# Patient Record
Sex: MALE | Race: WHITE | NOT HISPANIC OR LATINO
[De-identification: names, ages, dates, MRNs, and addresses within clinical notes are randomized per-mention and may not be internally consistent; named-entity substitution may affect disease eponyms.]

---

## 2018-08-17 ENCOUNTER — NON-APPOINTMENT (OUTPATIENT)
Age: 73
End: 2018-08-17

## 2018-08-17 ENCOUNTER — APPOINTMENT (OUTPATIENT)
Dept: CARDIOLOGY | Facility: CLINIC | Age: 73
End: 2018-08-17
Payer: MEDICARE

## 2018-08-17 VITALS
WEIGHT: 163 LBS | BODY MASS INDEX: 26.2 KG/M2 | DIASTOLIC BLOOD PRESSURE: 75 MMHG | OXYGEN SATURATION: 96 % | HEIGHT: 66 IN | HEART RATE: 65 BPM | SYSTOLIC BLOOD PRESSURE: 132 MMHG

## 2018-08-17 DIAGNOSIS — I25.2 OLD MYOCARDIAL INFARCTION: ICD-10-CM

## 2018-08-17 PROCEDURE — 93000 ELECTROCARDIOGRAM COMPLETE: CPT

## 2018-08-17 PROCEDURE — 99204 OFFICE O/P NEW MOD 45 MIN: CPT | Mod: 25

## 2018-08-29 ENCOUNTER — APPOINTMENT (OUTPATIENT)
Dept: CARDIOLOGY | Facility: CLINIC | Age: 73
End: 2018-08-29
Payer: MEDICARE

## 2018-09-24 ENCOUNTER — EMERGENCY (EMERGENCY)
Facility: HOSPITAL | Age: 73
LOS: 1 days | Discharge: HOME | End: 2018-09-24
Attending: EMERGENCY MEDICINE
Payer: MEDICARE

## 2018-09-24 VITALS — WEIGHT: 164.91 LBS | HEIGHT: 66 IN

## 2018-09-24 VITALS
HEART RATE: 60 BPM | OXYGEN SATURATION: 97 % | TEMPERATURE: 98 F | RESPIRATION RATE: 17 BRPM | SYSTOLIC BLOOD PRESSURE: 150 MMHG | DIASTOLIC BLOOD PRESSURE: 74 MMHG

## 2018-09-24 LAB
ALBUMIN SERPL ELPH-MCNC: 4.4 G/DL — SIGNIFICANT CHANGE UP (ref 3.3–5.2)
ALP SERPL-CCNC: 52 U/L — SIGNIFICANT CHANGE UP (ref 40–120)
ALT FLD-CCNC: 33 U/L — SIGNIFICANT CHANGE UP
ANION GAP SERPL CALC-SCNC: 9 MMOL/L — SIGNIFICANT CHANGE UP (ref 5–17)
APPEARANCE UR: CLEAR — SIGNIFICANT CHANGE UP
APTT BLD: 30.8 SEC — SIGNIFICANT CHANGE UP (ref 27.5–37.4)
AST SERPL-CCNC: 28 U/L — SIGNIFICANT CHANGE UP
BASOPHILS # BLD AUTO: 0 K/UL — SIGNIFICANT CHANGE UP (ref 0–0.2)
BASOPHILS NFR BLD AUTO: 0.5 % — SIGNIFICANT CHANGE UP (ref 0–2)
BILIRUB SERPL-MCNC: 0.2 MG/DL — LOW (ref 0.4–2)
BILIRUB UR-MCNC: NEGATIVE — SIGNIFICANT CHANGE UP
BUN SERPL-MCNC: 22 MG/DL — HIGH (ref 8–20)
CALCIUM SERPL-MCNC: 9.4 MG/DL — SIGNIFICANT CHANGE UP (ref 8.6–10.2)
CHLORIDE SERPL-SCNC: 99 MMOL/L — SIGNIFICANT CHANGE UP (ref 98–107)
CO2 SERPL-SCNC: 31 MMOL/L — HIGH (ref 22–29)
COLOR SPEC: YELLOW — SIGNIFICANT CHANGE UP
CREAT SERPL-MCNC: 0.89 MG/DL — SIGNIFICANT CHANGE UP (ref 0.5–1.3)
DIFF PNL FLD: ABNORMAL
EOSINOPHIL # BLD AUTO: 0.2 K/UL — SIGNIFICANT CHANGE UP (ref 0–0.5)
EOSINOPHIL NFR BLD AUTO: 3.9 % — SIGNIFICANT CHANGE UP (ref 0–5)
GLUCOSE SERPL-MCNC: 92 MG/DL — SIGNIFICANT CHANGE UP (ref 70–115)
GLUCOSE UR QL: NEGATIVE MG/DL — SIGNIFICANT CHANGE UP
HCT VFR BLD CALC: 43.6 % — SIGNIFICANT CHANGE UP (ref 42–52)
HGB BLD-MCNC: 14.1 G/DL — SIGNIFICANT CHANGE UP (ref 14–18)
INR BLD: 0.95 RATIO — SIGNIFICANT CHANGE UP (ref 0.88–1.16)
KETONES UR-MCNC: NEGATIVE — SIGNIFICANT CHANGE UP
LEUKOCYTE ESTERASE UR-ACNC: ABNORMAL
LYMPHOCYTES # BLD AUTO: 1.8 K/UL — SIGNIFICANT CHANGE UP (ref 1–4.8)
LYMPHOCYTES # BLD AUTO: 32.6 % — SIGNIFICANT CHANGE UP (ref 20–55)
MCHC RBC-ENTMCNC: 29.7 PG — SIGNIFICANT CHANGE UP (ref 27–31)
MCHC RBC-ENTMCNC: 32.3 G/DL — SIGNIFICANT CHANGE UP (ref 32–36)
MCV RBC AUTO: 91.8 FL — SIGNIFICANT CHANGE UP (ref 80–94)
MONOCYTES # BLD AUTO: 0.5 K/UL — SIGNIFICANT CHANGE UP (ref 0–0.8)
MONOCYTES NFR BLD AUTO: 9.1 % — SIGNIFICANT CHANGE UP (ref 3–10)
NEUTROPHILS # BLD AUTO: 3 K/UL — SIGNIFICANT CHANGE UP (ref 1.8–8)
NEUTROPHILS NFR BLD AUTO: 53.7 % — SIGNIFICANT CHANGE UP (ref 37–73)
NITRITE UR-MCNC: NEGATIVE — SIGNIFICANT CHANGE UP
PH UR: 8 — SIGNIFICANT CHANGE UP (ref 5–8)
PLATELET # BLD AUTO: 246 K/UL — SIGNIFICANT CHANGE UP (ref 150–400)
POTASSIUM SERPL-MCNC: 4.2 MMOL/L — SIGNIFICANT CHANGE UP (ref 3.5–5.3)
POTASSIUM SERPL-SCNC: 4.2 MMOL/L — SIGNIFICANT CHANGE UP (ref 3.5–5.3)
PROT SERPL-MCNC: 7.6 G/DL — SIGNIFICANT CHANGE UP (ref 6.6–8.7)
PROT UR-MCNC: 30 MG/DL
PROTHROM AB SERPL-ACNC: 10.4 SEC — SIGNIFICANT CHANGE UP (ref 9.8–12.7)
RBC # BLD: 4.75 M/UL — SIGNIFICANT CHANGE UP (ref 4.6–6.2)
RBC # FLD: 13.4 % — SIGNIFICANT CHANGE UP (ref 11–15.6)
SODIUM SERPL-SCNC: 139 MMOL/L — SIGNIFICANT CHANGE UP (ref 135–145)
SP GR SPEC: 1.01 — SIGNIFICANT CHANGE UP (ref 1.01–1.02)
UROBILINOGEN FLD QL: NEGATIVE MG/DL — SIGNIFICANT CHANGE UP
WBC # BLD: 5.6 K/UL — SIGNIFICANT CHANGE UP (ref 4.8–10.8)
WBC # FLD AUTO: 5.6 K/UL — SIGNIFICANT CHANGE UP (ref 4.8–10.8)

## 2018-09-24 PROCEDURE — 80053 COMPREHEN METABOLIC PANEL: CPT

## 2018-09-24 PROCEDURE — 85610 PROTHROMBIN TIME: CPT

## 2018-09-24 PROCEDURE — 85730 THROMBOPLASTIN TIME PARTIAL: CPT

## 2018-09-24 PROCEDURE — 36415 COLL VENOUS BLD VENIPUNCTURE: CPT

## 2018-09-24 PROCEDURE — 99284 EMERGENCY DEPT VISIT MOD MDM: CPT | Mod: 25

## 2018-09-24 PROCEDURE — 99284 EMERGENCY DEPT VISIT MOD MDM: CPT

## 2018-09-24 PROCEDURE — 85027 COMPLETE CBC AUTOMATED: CPT

## 2018-09-24 PROCEDURE — 74176 CT ABD & PELVIS W/O CONTRAST: CPT | Mod: 26

## 2018-09-24 PROCEDURE — 74176 CT ABD & PELVIS W/O CONTRAST: CPT

## 2018-09-24 PROCEDURE — 81001 URINALYSIS AUTO W/SCOPE: CPT

## 2018-09-24 RX ORDER — ACETAMINOPHEN 500 MG
2 TABLET ORAL
Qty: 18 | Refills: 0
Start: 2018-09-24 | End: 2018-09-26

## 2018-09-24 RX ORDER — CEPHALEXIN 500 MG
1 CAPSULE ORAL
Qty: 28 | Refills: 0
Start: 2018-09-24 | End: 2018-09-30

## 2018-09-24 RX ORDER — TAMSULOSIN HYDROCHLORIDE 0.4 MG/1
1 CAPSULE ORAL
Qty: 14 | Refills: 0
Start: 2018-09-24 | End: 2018-10-07

## 2018-09-24 NOTE — ED PROVIDER NOTE - PROGRESS NOTE DETAILS
labs and UA reviewed +hematuria, pending CT CT + recently passed stone, UA +UTI, will RX Keflex, flomax, NSAIDS, f/u with urology Pt well appearing. Likely recently passed stone and pain is controlled. Mild pyuria, and qwill give Abx.    exam performed, uneven sized testicles, L atrophic, no swelling, no erythema/induration, +cremasteric reflex Pt well appearing. Likely recently passed stone and pain is controlled. Mild pyuria, and will give Abx.    exam performed, uneven sized testicles, L atrophic, no swelling, no erythema/induration, +cremasteric reflex. Pt stable for dc

## 2018-09-24 NOTE — ED STATDOCS - OBJECTIVE STATEMENT
Telemedicine assessment was conducted (using real time 2 way audio-video technology) by Dr. Olman Dhaliwal located at [address] [place] [address]  ++++++++++++++++++++++++  Pertinent patient history and initial plan:  74 yo male with hx htn, cad with stents, on plavix, c/o few days of left lower back and left testicular pain. +hematuria this am. denies fever or chills. denies nausea or vomiting  no acute distress.   will check labs, UA, ct to r/o renal colic  unable to perform  exam due to teleconference  Patient seen by me in intake for initial assessment and ordering. Physician on site to follow results and further evaluate and treat patient.

## 2018-09-24 NOTE — ED PROVIDER NOTE - CARE PLAN
Principal Discharge DX:	Hematuria Principal Discharge DX:	Hematuria  Secondary Diagnosis:	Kidney stone

## 2018-09-24 NOTE — ED PROVIDER NOTE - PSH
CAD S/P percutaneous coronary angioplasty  Stent to Proximal LAD and Proximal CX  H/O arthroscopy of left knee    H/O foot surgery  Right Foot  H/O inguinal hernia repair    Hx of cholecystectomy

## 2018-09-24 NOTE — ED PROVIDER NOTE - ATTENDING CONTRIBUTION TO CARE
I, Olman Sutton, performed the initial face to face bedside interview with this patient regarding history of present illness, review of symptoms and relevant past medical, social and family history.  I completed an independent physical examination.  I was the initial provider who evaluated this patient. I have signed out the follow up of any pending tests (i.e. labs, radiological studies) to the ACP.  I have communicated the patient’s plan of care and disposition with the ACP.

## 2018-09-24 NOTE — ED PROVIDER NOTE - PMH
Asthma    CAD (coronary artery disease)    Dyslipidemia    GERD (gastroesophageal reflux disease)    Myocardial infarction  x 3  Rheumatoid arthritis    Tinnitus

## 2018-09-24 NOTE — ED PROVIDER NOTE - OBJECTIVE STATEMENT
74 y/o M presents to ED c/o hematuria onset today. Has also had intermittent lower back pain (rated 6/10) for a few weeks, which he initially attributed to his arthritis. Has also had L testicular pain for the past month, which he was evaluated for by his PMD. Denies fevers, chills, nausea, vomiting, numbness or tingling in the legs. No further acute complaints at this time.

## 2018-09-24 NOTE — ED ADULT NURSE NOTE - OBJECTIVE STATEMENT
pt arrived with left side flank pain 5/10 that started today pt states blood in urine denies fever, denies vomtiing

## 2018-09-24 NOTE — ED ADULT NURSE NOTE - NSIMPLEMENTINTERV_GEN_ALL_ED
Implemented All Universal Safety Interventions:  [place] to call system. Call bell, personal items and telephone within reach. Instruct patient to call for assistance. Room bathroom lighting operational. Non-slip footwear when patient is off stretcher. Physically safe environment: no spills, clutter or unnecessary equipment. Stretcher in lowest position, wheels locked, appropriate side rails in place.

## 2018-10-09 ENCOUNTER — APPOINTMENT (OUTPATIENT)
Dept: RHEUMATOLOGY | Facility: CLINIC | Age: 73
End: 2018-10-09
Payer: MEDICARE

## 2018-10-09 VITALS
SYSTOLIC BLOOD PRESSURE: 151 MMHG | BODY MASS INDEX: 25.71 KG/M2 | HEART RATE: 66 BPM | WEIGHT: 160 LBS | RESPIRATION RATE: 16 BRPM | HEIGHT: 66 IN | DIASTOLIC BLOOD PRESSURE: 79 MMHG | TEMPERATURE: 97.8 F | OXYGEN SATURATION: 100 %

## 2018-10-09 DIAGNOSIS — Z87.39 PERSONAL HISTORY OF OTHER DISEASES OF THE MUSCULOSKELETAL SYSTEM AND CONNECTIVE TISSUE: ICD-10-CM

## 2018-10-09 PROCEDURE — 99204 OFFICE O/P NEW MOD 45 MIN: CPT | Mod: 25

## 2018-10-09 PROCEDURE — 36415 COLL VENOUS BLD VENIPUNCTURE: CPT

## 2018-10-26 ENCOUNTER — APPOINTMENT (OUTPATIENT)
Dept: CARDIOLOGY | Facility: CLINIC | Age: 73
End: 2018-10-26
Payer: MEDICARE

## 2018-10-26 PROCEDURE — 93978 VASCULAR STUDY: CPT

## 2018-10-26 PROCEDURE — 93306 TTE W/DOPPLER COMPLETE: CPT

## 2018-11-16 ENCOUNTER — NON-APPOINTMENT (OUTPATIENT)
Age: 73
End: 2018-11-16

## 2018-11-16 ENCOUNTER — APPOINTMENT (OUTPATIENT)
Dept: CARDIOLOGY | Facility: CLINIC | Age: 73
End: 2018-11-16
Payer: MEDICARE

## 2018-11-16 VITALS
BODY MASS INDEX: 26.2 KG/M2 | WEIGHT: 163 LBS | HEIGHT: 66 IN | SYSTOLIC BLOOD PRESSURE: 148 MMHG | HEART RATE: 62 BPM | OXYGEN SATURATION: 96 % | DIASTOLIC BLOOD PRESSURE: 78 MMHG

## 2018-11-16 PROCEDURE — 99215 OFFICE O/P EST HI 40 MIN: CPT | Mod: 25

## 2018-11-16 PROCEDURE — 93000 ELECTROCARDIOGRAM COMPLETE: CPT

## 2018-11-16 NOTE — REVIEW OF SYSTEMS
[see HPI] : see HPI [Shortness Of Breath] : shortness of breath [Dyspnea on exertion] : not dyspnea during exertion [Chest  Pressure] : no chest pressure [Chest Pain] : no chest pain [Lower Ext Edema] : no extremity edema [Palpitations] : no palpitations [Cough] : cough [Wheezing] : wheezing [Joint Pain] : joint pain [Muscle Cramps] : muscle cramps [Easy Bleeding] : a tendency for easy bleeding [Easy Bruising] : no tendency for easy bruising [Negative] : Endocrine

## 2018-11-16 NOTE — HISTORY OF PRESENT ILLNESS
[FreeTextEntry1] : Historical HPI\par This is a 72 year old male with history of MI, coronary artery disease, multiple coronary artery stents, hypertension, asthma, and hypercholesterolemia presents to establish care with a new cardiologist. Patient states that he has not seen a cardiologist in a few years. His PCP is Dr. Rankin in [place]. [place] and is trying to establish care with physicians closer to his home. Patient denies any chest pain, SOB, or palpitations. He states he walks almost daily with no exertional chest pain or shortness of breath. He is compliant with his medications. He takes Plavix. He used to be on Aspirin and Plavix but patient gets frequent nose bleeds so the Aspirin was stopped in the past. Patient was on Crestor and Atorvastatin in the past but they were stopped due to complaints of muscle pains. Patient has been on pravastatin 80mg with no complaints. \par \par Today's HPI:\par Patient now on Zetia for cholesterol because he cannot tolerate statin therapy. Patient denies chest pain or palpitations. He states he walks the dogs for about 1 mile daily without symptoms. He does admit to mild SOB with walking upstairs.

## 2018-11-16 NOTE — PHYSICAL EXAM
[General Appearance - Well Developed] : well developed [Normal Appearance] : normal appearance [Well Groomed] : well groomed [General Appearance - Well Nourished] : well nourished [General Appearance - In No Acute Distress] : no acute distress [Normal Conjunctiva] : the conjunctiva exhibited no abnormalities [Eyelids - No Xanthelasma] : the eyelids demonstrated no xanthelasmas [Normal Oral Mucosa] : normal oral mucosa [No Oral Pallor] : no oral pallor [No Oral Cyanosis] : no oral cyanosis [Respiration, Rhythm And Depth] : normal respiratory rhythm and effort [Exaggerated Use Of Accessory Muscles For Inspiration] : no accessory muscle use [Auscultation Breath Sounds / Voice Sounds] : lungs were clear to auscultation bilaterally [Heart Rate And Rhythm] : heart rate and rhythm were normal [Heart Sounds] : normal S1 and S2 [Murmurs] : no murmurs present [Arterial Pulses Normal] : the arterial pulses were normal [Edema] : no peripheral edema present [Bowel Sounds] : normal bowel sounds [Abdomen Soft] : soft [Abdomen Tenderness] : non-tender [FreeTextEntry1] : ambulates with a cane. [Nail Clubbing] : no clubbing of the fingernails [Cyanosis, Localized] : no localized cyanosis [Skin Turgor] : normal skin turgor [] : no rash [Impaired Insight] : insight and judgment were intact [Affect] : the affect was normal [Memory Recent] : recent memory was not impaired

## 2018-11-16 NOTE — DISCUSSION/SUMMARY
[FreeTextEntry1] : 1. Coronary Artery Disease: patient has previous stents. Repeat cardiac catheterization in 2014 demonstrates patency of stents. Continue Plavix, Zetia, and Metoprolol (high risk medication with no signs of toxicity on ECG). Encouraged continuation of daily walking as long as chest pain free.\par \par 2. Hypercholesterolemia: uncontrolled. Goal LDL for this patient would be less than 70. Patient was on Zetia in the past, however he states it was stopped for some unknown reason. Recommend lipid panel in at next visit.\par \par 3. Former Smoker: patient underwent screening US of AAA on 10/26/2018. It was normal.

## 2018-11-21 ENCOUNTER — MEDICATION RENEWAL (OUTPATIENT)
Age: 73
End: 2018-11-21

## 2018-12-06 ENCOUNTER — RX CHANGE (OUTPATIENT)
Age: 73
End: 2018-12-06

## 2018-12-11 ENCOUNTER — APPOINTMENT (OUTPATIENT)
Dept: RHEUMATOLOGY | Facility: CLINIC | Age: 73
End: 2018-12-11
Payer: MEDICARE

## 2018-12-11 PROCEDURE — 99214 OFFICE O/P EST MOD 30 MIN: CPT

## 2019-01-02 ENCOUNTER — RX CHANGE (OUTPATIENT)
Age: 74
End: 2019-01-02

## 2019-01-15 ENCOUNTER — APPOINTMENT (OUTPATIENT)
Dept: RHEUMATOLOGY | Facility: CLINIC | Age: 74
End: 2019-01-15
Payer: MEDICARE

## 2019-01-15 VITALS
SYSTOLIC BLOOD PRESSURE: 117 MMHG | OXYGEN SATURATION: 95 % | DIASTOLIC BLOOD PRESSURE: 70 MMHG | HEART RATE: 82 BPM | RESPIRATION RATE: 17 BRPM

## 2019-01-15 DIAGNOSIS — M19.042 PRIMARY OSTEOARTHRITIS, RIGHT HAND: ICD-10-CM

## 2019-01-15 DIAGNOSIS — M19.041 PRIMARY OSTEOARTHRITIS, RIGHT HAND: ICD-10-CM

## 2019-01-15 PROCEDURE — 99214 OFFICE O/P EST MOD 30 MIN: CPT

## 2019-01-17 ENCOUNTER — CHART COPY (OUTPATIENT)
Age: 74
End: 2019-01-17

## 2019-01-24 LAB
A PHAGOCYTOPH IGG TITR SER IF: NORMAL TITER
ALBUMIN SERPL ELPH-MCNC: 4.7 G/DL
ALP BLD-CCNC: 57 U/L
ALT SERPL-CCNC: 36 U/L
ANA SER IF-ACNC: NEGATIVE
ANION GAP SERPL CALC-SCNC: 14 MMOL/L
AST SERPL-CCNC: 29 U/L
B BURGDOR AB SER QL IA: NEGATIVE
B BURGDOR AB SER-IMP: NEGATIVE
B BURGDOR IGM PATRN SER IB-IMP: NEGATIVE
B BURGDOR18/20KD IGM SER QL IB: NORMAL
B BURGDOR18KD IGG SER QL IB: NORMAL
B BURGDOR23KD IGG SER QL IB: NORMAL
B BURGDOR23KD IGM SER QL IB: NORMAL
B BURGDOR28KD AB SER QL IB: NORMAL
B BURGDOR28KD IGG SER QL IB: NORMAL
B BURGDOR30KD AB SER QL IB: NORMAL
B BURGDOR30KD IGG SER QL IB: NORMAL
B BURGDOR31KD IGG SER QL IB: NORMAL
B BURGDOR31KD IGM SER QL IB: NORMAL
B BURGDOR39KD IGG SER QL IB: NORMAL
B BURGDOR39KD IGM SER QL IB: NORMAL
B BURGDOR41KD IGG SER QL IB: PRESENT
B BURGDOR41KD IGM SER QL IB: NORMAL
B BURGDOR45KD AB SER QL IB: NORMAL
B BURGDOR45KD IGG SER QL IB: NORMAL
B BURGDOR58KD AB SER QL IB: NORMAL
B BURGDOR58KD IGG SER QL IB: NORMAL
B BURGDOR66KD IGG SER QL IB: NORMAL
B BURGDOR66KD IGM SER QL IB: NORMAL
B BURGDOR93KD IGG SER QL IB: NORMAL
B BURGDOR93KD IGM SER QL IB: NORMAL
B MICROTI IGG TITR SER: NORMAL TITER
BASOPHILS # BLD AUTO: 0.02 K/UL
BASOPHILS NFR BLD AUTO: 0.4 %
BILIRUB SERPL-MCNC: 0.3 MG/DL
BUN SERPL-MCNC: 20 MG/DL
CALCIUM SERPL-MCNC: 10.4 MG/DL
CCP AB SER IA-ACNC: <8 UNITS
CHLORIDE SERPL-SCNC: 101 MMOL/L
CO2 SERPL-SCNC: 27 MMOL/L
CREAT SERPL-MCNC: 1.12 MG/DL
CRP SERPL-MCNC: 0.47 MG/DL
E CHAFFEENSIS IGG TITR SER IF: NORMAL TITER
EOSINOPHIL # BLD AUTO: 0.23 K/UL
EOSINOPHIL NFR BLD AUTO: 4.6 %
ERYTHROCYTE [SEDIMENTATION RATE] IN BLOOD BY WESTERGREN METHOD: 8 MM/HR
GLUCOSE SERPL-MCNC: 106 MG/DL
HCT VFR BLD CALC: 43.4 %
HGB BLD-MCNC: 14.4 G/DL
IMM GRANULOCYTES NFR BLD AUTO: 0.2 %
LYMPHOCYTES # BLD AUTO: 1.62 K/UL
LYMPHOCYTES NFR BLD AUTO: 32.1 %
MAN DIFF?: NORMAL
MCHC RBC-ENTMCNC: 30.7 PG
MCHC RBC-ENTMCNC: 33.2 GM/DL
MCV RBC AUTO: 92.5 FL
MONOCYTES # BLD AUTO: 0.42 K/UL
MONOCYTES NFR BLD AUTO: 8.3 %
NEUTROPHILS # BLD AUTO: 2.74 K/UL
NEUTROPHILS NFR BLD AUTO: 54.4 %
PLATELET # BLD AUTO: 287 K/UL
POTASSIUM SERPL-SCNC: 5.6 MMOL/L
PROT SERPL-MCNC: 7.4 G/DL
RBC # BLD: 4.69 M/UL
RBC # FLD: 13.8 %
RF+CCP IGG SER-IMP: NEGATIVE
RHEUMATOID FACT SER QL: <10 IU/ML
SODIUM SERPL-SCNC: 142 MMOL/L
URATE SERPL-MCNC: 4.9 MG/DL
WBC # FLD AUTO: 5.04 K/UL

## 2019-02-12 ENCOUNTER — RX RENEWAL (OUTPATIENT)
Age: 74
End: 2019-02-12

## 2019-02-15 ENCOUNTER — APPOINTMENT (OUTPATIENT)
Dept: CARDIOLOGY | Facility: CLINIC | Age: 74
End: 2019-02-15
Payer: MEDICARE

## 2019-02-15 ENCOUNTER — NON-APPOINTMENT (OUTPATIENT)
Age: 74
End: 2019-02-15

## 2019-02-15 VITALS
HEIGHT: 66 IN | SYSTOLIC BLOOD PRESSURE: 138 MMHG | WEIGHT: 156 LBS | BODY MASS INDEX: 25.07 KG/M2 | DIASTOLIC BLOOD PRESSURE: 78 MMHG | HEART RATE: 68 BPM | OXYGEN SATURATION: 99 %

## 2019-02-15 PROCEDURE — 99215 OFFICE O/P EST HI 40 MIN: CPT | Mod: 25

## 2019-02-15 PROCEDURE — 93000 ELECTROCARDIOGRAM COMPLETE: CPT

## 2019-02-15 NOTE — REVIEW OF SYSTEMS
[see HPI] : see HPI [Shortness Of Breath] : shortness of breath [Cough] : cough [Wheezing] : wheezing [Joint Pain] : joint pain [Muscle Cramps] : muscle cramps [Easy Bleeding] : a tendency for easy bleeding [Negative] : Endocrine [Dyspnea on exertion] : not dyspnea during exertion [Chest  Pressure] : no chest pressure [Chest Pain] : no chest pain [Lower Ext Edema] : no extremity edema [Palpitations] : no palpitations [Easy Bruising] : no tendency for easy bruising

## 2019-02-15 NOTE — DISCUSSION/SUMMARY
[FreeTextEntry1] : 1. Coronary Artery Disease: Most recent cardiac catheterization was performed 5/1/2015 which demonstrated mild diffuse disease of the LAD with a patent stent that was previously placed. The Cx had mild diffused disease, the 1st marginal had a 99% lesion which underwent PCI with ROQUE, and there was 100% stenosis of the RCA and the distal vessel fills via collaterals from the left system. Continue Plavix, Zetia, and Metoprolol (high risk medication with no signs of toxicity on ECG). Encouraged continuation of daily walking as long as chest pain free.\par \par 2. Hypercholesterolemia: uncontrolled. Goal LDL for this patient would be less than 70. Patient was on multiple statins in the past and he developed significant myalgias requiring stoppage of the medicaitons. Currently on Zetia. Recommend lipid panel in at next visit.\par \par 3. Former Smoker: patient underwent screening US of AAA on 10/26/2018. It was normal. \par \par Patient is cleared from a cardiology standpoint to undergo surgical intervention on his left testicle. Please note I don't have access to pre-surgical lab testing. It is OK to stop his Plavix 5 days prior to the procedure and this medication should be resumed as soon as adequate hemostasis is obtained, preferably within 24 hours of the procedure.

## 2019-02-15 NOTE — PHYSICAL EXAM
[General Appearance - Well Developed] : well developed [Normal Appearance] : normal appearance [Well Groomed] : well groomed [General Appearance - Well Nourished] : well nourished [General Appearance - In No Acute Distress] : no acute distress [Normal Conjunctiva] : the conjunctiva exhibited no abnormalities [Eyelids - No Xanthelasma] : the eyelids demonstrated no xanthelasmas [Normal Oral Mucosa] : normal oral mucosa [No Oral Pallor] : no oral pallor [No Oral Cyanosis] : no oral cyanosis [Respiration, Rhythm And Depth] : normal respiratory rhythm and effort [Exaggerated Use Of Accessory Muscles For Inspiration] : no accessory muscle use [Auscultation Breath Sounds / Voice Sounds] : lungs were clear to auscultation bilaterally [Heart Rate And Rhythm] : heart rate and rhythm were normal [Heart Sounds] : normal S1 and S2 [Murmurs] : no murmurs present [Arterial Pulses Normal] : the arterial pulses were normal [Edema] : no peripheral edema present [Bowel Sounds] : normal bowel sounds [Abdomen Soft] : soft [Abdomen Tenderness] : non-tender [Nail Clubbing] : no clubbing of the fingernails [Cyanosis, Localized] : no localized cyanosis [Skin Turgor] : normal skin turgor [] : no rash [Impaired Insight] : insight and judgment were intact [Affect] : the affect was normal [Memory Recent] : recent memory was not impaired [FreeTextEntry1] : ambulates with a cane.

## 2019-02-15 NOTE — HISTORY OF PRESENT ILLNESS
[FreeTextEntry1] : Historical HPI\par This is a 72 year old male with history of MI, coronary artery disease, multiple coronary artery stents, hypertension, asthma, and hypercholesterolemia presents to establish care with a new cardiologist. Patient states that he has not seen a cardiologist in a few years. His PCP is Dr. Rankin in [place]. The patient moved out east and is trying to establish care with physicians closer to his home. Patient denies any chest pain, SOB, or palpitations. He states he walks almost daily with no exertional chest pain or shortness of breath. He is compliant with his medications. He takes Plavix. He used to be on Aspirin and Plavix but patient gets frequent nose bleeds so the Aspirin was stopped in the past. Patient was on Crestor, Pravastatin, and Atorvastatin in the past but they were stopped due to complaints of muscle pains. \par \par Today's HPI:\par Patient now on Zetia for cholesterol because he cannot tolerate statin therapy. Patient denies chest pain or palpitations. He states he walks the dogs for about 1 mile daily without symptoms. He does admit to mild SOB with walking upstairs. Patient is going to have his left testicle surgically removed on February 26, 2019 and need cardiac clearance.

## 2019-04-09 ENCOUNTER — APPOINTMENT (OUTPATIENT)
Dept: RHEUMATOLOGY | Facility: CLINIC | Age: 74
End: 2019-04-09
Payer: MEDICARE

## 2019-04-09 VITALS
HEART RATE: 63 BPM | TEMPERATURE: 97.5 F | OXYGEN SATURATION: 98 % | DIASTOLIC BLOOD PRESSURE: 70 MMHG | SYSTOLIC BLOOD PRESSURE: 151 MMHG

## 2019-04-09 PROCEDURE — 99213 OFFICE O/P EST LOW 20 MIN: CPT

## 2019-05-22 ENCOUNTER — NON-APPOINTMENT (OUTPATIENT)
Age: 74
End: 2019-05-22

## 2019-05-22 ENCOUNTER — APPOINTMENT (OUTPATIENT)
Age: 74
End: 2019-05-22
Payer: MEDICARE

## 2019-05-22 VITALS
HEIGHT: 66 IN | HEART RATE: 72 BPM | SYSTOLIC BLOOD PRESSURE: 127 MMHG | BODY MASS INDEX: 25.71 KG/M2 | DIASTOLIC BLOOD PRESSURE: 77 MMHG | OXYGEN SATURATION: 98 % | WEIGHT: 160 LBS

## 2019-05-22 PROCEDURE — 99215 OFFICE O/P EST HI 40 MIN: CPT | Mod: 25

## 2019-05-22 PROCEDURE — 93000 ELECTROCARDIOGRAM COMPLETE: CPT

## 2019-05-22 NOTE — HISTORY OF PRESENT ILLNESS
[FreeTextEntry1] : Historical HPI\par This is a 72 year old male with history of MI, coronary artery disease, multiple coronary artery stents, hypertension, asthma, and hypercholesterolemia presents to establish care with a new cardiologist. Patient states that he has not seen a cardiologist in a few years. His PCP is Dr. Rankin in [place]. The patient moved out east and is trying to establish care with physicians closer to his home. Patient denies any chest pain, SOB, or palpitations. He states he walks almost daily with no exertional chest pain or shortness of breath. He is compliant with his medications. He takes Plavix. He used to be on Aspirin and Plavix but patient gets frequent nose bleeds so the Aspirin was stopped in the past. Patient was on Crestor, Pravastatin, and Atorvastatin in the past but they were stopped due to complaints of muscle pains. \par \par Today's HPI:\par Patient now on Zetia for cholesterol because he cannot tolerate statin therapy. Patient denies chest pain or palpitations. He states he walks the dogs for about 1 mile daily without symptoms. He does admit to mild SOB with walking upstairs.

## 2019-05-22 NOTE — DISCUSSION/SUMMARY
[FreeTextEntry1] : 1. Coronary Artery Disease: Most recent cardiac catheterization was performed 5/1/2015 which demonstrated mild diffuse disease of the LAD with a patent stent that was previously placed. The Cx had mild diffused disease, the 1st marginal had a 99% lesion which underwent PCI with ROQUE, and there was 100% stenosis of the RCA and the distal vessel fills via collaterals from the left system. Continue Plavix, Zetia, and Metoprolol (high risk medication with no signs of toxicity on ECG). Encouraged continuation of daily walking as long as chest pain free.\par \par 2. Hypercholesterolemia: uncontrolled. Goal LDL for this patient would be less than 70. Patient was on multiple statins in the past and he developed significant myalgias requiring stoppage of the medications. Currently on Zetia. Recommend lipid panel.\par \par 3. Former Smoker: patient underwent screening US of AAA on 10/26/2018. It was normal. \par \par Follow up in 3 months or sooner if needed.

## 2019-08-12 ENCOUNTER — APPOINTMENT (OUTPATIENT)
Dept: RHEUMATOLOGY | Facility: CLINIC | Age: 74
End: 2019-08-12

## 2019-09-04 ENCOUNTER — APPOINTMENT (OUTPATIENT)
Dept: CARDIOLOGY | Facility: CLINIC | Age: 74
End: 2019-09-04

## 2019-09-12 ENCOUNTER — APPOINTMENT (OUTPATIENT)
Dept: RHEUMATOLOGY | Facility: CLINIC | Age: 74
End: 2019-09-12
Payer: MEDICARE

## 2019-09-12 VITALS
RESPIRATION RATE: 16 BRPM | BODY MASS INDEX: 25.71 KG/M2 | OXYGEN SATURATION: 96 % | DIASTOLIC BLOOD PRESSURE: 69 MMHG | WEIGHT: 160 LBS | HEIGHT: 66 IN | HEART RATE: 89 BPM | SYSTOLIC BLOOD PRESSURE: 121 MMHG

## 2019-09-12 DIAGNOSIS — G56.02 CARPAL TUNNEL SYNDROME, LEFT UPPER LIMB: ICD-10-CM

## 2019-09-12 DIAGNOSIS — M17.10 UNILATERAL PRIMARY OSTEOARTHRITIS, UNSPECIFIED KNEE: ICD-10-CM

## 2019-09-12 DIAGNOSIS — M47.812 SPONDYLOSIS W/OUT MYELOPATHY OR RADICULOPATHY, CERVICAL REGION: ICD-10-CM

## 2019-09-12 PROCEDURE — 99214 OFFICE O/P EST MOD 30 MIN: CPT

## 2019-09-17 ENCOUNTER — NON-APPOINTMENT (OUTPATIENT)
Age: 74
End: 2019-09-17

## 2019-09-17 ENCOUNTER — APPOINTMENT (OUTPATIENT)
Dept: CARDIOLOGY | Facility: CLINIC | Age: 74
End: 2019-09-17
Payer: MEDICARE

## 2019-09-17 VITALS
SYSTOLIC BLOOD PRESSURE: 140 MMHG | DIASTOLIC BLOOD PRESSURE: 80 MMHG | BODY MASS INDEX: 25.71 KG/M2 | HEIGHT: 66 IN | WEIGHT: 160 LBS | OXYGEN SATURATION: 96 % | HEART RATE: 88 BPM

## 2019-09-17 PROCEDURE — 93000 ELECTROCARDIOGRAM COMPLETE: CPT

## 2019-09-17 PROCEDURE — 99214 OFFICE O/P EST MOD 30 MIN: CPT | Mod: 25

## 2019-09-17 NOTE — ASSESSMENT
[FreeTextEntry1] : ECG performed today at the office revealed a NSR, with normal AQRS, MN, QRS and QTc. Q wave in V1-2. \par

## 2019-09-17 NOTE — REVIEW OF SYSTEMS
[Joint Pain] : joint pain [Muscle Cramps] : muscle cramps [Easy Bleeding] : a tendency for easy bleeding [Negative] : Respiratory [Dyspnea on exertion] : not dyspnea during exertion [Shortness Of Breath] : no shortness of breath [Chest  Pressure] : no chest pressure [Chest Pain] : no chest pain [Lower Ext Edema] : no extremity edema [Palpitations] : no palpitations [Wheezing] : no wheezing [Cough] : no cough [Easy Bruising] : no tendency for easy bruising

## 2019-09-17 NOTE — PHYSICAL EXAM
[General Appearance - Well Developed] : well developed [Normal Appearance] : normal appearance [Well Groomed] : well groomed [General Appearance - Well Nourished] : well nourished [General Appearance - In No Acute Distress] : no acute distress [Normal Conjunctiva] : the conjunctiva exhibited no abnormalities [Eyelids - No Xanthelasma] : the eyelids demonstrated no xanthelasmas [Normal Oral Mucosa] : normal oral mucosa [No Oral Pallor] : no oral pallor [No Oral Cyanosis] : no oral cyanosis [Respiration, Rhythm And Depth] : normal respiratory rhythm and effort [Exaggerated Use Of Accessory Muscles For Inspiration] : no accessory muscle use [Auscultation Breath Sounds / Voice Sounds] : lungs were clear to auscultation bilaterally [Heart Rate And Rhythm] : heart rate and rhythm were normal [Heart Sounds] : normal S1 and S2 [Murmurs] : no murmurs present [Arterial Pulses Normal] : the arterial pulses were normal [Edema] : no peripheral edema present [Abdomen Soft] : soft [Bowel Sounds] : normal bowel sounds [Abdomen Tenderness] : non-tender [Nail Clubbing] : no clubbing of the fingernails [Cyanosis, Localized] : no localized cyanosis [Skin Turgor] : normal skin turgor [] : no rash [Impaired Insight] : insight and judgment were intact [Affect] : the affect was normal [Memory Recent] : recent memory was not impaired [Normal Jugular Venous V Waves Present] : normal jugular venous V waves present [Chest Palpation] : palpation of the chest revealed no abnormalities [Veins - Varicosity Changes] : no varicosital changes were noted in the lower extremities [Abnormal Walk] : normal gait [Skin Color & Pigmentation] : normal skin color and pigmentation [Oriented To Time, Place, And Person] : oriented to person, place, and time [No Anxiety] : not feeling anxious [FreeTextEntry1] : ambulates with a cane.

## 2019-09-17 NOTE — DISCUSSION/SUMMARY
[FreeTextEntry1] : 1. Coronary Artery Disease: Most recent cardiac catheterization was performed 5/1/2015 which demonstrated mild diffuse disease of the LAD with a patent stent that was previously placed. The Cx had mild diffused disease, the 1st marginal had a 99% lesion which underwent PCI with ROQUE, and there was 100% stenosis of the RCA and the distal vessel fills via collaterals from the left system. Continue Plavix, Zetia, and Metoprolol (high risk medication with no signs of toxicity on ECG). Encouraged continuation of daily walking as long as chest pain free.\par \par 2. Hypercholesterolemia: uncontrolled. Goal LDL for this patient would be less than 70. Patient was on multiple statins in the past and he developed significant myalgias requiring stoppage of the medications. Currently on Zetia. Recommended PCSK9 inhibitors. Will start Praluent. \par Follow up in 3 months or sooner if needed.

## 2019-09-17 NOTE — HISTORY OF PRESENT ILLNESS
[FreeTextEntry1] : Historical HPI\par This is a 74 year old male with history of MI in 1988, coronary artery disease, multiple coronary artery stents, hypertension, asthma, and hypercholesterolemia. \par Last intervention was performed in 2015 that revealed patent stents in the LAD and LCx with a complete occlusion of the RCA. PCI was done to the OM1 successfully.  LVEF is 50% as per echo in 2018. \par Patient denies any chest pain, SOB, or palpitations. He states he walks almost daily with no exertional chest pain or shortness of breath. He is compliant with his medications. He takes Plavix. He used to be on Aspirin and Plavix but patient gets frequent nose bleeds so the Aspirin was stopped in the past. Patient was on Crestor, Pravastatin, and Atorvastatin in the past but they were stopped due to complaints of muscle pains. \par Patient now on Zetia for cholesterol because he cannot tolerate statin therapy. Patient denies chest pain or palpitations. He states he walks the dogs for about 1 mile daily without symptoms. He does admit to mild SOB with walking upstairs. \par Denies HTN or diabetes.\par Doesnt smoke, quit in 2001.\par Denies drinking alcohol\par Denies the use of illicit drugs

## 2019-09-23 ENCOUNTER — APPOINTMENT (OUTPATIENT)
Dept: NEUROLOGY | Facility: CLINIC | Age: 74
End: 2019-09-23
Payer: MEDICARE

## 2019-09-23 VITALS
HEIGHT: 66 IN | SYSTOLIC BLOOD PRESSURE: 130 MMHG | DIASTOLIC BLOOD PRESSURE: 80 MMHG | WEIGHT: 160 LBS | BODY MASS INDEX: 25.71 KG/M2

## 2019-09-23 PROCEDURE — 99204 OFFICE O/P NEW MOD 45 MIN: CPT

## 2019-09-23 NOTE — ASSESSMENT
[FreeTextEntry1] : This is a 74-year-old man who experiences tremor when his hand is partially flexed his arms stretched out while laying down. He has no parkinsonian signs. I will check an MRI of his brain to make sure there is no intracranial pathology that may be causing this. I did advise him to get a second carpal Tunnel wrist splint to keep his hand from flexing to see if this will help control the tremor. I will call her with the results of the MRI and see him back in the office as needed.

## 2019-09-23 NOTE — PHYSICAL EXAM
[Person] : oriented to person [Place] : oriented to place [Time] : oriented to time [Short Term Intact] : short term memory intact [Remote Intact] : remote memory intact [Registration Intact] : recent registration memory intact [Span Intact] : the attention span was normal [Concentration Intact] : normal concentrating ability [Visual Intact] : visual attention was ~T not ~L decreased [Naming Objects] : no difficulty naming common objects [Repeating Phrases] : no difficulty repeating a phrase [Fluency] : fluency intact [Comprehension] : comprehension intact [Current Events] : adequate knowledge of current events [Past History] : adequate knowledge of personal past history [Cranial Nerves Optic (II)] : visual acuity intact bilaterally,  visual fields full to confrontation, pupils equal round and reactive to light [Cranial Nerves Oculomotor (III)] : extraocular motion intact [Cranial Nerves Trigeminal (V)] : facial sensation intact symmetrically [Cranial Nerves Facial (VII)] : face symmetrical [Cranial Nerves Vestibulocochlear (VIII)] : hearing was intact bilaterally [Cranial Nerves Glossopharyngeal (IX)] : tongue and palate midline [Cranial Nerves Accessory (XI - Cranial And Spinal)] : head turning and shoulder shrug symmetric [Cranial Nerves Hypoglossal (XII)] : there was no tongue deviation with protrusion [Motor Strength] : muscle strength was normal in all four extremities [No Muscle Atrophy] : normal bulk in all four extremities [Paresis Pronator Drift Right-Sided] : no pronator drift on the right [Paresis Pronator Drift Left-Sided] : no pronator drift on the left [Motor Strength Upper Extremities Bilaterally] : strength was normal in both upper extremities [Motor Strength Lower Extremities Bilaterally] : strength was normal in both lower extremities [Sensation Tactile Decrease] : light touch was intact [Balance] : balance was intact [Tremor] : no tremor present [Coordination - Dysmetria Impaired Finger-to-Nose Bilateral] : not present [2+] : Patella left 2+ [Plantar Reflex Right Only] : abnormal on the right [PERRL With Normal Accommodation] : pupils were equal in size, round, reactive to light, with normal accommodation [Sclera] : the sclera and conjunctiva were normal [Extraocular Movements] : extraocular movements were intact [Optic Disc Abnormality] : the optic disc were normal in size and color [No APD] : no afferent pupillary defect [No LORETO] : no internuclear ophthalmoplegia [Full Visual Field] : full visual field [Edema] : there was no peripheral edema [Abnormal Walk] : normal gait [Involuntary Movements] : no involuntary movements were seen [Motor Tone] : muscle strength and tone were normal

## 2019-09-23 NOTE — CONSULT LETTER
[Dear  ___] : Dear  [unfilled], [Consult Letter:] : I had the pleasure of evaluating your patient, [unfilled]. [Please see my note below.] : Please see my note below. [Consult Closing:] : Thank you very much for allowing me to participate in the care of this patient.  If you have any questions, please do not hesitate to contact me. [Sincerely,] : Sincerely, [FreeTextEntry3] : Cali Reyna M.D., Ph.D. DPN-N\par [facility]\par Neurology at [place]\par Medical Director of Stroke Services\par [facility]\par

## 2019-09-23 NOTE — HISTORY OF PRESENT ILLNESS
[FreeTextEntry1] : Initial office visit September 23, 2019:\par This is a 74-year-old man who presents today for evaluation of tremor. He states that if he is laying down with his arms outstretched and his hand is gently flexed he'll have uncontrollable tremor. It stops and he underwent his his hand. He recently got a carpal tunnel wrist brace for his left hand which is not allow him to flex the fingers and this is stopped the tremor on the left hand but it still happens on the right hand. He doesn't have the tremor during the day when his car and up and about. He has no clear resting tremor currently. He is here today for neurologic evaluation of this.

## 2019-09-24 PROBLEM — M47.812 CERVICAL SPONDYLOSIS: Status: ACTIVE | Noted: 2018-10-09

## 2019-10-01 ENCOUNTER — FORM ENCOUNTER (OUTPATIENT)
Age: 74
End: 2019-10-01

## 2019-10-02 ENCOUNTER — OUTPATIENT (OUTPATIENT)
Dept: OUTPATIENT SERVICES | Facility: HOSPITAL | Age: 74
LOS: 1 days | End: 2019-10-02
Payer: MEDICARE

## 2019-10-02 ENCOUNTER — APPOINTMENT (OUTPATIENT)
Dept: MRI IMAGING | Facility: CLINIC | Age: 74
End: 2019-10-02
Payer: MEDICARE

## 2019-10-02 DIAGNOSIS — R25.1 TREMOR, UNSPECIFIED: ICD-10-CM

## 2019-10-02 PROCEDURE — 70551 MRI BRAIN STEM W/O DYE: CPT

## 2019-10-02 PROCEDURE — 70551 MRI BRAIN STEM W/O DYE: CPT | Mod: 26

## 2019-10-10 ENCOUNTER — MEDICATION RENEWAL (OUTPATIENT)
Age: 74
End: 2019-10-10

## 2019-10-22 ENCOUNTER — RX RENEWAL (OUTPATIENT)
Age: 74
End: 2019-10-22

## 2019-12-27 ENCOUNTER — APPOINTMENT (OUTPATIENT)
Dept: NEUROLOGY | Facility: CLINIC | Age: 74
End: 2019-12-27
Payer: MEDICARE

## 2019-12-27 VITALS
SYSTOLIC BLOOD PRESSURE: 120 MMHG | DIASTOLIC BLOOD PRESSURE: 80 MMHG | BODY MASS INDEX: 25.71 KG/M2 | HEIGHT: 66 IN | WEIGHT: 160 LBS

## 2019-12-27 PROCEDURE — 99213 OFFICE O/P EST LOW 20 MIN: CPT

## 2019-12-27 NOTE — HISTORY OF PRESENT ILLNESS
[FreeTextEntry1] : Initial office visit September 23, 2019:\par This is a 74-year-old man who presents today for evaluation of tremor. He states that if he is laying down with his arms outstretched and his hand is gently flexed he'll have uncontrollable tremor. It stops and he underwent his his hand. He recently got a carpal tunnel wrist brace for his left hand which is not allow him to flex the fingers and this is stopped the tremor on the left hand but it still happens on the right hand. He doesn't have the tremor during the day when his car and up and about. He has no clear resting tremor currently. He is here today for neurologic evaluation of this.\par \par Followup December 27, 2019:\par This is a 74-year-old man he presents today for evaluation of tremor. He continues to have tremor when he closes his fist he can be induced. He opens his hand that she stops. What is concerning now is that his wife is noticing it while he is sleeping. He she will see him with a flexed head and shaking arm. It can happen on either arm but appears to have been more the right than the left. The patient states that it can happen during the day and he states he can bring on, but when he tried to do maneuvers that bring on today the office did not occur. He is here today for neurologic followup.

## 2019-12-27 NOTE — DATA REVIEWED
[de-identified] : MRI of his brain was done on October 2, 2019 interviewed. It did not show stroke mass bleed. It did show significant amount as well associated changes along with mild atrophy. There was also enlarged perivascular spaces in the basal ganglia bilaterally.

## 2019-12-27 NOTE — ASSESSMENT
[FreeTextEntry1] : This is a 74-year-old man with tremor that seems to be induced when he closes his fist. His wife has seen him with a closed fist and shaking or melena he went to sleep. This would be concerned more for seizure to me. I will check an EEG. He has no Parkinsonian signs currently. I will call him with the results of the EEG and if negative refer him for movement disorder specialist consultation.

## 2019-12-27 NOTE — CONSULT LETTER
[Dear  ___] : Dear  [unfilled], [Consult Closing:] : Thank you very much for allowing me to participate in the care of this patient.  If you have any questions, please do not hesitate to contact me. [Please see my note below.] : Please see my note below. [Courtesy Letter:] : I had the pleasure of seeing your patient, [unfilled], in my office today. [Sincerely,] : Sincerely, [FreeTextEntry3] : Cali Reyna M.D., Ph.D. DPN-N\par [facility]\par Neurology at [place]\par Medical Director of Stroke Services\par [facility]\par

## 2019-12-27 NOTE — PHYSICAL EXAM
[Person] : oriented to person [Place] : oriented to place [Time] : oriented to time [Remote Intact] : remote memory intact [Span Intact] : the attention span was normal [Registration Intact] : recent registration memory intact [Concentration Intact] : normal concentrating ability [Visual Intact] : visual attention was ~T not ~L decreased [Naming Objects] : no difficulty naming common objects [Repeating Phrases] : no difficulty repeating a phrase [Fluency] : fluency intact [Comprehension] : comprehension intact [Current Events] : adequate knowledge of current events [Past History] : adequate knowledge of personal past history [Cranial Nerves Optic (II)] : visual acuity intact bilaterally,  visual fields full to confrontation, pupils equal round and reactive to light [Cranial Nerves Trigeminal (V)] : facial sensation intact symmetrically [Cranial Nerves Oculomotor (III)] : extraocular motion intact [Cranial Nerves Facial (VII)] : face symmetrical [Cranial Nerves Vestibulocochlear (VIII)] : hearing was intact bilaterally [Cranial Nerves Accessory (XI - Cranial And Spinal)] : head turning and shoulder shrug symmetric [Cranial Nerves Glossopharyngeal (IX)] : tongue and palate midline [Cranial Nerves Hypoglossal (XII)] : there was no tongue deviation with protrusion [Involuntary Movements] : no involuntary movements were seen [Motor Strength] : muscle strength was normal in all four extremities [Motor Tone] : muscle tone was normal in all four extremities [Paresis Pronator Drift Right-Sided] : no pronator drift on the right [No Muscle Atrophy] : normal bulk in all four extremities [Motor Strength Upper Extremities Bilaterally] : strength was normal in both upper extremities [Paresis Pronator Drift Left-Sided] : no pronator drift on the left [Motor Strength Lower Extremities Bilaterally] : strength was normal in both lower extremities [Sensation Tactile Decrease] : light touch was intact [Sensation Vibration Decrease] : vibration was intact [Sensation Pain / Temperature Decrease] : pain and temperature was intact [Proprioception] : proprioception was intact [Abnormal Walk] : normal gait [Balance] : balance was intact [Coordination - Dysmetria Impaired Finger-to-Nose Bilateral] : not present [Tremor] : no tremor present [2+] : Patella left 2+ [Sclera] : the sclera and conjunctiva were normal [PERRL With Normal Accommodation] : pupils were equal in size, round, reactive to light, with normal accommodation [Extraocular Movements] : extraocular movements were intact [No APD] : no afferent pupillary defect [No LORETO] : no internuclear ophthalmoplegia [Full Visual Field] : full visual field

## 2019-12-27 NOTE — REVIEW OF SYSTEMS
[As Noted in HPI] : as noted in HPI [Negative] : Heme/Lymph [de-identified] : Tremor as noted above in history of present illness

## 2020-01-06 ENCOUNTER — APPOINTMENT (OUTPATIENT)
Dept: NEUROLOGY | Facility: CLINIC | Age: 75
End: 2020-01-06

## 2020-01-13 ENCOUNTER — APPOINTMENT (OUTPATIENT)
Dept: RHEUMATOLOGY | Facility: CLINIC | Age: 75
End: 2020-01-13

## 2020-01-30 RX ORDER — ALIROCUMAB 75 MG/ML
75 INJECTION, SOLUTION SUBCUTANEOUS
Qty: 6 | Refills: 6 | Status: DISCONTINUED | COMMUNITY
Start: 2019-09-17 | End: 2020-01-30

## 2020-03-17 ENCOUNTER — EMERGENCY (EMERGENCY)
Facility: HOSPITAL | Age: 75
LOS: 1 days | Discharge: HOME | End: 2020-03-17
Attending: EMERGENCY MEDICINE
Payer: MEDICARE

## 2020-03-17 VITALS
WEIGHT: 160.06 LBS | OXYGEN SATURATION: 98 % | HEIGHT: 66 IN | TEMPERATURE: 97 F | HEART RATE: 84 BPM | RESPIRATION RATE: 18 BRPM | DIASTOLIC BLOOD PRESSURE: 87 MMHG | SYSTOLIC BLOOD PRESSURE: 182 MMHG

## 2020-03-17 LAB
ALBUMIN SERPL ELPH-MCNC: 4.4 G/DL — SIGNIFICANT CHANGE UP (ref 3.3–5.2)
ALP SERPL-CCNC: 90 U/L — SIGNIFICANT CHANGE UP (ref 40–120)
ALT FLD-CCNC: 40 U/L — SIGNIFICANT CHANGE UP
ANION GAP SERPL CALC-SCNC: 17 MMOL/L — SIGNIFICANT CHANGE UP (ref 5–17)
APPEARANCE UR: ABNORMAL
AST SERPL-CCNC: 32 U/L — SIGNIFICANT CHANGE UP
BASOPHILS # BLD AUTO: 0.03 K/UL — SIGNIFICANT CHANGE UP (ref 0–0.2)
BASOPHILS NFR BLD AUTO: 0.4 % — SIGNIFICANT CHANGE UP (ref 0–2)
BILIRUB SERPL-MCNC: 0.6 MG/DL — SIGNIFICANT CHANGE UP (ref 0.4–2)
BILIRUB UR-MCNC: NEGATIVE — SIGNIFICANT CHANGE UP
BUN SERPL-MCNC: 15 MG/DL — SIGNIFICANT CHANGE UP (ref 8–20)
CALCIUM SERPL-MCNC: 9.9 MG/DL — SIGNIFICANT CHANGE UP (ref 8.6–10.2)
CHLORIDE SERPL-SCNC: 96 MMOL/L — LOW (ref 98–107)
CO2 SERPL-SCNC: 24 MMOL/L — SIGNIFICANT CHANGE UP (ref 22–29)
COLOR SPEC: YELLOW — SIGNIFICANT CHANGE UP
CREAT SERPL-MCNC: 1 MG/DL — SIGNIFICANT CHANGE UP (ref 0.5–1.3)
DIFF PNL FLD: ABNORMAL
EOSINOPHIL # BLD AUTO: 0.08 K/UL — SIGNIFICANT CHANGE UP (ref 0–0.5)
EOSINOPHIL NFR BLD AUTO: 1 % — SIGNIFICANT CHANGE UP (ref 0–6)
EPI CELLS # UR: SIGNIFICANT CHANGE UP
GLUCOSE SERPL-MCNC: 103 MG/DL — HIGH (ref 70–99)
GLUCOSE UR QL: NEGATIVE MG/DL — SIGNIFICANT CHANGE UP
HCT VFR BLD CALC: 44.4 % — SIGNIFICANT CHANGE UP (ref 39–50)
HGB BLD-MCNC: 15.3 G/DL — SIGNIFICANT CHANGE UP (ref 13–17)
IMM GRANULOCYTES NFR BLD AUTO: 0.3 % — SIGNIFICANT CHANGE UP (ref 0–1.5)
KETONES UR-MCNC: ABNORMAL
LEUKOCYTE ESTERASE UR-ACNC: ABNORMAL
LIDOCAIN IGE QN: 9 U/L — LOW (ref 22–51)
LYMPHOCYTES # BLD AUTO: 1.8 K/UL — SIGNIFICANT CHANGE UP (ref 1–3.3)
LYMPHOCYTES # BLD AUTO: 23.4 % — SIGNIFICANT CHANGE UP (ref 13–44)
MCHC RBC-ENTMCNC: 30.1 PG — SIGNIFICANT CHANGE UP (ref 27–34)
MCHC RBC-ENTMCNC: 34.5 GM/DL — SIGNIFICANT CHANGE UP (ref 32–36)
MCV RBC AUTO: 87.2 FL — SIGNIFICANT CHANGE UP (ref 80–100)
MONOCYTES # BLD AUTO: 0.76 K/UL — SIGNIFICANT CHANGE UP (ref 0–0.9)
MONOCYTES NFR BLD AUTO: 9.9 % — SIGNIFICANT CHANGE UP (ref 2–14)
NEUTROPHILS # BLD AUTO: 5 K/UL — SIGNIFICANT CHANGE UP (ref 1.8–7.4)
NEUTROPHILS NFR BLD AUTO: 65 % — SIGNIFICANT CHANGE UP (ref 43–77)
NITRITE UR-MCNC: NEGATIVE — SIGNIFICANT CHANGE UP
PH UR: 8 — SIGNIFICANT CHANGE UP (ref 5–8)
PLATELET # BLD AUTO: 269 K/UL — SIGNIFICANT CHANGE UP (ref 150–400)
POTASSIUM SERPL-MCNC: 4.6 MMOL/L — SIGNIFICANT CHANGE UP (ref 3.5–5.3)
POTASSIUM SERPL-SCNC: 4.6 MMOL/L — SIGNIFICANT CHANGE UP (ref 3.5–5.3)
PROT SERPL-MCNC: 7.4 G/DL — SIGNIFICANT CHANGE UP (ref 6.6–8.7)
PROT UR-MCNC: 100 MG/DL
RBC # BLD: 5.09 M/UL — SIGNIFICANT CHANGE UP (ref 4.2–5.8)
RBC # FLD: 13.2 % — SIGNIFICANT CHANGE UP (ref 10.3–14.5)
RBC CASTS # UR COMP ASSIST: ABNORMAL /HPF (ref 0–4)
SODIUM SERPL-SCNC: 137 MMOL/L — SIGNIFICANT CHANGE UP (ref 135–145)
SP GR SPEC: 1.01 — SIGNIFICANT CHANGE UP (ref 1.01–1.02)
UROBILINOGEN FLD QL: NEGATIVE MG/DL — SIGNIFICANT CHANGE UP
WBC # BLD: 7.69 K/UL — SIGNIFICANT CHANGE UP (ref 3.8–10.5)
WBC # FLD AUTO: 7.69 K/UL — SIGNIFICANT CHANGE UP (ref 3.8–10.5)
WBC UR QL: ABNORMAL

## 2020-03-17 PROCEDURE — 99285 EMERGENCY DEPT VISIT HI MDM: CPT

## 2020-03-17 RX ORDER — ONDANSETRON 8 MG/1
4 TABLET, FILM COATED ORAL ONCE
Refills: 0 | Status: COMPLETED | OUTPATIENT
Start: 2020-03-17 | End: 2020-03-17

## 2020-03-17 RX ORDER — SODIUM CHLORIDE 9 MG/ML
1000 INJECTION INTRAMUSCULAR; INTRAVENOUS; SUBCUTANEOUS ONCE
Refills: 0 | Status: COMPLETED | OUTPATIENT
Start: 2020-03-17 | End: 2020-03-17

## 2020-03-17 RX ORDER — MORPHINE SULFATE 50 MG/1
4 CAPSULE, EXTENDED RELEASE ORAL ONCE
Refills: 0 | Status: DISCONTINUED | OUTPATIENT
Start: 2020-03-17 | End: 2020-03-17

## 2020-03-17 RX ADMIN — MORPHINE SULFATE 4 MILLIGRAM(S): 50 CAPSULE, EXTENDED RELEASE ORAL at 21:51

## 2020-03-17 RX ADMIN — ONDANSETRON 4 MILLIGRAM(S): 8 TABLET, FILM COATED ORAL at 21:51

## 2020-03-17 RX ADMIN — SODIUM CHLORIDE 1000 MILLILITER(S): 9 INJECTION INTRAMUSCULAR; INTRAVENOUS; SUBCUTANEOUS at 21:51

## 2020-03-17 NOTE — ED PROVIDER NOTE - NSFOLLOWUPINSTRUCTIONS_ED_ALL_ED_FT
Keflex 500 mg 3x daily for next 7 days  Percocet 1 tab every 6 hrs for moderate to severe pain  Follow up with Urology/[place]-call to schedule appt  Return sooner for any problems    Urinary Tract Infection    A urinary tract infection (UTI) is an infection of any part of the urinary tract, which includes the kidneys, ureters, bladder, and urethra. Risk factors include ignoring your need to urinate, wiping back to front if female, being an uncircumcised male, and having diabetes or a weak immune system. Symptoms include frequent urination, pain or burning with urination, foul smelling urine, cloudy urine, pain in the lower abdomen, blood in the urine, and fever. If you were prescribed an antibiotic medicine, take it as told by your health care provider. Do not stop taking the antibiotic even if you start to feel better.    SEEK IMMEDIATE MEDICAL CARE IF YOU HAVE ANY OF THE FOLLOWING SYMPTOMS: severe back or abdominal pain, fever, inability to keep fluids or medicine down, dizziness/lightheadedness, or a change in mental status.

## 2020-03-17 NOTE — ED PROVIDER NOTE - PROGRESS NOTE DETAILS
CT and lab results as noted.  Pt pain free at this time.  Will d/c on Keflex and Percocet with Urology follow-up.  Pt has been seen by Dr. Mathews in the past

## 2020-03-17 NOTE — ED ADULT TRIAGE NOTE - CHIEF COMPLAINT QUOTE
patient states that he has right lower abdominal pain radiating to his back was sent by UC for evaluation has HX of asthma and KIdney stones

## 2020-03-17 NOTE — ED ADULT TRIAGE NOTE - CCCP TRG CHIEF CMPLNT
Problem: Patient Care Overview  Goal: Plan of Care Review  Outcome: Ongoing (interventions implemented as appropriate)  Flowsheets (Taken 3/14/2020 6912)  Progress: improving  Plan of Care Reviewed With: patient  Patient Agreement with Plan of Care: agrees  Outcome Summary: PT PLEASANT AND COOPERATIVE.      abdominal pain

## 2020-03-17 NOTE — ED PROVIDER NOTE - PATIENT PORTAL LINK FT
You can access the FollowMyHealth Patient Portal offered by [facility] by registering at the following website: [email or web address]. By joining [employer]’s FollowMyHealth portal, you will also be able to view your health information using other applications (apps) compatible with our system.

## 2020-03-17 NOTE — ED PROVIDER NOTE - CARE PROVIDER_API CALL
Buck Mathews)  Urology  [address] [place] [address]  Phone: [phone]  Fax: [phone]  Follow Up Time:

## 2020-03-17 NOTE — ED PROVIDER NOTE - OBJECTIVE STATEMENT
74yoM with asthma, HTN, HLD, CAD 4 stents, kidney stones, p/w 3 days of progressive LLQ pain radiating to back;  gradual onset; nausea & vomiting x 1 tonight after dinner. Pt states he was constipated but had 2 BM today after taking miralax.  No fever/chills/ Pt states he saw " red flakes" in his urine today.  No dysuria.

## 2020-03-18 VITALS
TEMPERATURE: 98 F | HEART RATE: 94 BPM | DIASTOLIC BLOOD PRESSURE: 86 MMHG | RESPIRATION RATE: 17 BRPM | SYSTOLIC BLOOD PRESSURE: 133 MMHG | OXYGEN SATURATION: 97 %

## 2020-03-18 PROCEDURE — 99284 EMERGENCY DEPT VISIT MOD MDM: CPT | Mod: 25

## 2020-03-18 PROCEDURE — 85027 COMPLETE CBC AUTOMATED: CPT

## 2020-03-18 PROCEDURE — 74177 CT ABD & PELVIS W/CONTRAST: CPT | Mod: 26

## 2020-03-18 PROCEDURE — 96374 THER/PROPH/DIAG INJ IV PUSH: CPT | Mod: XU

## 2020-03-18 PROCEDURE — 96375 TX/PRO/DX INJ NEW DRUG ADDON: CPT

## 2020-03-18 PROCEDURE — 36415 COLL VENOUS BLD VENIPUNCTURE: CPT

## 2020-03-18 PROCEDURE — 74177 CT ABD & PELVIS W/CONTRAST: CPT

## 2020-03-18 PROCEDURE — 83690 ASSAY OF LIPASE: CPT

## 2020-03-18 PROCEDURE — 80053 COMPREHEN METABOLIC PANEL: CPT

## 2020-03-18 PROCEDURE — 87086 URINE CULTURE/COLONY COUNT: CPT

## 2020-03-18 PROCEDURE — 81001 URINALYSIS AUTO W/SCOPE: CPT

## 2020-03-18 RX ORDER — CEPHALEXIN 500 MG
1 CAPSULE ORAL
Qty: 21 | Refills: 0
Start: 2020-03-18 | End: 2020-03-24

## 2020-03-18 RX ORDER — CEFTRIAXONE 500 MG/1
1000 INJECTION, POWDER, FOR SOLUTION INTRAMUSCULAR; INTRAVENOUS ONCE
Refills: 0 | Status: COMPLETED | OUTPATIENT
Start: 2020-03-18 | End: 2020-03-18

## 2020-03-18 RX ADMIN — CEFTRIAXONE 100 MILLIGRAM(S): 500 INJECTION, POWDER, FOR SOLUTION INTRAMUSCULAR; INTRAVENOUS at 01:13

## 2020-03-18 NOTE — ED ADULT NURSE REASSESSMENT NOTE - NS ED NURSE REASSESS COMMENT FT1
Pt is resting in bed comfortably at this time, no apparent distress noted at this time. pt safety maintained. Pt denies any complaints at this time. Pt awaiting CT results.

## 2020-03-18 NOTE — ED ADULT NURSE NOTE - OBJECTIVE STATEMENT
Pt in no apparent distress at this time. Airway patent, breathing spontaneous and nonlabored. Pt A&Ox4 resting in stretcher, c/o lower abdominal pain x3 days. Pt describes pain in LLQ, worsens with palpation. Pt states he had one episode of vomiting with associated nausea. Pt states he was constipated yesterday. reports PMH of cardiac stents and 3 heart attacks. Pt denies any chest pain, SOB or difficulty breathing.

## 2020-03-18 NOTE — ED ADULT NURSE NOTE - NSIMPLEMENTINTERV_GEN_ALL_ED
Implemented All Universal Safety Interventions:  [place] to call system. Call bell, personal items and telephone within reach. Instruct patient to call for assistance. Room bathroom lighting operational. Non-slip footwear when patient is off stretcher. Physically safe environment: no spills, clutter or unnecessary equipment. Stretcher in lowest position, wheels locked, appropriate side rails in place.

## 2020-03-19 LAB
CULTURE RESULTS: NO GROWTH — SIGNIFICANT CHANGE UP
SPECIMEN SOURCE: SIGNIFICANT CHANGE UP

## 2020-04-21 ENCOUNTER — APPOINTMENT (OUTPATIENT)
Dept: CARDIOLOGY | Facility: CLINIC | Age: 75
End: 2020-04-21

## 2020-05-18 ENCOUNTER — RX RENEWAL (OUTPATIENT)
Age: 75
End: 2020-05-18

## 2020-09-29 ENCOUNTER — APPOINTMENT (OUTPATIENT)
Dept: CARDIOLOGY | Facility: CLINIC | Age: 75
End: 2020-09-29
Payer: MEDICARE

## 2020-09-29 ENCOUNTER — NON-APPOINTMENT (OUTPATIENT)
Age: 75
End: 2020-09-29

## 2020-09-29 VITALS
HEART RATE: 72 BPM | BODY MASS INDEX: 28.25 KG/M2 | SYSTOLIC BLOOD PRESSURE: 128 MMHG | WEIGHT: 175 LBS | DIASTOLIC BLOOD PRESSURE: 72 MMHG | OXYGEN SATURATION: 97 % | TEMPERATURE: 98.1 F

## 2020-09-29 PROCEDURE — 99214 OFFICE O/P EST MOD 30 MIN: CPT | Mod: 24

## 2020-09-29 PROCEDURE — 93000 ELECTROCARDIOGRAM COMPLETE: CPT

## 2020-09-29 NOTE — DISCUSSION/SUMMARY
Anoxic encephalopathy    Brain injury with coma  x 2 weeks in 2008  Gait abnormality    Hypertension    Leg pain, right    Prostate cancer  radiation therapy  Sudden cardiac death [FreeTextEntry1] : Mr. KRISTAN DENNIS is a 75 year male with known CAD. Underwent PCI to the LCx and since then he has been completely asymptomatic. Has stopped his ezetimibe and we have placed a new prescription. \par Wife recently  from a glioblastoma. \par At the present time He is completely asymptomatic.\par I have recommended to continue the same medications.\par We will perform routine laboratory tests\par Routine follow up in 6 months\par

## 2020-09-29 NOTE — ASSESSMENT
[FreeTextEntry1] : ECG performed today at the office revealed a NSR 68 bpm, with normal AQRS, AR, QRS and QTc. \par

## 2020-09-29 NOTE — PHYSICAL EXAM
[General Appearance - Well Developed] : well developed [Normal Appearance] : normal appearance [Well Groomed] : well groomed [General Appearance - Well Nourished] : well nourished [General Appearance - In No Acute Distress] : no acute distress [Normal Conjunctiva] : the conjunctiva exhibited no abnormalities [Eyelids - No Xanthelasma] : the eyelids demonstrated no xanthelasmas [Normal Jugular Venous V Waves Present] : normal jugular venous V waves present [Respiration, Rhythm And Depth] : normal respiratory rhythm and effort [Exaggerated Use Of Accessory Muscles For Inspiration] : no accessory muscle use [Auscultation Breath Sounds / Voice Sounds] : lungs were clear to auscultation bilaterally [Chest Palpation] : palpation of the chest revealed no abnormalities [Heart Rate And Rhythm] : heart rate and rhythm were normal [Heart Sounds] : normal S1 and S2 [Murmurs] : no murmurs present [Arterial Pulses Normal] : the arterial pulses were normal [Edema] : no peripheral edema present [Veins - Varicosity Changes] : no varicosital changes were noted in the lower extremities [Bowel Sounds] : normal bowel sounds [Abdomen Soft] : soft [Abdomen Tenderness] : non-tender [Abnormal Walk] : normal gait [Nail Clubbing] : no clubbing of the fingernails [Cyanosis, Localized] : no localized cyanosis [Skin Color & Pigmentation] : normal skin color and pigmentation [Skin Turgor] : normal skin turgor [] : no rash [Oriented To Time, Place, And Person] : oriented to person, place, and time [Impaired Insight] : insight and judgment were intact [Affect] : the affect was normal [Memory Recent] : recent memory was not impaired [No Anxiety] : not feeling anxious [FreeTextEntry1] : ambulates with a cane.

## 2020-09-29 NOTE — HISTORY OF PRESENT ILLNESS
[FreeTextEntry1] : This is a 74 year old male, , wife  in 2020 from glioblastoma), father of 6. Has a known history of MI in , coronary artery disease, multiple coronary artery stents, hypertension, asthma, and hypercholesterolemia. Was under the care of Dr. Cartagena. \par Last intervention was performed in  that revealed patent stents in the LAD and LCx with a complete occlusion of the RCA. PCI was done to the OM1 successfully.  LVEF is 50% as per echo in 2018. \par Patient denies any chest pain, SOB, or palpitations. He states he walks almost daily with no exertional chest pain or shortness of breath. He is compliant with his medications. He used to be on Aspirin and Plavix but patient gets frequent nose bleeds so the Aspirin was stopped in the past. Patient was on Crestor, Pravastatin, and Atorvastatin in the past but they were stopped due to complaints of muscle pains. \par Patient now on Zetia for cholesterol because he cannot tolerate statin therapy. However he stopped when he run out of it. \par Patient denies chest pain or palpitations. He states he walks the dogs for about 1 mile daily without symptoms. He does admit to mild SOB with walking upstairs. \par Denies HTN or diabetes.\par Doesnt smoke, quit in .\par Denies drinking alcohol\par Denies the use of illicit drugs

## 2020-09-29 NOTE — REVIEW OF SYSTEMS
[Joint Pain] : joint pain [Muscle Cramps] : muscle cramps [Easy Bleeding] : a tendency for easy bleeding [Negative] : Endocrine [Shortness Of Breath] : no shortness of breath [Dyspnea on exertion] : not dyspnea during exertion [Chest  Pressure] : no chest pressure [Chest Pain] : no chest pain [Lower Ext Edema] : no extremity edema [Palpitations] : no palpitations [Cough] : no cough [Wheezing] : no wheezing [Easy Bruising] : no tendency for easy bruising

## 2021-01-19 ENCOUNTER — APPOINTMENT (OUTPATIENT)
Dept: CARDIOLOGY | Facility: CLINIC | Age: 76
End: 2021-01-19
Payer: MEDICARE

## 2021-01-19 ENCOUNTER — NON-APPOINTMENT (OUTPATIENT)
Age: 76
End: 2021-01-19

## 2021-01-19 VITALS
OXYGEN SATURATION: 98 % | TEMPERATURE: 97.9 F | BODY MASS INDEX: 27.48 KG/M2 | HEART RATE: 75 BPM | RESPIRATION RATE: 17 BRPM | SYSTOLIC BLOOD PRESSURE: 132 MMHG | HEIGHT: 66 IN | WEIGHT: 171 LBS | DIASTOLIC BLOOD PRESSURE: 85 MMHG

## 2021-01-19 PROCEDURE — 99072 ADDL SUPL MATRL&STAF TM PHE: CPT

## 2021-01-19 PROCEDURE — 93000 ELECTROCARDIOGRAM COMPLETE: CPT

## 2021-01-19 PROCEDURE — 99214 OFFICE O/P EST MOD 30 MIN: CPT | Mod: 24

## 2021-01-27 LAB
ALBUMIN SERPL ELPH-MCNC: 4.2 G/DL
ALP BLD-CCNC: 91 U/L
ALT SERPL-CCNC: 44 U/L
ANION GAP SERPL CALC-SCNC: 13 MMOL/L
AST SERPL-CCNC: 32 U/L
BASOPHILS # BLD AUTO: 0.05 K/UL
BASOPHILS NFR BLD AUTO: 0.7 %
BILIRUB DIRECT SERPL-MCNC: 0.1 MG/DL
BILIRUB INDIRECT SERPL-MCNC: 0.2 MG/DL
BILIRUB SERPL-MCNC: 0.3 MG/DL
BUN SERPL-MCNC: 18 MG/DL
CALCIUM SERPL-MCNC: 9.4 MG/DL
CHLORIDE SERPL-SCNC: 102 MMOL/L
CHOLEST SERPL-MCNC: 137 MG/DL
CK SERPL-CCNC: 76 U/L
CO2 SERPL-SCNC: 23 MMOL/L
CREAT SERPL-MCNC: 1.46 MG/DL
EOSINOPHIL # BLD AUTO: 0.22 K/UL
EOSINOPHIL NFR BLD AUTO: 3.3 %
ESTIMATED AVERAGE GLUCOSE: 128 MG/DL
GLUCOSE SERPL-MCNC: 129 MG/DL
HBA1C MFR BLD HPLC: 6.1 %
HCT VFR BLD CALC: 44.3 %
HDLC SERPL-MCNC: 39 MG/DL
HGB BLD-MCNC: 14.3 G/DL
IMM GRANULOCYTES NFR BLD AUTO: 0.1 %
LDLC SERPL CALC-MCNC: 47 MG/DL
LYMPHOCYTES # BLD AUTO: 2.06 K/UL
LYMPHOCYTES NFR BLD AUTO: 30.5 %
MAN DIFF?: NORMAL
MCHC RBC-ENTMCNC: 28.5 PG
MCHC RBC-ENTMCNC: 32.3 GM/DL
MCV RBC AUTO: 88.2 FL
MONOCYTES # BLD AUTO: 0.59 K/UL
MONOCYTES NFR BLD AUTO: 8.7 %
NEUTROPHILS # BLD AUTO: 3.83 K/UL
NEUTROPHILS NFR BLD AUTO: 56.7 %
NONHDLC SERPL-MCNC: 98 MG/DL
PLATELET # BLD AUTO: 234 K/UL
POTASSIUM SERPL-SCNC: 4.8 MMOL/L
PROT SERPL-MCNC: 7.3 G/DL
RBC # BLD: 5.02 M/UL
RBC # FLD: 13.7 %
SODIUM SERPL-SCNC: 139 MMOL/L
TRIGL SERPL-MCNC: 256 MG/DL
TSH SERPL-ACNC: 5.19 UIU/ML
WBC # FLD AUTO: 6.76 K/UL

## 2021-01-28 ENCOUNTER — APPOINTMENT (OUTPATIENT)
Dept: CARDIOLOGY | Facility: CLINIC | Age: 76
End: 2021-01-28
Payer: MEDICARE

## 2021-01-28 PROCEDURE — A9500: CPT

## 2021-01-28 PROCEDURE — 99072 ADDL SUPL MATRL&STAF TM PHE: CPT

## 2021-01-28 PROCEDURE — 93306 TTE W/DOPPLER COMPLETE: CPT

## 2021-01-28 PROCEDURE — 93015 CV STRESS TEST SUPVJ I&R: CPT

## 2021-01-28 PROCEDURE — 78452 HT MUSCLE IMAGE SPECT MULT: CPT

## 2021-02-04 NOTE — PHYSICAL EXAM
[General Appearance - Well Developed] : well developed [Normal Appearance] : normal appearance [Well Groomed] : well groomed [General Appearance - In No Acute Distress] : no acute distress [General Appearance - Well Nourished] : well nourished [Normal Conjunctiva] : the conjunctiva exhibited no abnormalities [Eyelids - No Xanthelasma] : the eyelids demonstrated no xanthelasmas [Normal Jugular Venous V Waves Present] : normal jugular venous V waves present [Respiration, Rhythm And Depth] : normal respiratory rhythm and effort [Exaggerated Use Of Accessory Muscles For Inspiration] : no accessory muscle use [Auscultation Breath Sounds / Voice Sounds] : lungs were clear to auscultation bilaterally [Chest Palpation] : palpation of the chest revealed no abnormalities [Heart Rate And Rhythm] : heart rate and rhythm were normal [Heart Sounds] : normal S1 and S2 [Murmurs] : no murmurs present [Arterial Pulses Normal] : the arterial pulses were normal [Edema] : no peripheral edema present [Veins - Varicosity Changes] : no varicosital changes were noted in the lower extremities [Bowel Sounds] : normal bowel sounds [Abdomen Soft] : soft [Abdomen Tenderness] : non-tender [Abnormal Walk] : normal gait [Nail Clubbing] : no clubbing of the fingernails [Cyanosis, Localized] : no localized cyanosis [Skin Color & Pigmentation] : normal skin color and pigmentation [Skin Turgor] : normal skin turgor [] : no rash [Oriented To Time, Place, And Person] : oriented to person, place, and time [Impaired Insight] : insight and judgment were intact [Affect] : the affect was normal [Memory Recent] : recent memory was not impaired [No Anxiety] : not feeling anxious [FreeTextEntry1] : ambulates with a cane.

## 2021-02-04 NOTE — REVIEW OF SYSTEMS
[Joint Pain] : joint pain [Muscle Cramps] : muscle cramps [Easy Bleeding] : a tendency for easy bleeding [Negative] : Endocrine [Shortness Of Breath] : shortness of breath [Dyspnea on exertion] : dyspnea during exertion [Chest  Pressure] : no chest pressure [Chest Pain] : no chest pain [Lower Ext Edema] : no extremity edema [Cough] : no cough [Palpitations] : no palpitations [Wheezing] : no wheezing [Easy Bruising] : no tendency for easy bruising

## 2021-02-04 NOTE — DISCUSSION/SUMMARY
[FreeTextEntry1] : Mr. KRISTAN DENNIS is a 75 year male with known CAD and multiple coronary interventions in the past. Has been C/O cough and effort related SOB for the last month. he was assesed by his pulmonologist and underwent PFTs that were OK.\par Will request an echo and nuclear to assess for possible IHD that could explain his symptoms.\par Will have a colonoscopy in feb 10.\par I have recommended to continue the same medications.\par Routine follow up in 1 month\par

## 2021-02-04 NOTE — ADDENDUM
[FreeTextEntry1] : 2/4/2021\par \par Mr. Edil Cool is stable from cardiology point of view for upcoming Colonoscopy and Upper Endoscopy. He may hold the Plavix for 5 days prior to the procedure and will resume asap after the procedure. \par \par Greg Memoracion, NP

## 2021-02-04 NOTE — ASSESSMENT
[FreeTextEntry1] : ECG performed today at the office revealed a NSR 76 bpm, with normal AQRS, TN, QRS and QTc. Frequent VPCs - Trigeminy. \par

## 2021-02-04 NOTE — HISTORY OF PRESENT ILLNESS
[FreeTextEntry1] : This is a 74 year old male, , wife  in 2020 from glioblastoma), father of 6. Has a known history of MI in , coronary artery disease, multiple coronary artery stents, hypertension, asthma, and hypercholesterolemia. Was under the care of Dr. Cartagena. \par He has known CAD. Had PCI in  and in  at [employer]. Last cardiac cath was performed in  that revealed patent stents in the LAD and LCx with a complete occlusion of the RCA. PCI was done to the OM1 successfully.  LVEF is 50% as per echo in 2018. \par Patient has been C/O coughing and possibly SOB, limited in his ambulation because of Rt knee problems. No chest pressure or pain. Denies orthopnea or PND. Was assessed by pulmonology that referred him to us. PFTs were reasonable preserved. He used to be on Aspirin and Plavix but patient gets frequent nose bleeds so the Aspirin was stopped in the past but he is on clopidogrel. Patient was on Crestor, Pravastatin, and Atorvastatin in the past but they were stopped due to complaints of muscle pains. \par Patient now on Zetia for cholesterol because he cannot tolerate statin therapy. However he stopped when he run out of it. \par He states he walks the dogs for about 1 mile daily without symptoms. He does admit to mild SOB with walking upstairs. \par Asthma under the care of a pulmonologist since \par Left knee surgery in \par Hyperlipidemia treated with PCSK9 and zetia. \par Denies HTN or diabetes.\par Doesnt smoke, quit in .\par Denies drinking alcohol\par Denies the use of illicit drugs

## 2021-02-10 ENCOUNTER — RESULT REVIEW (OUTPATIENT)
Age: 76
End: 2021-02-10

## 2021-02-17 NOTE — ED PROVIDER NOTE - PROGRESS NOTE
Subjective: Alert and awake in bed, no complaints. Overnight course reviewed with RN.      Objective:     Vital Signs (last 24 hrs)_____ Last Charted___________Minimum____________ Maximum____________  Temp    L 97.3 (JAN 02 14:40) L 97.3 (JAN 02 14:40) L 97.3 (JAN 02 14:40)  Heart Rate   70  (JAN 02 14:40) 70  (JAN 02 14:40) 70  (JAN 02 14:40)  Resp Rate       20  (JAN 02 14:40) L 12 (JAN 01 16:30) H 26 (JAN 01 16:00)  SBP    103  (JAN 02 14:40) 91  (JAN 01 17:30) 119  (JAN 02 10:00)  DBP    63  (JAN 02 14:40) L 35 (JAN 01 17:30) H 95 (JAN 02 10:00)    Labs (Last four charted values)  WBC                  7.8 (JAN 02) 10.6 (JAN 01) H 28.8 (DEC 31) H 17.0 (DEC 30)   Hgb                  L 8.4 (JAN 02) L 8.6 (JAN 01) L 7.7 (DEC 31) L 9.7 (DEC 30)   Hct                  L 26 (JAN 02) L 27 (JAN 01) L 25 (DEC 31) L 31 (DEC 30)   Plt                  164 (JAN 02) 203 (JAN 01) 245 (DEC 31) 317 (DEC 30)   Na                   140 (JAN 02) 139 (JAN 01) 136 (DEC 31) L 133 (DEC 30)   K                    3.6 (JAN 02) 4.4 (JAN 01) 3.5 (JAN 01) 4.0 (DEC 31)   CO2                  20 (JAN 02) L 18 (JAN 01) L 18 (DEC 31) 23 (DEC 30)   Cl                   H 112 (JAN 02) H 111 (JAN 01) H 108 (DEC 31) 101 (DEC 30)   Cr                   0.98 (JAN 02) 1.17 (JAN 01) 1.25 (DEC 31) 1.04 (DEC 30)   BUN                  H 28 (JAN 02) H 26 (JAN 01) 19 (DEC 31) 12 (DEC 30)   Glucose              96 (JAN 02) 96 (JAN 01) H 104 (DEC 31) H 111 (DEC 30)   Mg                   2.6 (JAN 02) 1.7 (JAN 01)   Phos                 L 1.6 (JAN 02) 2.8 (JAN 01)   Ca                   L 7.2 (JAN 02) L 7.2 (JAN 01) L 7.2 (DEC 31) 9.1 (DEC 30)   PT                   H 12.5 (JAN 01) 11.2 (DEC 17)   INR                  H 1.3 (JAN 01) 1.1 (DEC 17)     Constitutional: 89-year-old male in no apparent distress normal  HEENT: Normal  Heart: S1, S2 heard  Lungs: Clear and diminished  Abdomen: Soft, nontender, bowel sounds present  Extremities: No  edema  Neurologic: Alert and oriented x3  Skin: Scattered bruising  Psychology: Appropriate mood.  Patient is in a happy and talkative mood.  Goals of Care:   1.  Plan of care.  Patient will continue current treatments for infections and post surgery.  Currently doing very well.  Plan is for patient to get discharge from ICU to a regular hospital room today.  2.  Palliative care versus hospice.  Patient will continue with palliative care.      Thank you for this consult. Palliative care will continue to follow this pt while here at the hospital.  Time: 25 minutes, > 50% time spent counseling and supporting the patient as noted above and communicating with care team.              Electronically Signed On 01.02.2019 14:56  ___________________________________________________   Olga Mancilla NP     Worsening.

## 2021-06-15 ENCOUNTER — APPOINTMENT (OUTPATIENT)
Dept: CARDIOLOGY | Facility: CLINIC | Age: 76
End: 2021-06-15
Payer: MEDICARE

## 2021-06-15 ENCOUNTER — NON-APPOINTMENT (OUTPATIENT)
Age: 76
End: 2021-06-15

## 2021-06-15 VITALS
RESPIRATION RATE: 18 BRPM | TEMPERATURE: 98.3 F | BODY MASS INDEX: 28.77 KG/M2 | SYSTOLIC BLOOD PRESSURE: 118 MMHG | OXYGEN SATURATION: 97 % | HEART RATE: 69 BPM | HEIGHT: 66 IN | DIASTOLIC BLOOD PRESSURE: 70 MMHG | WEIGHT: 179 LBS

## 2021-06-15 PROCEDURE — 99072 ADDL SUPL MATRL&STAF TM PHE: CPT

## 2021-06-15 PROCEDURE — 99213 OFFICE O/P EST LOW 20 MIN: CPT

## 2021-06-15 PROCEDURE — 93000 ELECTROCARDIOGRAM COMPLETE: CPT

## 2021-06-15 RX ORDER — MULTIVIT-MINS/IRON/FOLIC/LYCOP 8-200-600
TABLET ORAL DAILY
Refills: 0 | Status: ACTIVE | COMMUNITY

## 2021-06-15 NOTE — DISCUSSION/SUMMARY
[FreeTextEntry1] : Mr. KRISTAN DENNIS is a 75 year male with known CAD. Underwent PCI to the LCx and since then he has been completely asymptomatic.\par Will undergo resection of a Lt renal mass, he is an active patient able to perform >4 METS. Had a nuclear stress test that was negative for ischemia and has a normal LVEF. This is an intermediate risk surgery in a low risk patient (cardiac risk <1%). Therefore there is no need for further evaluation prior to the surgery.\par At the present time He is completely asymptomatic.\par I have recommended to continue the same medications.\par Routine follow up in 6 months\par

## 2021-06-15 NOTE — REVIEW OF SYSTEMS
[Cough] : cough [Joint Pain] : joint pain [Joint Swelling] : joint swelling [Negative] : Heme/Lymph [Myalgia] : no myalgia

## 2021-06-15 NOTE — ASSESSMENT
[FreeTextEntry1] : ECG performed today at the office revealed a NSR 73 bpm, with normal AQRS, IN, QRS and QTc. \par

## 2021-06-15 NOTE — HISTORY OF PRESENT ILLNESS
[FreeTextEntry1] : This is a 75 year old male, , wife  in 2020 from glioblastoma), father of 6. Has a known history of MI in , coronary artery disease, multiple coronary artery stents, hypertension, asthma, and hypercholesterolemia. Was under the care of Dr. Cartagena. \par Last intervention was performed in  that revealed patent stents in the LAD and LCx with a complete occlusion of the RCA. PCI was done to the OM1 successfully.  LVEF is 50% as per echo in 2018. \par Patient denies any chest pain, SOB, or palpitations. He states he walks almost daily with no exertional chest pain or shortness of breath. He is compliant with his medications. He used to be on Aspirin and Plavix but patient gets frequent nose bleeds so the Aspirin was stopped in the past. Patient was on Crestor, Pravastatin, and Atorvastatin in the past but they were stopped due to complaints of muscle pains. \par Patient now on Zetia for cholesterol because he cannot tolerate statin therapy. However he stopped when he run out of it. \par Patient has a Left renal mass and will undergo surgery in July at [place]. \par Patient denies chest pain or palpitations. He states he walks the dogs for about 1 mile daily without symptoms. He does admit to mild SOB with walking upstairs. Nuclear stress test done in 2021 was normal with no ischemia. \par Denies HTN or diabetes.\blanquita Doesnt smoke, quit in .\par Denies drinking alcohol\par Denies the use of illicit drugs

## 2021-07-16 ENCOUNTER — RESULT REVIEW (OUTPATIENT)
Age: 76
End: 2021-07-16

## 2021-07-24 ENCOUNTER — EMERGENCY (EMERGENCY)
Facility: HOSPITAL | Age: 76
LOS: 1 days | Discharge: HOME | End: 2021-07-24
Attending: EMERGENCY MEDICINE
Payer: MEDICARE

## 2021-07-24 VITALS
WEIGHT: 156.09 LBS | SYSTOLIC BLOOD PRESSURE: 126 MMHG | OXYGEN SATURATION: 96 % | DIASTOLIC BLOOD PRESSURE: 80 MMHG | HEART RATE: 65 BPM | TEMPERATURE: 98 F | HEIGHT: 66 IN | RESPIRATION RATE: 18 BRPM

## 2021-07-24 LAB
APPEARANCE UR: CLEAR — SIGNIFICANT CHANGE UP
BILIRUB UR-MCNC: NEGATIVE — SIGNIFICANT CHANGE UP
COLOR SPEC: YELLOW — SIGNIFICANT CHANGE UP
DIFF PNL FLD: NEGATIVE — SIGNIFICANT CHANGE UP
GLUCOSE UR QL: NEGATIVE MG/DL — SIGNIFICANT CHANGE UP
KETONES UR-MCNC: NEGATIVE — SIGNIFICANT CHANGE UP
LEUKOCYTE ESTERASE UR-ACNC: NEGATIVE — SIGNIFICANT CHANGE UP
NITRITE UR-MCNC: NEGATIVE — SIGNIFICANT CHANGE UP
PH UR: 7 — SIGNIFICANT CHANGE UP (ref 5–8)
PROT UR-MCNC: NEGATIVE MG/DL — SIGNIFICANT CHANGE UP
SP GR SPEC: 1.01 — SIGNIFICANT CHANGE UP (ref 1.01–1.02)
UROBILINOGEN FLD QL: NEGATIVE MG/DL — SIGNIFICANT CHANGE UP

## 2021-07-24 PROCEDURE — 99283 EMERGENCY DEPT VISIT LOW MDM: CPT | Mod: 25

## 2021-07-24 PROCEDURE — 81003 URINALYSIS AUTO W/O SCOPE: CPT

## 2021-07-24 PROCEDURE — 99284 EMERGENCY DEPT VISIT MOD MDM: CPT

## 2021-07-24 PROCEDURE — 51702 INSERT TEMP BLADDER CATH: CPT

## 2021-07-24 PROCEDURE — 87086 URINE CULTURE/COLONY COUNT: CPT

## 2021-07-24 RX ORDER — CEPHALEXIN 500 MG
1 CAPSULE ORAL
Qty: 21 | Refills: 0
Start: 2021-07-24 | End: 2021-07-30

## 2021-07-24 RX ORDER — TAMSULOSIN HYDROCHLORIDE 0.4 MG/1
1 CAPSULE ORAL
Qty: 7 | Refills: 0
Start: 2021-07-24 | End: 2021-07-30

## 2021-07-24 NOTE — ED STATDOCS - PATIENT PORTAL LINK FT
You can access the FollowMyHealth Patient Portal offered by [facility] by registering at the following website: [email or web address]. By joining [employer]’s FollowMyHealth portal, you will also be able to view your health information using other applications (apps) compatible with our system.

## 2021-07-24 NOTE — ED STATDOCS - ATTENDING CONTRIBUTION TO CARE
Kyung: I performed a face to face bedside interview with patient regarding history of present illness, review of symptoms and past medical history. I completed an independent physical exam and ordered tests/medications as needed.  I have discussed patient's plan of care with advanced care provider. The advanced care provider assisted in  executing the discussed plan. I was available for any questions or issues that may have arose during the execution of the plan of care.

## 2021-07-24 NOTE — ED STATDOCS - PROGRESS NOTE DETAILS
Hardy placed  Urine collected  Has f/u with urology this monday  Rx flomax and keflex  Return precautions discussed

## 2021-07-24 NOTE — ED ADULT TRIAGE NOTE - CHIEF COMPLAINT QUOTE
Patient arrived to the ED from home, pt states that he had a archer catheter removed yesterday and is having difficulty urinating since that time. Pt states that he is having slight discomfort in his lower abdomen.

## 2021-07-24 NOTE — ED STATDOCS - PHYSICAL EXAMINATION
Gen: No acute distress, non toxic  HEENT: Mucous membranes moist, pink conjunctivae, EOMI  CV: RRR, nl s1/s2.  Resp: CTAB, normal rate and effort  GI: Abdomen soft, NT, ND. No rebound, no guarding. Healing surgical scar with light bruising  : No CVAT. 1 testicle, no tenderness or lesion.  Neuro: A&O x 3, moving all 4 extremities  MSK: No spine or joint tenderness to palpation  Skin: No rashes. intact and perfused.

## 2021-07-24 NOTE — ED STATDOCS - NS ED ROS FT
ROS: No fever/chills. No eye pain/changes in vision, No ear pain/sore throat/dysphagia, No chest pain/palpitations. No SOB/cough/. No abdominal pain, N/V/D, no black/bloody bm. No dysuria/frequency/discharge, No headache. No Dizziness.    No rashes or breaks in skin. No numbness/tingling/weakness. +urinary retention

## 2021-07-24 NOTE — ED STATDOCS - OBJECTIVE STATEMENT
74y/o M with PMHx of CAD; on Plavix and Renal CA s/p recent left nephrectomy (no chemo/radiation) presents to the ED c/o urinary retention since yesterday. Pt had archer in since surgery, removed yesterday and has been unable to urinate since, reporting only small dribbling. Denies fever, chills, back pain or other symptoms.

## 2021-07-24 NOTE — ED STATDOCS - CLINICAL SUMMARY MEDICAL DECISION MAKING FREE TEXT BOX
Pt with urinary retention after failing trial to void, will replace archer. start Flomax and Keflex, follow up with urology and instructed on side effects of Flomax.

## 2021-07-25 LAB
CULTURE RESULTS: NO GROWTH — SIGNIFICANT CHANGE UP
SPECIMEN SOURCE: SIGNIFICANT CHANGE UP

## 2021-09-01 ENCOUNTER — APPOINTMENT (OUTPATIENT)
Dept: CARDIOLOGY | Facility: CLINIC | Age: 76
End: 2021-09-01
Payer: MEDICARE

## 2021-09-01 VITALS
TEMPERATURE: 97.7 F | DIASTOLIC BLOOD PRESSURE: 70 MMHG | OXYGEN SATURATION: 96 % | WEIGHT: 167 LBS | HEART RATE: 61 BPM | BODY MASS INDEX: 26.84 KG/M2 | SYSTOLIC BLOOD PRESSURE: 110 MMHG | HEIGHT: 66 IN

## 2021-09-01 DIAGNOSIS — Z01.810 ENCOUNTER FOR PREPROCEDURAL CARDIOVASCULAR EXAMINATION: ICD-10-CM

## 2021-09-01 PROCEDURE — 99214 OFFICE O/P EST MOD 30 MIN: CPT

## 2021-09-10 ENCOUNTER — RESULT REVIEW (OUTPATIENT)
Age: 76
End: 2021-09-10

## 2022-01-05 ENCOUNTER — APPOINTMENT (OUTPATIENT)
Dept: CARDIOLOGY | Facility: CLINIC | Age: 77
End: 2022-01-05

## 2022-01-17 ENCOUNTER — EMERGENCY (EMERGENCY)
Facility: HOSPITAL | Age: 77
LOS: 1 days | Discharge: HOME | End: 2022-01-17
Attending: STUDENT IN AN ORGANIZED HEALTH CARE EDUCATION/TRAINING PROGRAM
Payer: COMMERCIAL

## 2022-01-17 VITALS
TEMPERATURE: 98 F | SYSTOLIC BLOOD PRESSURE: 133 MMHG | DIASTOLIC BLOOD PRESSURE: 77 MMHG | OXYGEN SATURATION: 97 % | HEART RATE: 73 BPM | RESPIRATION RATE: 18 BRPM

## 2022-01-17 VITALS
DIASTOLIC BLOOD PRESSURE: 77 MMHG | HEIGHT: 66 IN | TEMPERATURE: 98 F | HEART RATE: 77 BPM | WEIGHT: 160.06 LBS | SYSTOLIC BLOOD PRESSURE: 133 MMHG | RESPIRATION RATE: 18 BRPM | OXYGEN SATURATION: 98 %

## 2022-01-17 LAB
ALBUMIN SERPL ELPH-MCNC: 3.9 G/DL — SIGNIFICANT CHANGE UP (ref 3.3–5.2)
ALP SERPL-CCNC: 106 U/L — SIGNIFICANT CHANGE UP (ref 40–120)
ALT FLD-CCNC: 36 U/L — SIGNIFICANT CHANGE UP
ANION GAP SERPL CALC-SCNC: 13 MMOL/L — SIGNIFICANT CHANGE UP (ref 5–17)
APPEARANCE UR: CLEAR — SIGNIFICANT CHANGE UP
AST SERPL-CCNC: 35 U/L — SIGNIFICANT CHANGE UP
BACTERIA # UR AUTO: ABNORMAL
BASOPHILS # BLD AUTO: 0 K/UL — SIGNIFICANT CHANGE UP (ref 0–0.2)
BASOPHILS NFR BLD AUTO: 0 % — SIGNIFICANT CHANGE UP (ref 0–2)
BILIRUB SERPL-MCNC: 0.4 MG/DL — SIGNIFICANT CHANGE UP (ref 0.4–2)
BILIRUB UR-MCNC: NEGATIVE — SIGNIFICANT CHANGE UP
BUN SERPL-MCNC: 13.2 MG/DL — SIGNIFICANT CHANGE UP (ref 8–20)
CALCIUM SERPL-MCNC: 8.8 MG/DL — SIGNIFICANT CHANGE UP (ref 8.6–10.2)
CHLORIDE SERPL-SCNC: 95 MMOL/L — LOW (ref 98–107)
CO2 SERPL-SCNC: 24 MMOL/L — SIGNIFICANT CHANGE UP (ref 22–29)
COD CRY URNS QL: ABNORMAL
COLOR SPEC: YELLOW — SIGNIFICANT CHANGE UP
CREAT SERPL-MCNC: 1.09 MG/DL — SIGNIFICANT CHANGE UP (ref 0.5–1.3)
DIFF PNL FLD: NEGATIVE — SIGNIFICANT CHANGE UP
EOSINOPHIL # BLD AUTO: 0.26 K/UL — SIGNIFICANT CHANGE UP (ref 0–0.5)
EOSINOPHIL NFR BLD AUTO: 7.2 % — HIGH (ref 0–6)
EPI CELLS # UR: SIGNIFICANT CHANGE UP
GIANT PLATELETS BLD QL SMEAR: PRESENT — SIGNIFICANT CHANGE UP
GLUCOSE SERPL-MCNC: 85 MG/DL — SIGNIFICANT CHANGE UP (ref 70–99)
GLUCOSE UR QL: NEGATIVE MG/DL — SIGNIFICANT CHANGE UP
HCT VFR BLD CALC: 35.3 % — LOW (ref 39–50)
HGB BLD-MCNC: 12.1 G/DL — LOW (ref 13–17)
KETONES UR-MCNC: ABNORMAL
LEUKOCYTE ESTERASE UR-ACNC: ABNORMAL
LYMPHOCYTES # BLD AUTO: 0.45 K/UL — LOW (ref 1–3.3)
LYMPHOCYTES # BLD AUTO: 12.5 % — LOW (ref 13–44)
MAGNESIUM SERPL-MCNC: 2 MG/DL — SIGNIFICANT CHANGE UP (ref 1.8–2.6)
MANUAL SMEAR VERIFICATION: SIGNIFICANT CHANGE UP
MCHC RBC-ENTMCNC: 30.6 PG — SIGNIFICANT CHANGE UP (ref 27–34)
MCHC RBC-ENTMCNC: 34.3 GM/DL — SIGNIFICANT CHANGE UP (ref 32–36)
MCV RBC AUTO: 89.1 FL — SIGNIFICANT CHANGE UP (ref 80–100)
MONOCYTES # BLD AUTO: 0.54 K/UL — SIGNIFICANT CHANGE UP (ref 0–0.9)
MONOCYTES NFR BLD AUTO: 15.2 % — HIGH (ref 2–14)
NEUTROPHILS # BLD AUTO: 2.07 K/UL — SIGNIFICANT CHANGE UP (ref 1.8–7.4)
NEUTROPHILS NFR BLD AUTO: 58 % — SIGNIFICANT CHANGE UP (ref 43–77)
NITRITE UR-MCNC: NEGATIVE — SIGNIFICANT CHANGE UP
PH UR: 6 — SIGNIFICANT CHANGE UP (ref 5–8)
PHOSPHATE SERPL-MCNC: 3 MG/DL — SIGNIFICANT CHANGE UP (ref 2.4–4.7)
PLAT MORPH BLD: NORMAL — SIGNIFICANT CHANGE UP
PLATELET # BLD AUTO: 137 K/UL — LOW (ref 150–400)
POTASSIUM SERPL-MCNC: 4.6 MMOL/L — SIGNIFICANT CHANGE UP (ref 3.5–5.3)
POTASSIUM SERPL-SCNC: 4.6 MMOL/L — SIGNIFICANT CHANGE UP (ref 3.5–5.3)
PROT SERPL-MCNC: 7.2 G/DL — SIGNIFICANT CHANGE UP (ref 6.6–8.7)
PROT UR-MCNC: NEGATIVE — SIGNIFICANT CHANGE UP
RAPID RVP RESULT: SIGNIFICANT CHANGE UP
RBC # BLD: 3.96 M/UL — LOW (ref 4.2–5.8)
RBC # FLD: 13.2 % — SIGNIFICANT CHANGE UP (ref 10.3–14.5)
RBC BLD AUTO: NORMAL — SIGNIFICANT CHANGE UP
RBC CASTS # UR COMP ASSIST: SIGNIFICANT CHANGE UP /HPF (ref 0–4)
SARS-COV-2 RNA SPEC QL NAA+PROBE: SIGNIFICANT CHANGE UP
SODIUM SERPL-SCNC: 132 MMOL/L — LOW (ref 135–145)
SP GR SPEC: 1.02 — SIGNIFICANT CHANGE UP (ref 1.01–1.02)
TROPONIN T SERPL-MCNC: <0.01 NG/ML — SIGNIFICANT CHANGE UP (ref 0–0.06)
UROBILINOGEN FLD QL: NEGATIVE MG/DL — SIGNIFICANT CHANGE UP
VARIANT LYMPHS # BLD: 7.1 % — HIGH (ref 0–6)
WBC # BLD: 3.57 K/UL — LOW (ref 3.8–10.5)
WBC # FLD AUTO: 3.57 K/UL — LOW (ref 3.8–10.5)
WBC UR QL: SIGNIFICANT CHANGE UP

## 2022-01-17 PROCEDURE — 93005 ELECTROCARDIOGRAM TRACING: CPT

## 2022-01-17 PROCEDURE — 99283 EMERGENCY DEPT VISIT LOW MDM: CPT | Mod: 25

## 2022-01-17 PROCEDURE — 84484 ASSAY OF TROPONIN QUANT: CPT

## 2022-01-17 PROCEDURE — 71045 X-RAY EXAM CHEST 1 VIEW: CPT

## 2022-01-17 PROCEDURE — 80053 COMPREHEN METABOLIC PANEL: CPT

## 2022-01-17 PROCEDURE — 83735 ASSAY OF MAGNESIUM: CPT

## 2022-01-17 PROCEDURE — 93010 ELECTROCARDIOGRAM REPORT: CPT

## 2022-01-17 PROCEDURE — 0225U NFCT DS DNA&RNA 21 SARSCOV2: CPT

## 2022-01-17 PROCEDURE — 36415 COLL VENOUS BLD VENIPUNCTURE: CPT

## 2022-01-17 PROCEDURE — 71045 X-RAY EXAM CHEST 1 VIEW: CPT | Mod: 26

## 2022-01-17 PROCEDURE — 85025 COMPLETE CBC W/AUTO DIFF WBC: CPT

## 2022-01-17 PROCEDURE — 87086 URINE CULTURE/COLONY COUNT: CPT

## 2022-01-17 PROCEDURE — 81001 URINALYSIS AUTO W/SCOPE: CPT

## 2022-01-17 PROCEDURE — 99285 EMERGENCY DEPT VISIT HI MDM: CPT

## 2022-01-17 PROCEDURE — 84100 ASSAY OF PHOSPHORUS: CPT

## 2022-01-17 RX ORDER — SODIUM CHLORIDE 9 MG/ML
500 INJECTION, SOLUTION INTRAVENOUS ONCE
Refills: 0 | Status: COMPLETED | OUTPATIENT
Start: 2022-01-17 | End: 2022-01-17

## 2022-01-17 RX ADMIN — SODIUM CHLORIDE 500 MILLILITER(S): 9 INJECTION, SOLUTION INTRAVENOUS at 15:22

## 2022-01-17 RX ADMIN — Medication 100 MILLIGRAM(S): at 18:53

## 2022-01-17 NOTE — ED PROVIDER NOTE - ATTENDING CONTRIBUTION TO CARE
73yo male with history of HTN, HLD, CAD s/p PCI x 4, asthma, s/p left nephrectomy, bladder CA presenting with worsening weakness x 1 week with decreased appetite. Denies f/c, cough, cp, sob, abd pain. Patient goes to infusion center every 2 weeks for cancer treatment, last went 1 week ago. Denies sick contacts. Is vaccinated against COVID.   AP - well appearing, ambulatory here. vitals wnl. will do infectious w/up. check electrolytes. reassess

## 2022-01-17 NOTE — ED PROVIDER NOTE - NSICDXPASTSURGICALHX_GEN_ALL_CORE_FT
PAST SURGICAL HISTORY:  CAD S/P percutaneous coronary angioplasty Stent to Proximal LAD and Proximal CX    H/O arthroscopy of left knee     H/O foot surgery Right Foot    H/O inguinal hernia repair     Hx of cholecystectomy

## 2022-01-17 NOTE — ED PROVIDER NOTE - CLINICAL SUMMARY MEDICAL DECISION MAKING FREE TEXT BOX
75yo male with history of HTN, HLD, CAD s/p PCI x 4, asthma, s/p left nephrectomy, bladder CA presenting with worsening weakness x 1 week with decreased appetite. Unremarkable examination with normal heart sounds, clear lungs bilaterally, nontender abdomen, without focal weakness, 5/5 strength, neuro intact. Labs, IVF, ECG, CXR, urine.

## 2022-01-17 NOTE — ED PROVIDER NOTE - NSICDXPASTMEDICALHX_GEN_ALL_CORE_FT
PAST MEDICAL HISTORY:  Asthma     CAD (coronary artery disease)     Dyslipidemia     GERD (gastroesophageal reflux disease)     Myocardial infarction x 3    Rheumatoid arthritis     Tinnitus

## 2022-01-17 NOTE — ED PROVIDER NOTE - NSFOLLOWUPINSTRUCTIONS_ED_ALL_ED_FT
Pneumonia    Pneumonia is an infection of the lungs. Pneumonia may be caused by bacteria, viruses, or funguses. Symptoms include coughing, fever, chest pain when breathing deeply or coughing, shortness of breath, fatigue, or muscle aches. Pneumonia can be diagnosed with a medical history and physical exam, as well as other tests which may include a chest X-ray. If you were prescribed an antibiotic medicine, take it as told by your health care provider and do not stop taking the antibiotic even if you start to feel better. Do not use tobacco products, including cigarettes, chewing tobacco, and e-cigarettes.    SEEK IMMEDIATE MEDICAL CARE IF YOU HAVE ANY OF THE FOLLOWING SYMPTOMS: worsening shortness of breath, worsening chest pain, coughing up blood, change in mental status, lightheadedness/dizziness.    FOLLOW UP WITH YOUR PRIMARY CARE DOCTOR WITHIN 1 WEEK.

## 2022-01-17 NOTE — ED ADULT NURSE NOTE - NSIMPLEMENTINTERV_GEN_ALL_ED
Implemented All Fall Risk Interventions:  [place] to call system. Call bell, personal items and telephone within reach. Instruct patient to call for assistance. Room bathroom lighting operational. Non-slip footwear when patient is off stretcher. Physically safe environment: no spills, clutter or unnecessary equipment. Stretcher in lowest position, wheels locked, appropriate side rails in place. Provide visual cue, wrist band, yellow gown, etc. Monitor gait and stability. Monitor for mental status changes and reorient to person, place, and time. Review medications for side effects contributing to fall risk. Reinforce activity limits and safety measures with patient and family.

## 2022-01-17 NOTE — ED PROVIDER NOTE - PATIENT PORTAL LINK FT
You can access the FollowMyHealth Patient Portal offered by [facility] by registering at the following website: [email or web address]. By joining [employer]’s FollowMyHealth portal, you will also be able to view your health information using other applications (apps) compatible with our system.

## 2022-01-17 NOTE — ED PROVIDER NOTE - PROGRESS NOTE DETAILS
labs unremarkable. CXR with b/l infiltrate. Patient remains stable on reassessment. Pending urine. -DO Blanca patient remains stable on reassessment. will d/c home with abx for PNA. patient to follow with PCP. -DO Blanca

## 2022-01-17 NOTE — ED PROVIDER NOTE - OBJECTIVE STATEMENT
75yo male with history of HTN, HLD, CAD s/p PCI x 4, asthma, s/p left nephrectomy, bladder CA presenting with worsening weakness x 1 week with decreased appetite without fever, chills, chest pain, abdominal pain, urinary symptoms, dark stools or focal weakness. Patient goes to infusion center every 2 weeks but unsure which treatment he is receiving.

## 2022-01-17 NOTE — ED PROVIDER NOTE - PHYSICAL EXAMINATION
General: Well appearing in no acute distress. Alert and cooperative.   Head: Normocephalic, atraumatic.  Eyes: PERRLA. No conjunctival injection. No scleral icterus. EOMI  ENMT: Atraumatic external nose and ears.   Neck: Soft and supple. Full ROM without pain.   Cardiac: Regular rate and regular rhythm. No murmurs. Peripheral pulses 2+ and symmetric in all extremities. No LE edema.  Resp: Unlabored respiratory effort. Lungs CTAB. Speaking in full sentences. No wheezes.  Abd: Soft, non-tender, non-distended.   MSK: Spine midline and non-tender. No CVA tenderness.    Skin: Warm and dry.   Neuro: AO x 3. Moves all extremities symmetrically. Motor strength and sensation grossly intact.

## 2022-01-17 NOTE — ED ADULT TRIAGE NOTE - CHIEF COMPLAINT QUOTE
Patient arrived to ED today with c/o weakness, recent mechanical falls, hx bladder cancer on immuno-therapy.

## 2022-01-18 LAB
CULTURE RESULTS: SIGNIFICANT CHANGE UP
SPECIMEN SOURCE: SIGNIFICANT CHANGE UP

## 2022-01-19 ENCOUNTER — INPATIENT (INPATIENT)
Facility: HOSPITAL | Age: 77
LOS: 15 days | Discharge: NURSING, REHAB OR LONG-TERM CARE | DRG: 641 | End: 2022-02-04
Attending: HOSPITALIST | Admitting: STUDENT IN AN ORGANIZED HEALTH CARE EDUCATION/TRAINING PROGRAM
Payer: OTHER GOVERNMENT

## 2022-01-19 ENCOUNTER — APPOINTMENT (OUTPATIENT)
Dept: CARDIOLOGY | Facility: CLINIC | Age: 77
End: 2022-01-19

## 2022-01-19 VITALS
DIASTOLIC BLOOD PRESSURE: 80 MMHG | HEART RATE: 82 BPM | WEIGHT: 139.99 LBS | SYSTOLIC BLOOD PRESSURE: 153 MMHG | HEIGHT: 66 IN | OXYGEN SATURATION: 96 % | TEMPERATURE: 99 F | RESPIRATION RATE: 20 BRPM

## 2022-01-19 NOTE — ED ADULT TRIAGE NOTE - CHIEF COMPLAINT QUOTE
C/O of increased generalized weakness and decrease PO intake for the past 2 days.  Pt with multiple falls today, did not hit his head. C/O of lower back pain.  Takes Plavix. Was diagnosed on Monday with PNA.

## 2022-01-20 LAB
ALBUMIN SERPL ELPH-MCNC: 3.7 G/DL — SIGNIFICANT CHANGE UP (ref 3.3–5.2)
ALBUMIN SERPL ELPH-MCNC: 3.8 G/DL — SIGNIFICANT CHANGE UP (ref 3.3–5.2)
ALP SERPL-CCNC: 108 U/L — SIGNIFICANT CHANGE UP (ref 40–120)
ALP SERPL-CCNC: 96 U/L — SIGNIFICANT CHANGE UP (ref 40–120)
ALT FLD-CCNC: 23 U/L — SIGNIFICANT CHANGE UP
ALT FLD-CCNC: 27 U/L — SIGNIFICANT CHANGE UP
ANION GAP SERPL CALC-SCNC: 11 MMOL/L — SIGNIFICANT CHANGE UP (ref 5–17)
ANION GAP SERPL CALC-SCNC: 13 MMOL/L — SIGNIFICANT CHANGE UP (ref 5–17)
ANION GAP SERPL CALC-SCNC: 13 MMOL/L — SIGNIFICANT CHANGE UP (ref 5–17)
ANION GAP SERPL CALC-SCNC: 14 MMOL/L — SIGNIFICANT CHANGE UP (ref 5–17)
APPEARANCE UR: CLEAR — SIGNIFICANT CHANGE UP
AST SERPL-CCNC: 25 U/L — SIGNIFICANT CHANGE UP
AST SERPL-CCNC: 30 U/L — SIGNIFICANT CHANGE UP
BACTERIA # UR AUTO: ABNORMAL
BASOPHILS # BLD AUTO: 0.01 K/UL — SIGNIFICANT CHANGE UP (ref 0–0.2)
BASOPHILS NFR BLD AUTO: 0.2 % — SIGNIFICANT CHANGE UP (ref 0–2)
BILIRUB SERPL-MCNC: 0.6 MG/DL — SIGNIFICANT CHANGE UP (ref 0.4–2)
BILIRUB SERPL-MCNC: 0.6 MG/DL — SIGNIFICANT CHANGE UP (ref 0.4–2)
BILIRUB UR-MCNC: NEGATIVE — SIGNIFICANT CHANGE UP
BUN SERPL-MCNC: 12.2 MG/DL — SIGNIFICANT CHANGE UP (ref 8–20)
BUN SERPL-MCNC: 15.9 MG/DL — SIGNIFICANT CHANGE UP (ref 8–20)
BUN SERPL-MCNC: 17.2 MG/DL — SIGNIFICANT CHANGE UP (ref 8–20)
BUN SERPL-MCNC: 17.5 MG/DL — SIGNIFICANT CHANGE UP (ref 8–20)
CALCIUM SERPL-MCNC: 8.4 MG/DL — LOW (ref 8.6–10.2)
CALCIUM SERPL-MCNC: 8.7 MG/DL — SIGNIFICANT CHANGE UP (ref 8.6–10.2)
CALCIUM SERPL-MCNC: 9 MG/DL — SIGNIFICANT CHANGE UP (ref 8.6–10.2)
CALCIUM SERPL-MCNC: 9.3 MG/DL — SIGNIFICANT CHANGE UP (ref 8.6–10.2)
CHLORIDE SERPL-SCNC: 91 MMOL/L — LOW (ref 98–107)
CHLORIDE SERPL-SCNC: 92 MMOL/L — LOW (ref 98–107)
CHLORIDE SERPL-SCNC: 93 MMOL/L — LOW (ref 98–107)
CHLORIDE SERPL-SCNC: 96 MMOL/L — LOW (ref 98–107)
CO2 SERPL-SCNC: 21 MMOL/L — LOW (ref 22–29)
CO2 SERPL-SCNC: 22 MMOL/L — SIGNIFICANT CHANGE UP (ref 22–29)
CO2 SERPL-SCNC: 22 MMOL/L — SIGNIFICANT CHANGE UP (ref 22–29)
CO2 SERPL-SCNC: 23 MMOL/L — SIGNIFICANT CHANGE UP (ref 22–29)
COLOR SPEC: YELLOW — SIGNIFICANT CHANGE UP
CREAT SERPL-MCNC: 0.92 MG/DL — SIGNIFICANT CHANGE UP (ref 0.5–1.3)
CREAT SERPL-MCNC: 1.04 MG/DL — SIGNIFICANT CHANGE UP (ref 0.5–1.3)
CREAT SERPL-MCNC: 1.11 MG/DL — SIGNIFICANT CHANGE UP (ref 0.5–1.3)
CREAT SERPL-MCNC: 1.12 MG/DL — SIGNIFICANT CHANGE UP (ref 0.5–1.3)
DIFF PNL FLD: NEGATIVE — SIGNIFICANT CHANGE UP
EOSINOPHIL # BLD AUTO: 0.35 K/UL — SIGNIFICANT CHANGE UP (ref 0–0.5)
EOSINOPHIL NFR BLD AUTO: 6.8 % — HIGH (ref 0–6)
EPI CELLS # UR: SIGNIFICANT CHANGE UP
GLUCOSE SERPL-MCNC: 101 MG/DL — HIGH (ref 70–99)
GLUCOSE SERPL-MCNC: 106 MG/DL — HIGH (ref 70–99)
GLUCOSE SERPL-MCNC: 93 MG/DL — SIGNIFICANT CHANGE UP (ref 70–99)
GLUCOSE SERPL-MCNC: 98 MG/DL — SIGNIFICANT CHANGE UP (ref 70–99)
GLUCOSE UR QL: NEGATIVE MG/DL — SIGNIFICANT CHANGE UP
HCT VFR BLD CALC: 35.4 % — LOW (ref 39–50)
HGB BLD-MCNC: 12.5 G/DL — LOW (ref 13–17)
IMM GRANULOCYTES NFR BLD AUTO: 0.6 % — SIGNIFICANT CHANGE UP (ref 0–1.5)
KETONES UR-MCNC: ABNORMAL
LACTATE BLDV-MCNC: 1.1 MMOL/L — SIGNIFICANT CHANGE UP (ref 0.5–2)
LEUKOCYTE ESTERASE UR-ACNC: NEGATIVE — SIGNIFICANT CHANGE UP
LYMPHOCYTES # BLD AUTO: 0.62 K/UL — LOW (ref 1–3.3)
LYMPHOCYTES # BLD AUTO: 12 % — LOW (ref 13–44)
MAGNESIUM SERPL-MCNC: 1.8 MG/DL — SIGNIFICANT CHANGE UP (ref 1.6–2.6)
MCHC RBC-ENTMCNC: 31.2 PG — SIGNIFICANT CHANGE UP (ref 27–34)
MCHC RBC-ENTMCNC: 35.3 GM/DL — SIGNIFICANT CHANGE UP (ref 32–36)
MCV RBC AUTO: 88.3 FL — SIGNIFICANT CHANGE UP (ref 80–100)
MONOCYTES # BLD AUTO: 0.62 K/UL — SIGNIFICANT CHANGE UP (ref 0–0.9)
MONOCYTES NFR BLD AUTO: 12 % — SIGNIFICANT CHANGE UP (ref 2–14)
NEUTROPHILS # BLD AUTO: 3.55 K/UL — SIGNIFICANT CHANGE UP (ref 1.8–7.4)
NEUTROPHILS NFR BLD AUTO: 68.4 % — SIGNIFICANT CHANGE UP (ref 43–77)
NITRITE UR-MCNC: NEGATIVE — SIGNIFICANT CHANGE UP
PH UR: 6 — SIGNIFICANT CHANGE UP (ref 5–8)
PLATELET # BLD AUTO: 166 K/UL — SIGNIFICANT CHANGE UP (ref 150–400)
POTASSIUM SERPL-MCNC: 4.2 MMOL/L — SIGNIFICANT CHANGE UP (ref 3.5–5.3)
POTASSIUM SERPL-MCNC: 4.4 MMOL/L — SIGNIFICANT CHANGE UP (ref 3.5–5.3)
POTASSIUM SERPL-MCNC: 4.6 MMOL/L — SIGNIFICANT CHANGE UP (ref 3.5–5.3)
POTASSIUM SERPL-MCNC: 5.6 MMOL/L — HIGH (ref 3.5–5.3)
POTASSIUM SERPL-SCNC: 4.2 MMOL/L — SIGNIFICANT CHANGE UP (ref 3.5–5.3)
POTASSIUM SERPL-SCNC: 4.4 MMOL/L — SIGNIFICANT CHANGE UP (ref 3.5–5.3)
POTASSIUM SERPL-SCNC: 4.6 MMOL/L — SIGNIFICANT CHANGE UP (ref 3.5–5.3)
POTASSIUM SERPL-SCNC: 5.6 MMOL/L — HIGH (ref 3.5–5.3)
PROT SERPL-MCNC: 7 G/DL — SIGNIFICANT CHANGE UP (ref 6.6–8.7)
PROT SERPL-MCNC: 7.4 G/DL — SIGNIFICANT CHANGE UP (ref 6.6–8.7)
PROT UR-MCNC: 30 MG/DL
RAPID RVP RESULT: SIGNIFICANT CHANGE UP
RBC # BLD: 4.01 M/UL — LOW (ref 4.2–5.8)
RBC # FLD: 13 % — SIGNIFICANT CHANGE UP (ref 10.3–14.5)
RBC CASTS # UR COMP ASSIST: SIGNIFICANT CHANGE UP /HPF (ref 0–4)
SARS-COV-2 RNA SPEC QL NAA+PROBE: SIGNIFICANT CHANGE UP
SODIUM SERPL-SCNC: 127 MMOL/L — LOW (ref 135–145)
SODIUM SERPL-SCNC: 127 MMOL/L — LOW (ref 135–145)
SODIUM SERPL-SCNC: 128 MMOL/L — LOW (ref 135–145)
SODIUM SERPL-SCNC: 129 MMOL/L — LOW (ref 135–145)
SP GR SPEC: 1.02 — SIGNIFICANT CHANGE UP (ref 1.01–1.02)
UROBILINOGEN FLD QL: NEGATIVE MG/DL — SIGNIFICANT CHANGE UP
WBC # BLD: 5.18 K/UL — SIGNIFICANT CHANGE UP (ref 3.8–10.5)
WBC # FLD AUTO: 5.18 K/UL — SIGNIFICANT CHANGE UP (ref 3.8–10.5)
WBC UR QL: SIGNIFICANT CHANGE UP /HPF (ref 0–5)

## 2022-01-20 PROCEDURE — 71045 X-RAY EXAM CHEST 1 VIEW: CPT | Mod: 26

## 2022-01-20 PROCEDURE — 70450 CT HEAD/BRAIN W/O DYE: CPT | Mod: 26,MA

## 2022-01-20 PROCEDURE — 99218: CPT

## 2022-01-20 PROCEDURE — 93010 ELECTROCARDIOGRAM REPORT: CPT

## 2022-01-20 PROCEDURE — 71250 CT THORAX DX C-: CPT | Mod: 26,MA

## 2022-01-20 PROCEDURE — 73522 X-RAY EXAM HIPS BI 3-4 VIEWS: CPT | Mod: 26

## 2022-01-20 RX ORDER — CLOPIDOGREL BISULFATE 75 MG/1
75 TABLET, FILM COATED ORAL DAILY
Refills: 0 | Status: DISCONTINUED | OUTPATIENT
Start: 2022-01-20 | End: 2022-01-29

## 2022-01-20 RX ORDER — MONTELUKAST 4 MG/1
10 TABLET, CHEWABLE ORAL DAILY
Refills: 0 | Status: DISCONTINUED | OUTPATIENT
Start: 2022-01-20 | End: 2022-01-29

## 2022-01-20 RX ORDER — SODIUM CHLORIDE 9 MG/ML
1000 INJECTION INTRAMUSCULAR; INTRAVENOUS; SUBCUTANEOUS ONCE
Refills: 0 | Status: COMPLETED | OUTPATIENT
Start: 2022-01-20 | End: 2022-01-20

## 2022-01-20 RX ORDER — METOPROLOL TARTRATE 50 MG
25 TABLET ORAL
Refills: 0 | Status: DISCONTINUED | OUTPATIENT
Start: 2022-01-20 | End: 2022-01-23

## 2022-01-20 RX ORDER — LEVOTHYROXINE SODIUM 125 MCG
12.5 TABLET ORAL DAILY
Refills: 0 | Status: DISCONTINUED | OUTPATIENT
Start: 2022-01-20 | End: 2022-01-29

## 2022-01-20 RX ORDER — DILTIAZEM HCL 120 MG
180 CAPSULE, EXT RELEASE 24 HR ORAL DAILY
Refills: 0 | Status: DISCONTINUED | OUTPATIENT
Start: 2022-01-20 | End: 2022-01-21

## 2022-01-20 RX ORDER — PANTOPRAZOLE SODIUM 20 MG/1
40 TABLET, DELAYED RELEASE ORAL
Refills: 0 | Status: DISCONTINUED | OUTPATIENT
Start: 2022-01-20 | End: 2022-01-29

## 2022-01-20 RX ORDER — ALBUTEROL 90 UG/1
2 AEROSOL, METERED ORAL EVERY 6 HOURS
Refills: 0 | Status: DISCONTINUED | OUTPATIENT
Start: 2022-01-20 | End: 2022-02-04

## 2022-01-20 RX ORDER — FENOFIBRATE,MICRONIZED 130 MG
145 CAPSULE ORAL DAILY
Refills: 0 | Status: DISCONTINUED | OUTPATIENT
Start: 2022-01-20 | End: 2022-01-21

## 2022-01-20 RX ADMIN — SODIUM CHLORIDE 1000 MILLILITER(S): 9 INJECTION INTRAMUSCULAR; INTRAVENOUS; SUBCUTANEOUS at 06:32

## 2022-01-20 RX ADMIN — Medication 100 MILLIGRAM(S): at 18:17

## 2022-01-20 RX ADMIN — SODIUM CHLORIDE 1000 MILLILITER(S): 9 INJECTION INTRAMUSCULAR; INTRAVENOUS; SUBCUTANEOUS at 02:32

## 2022-01-20 RX ADMIN — SODIUM CHLORIDE 1000 MILLILITER(S): 9 INJECTION INTRAMUSCULAR; INTRAVENOUS; SUBCUTANEOUS at 23:04

## 2022-01-20 RX ADMIN — MONTELUKAST 10 MILLIGRAM(S): 4 TABLET, CHEWABLE ORAL at 11:54

## 2022-01-20 RX ADMIN — Medication 25 MILLIGRAM(S): at 18:16

## 2022-01-20 RX ADMIN — Medication 180 MILLIGRAM(S): at 18:17

## 2022-01-20 RX ADMIN — Medication 145 MILLIGRAM(S): at 22:34

## 2022-01-20 RX ADMIN — CLOPIDOGREL BISULFATE 75 MILLIGRAM(S): 75 TABLET, FILM COATED ORAL at 11:54

## 2022-01-20 NOTE — ED ADULT NURSE REASSESSMENT NOTE - NSIMPLEMENTINTERV_GEN_ALL_ED
Implemented All Fall Risk Interventions:  [place] to call system. Call bell, personal items and telephone within reach. Instruct patient to call for assistance. Room bathroom lighting operational. Non-slip footwear when patient is off stretcher. Physically safe environment: no spills, clutter or unnecessary equipment. Stretcher in lowest position, wheels locked, appropriate side rails in place. Provide visual cue, wrist band, yellow gown, etc. Monitor gait and stability. Monitor for mental status changes and reorient to person, place, and time. Review medications for side effects contributing to fall risk. Reinforce activity limits and safety measures with patient and family.

## 2022-01-20 NOTE — ED ADULT NURSE REASSESSMENT NOTE - NS ED NURSE REASSESS COMMENT FT1
Received patient from [facility] RN.  Pt AxO4, VSS.  Pt denies chest pain/SOB at this time. left  IV insertion site intact-, flushing without difficulty. Inc of urine @ time keep pt clean and dry .Instructed to patient to call for assistance as needed and to stay in bed verbalized understanding. Safety measures taken, bed in low position, call bell within reach, side rails up x2.  Plan of care explained.  Pt verbalized understanding.  Will continue to monitor. Anticipate D/C in AM

## 2022-01-20 NOTE — ED PROVIDER NOTE - PHYSICAL EXAMINATION
General-alert and oriented to person place and time, nontoxic appearing, pleasant cooperative, NAD  HEENT-normocephalic, atraumatic, NT to palp, EOMI, PERRLA, no conjunctival injections, nares patent, pinna nt to palp, tympanic membrane intact bilaterally, nonbulging TM, no erythema noted, +light reflex, moist oral mucosa, tongue nonenlarged, uvula midline, tonsils nonenlarged, no exudates or erythema noted  Neck- supple, trach midline, No JVD, no LAD  Chest- Nt to palp, no reproducible pain  Cardio-s1,s2 present, regular rate and rhythm  Resp- talks in full sentences, symmetrical chest rise, CTA bilat, no evidence of wheezes, rhonchi noted  Abdomen- bowel sounds presnt in all 4 quadrants, soft, NT/ND, no guarding, no rebound tenderness  MSK- moves all extremities, able to ambulate without issues  Back- nt to palp of cervical, thoracic, lumbar spine, nt to palp of paraspinal m.,   Neuro- no focal deficits, sensation intact

## 2022-01-20 NOTE — CHART NOTE - NSCHARTNOTEFT_GEN_A_CORE
Chart reviewed. MD order received for swallow eval. Pt received & seen seated upright in bed, awake/alert, oriented , reduced cognition, 0/10 pain. See documentation section for eval findings. Pt left NAD, 0/1 pain. RN Jamee aware of RX  RX:   1. Regular diet, thin fluids   2. Aspiration precautions   3. Supervision with PO, due to reduced cognition  4. Small bites/sips   5. Upright with PO   6. Will follow, to check PO tolerance

## 2022-01-20 NOTE — PHYSICAL THERAPY INITIAL EVALUATION ADULT - PLANNED THERAPY INTERVENTIONS, PT EVAL
-Midodrine is being prescribed for low BP.   If the top number of your BP is less than 100 you can take one 5 mg tablet of midodrine   If needed you can take up to 3 times a day.     - follow up with PCP     - do not drive if you are feeling dizzy     - sleep with the head of your bed elevated or with 3 pillows if available     MEDICATIONS:  · See Medication List (bring to your doctor appointments).  · Other:      VACCINES:  Most Recent Immunizations   Administered Date(s) Administered   • Hep A/Hep B 08/25/2008   • Influenza, injectable, quadrivalent 10/02/2018   • Influenza, injectable, quadrivalent, preservative-free 10/12/2017   • Influenza, seasonal, injectable, trivalent 03/06/2008   • Pneumococcal Conjugate 13 valent 10/12/2017   • Polio, INACTIVATED 03/06/2008   • Tdap 10/02/2018   • Typhoid, Vi capsular polysaccharide vaccine 04/07/2010   • Yellow fever (YF-VAX) 04/07/2010   • Zoster Shingles 08/13/2016       ACTIVITY:  · Weigh yourself daily (first thing in the morning, with same amount of clothes on) unless told otherwise by your doctor.  · Continue activity as you were in the hospital, slowly increase to what you were doing previously.  · Other: Change positions slowly if you feel dizzy or light headed      SMOKING:  · Avoid all tobacco products and secondhand smoke.  · Smoking Cessation Counseling offered.  · [place] Toll Free Quit Line: [phone]    DIET:  · Limit salt and salty foods unless told otherwise by your doctor.  · Special Diet: Diabetic Diet    · Trouble breathing or chest pain - CALL 911    CONTACT YOUR DOCTOR IF:  · You have symptoms that are not \"normal\" for you.  · You have new or worse symptoms or pain, not relieved by medicine or rest.  · Temperature greater than 101 degrees F, chills or flu like symptoms.  · You gain more than 3 pounds in 2 days.     balance training/bed mobility training/gait training/strengthening/transfer training

## 2022-01-20 NOTE — ED PROVIDER NOTE - PROGRESS NOTE DETAILS
POLO- ct head negative, ct chest negative except for "Ill-defined nonspecific 1 cm left lower lobe groundglass opacity for   which a 3-6 month follow-up CT is recommended to ensure stability." pt advised of results and follow up, frequent falls and weakness, hyponatremia improving, will admit to obs for pt eval and possible placement.

## 2022-01-20 NOTE — SWALLOW BEDSIDE ASSESSMENT ADULT - COMMENTS
Pt denied sensation of fluids "coming up" Pt denied sensation of PO "coming up" As per medical notes: " 77yo male with history of HTN, HLD, CAD s/p PCI x 4, asthma, s/p left nephrectomy, bladder CA, currently on chemo and radiation, Following [place] [facility] presents to the ED c/o worsening weakness. Frequent falls. will admit to obs for pt/sw for possible placement, hyponatremic, may be related to decrease in po intake, will hydrate and re-evaluate"

## 2022-01-20 NOTE — ED PROVIDER NOTE - ATTENDING CONTRIBUTION TO CARE
75 yo fatigued appearing male presenting for worsening general weakness and recurrent falls. I personally saw the patient with the PA, and completed the key components of the history and physical exam. I then discussed the management plan with the PA.

## 2022-01-20 NOTE — ED ADULT NURSE NOTE - CHIEF COMPLAINT QUOTE
HCP form explained and provided/No
C/O of increased generalized weakness and decrease PO intake for the past 2 days.  Pt with multiple falls today, did not hit his head. C/O of lower back pain.  Takes Plavix. Was diagnosed on Monday with PNA.

## 2022-01-20 NOTE — ED PROVIDER NOTE - ENMT NEGATIVE STATEMENT, MLM
Ears: no ear pain and no hearing problems. Nose: no nasal congestion and no nasal drainage. Mouth/Throat: no dysphagia, no hoarseness and no throat pain. Neck: no lumps, no pain, no stiffness and no swollen glands.
Dr. Grimaldo's Note: Workup done to rule out acute infectious, metabolic, cardiac condition. workup significant for covid, otherwise neg. pt's vitals are unremarkable. despite this diagnosis, pt appears well and is safe for d/c with supportive care, return precautions.

## 2022-01-20 NOTE — PROVIDER CONTACT NOTE (OTHER) - ASSESSMENT
PT ordered. chart reviewed and noted. pt received in ED, semifowler position in stretcher, agreeable to PT. pt requires assistance for functional mobility due to strength and balance deficits. 0/10 pain throughout session, pt left as received will continue to follow, NAD, all lines intact  goals: 2-3x a week for 2 weeks  bed mobility: S  transfers: S  gait: 50 feet RW [place]

## 2022-01-20 NOTE — ED CDU PROVIDER INITIAL DAY NOTE - ATTENDING CONTRIBUTION TO CARE
77 yo male with worsening weakness for several days. I personally saw the patient with the PA, and completed the key components of the history and physical exam. I then discussed the management plan with the PA.

## 2022-01-20 NOTE — ED CDU PROVIDER INITIAL DAY NOTE - MEDICAL DECISION MAKING DETAILS
75yo male with history of HTN, HLD, CAD s/p PCI x 4, asthma, s/p left nephrectomy, bladder CA, currently on chemo and radiation, Following [place] [facility] presents to the ED c/o worsening weakness. Frequent falls. will admit to obs for pt/sw for possible placement, hyponatremic, may be related to decrease in po intake, will hydrate and re-evaluate

## 2022-01-20 NOTE — PHYSICAL THERAPY INITIAL EVALUATION ADULT - PRECAUTIONS/LIMITATIONS, REHAB EVAL
Physical Therapy  Facility/Department: [address] STEPDOWN  Daily Treatment Note  NAME: Deni Estevez  : 1955  MRN: [id number]    Date of Service: 2019    Discharge Recommendations:    Further therapy recommended at discharge. PT Equipment Recommendations  Equipment Needed: No    Patient Diagnosis(es): There were no encounter diagnoses. has a past medical history of Acquired lymphedema, Acquired tracheal collapse, Arthritis, Blood clot in vein, CAD (coronary artery disease), CHF (congestive heart failure) ([address] COPD (chronic obstructive pulmonary disease) ([address] Factor V deficiency ([facility] [address] Full dentures, Gout, Hyperlipidemia, Hypertension, Primary osteoarthritis of left knee, Respiratory failure ([facility] [address] Sleep apnea, Type II or unspecified type diabetes mellitus without mention of complication, not stated as uncontrolled, and Wears glasses. has a past surgical history that includes cervical fusion (, ); Tricuspid valvuloplasty ( ?); Skin graft (Right, 2015); Knee Arthroplasty (Left, 12/22/15); pacemaker placement (10/2011); Knee Arthroplasty (Right, 2017); joint replacement (Bilateral); tracheostomy (2018); Cardiac catheterization (); other surgical history (Right, 2019); Leg Surgery (Right, 2019); and incision and drainage (Right, 2019). Restrictions  Restrictions/Precautions  Restrictions/Precautions: Fall Risk, Weight Bearing  Required Braces or Orthoses?: No  Lower Extremity Weight Bearing Restrictions  Right Lower Extremity Weight Bearing: Weight Bearing As Tolerated  Position Activity Restriction  Other position/activity restrictions: activity as tolerated, R knee I&D  s/p fall on knee ([facility]KA 2017). trach  Subjective   General  Response To Previous Treatment: Patient with no complaints from previous session.   Family / Caregiver Present: No(Wife initially but she left prior to mobility )  Subjective  Subjective: RN and pt agreed to PT, Goals  Short term goals  Time Frame for Short term goals: 14 visits  Short term goal 1: Pt will be Alejandra with bed mobility  Short term goal 2: Pt will be Alejandra transfers  Short term goal 3: Pt will be Alejandra amb 200' RW    Plan    Plan  Times per week: 6x/wk, 1-2x/day  Current Treatment Recommendations: Strengthening, Balance Training, Transfer Training, Gait Training, Endurance Training, Functional Mobility Training, Home Exercise Program, Safety Education & Training, Patient/Caregiver Education & Training, Equipment Evaluation, Education, & procurement  Safety Devices  Type of devices: Call light within reach, Nurse notified, Gait belt, Patient at risk for falls, Left in chair, Chair alarm in place, All fall risk precautions in place(skylift sling under pt in chair)  Restraints  Initially in place: No     Therapy Time   Individual Concurrent Group Co-treatment   Time In 1324         Time Out 1422         Minutes 62                 Lina Kurtz, PTA no known precautions/limitations

## 2022-01-20 NOTE — ED ADULT NURSE REASSESSMENT NOTE - NS ED NURSE REASSESS COMMENT FT1
Pt awake and alert x3,  Denies any c/o pain or discomfort. Pt resting comfortably at this time. Pts B/l hearing aids with patient. Safety maintained.

## 2022-01-20 NOTE — ED ADULT NURSE NOTE - OBJECTIVE STATEMENT
Pt AAOX3, Pt c/o worsening weakness. Patient notes the weakness has worsened over the past 4 days. Notes he was at [facility] 3 days ago for similar symptoms and was diagnosed with a RLL PNA, Patient notes since being discharged the he became progressively worsening weakness. Notes frequent falls today. Did not hit his head. Notes decrease in po intake. pt respirations even and unlabored, pt able to move all extremities well

## 2022-01-20 NOTE — ED ADULT NURSE REASSESSMENT NOTE - NS ED NURSE REASSESS COMMENT FT1
Report received from ED RN. Patient seen and assessed at bedside. Patient is A&Ox4. Pt in no apparent distress. Pt denies pain. PIV noted, intact & WNL. NS 0.9% bolus in progress. Call bell within reach. Safety maintained. Will continue to closely monitor.

## 2022-01-20 NOTE — ED ADULT NURSE REASSESSMENT NOTE - NS ED NURSE REASSESS COMMENT FT1
Assumed care of the patient @[id number]. Pt A&Ox4. Patient in understanding of plan of care. Patient with no further questions for the RN. Resting in comfort. Call bell within reach and encouraged to use when assistance needed. Will continue to monitor.

## 2022-01-20 NOTE — ED PROVIDER NOTE - NSICDXPASTSURGICALHX_GEN_ALL_CORE_FT
Phone call to patient and states is feeling better and is taking clear liquids and did eat a sandwich and only had slight cramping. Had one diarrhea stool today.  Denies fever   Needs a work excuse from 7/9-7/11   PAST SURGICAL HISTORY:  CAD S/P percutaneous coronary angioplasty Stent to Proximal LAD and Proximal CX    H/O arthroscopy of left knee     H/O foot surgery Right Foot    H/O inguinal hernia repair     Hx of cholecystectomy

## 2022-01-20 NOTE — ED PROVIDER NOTE - CLINICAL SUMMARY MEDICAL DECISION MAKING FREE TEXT BOX
75yo male with history of HTN, HLD, CAD s/p PCI x 4, asthma, s/p left nephrectomy, bladder CA, currently on chemo and radiation, Following [place] [facility] presents to the ED c/o worsening weakness. taking oral abx as outpatient but continues to remain weak, frequent falls today, did not hit his head, no signs of head trauma, will obtain labs, ekg, chest xray, reassess

## 2022-01-20 NOTE — ED PROVIDER NOTE - OBJECTIVE STATEMENT
77yo male with history of HTN, HLD, CAD s/p PCI x 4, asthma, s/p left nephrectomy, bladder CA, currently on chemo and radiation, Following [place] [facility] presents to the ED c/o worsening weakness. Patient notes the weakness has worsened over the past 4 days. Notes he was at [facility] 3 days ago for similar symptoms and was diagnosed with a RLL PNA, discharged and started on oral abx, Doxycycline. Patient notes since being discharged the he became progressively worsening weakness. Notes frequent falls today. Did not hit his head. Notes decrease in po intake. Denies chest pain, sob, lightheadedness, dizziness, nausea vomiting.

## 2022-01-21 DIAGNOSIS — R53.1 WEAKNESS: ICD-10-CM

## 2022-01-21 LAB
ALBUMIN SERPL ELPH-MCNC: 3.8 G/DL — SIGNIFICANT CHANGE UP (ref 3.3–5.2)
ALP SERPL-CCNC: 96 U/L — SIGNIFICANT CHANGE UP (ref 40–120)
ALT FLD-CCNC: 22 U/L — SIGNIFICANT CHANGE UP
ANION GAP SERPL CALC-SCNC: 12 MMOL/L — SIGNIFICANT CHANGE UP (ref 5–17)
ANION GAP SERPL CALC-SCNC: 12 MMOL/L — SIGNIFICANT CHANGE UP (ref 5–17)
AST SERPL-CCNC: 27 U/L — SIGNIFICANT CHANGE UP
BILIRUB SERPL-MCNC: 0.6 MG/DL — SIGNIFICANT CHANGE UP (ref 0.4–2)
BUN SERPL-MCNC: 10.7 MG/DL — SIGNIFICANT CHANGE UP (ref 8–20)
BUN SERPL-MCNC: 10.8 MG/DL — SIGNIFICANT CHANGE UP (ref 8–20)
CALCIUM SERPL-MCNC: 8.9 MG/DL — SIGNIFICANT CHANGE UP (ref 8.6–10.2)
CALCIUM SERPL-MCNC: 9 MG/DL — SIGNIFICANT CHANGE UP (ref 8.6–10.2)
CHLORIDE SERPL-SCNC: 92 MMOL/L — LOW (ref 98–107)
CHLORIDE SERPL-SCNC: 95 MMOL/L — LOW (ref 98–107)
CO2 SERPL-SCNC: 23 MMOL/L — SIGNIFICANT CHANGE UP (ref 22–29)
CO2 SERPL-SCNC: 23 MMOL/L — SIGNIFICANT CHANGE UP (ref 22–29)
CREAT SERPL-MCNC: 1 MG/DL — SIGNIFICANT CHANGE UP (ref 0.5–1.3)
CREAT SERPL-MCNC: 1.01 MG/DL — SIGNIFICANT CHANGE UP (ref 0.5–1.3)
GLUCOSE SERPL-MCNC: 129 MG/DL — HIGH (ref 70–99)
GLUCOSE SERPL-MCNC: 93 MG/DL — SIGNIFICANT CHANGE UP (ref 70–99)
POTASSIUM SERPL-MCNC: 4.2 MMOL/L — SIGNIFICANT CHANGE UP (ref 3.5–5.3)
POTASSIUM SERPL-MCNC: 4.4 MMOL/L — SIGNIFICANT CHANGE UP (ref 3.5–5.3)
POTASSIUM SERPL-SCNC: 4.2 MMOL/L — SIGNIFICANT CHANGE UP (ref 3.5–5.3)
POTASSIUM SERPL-SCNC: 4.4 MMOL/L — SIGNIFICANT CHANGE UP (ref 3.5–5.3)
PROCALCITONIN SERPL-MCNC: 0.28 NG/ML — HIGH (ref 0.02–0.1)
PROT SERPL-MCNC: 7 G/DL — SIGNIFICANT CHANGE UP (ref 6.6–8.7)
SODIUM SERPL-SCNC: 127 MMOL/L — LOW (ref 135–145)
SODIUM SERPL-SCNC: 130 MMOL/L — LOW (ref 135–145)
T3 SERPL-MCNC: 71 NG/DL — LOW (ref 80–200)
T4 AB SER-ACNC: 7.9 UG/DL — SIGNIFICANT CHANGE UP (ref 4.5–12)
TSH SERPL-MCNC: 3.56 UIU/ML — SIGNIFICANT CHANGE UP (ref 0.27–4.2)

## 2022-01-21 PROCEDURE — 99223 1ST HOSP IP/OBS HIGH 75: CPT

## 2022-01-21 PROCEDURE — 99217: CPT

## 2022-01-21 PROCEDURE — 99497 ADVNCD CARE PLAN 30 MIN: CPT | Mod: 25

## 2022-01-21 RX ORDER — TAMSULOSIN HYDROCHLORIDE 0.4 MG/1
0.4 CAPSULE ORAL AT BEDTIME
Refills: 0 | Status: DISCONTINUED | OUTPATIENT
Start: 2022-01-21 | End: 2022-01-29

## 2022-01-21 RX ORDER — AZITHROMYCIN 500 MG/1
500 TABLET, FILM COATED ORAL ONCE
Refills: 0 | Status: COMPLETED | OUTPATIENT
Start: 2022-01-21 | End: 2022-01-21

## 2022-01-21 RX ORDER — FENOFIBRATE,MICRONIZED 130 MG
145 CAPSULE ORAL DAILY
Refills: 0 | Status: DISCONTINUED | OUTPATIENT
Start: 2022-01-21 | End: 2022-01-29

## 2022-01-21 RX ORDER — CHOLECALCIFEROL (VITAMIN D3) 125 MCG
5000 CAPSULE ORAL DAILY
Refills: 0 | Status: DISCONTINUED | OUTPATIENT
Start: 2022-01-21 | End: 2022-01-29

## 2022-01-21 RX ORDER — OXYCODONE AND ACETAMINOPHEN 5; 325 MG/1; MG/1
1 TABLET ORAL EVERY 4 HOURS
Refills: 0 | Status: DISCONTINUED | OUTPATIENT
Start: 2022-01-21 | End: 2022-01-21

## 2022-01-21 RX ORDER — CEFTRIAXONE 500 MG/1
1000 INJECTION, POWDER, FOR SOLUTION INTRAMUSCULAR; INTRAVENOUS EVERY 24 HOURS
Refills: 0 | Status: DISCONTINUED | OUTPATIENT
Start: 2022-01-21 | End: 2022-01-22

## 2022-01-21 RX ORDER — ASCORBIC ACID 60 MG
1000 TABLET,CHEWABLE ORAL DAILY
Refills: 0 | Status: DISCONTINUED | OUTPATIENT
Start: 2022-01-21 | End: 2022-01-29

## 2022-01-21 RX ORDER — AZITHROMYCIN 500 MG/1
TABLET, FILM COATED ORAL
Refills: 0 | Status: DISCONTINUED | OUTPATIENT
Start: 2022-01-21 | End: 2022-01-22

## 2022-01-21 RX ORDER — FAMOTIDINE 10 MG/ML
40 INJECTION INTRAVENOUS AT BEDTIME
Refills: 0 | Status: DISCONTINUED | OUTPATIENT
Start: 2022-01-21 | End: 2022-01-29

## 2022-01-21 RX ORDER — AZITHROMYCIN 500 MG/1
TABLET, FILM COATED ORAL
Refills: 0 | Status: DISCONTINUED | OUTPATIENT
Start: 2022-01-21 | End: 2022-01-21

## 2022-01-21 RX ORDER — MOMETASONE FUROATE 220 UG/1
1 INHALANT RESPIRATORY (INHALATION) DAILY
Refills: 0 | Status: DISCONTINUED | OUTPATIENT
Start: 2022-01-21 | End: 2022-02-04

## 2022-01-21 RX ORDER — ACETAMINOPHEN 500 MG
650 TABLET ORAL EVERY 6 HOURS
Refills: 0 | Status: DISCONTINUED | OUTPATIENT
Start: 2022-01-21 | End: 2022-01-29

## 2022-01-21 RX ORDER — POLYETHYLENE GLYCOL 3350 17 G/17G
17 POWDER, FOR SOLUTION ORAL DAILY
Refills: 0 | Status: DISCONTINUED | OUTPATIENT
Start: 2022-01-21 | End: 2022-01-29

## 2022-01-21 RX ORDER — SODIUM CHLORIDE 9 MG/ML
1000 INJECTION INTRAMUSCULAR; INTRAVENOUS; SUBCUTANEOUS
Refills: 0 | Status: COMPLETED | OUTPATIENT
Start: 2022-01-21 | End: 2022-01-22

## 2022-01-21 RX ORDER — ENOXAPARIN SODIUM 100 MG/ML
40 INJECTION SUBCUTANEOUS DAILY
Refills: 0 | Status: DISCONTINUED | OUTPATIENT
Start: 2022-01-21 | End: 2022-02-04

## 2022-01-21 RX ORDER — AZITHROMYCIN 500 MG/1
250 TABLET, FILM COATED ORAL EVERY 24 HOURS
Refills: 0 | Status: DISCONTINUED | OUTPATIENT
Start: 2022-01-22 | End: 2022-01-22

## 2022-01-21 RX ORDER — ESCITALOPRAM OXALATE 10 MG/1
10 TABLET, FILM COATED ORAL DAILY
Refills: 0 | Status: DISCONTINUED | OUTPATIENT
Start: 2022-01-21 | End: 2022-01-29

## 2022-01-21 RX ORDER — SODIUM CHLORIDE 9 MG/ML
1 INJECTION INTRAMUSCULAR; INTRAVENOUS; SUBCUTANEOUS ONCE
Refills: 0 | Status: COMPLETED | OUTPATIENT
Start: 2022-01-21 | End: 2022-01-21

## 2022-01-21 RX ORDER — SENNA PLUS 8.6 MG/1
2 TABLET ORAL AT BEDTIME
Refills: 0 | Status: DISCONTINUED | OUTPATIENT
Start: 2022-01-21 | End: 2022-01-29

## 2022-01-21 RX ORDER — DULOXETINE HYDROCHLORIDE 30 MG/1
60 CAPSULE, DELAYED RELEASE ORAL DAILY
Refills: 0 | Status: DISCONTINUED | OUTPATIENT
Start: 2022-01-21 | End: 2022-01-29

## 2022-01-21 RX ADMIN — MONTELUKAST 10 MILLIGRAM(S): 4 TABLET, CHEWABLE ORAL at 11:33

## 2022-01-21 RX ADMIN — Medication 25 MILLIGRAM(S): at 06:02

## 2022-01-21 RX ADMIN — Medication 100 MILLIGRAM(S): at 06:05

## 2022-01-21 RX ADMIN — PANTOPRAZOLE SODIUM 40 MILLIGRAM(S): 20 TABLET, DELAYED RELEASE ORAL at 08:29

## 2022-01-21 RX ADMIN — Medication 145 MILLIGRAM(S): at 11:33

## 2022-01-21 RX ADMIN — Medication 650 MILLIGRAM(S): at 11:56

## 2022-01-21 RX ADMIN — CLOPIDOGREL BISULFATE 75 MILLIGRAM(S): 75 TABLET, FILM COATED ORAL at 11:33

## 2022-01-21 RX ADMIN — Medication 12.5 MICROGRAM(S): at 06:01

## 2022-01-21 RX ADMIN — Medication 25 MILLIGRAM(S): at 17:54

## 2022-01-21 RX ADMIN — Medication 650 MILLIGRAM(S): at 18:12

## 2022-01-21 RX ADMIN — FAMOTIDINE 40 MILLIGRAM(S): 10 INJECTION INTRAVENOUS at 23:08

## 2022-01-21 RX ADMIN — Medication 650 MILLIGRAM(S): at 18:26

## 2022-01-21 RX ADMIN — SENNA PLUS 2 TABLET(S): 8.6 TABLET ORAL at 23:08

## 2022-01-21 RX ADMIN — CEFTRIAXONE 100 MILLIGRAM(S): 500 INJECTION, POWDER, FOR SOLUTION INTRAMUSCULAR; INTRAVENOUS at 17:36

## 2022-01-21 RX ADMIN — AZITHROMYCIN 255 MILLIGRAM(S): 500 TABLET, FILM COATED ORAL at 17:35

## 2022-01-21 RX ADMIN — Medication 180 MILLIGRAM(S): at 06:00

## 2022-01-21 RX ADMIN — TAMSULOSIN HYDROCHLORIDE 0.4 MILLIGRAM(S): 0.4 CAPSULE ORAL at 23:09

## 2022-01-21 RX ADMIN — SODIUM CHLORIDE 100 MILLILITER(S): 9 INJECTION INTRAMUSCULAR; INTRAVENOUS; SUBCUTANEOUS at 18:31

## 2022-01-21 RX ADMIN — SODIUM CHLORIDE 1 GRAM(S): 9 INJECTION INTRAMUSCULAR; INTRAVENOUS; SUBCUTANEOUS at 11:32

## 2022-01-21 NOTE — H&P ADULT - NSHPLABSRESULTS_GEN_ALL_CORE
1/17/21 CXR : Mild hazy opacicty right lung  1/21/21 CXR : Clear b/l  1/20/21 CT Head :   IMPRESSION:  No acute intracranial hemorrhage, mass effect, or acute osseous fracture.

## 2022-01-21 NOTE — ED ADULT NURSE REASSESSMENT NOTE - STATUS
Failed Dysphagia screen
MORRIS placement/awaiting consult
PT/OT consult/awaiting consult
M[facility] placement
[]PT  []speech & swallow/awaiting consult
M[facility]RIS placement/awaiting consult

## 2022-01-21 NOTE — ED CDU PROVIDER SUBSEQUENT DAY NOTE - PROGRESS NOTE DETAILS
alerted by RN pt has fever 101.3F. tylenol ordered. blood cultures already drawn. pt currently being tx for pna with doxycycline.

## 2022-01-21 NOTE — ED ADULT NURSE REASSESSMENT NOTE - NS ED NURSE REASSESS COMMENT FT1
Patient noted with temp 101.3 rectal, Anand Anderson notified, awaiting orders for tylenol po, no further orders received at this time.

## 2022-01-21 NOTE — H&P ADULT - ASSESSMENT
76 year old male with HTN, HLD, CAD s/p PCI x 4, asthma, s/p left nephrectomy and left RCC on immunotherapy (biweekly infusions per son with [facility]) presenting from home with recurrent falls and profound weakness x 10 days. Placed in observation waiting for dsc to [facility]. Noted to be febrile and hyponatremic while in ER    Admit to Medicine   Profound weakness with fever   Recent CXR (1/17) in comparison to repeat 1/20 with Right sided infiltrate. Potentially obscured on repeat imaging by cardiac border  Obtain CT Chest w/o contrast  Send TFTs now  Send am cortisol levels (s/p nephrectomy and questionable radiation)  Blood cultures x 2    Hyponatremia   UA notable for ketonuria, suspect recent starvation ketosis given pts history   Start 0.9NS @ 100 ml/hr   Repeat BMP in am     Hypothyroidism  Continue home dose of synthroid 12.5 mcg daily  Reports significant recent constiptaion  Send TFTs as above   Fleet enema x 1, bowel regimen     CAD / HTN / HLD  Continue Metoprolol 25 mg BID   Continue Plavix 75 mg daily  In sins rhythm now, previously had been on Cardizem (no longer per home meds)  Metoprolol 5 mg IVP Q 6 for sustained HR > 120 (>15 mins)  On Alirocumab bi weekly (Praluent). PCSK9 inh non formulary. Will start Tricor for now   Telemetry monitoring x 24 hours     DVT ppx : Lovenox  Dispo : Acute  DNR/DNI (MOLST filled and placed in chart)  HCP : Ricky (son) [phone]    76 year old male with HTN, HLD, CAD s/p PCI x 4, asthma, s/p left nephrectomy and left RCC on immunotherapy (biweekly infusions per son with [place] [facility]) presenting from home with recurrent falls and profound weakness x 10 days. Placed in observation waiting for dsc to [facility]. Noted to be febrile and hyponatremic while in ER    Admit to Medicine   Profound weakness with fever   Recent CXR (1/17) in comparison to repeat 1/20 with Right sided infiltrate. Potentially obscured on repeat imaging by cardiac border  Obtain CT Chest w/o contrast  Send TFTs now  Send am cortisol levels (s/p nephrectomy and questionable radiation)  Blood cultures x 2    RCC s/p nephrectomy  Will d/w [facility] in am regarding pts current regimen and check for potential medication side effects    Hyponatremia   UA notable for ketonuria, suspect recent starvation ketosis given pts history   Start 0.9NS @ 100 ml/hr   Repeat BMP in am     Hypothyroidism  Continue home dose of synthroid 12.5 mcg daily  Reports significant recent constiptaion  Send TFTs as above   Fleet enema x 1, bowel regimen     CAD / HTN / HLD  Continue Metoprolol 25 mg BID   Continue Plavix 75 mg daily  In sins rhythm now, previously had been on Cardizem (no longer per home meds)  Metoprolol 5 mg IVP Q 6 for sustained HR > 120 (>15 mins)  On Alirocumab bi weekly (Praluent). PCSK9 inh non formulary. Will start Tricor for now   Telemetry monitoring x 24 hours     DVT ppx : Lovenox  Dispo : Acute  DNR/DNI (MOLST filled and placed in chart)  HCP : Ricky (son) [phone]

## 2022-01-21 NOTE — H&P ADULT - HISTORY OF PRESENT ILLNESS
Briefly pt is a 76 year old male with HTN, HLD, CAD s/p PCI x 4, asthma, s/p left nephrectomy and left RCC on immunotherapy (biweekly infusions per son with [place] [facility]) presenting from home with recurrent falls and profound weakness x 10 days. Per pt and his son (Ricky via Telephone) pt woke up with sudden onset of fatigue slept ~ 4 days continuously, has been unable to go for scheduled infusion therapy due to weakness. Prior to [facility] [place] pt reports he was independent able to ambulate w/o assistance. Recently was told he had PNA (1/17) and dsc on Doxycyline BID.

## 2022-01-21 NOTE — ED CDU PROVIDER SUBSEQUENT DAY NOTE - ATTENDING CONTRIBUTION TO CARE
I, Olman Sutton, performed a face to face bedside interview with this patient regarding history of present illness, review of symptoms and relevant past medical, social and family history.  I completed an independent physical examination. I have communicated the patient’s plan of care and disposition with the ACP.  Pt on obs pending MORRIS placement. Here for generalize weakness and recurrent falls, likely due to decondition and poor PO intake from chemo treatment. No focal deficits or acute pathology identified.  Gen: NAD, well appearing  CV: RRR  Pul: CTA b/l  Abd: Soft, non-distended, non-tender  Neuro: no focal deficits

## 2022-01-21 NOTE — H&P ADULT - CONVERSATION DETAILS
Pts wishes regarding end of life care and GOC discussed. The pt has a prior DNR which he wishes to remain in effect. Also wishes to be DNI   Wants son (Ricky) to be HCP ([phone]) Pts wishes regarding end of life care and GOC discussed. The pt has a prior DNR which he wishes to remain in effect. Also wishes to be DNI   Wants son (Ricky) to be HCP ([phone])    ACP time : 45 mins

## 2022-01-21 NOTE — ED ADULT NURSE REASSESSMENT NOTE - NURSING MUSC ROM
full range of motion in all extremities

## 2022-01-21 NOTE — H&P ADULT - NSHPREVIEWOFSYSTEMS_GEN_ALL_CORE
Notable for weakness, constipation (prior to today last BM ~ 2 weeks prior), fever (today). Denies any SOB, fevers, chills, CP, changes in genitourinary habits.

## 2022-01-21 NOTE — ED ADULT NURSE REASSESSMENT NOTE - NURSING MUSC JOINTS
no pain, swelling or deformity of joints

## 2022-01-21 NOTE — ED ADULT NURSE REASSESSMENT NOTE - NURSING MUSC STRENGTH
hand grasp, leg strength strong and equal bilaterally

## 2022-01-21 NOTE — ED ADULT NURSE REASSESSMENT NOTE - NS ED NURSE REASSESS COMMENT FT1
Assumed care of the patient at 0730. Verbal report received from Darby RILEY ED. Patient A&Ox4. No s/s of acute distress or pain. VSS. PIV patent. Patient pending rpt bmp at 10am and MORRIS placement. Perineal care provided. Voiding in urinal. Frequent T&P. Fall precautions maintained. Patient in understanding of plan of care. Patient with no further questions for the RN. Resting in comfort. Call bell within reach and encouraged to use when assistance needed. Will continue to monitor. Assumed care of the patient at 0730. Verbal report received from Darby RILEY ED. Patient A&Ox4. No s/s of acute distress or pain. VSS. PIV patent. Patient pending rpt bmp at 10am and MORRIS placement. Perineal care provided. Voiding in urinal. Frequent T&P. Fall precautions maintained. NSR on CM. Patient in understanding of plan of care. Patient with no further questions for the RN. Resting in comfort. Call bell within reach and encouraged to use when assistance needed. Will continue to monitor.

## 2022-01-21 NOTE — ED ADULT NURSE REASSESSMENT NOTE - COMFORT CARE
meal provided/plan of care explained/po fluids offered/repositioned/side rails up/warm blanket provided
meal provided/plan of care explained/repositioned/wait time explained
meal provided/plan of care explained/po fluids offered/repositioned/wait time explained
plan of care explained/po fluids offered/wait time explained
assisted with bedpan/darkened lights/side rails up/warm blanket provided

## 2022-01-21 NOTE — ED ADULT NURSE REASSESSMENT NOTE - GENERAL PATIENT STATE
comfortable appearance/cooperative
comfortable appearance/cooperative
comfortable appearance
comfortable appearance/cooperative

## 2022-01-21 NOTE — H&P ADULT - NSHPSOCIALHISTORY_GEN_ALL_CORE
Lives at home, ambulates w/o assistance (prior to 10 days ago), former smoker ( pack years) quit 25 years ago

## 2022-01-21 NOTE — ED CDU PROVIDER SUBSEQUENT DAY NOTE - HISTORY
pt resting comfortably overnight. No acute complaints. Given 1L NS Bolus, in for repeat cmp to monitor Na. On doxy for prior dx of PNA. PT recommended MORRIS.

## 2022-01-21 NOTE — ED CDU PROVIDER DISPOSITION NOTE - ATTENDING CONTRIBUTION TO CARE
I, Olman Sutton, performed a face to face bedside interview with this patient regarding history of present illness, review of symptoms and relevant past medical, social and family history.  I completed an independent physical examination. I have communicated the patient’s plan of care and disposition with the ACP.  Pt presented for gait instability likely secondary to chemo, then became febrile here, high risk as immunocompromised  Gen: NAD, well appearing  CV: RRR  Pul: CTA b/l  Abd: Soft, non-distended, non-tender  Neuro: no focal deficits  Pt admitted for fever w/u and MORRIS placement

## 2022-01-21 NOTE — ED ADULT NURSE REASSESSMENT NOTE - NS ED NURSE REASSESS COMMENT FT1
Care endorsed to Olivia RILEY ESSU. Patient in understanding of plan of care of being admitted to the hospital. Patient assisted to bedside commode, large BMx1. VSS. Afebrile now. Patient moved via stretcher to CDU 13L. RN with no further questions.

## 2022-01-21 NOTE — ED CDU PROVIDER DISPOSITION NOTE - CLINICAL COURSE
75yo M pmhx of HTN, HLD, CAD s/p PCI x 4, asthma, s/p left nephrectomy, bladder CA, currently on chemo and radiation presented to ED c/o persistent weakness. Pt has poor PO intake 2/2 chemo, last session 2 weeks ago. currently being tx for pna with doxycycline. Was kept overnight in observation unit pending MORRIS placemetn however as per CM due to pt's insurance unable to placed over weekend likely until Monday. pt also febrile 101.3 this AM. cultures drawn and pending, abx continued. pt admitted for further management, pending MORRIS placement.

## 2022-01-21 NOTE — H&P ADULT - NSHPPHYSICALEXAM_GEN_ALL_CORE
CONSTITUTIONAL: Non toxic appearing, lying in bed  ENMT: Moist oral mucosa, no pharyngeal injection or exudates; normal dentition  RESPIRATORY: Normal respiratory effort; lungs are clear to auscultation bilaterally  CARDIOVASCULAR: Regular rate and rhythm, normal S1 and S2, no murmur/rub/gallop; No lower extremity edema; Peripheral pulses are 2+ bilaterally  ABDOMEN: Nontender to palpation, normoactive bowel sounds, no rebound/guarding; No hepatosplenomegaly  MUSCLOSKELETAL:  Normal gait; no clubbing or cyanosis of digits; no joint swelling or tenderness to palpation  PSYCH: A+O to person, place, and time; affect appropriate  NEUROLOGY: CN 2-12 are intact and symmetric; no gross sensory deficits;   SKIN: No rashes; no palpable lesions

## 2022-01-22 LAB
ALBUMIN SERPL ELPH-MCNC: 3.2 G/DL — LOW (ref 3.3–5.2)
ALBUMIN SERPL ELPH-MCNC: 3.4 G/DL — SIGNIFICANT CHANGE UP (ref 3.3–5.2)
ALP SERPL-CCNC: 87 U/L — SIGNIFICANT CHANGE UP (ref 40–120)
ALP SERPL-CCNC: 88 U/L — SIGNIFICANT CHANGE UP (ref 40–120)
ALT FLD-CCNC: 21 U/L — SIGNIFICANT CHANGE UP
ALT FLD-CCNC: 24 U/L — SIGNIFICANT CHANGE UP
AMMONIA BLD-MCNC: 12 UMOL/L — SIGNIFICANT CHANGE UP (ref 11–55)
ANION GAP SERPL CALC-SCNC: 17 MMOL/L — SIGNIFICANT CHANGE UP (ref 5–17)
AST SERPL-CCNC: 25 U/L — SIGNIFICANT CHANGE UP
AST SERPL-CCNC: 36 U/L — SIGNIFICANT CHANGE UP
BILIRUB DIRECT SERPL-MCNC: 0.2 MG/DL — SIGNIFICANT CHANGE UP (ref 0–0.3)
BILIRUB INDIRECT FLD-MCNC: 0.4 MG/DL — SIGNIFICANT CHANGE UP (ref 0.2–1)
BILIRUB SERPL-MCNC: 0.5 MG/DL — SIGNIFICANT CHANGE UP (ref 0.4–2)
BILIRUB SERPL-MCNC: 0.6 MG/DL — SIGNIFICANT CHANGE UP (ref 0.4–2)
BUN SERPL-MCNC: 11.1 MG/DL — SIGNIFICANT CHANGE UP (ref 8–20)
CALCIUM SERPL-MCNC: 8.7 MG/DL — SIGNIFICANT CHANGE UP (ref 8.6–10.2)
CHLORIDE SERPL-SCNC: 96 MMOL/L — LOW (ref 98–107)
CK MB CFR SERPL CALC: 1.7 NG/ML — SIGNIFICANT CHANGE UP (ref 0–6.7)
CK SERPL-CCNC: 195 U/L — SIGNIFICANT CHANGE UP (ref 30–200)
CO2 SERPL-SCNC: 19 MMOL/L — LOW (ref 22–29)
CREAT SERPL-MCNC: 1.09 MG/DL — SIGNIFICANT CHANGE UP (ref 0.5–1.3)
CULTURE RESULTS: SIGNIFICANT CHANGE UP
GLUCOSE SERPL-MCNC: 106 MG/DL — HIGH (ref 70–99)
HCT VFR BLD CALC: 31.8 % — LOW (ref 39–50)
HGB BLD-MCNC: 11 G/DL — LOW (ref 13–17)
LACTATE SERPL-SCNC: 1.1 MMOL/L — SIGNIFICANT CHANGE UP (ref 0.5–2)
LDH SERPL L TO P-CCNC: 291 U/L — HIGH (ref 98–192)
MAGNESIUM SERPL-MCNC: 1.7 MG/DL — SIGNIFICANT CHANGE UP (ref 1.6–2.6)
MCHC RBC-ENTMCNC: 30.5 PG — SIGNIFICANT CHANGE UP (ref 27–34)
MCHC RBC-ENTMCNC: 34.6 GM/DL — SIGNIFICANT CHANGE UP (ref 32–36)
MCV RBC AUTO: 88.1 FL — SIGNIFICANT CHANGE UP (ref 80–100)
PHOSPHATE SERPL-MCNC: 3.2 MG/DL — SIGNIFICANT CHANGE UP (ref 2.4–4.7)
PLATELET # BLD AUTO: 204 K/UL — SIGNIFICANT CHANGE UP (ref 150–400)
POTASSIUM SERPL-MCNC: 3.8 MMOL/L — SIGNIFICANT CHANGE UP (ref 3.5–5.3)
POTASSIUM SERPL-SCNC: 3.8 MMOL/L — SIGNIFICANT CHANGE UP (ref 3.5–5.3)
PROCALCITONIN SERPL-MCNC: 0.43 NG/ML — HIGH (ref 0.02–0.1)
PROT SERPL-MCNC: 6.8 G/DL — SIGNIFICANT CHANGE UP (ref 6.6–8.7)
PROT SERPL-MCNC: 7.1 G/DL — SIGNIFICANT CHANGE UP (ref 6.6–8.7)
RBC # BLD: 3.61 M/UL — LOW (ref 4.2–5.8)
RBC # FLD: 13 % — SIGNIFICANT CHANGE UP (ref 10.3–14.5)
SODIUM SERPL-SCNC: 132 MMOL/L — LOW (ref 135–145)
SPECIMEN SOURCE: SIGNIFICANT CHANGE UP
TROPONIN T SERPL-MCNC: <0.01 NG/ML — SIGNIFICANT CHANGE UP (ref 0–0.06)
WBC # BLD: 5.02 K/UL — SIGNIFICANT CHANGE UP (ref 3.8–10.5)
WBC # FLD AUTO: 5.02 K/UL — SIGNIFICANT CHANGE UP (ref 3.8–10.5)

## 2022-01-22 PROCEDURE — 99223 1ST HOSP IP/OBS HIGH 75: CPT

## 2022-01-22 PROCEDURE — 93010 ELECTROCARDIOGRAM REPORT: CPT

## 2022-01-22 PROCEDURE — 99233 SBSQ HOSP IP/OBS HIGH 50: CPT

## 2022-01-22 RX ORDER — METOPROLOL TARTRATE 50 MG
5 TABLET ORAL EVERY 6 HOURS
Refills: 0 | Status: DISCONTINUED | OUTPATIENT
Start: 2022-01-22 | End: 2022-01-31

## 2022-01-22 RX ADMIN — Medication 1000 MILLIGRAM(S): at 11:27

## 2022-01-22 RX ADMIN — DULOXETINE HYDROCHLORIDE 60 MILLIGRAM(S): 30 CAPSULE, DELAYED RELEASE ORAL at 11:27

## 2022-01-22 RX ADMIN — ESCITALOPRAM OXALATE 10 MILLIGRAM(S): 10 TABLET, FILM COATED ORAL at 11:27

## 2022-01-22 RX ADMIN — PANTOPRAZOLE SODIUM 40 MILLIGRAM(S): 20 TABLET, DELAYED RELEASE ORAL at 07:41

## 2022-01-22 RX ADMIN — MONTELUKAST 10 MILLIGRAM(S): 4 TABLET, CHEWABLE ORAL at 11:27

## 2022-01-22 RX ADMIN — TAMSULOSIN HYDROCHLORIDE 0.4 MILLIGRAM(S): 0.4 CAPSULE ORAL at 21:42

## 2022-01-22 RX ADMIN — SODIUM CHLORIDE 100 MILLILITER(S): 9 INJECTION INTRAMUSCULAR; INTRAVENOUS; SUBCUTANEOUS at 07:36

## 2022-01-22 RX ADMIN — SENNA PLUS 2 TABLET(S): 8.6 TABLET ORAL at 21:42

## 2022-01-22 RX ADMIN — Medication 25 MILLIGRAM(S): at 17:00

## 2022-01-22 RX ADMIN — FAMOTIDINE 40 MILLIGRAM(S): 10 INJECTION INTRAVENOUS at 21:42

## 2022-01-22 RX ADMIN — MOMETASONE FUROATE 1 PUFF(S): 220 INHALANT RESPIRATORY (INHALATION) at 08:57

## 2022-01-22 RX ADMIN — Medication 25 MILLIGRAM(S): at 05:43

## 2022-01-22 RX ADMIN — Medication 650 MILLIGRAM(S): at 04:58

## 2022-01-22 RX ADMIN — Medication 5000 UNIT(S): at 11:28

## 2022-01-22 RX ADMIN — ENOXAPARIN SODIUM 40 MILLIGRAM(S): 100 INJECTION SUBCUTANEOUS at 11:26

## 2022-01-22 RX ADMIN — Medication 12.5 MICROGRAM(S): at 05:43

## 2022-01-22 RX ADMIN — CLOPIDOGREL BISULFATE 75 MILLIGRAM(S): 75 TABLET, FILM COATED ORAL at 11:27

## 2022-01-22 RX ADMIN — Medication 650 MILLIGRAM(S): at 11:28

## 2022-01-22 RX ADMIN — Medication 145 MILLIGRAM(S): at 11:27

## 2022-01-22 NOTE — CONSULT NOTE ADULT - SUBJECTIVE AND OBJECTIVE BOX
[facility]  INFECTIOUS DISEASES AND INTERNAL MEDICINE at [place] and [place]  =======================================================                               Alec Epperson MD#  Victor Manuel Minor MD*                                     Brianna Ndiaye MD*    Melida Gil MD*            Diplomates American Board of Internal Medicine & Infectious Diseases                # [place] Office - Appt - Tel  [phone] Fax [phone]                * [place] Office - Appt - Tel [phone] Fax [phone]                                  Hospital Consult line:  [phone]  =======================================================      [id number]  KRISTANPATRICA DENNIS    CC: Patient is a 76y old  Male who presents with a chief complaint of Weakness (22 Jan 2022 11:53)      76y  Male with h/o HTN, HLD, CAD s/p PCI x 4, asthma, s/p left nephrectomy and left RCC on immunotherapy (biweekly infusions per son with [place] [facility]). Patient was brought to the ER by his son for recurrent falls and profound weakness x 10 days.  He reports we was well up until one day after waking up and having weakness. No other associated symptoms. Denies sick contacts. No noted fever at home. In the ER patient was febrile to 102.5F no leukocytosis. Started on Ceftriaxone and Azithromycin. ID input requested.       Past Medical & Surgical Hx:  CAD (coronary artery disease)  Myocardial infarction x 3  Dyslipidemia  Asthma  GERD (gastroesophageal reflux disease)  Rheumatoid arthritis  Tinnitus  CAD S/P percutaneous coronary angioplasty  Stent to Proximal LAD and Proximal CX  Hx of cholecystectomy  H/O arthroscopy of left knee  H/O inguinal hernia repair  H/O foot surgery, Right Foot      Social Hx:  Former smoker       FAMILY HISTORY:  Doesn't recall medical problems of his mother and father       Allergies  No Known Allergies       REVIEW OF SYSTEMS:  CONSTITUTIONAL:  No Fever or chills  HEENT:  No diplopia or blurred vision.  No earache, sore throat or runny nose.  CARDIOVASCULAR:  No pressure, squeezing, strangling, tightness, heaviness or aching about the chest, neck, axilla or epigastrium.  RESPIRATORY:  No cough, shortness of breath  GASTROINTESTINAL:  No nausea, vomiting or diarrhea.  GENITOURINARY:  No dysuria, frequency or urgency.   MUSCULOSKELETAL:  no joint aches, no muscle pain  SKIN:  No change in skin, hair or nails.  NEUROLOGIC:  No Headaches, seizures   PSYCHIATRIC:  No disorder of thought or mood.  ENDOCRINE:  No heat or cold intolerance  HEMATOLOGICAL:  No easy bruising or bleeding.       Physical Exam:  GEN: NAD, pleasant  HEENT: normocephalic and atraumatic. EOMI. PERRL.  Anicteric  NECK: Supple.   LUNGS: Decreased Basal BS B/L   HEART: Regular rate and rhythm   ABDOMEN: Soft, nontender, and nondistended.  Positive bowel sounds.    : No CVA tenderness  EXTREMITIES: Without any edema.  MSK: No joint swelling  NEUROLOGIC: No Focal Deficits  PSYCHIATRIC: Appropriate affect .  SKIN: No Rash      Vitals:  T(F): 101 (22 Jan 2022 07:57), Max: 102.5 (22 Jan 2022 04:48)  HR: 81 (22 Jan 2022 07:57)  BP: 154/75 (22 Jan 2022 07:57)  RR: 18 (22 Jan 2022 07:57)  SpO2: 95% (22 Jan 2022 07:57) (93% - 97%)  temp max in last 48H T(F): , Max: 102.5 (01-22-22 @ 04:48)      Current Antibiotics:  azithromycin  IVPB      azithromycin  IVPB 250 milliGRAM(s) IV Intermittent every 24 hours  cefTRIAXone   IVPB 1000 milliGRAM(s) IV Intermittent every 24 hours    Other medications:  ascorbic acid  Oral Tab/Cap - Peds 1000 milliGRAM(s) Oral daily  cholecalciferol Oral Tab/Cap - Peds 5000 Unit(s) Oral daily  clopidogrel Tablet 75 milliGRAM(s) Oral daily  DULoxetine 60 milliGRAM(s) Oral daily  enoxaparin Injectable 40 milliGRAM(s) SubCutaneous daily  escitalopram 10 milliGRAM(s) Oral daily  famotidine  Oral Tab/Cap - Peds 40 milliGRAM(s) Oral at bedtime  fenofibrate Tablet 145 milliGRAM(s) Oral daily  levothyroxine 12.5 MICROGram(s) Oral daily  metoprolol tartrate 25 milliGRAM(s) Oral two times a day  mometasone 220 MICROgram(s) Inhaler 1 Puff(s) Inhalation daily  montelukast 10 milliGRAM(s) Oral daily  pantoprazole    Tablet 40 milliGRAM(s) Oral before breakfast  senna 2 Tablet(s) Oral at bedtime  tamsulosin 0.4 milliGRAM(s) Oral at bedtime                 11.0   5.02  )-----------( 204      ( 22 Jan 2022 07:51 )             31.8     01-22    132<L>  |  96<L>  |  11.1  ----------------------------<  106<H>  3.8   |  19.0<L>  |  1.09    Ca    8.7      22 Jan 2022 07:51  Phos  3.2     01-22  Mg     1.7     01-22    TPro  7.1  /  Alb  3.2<L>  /  TBili  0.6  /  DBili  0.2  /  AST  36  /  ALT  24  /  AlkPhos  88  01-22      RECENT CULTURES:  01-21 @ 02:09 Clean Catch Clean Catch (Midstream)     <10,000 CFU/mL Normal Urogenital [place]    01-20 @ 01:04 .Blood Blood     No growth at 48 hours    01-17 @ 23:03 Clean Catch Clean Catch (Midstream)     <10,000 CFU/mL Normal Urogenital Clarita      WBC Count: 5.02 K/uL (01-22-22 @ 07:51)  WBC Count: 5.18 K/uL (01-20-22 @ 01:04)  WBC Count: 3.57 K/uL (01-17-22 @ 15:03)    Creatinine, Serum: 1.09 mg/dL (01-22-22 @ 07:51)  Creatinine, Serum: 1.00 mg/dL (01-21-22 @ 09:31)  Creatinine, Serum: 1.01 mg/dL (01-21-22 @ 06:57)  Creatinine, Serum: 0.92 mg/dL (01-20-22 @ 21:30)  Creatinine, Serum: 1.04 mg/dL (01-20-22 @ 05:08)  Creatinine, Serum: 1.12 mg/dL (01-20-22 @ 02:15)  Creatinine, Serum: 1.11 mg/dL (01-20-22 @ 01:04)  Creatinine, Serum: 1.09 mg/dL (01-17-22 @ 15:03)    Procalcitonin, Serum: 0.43 ng/mL (01-22-22 @ 11:49)  Procalcitonin, Serum: 0.28 ng/mL (01-21-22 @ 16:22)     Rapid RVP Result: NotDetec (01-20-22 @ 01:04)  SARS-CoV-2: NotDetec (01-20-22 @ 01:04)  SARS-CoV-2: NotDetec (01-17-22 @ 15:02)  Rapid RVP Result: NotDetec (01-17-22 @ 15:02)    SARS-CoV-2: NotDetec (01-20-22 @ 01:04)  SARS-CoV-2: NotDetec (01-17-22 @ 15:02)      Urinalysis (01.20.22 @ 06:51)    pH Urine: 6.0    Glucose Qualitative, Urine: Negative mg/dL    Blood, Urine: Negative    Color: Yellow    Urine Appearance: Clear    Bilirubin: Negative    Ketone - Urine: Moderate    Specific Gravity: 1.020    Protein, Urine: 30 mg/dL    Urobilinogen: Negative mg/dL    Nitrite: Negative    Leukocyte Esterase Concentration: Negative  Urine Microscopic-Add On (NC) (01.20.22 @ 06:51)    Bacteria: Occasional    Epithelial Cells: Occasional    Red Blood Cell - Urine: 0-2 /HPF    White Blood Cell - Urine: 3-5 /HPF      < from: CT Chest No Cont (01.20.22 @ 04:45) >  ACC: [id number] EXAM:  CT CHEST                          PROCEDURE DATE:  01/20/2022      INTERPRETATION:  CLINICAL INFORMATION: Pneumonia    COMPARISON: CT chest 7/17/2021    CONTRAST/COMPLICATIONS:  IV Contrast: NONE  Oral Contrast: NONE  Complications: None reported at time of study completion    PROCEDURE:  CT of the Chest was performed.  Sagittal and coronal reformats were performed.    FINDINGS:    LUNGS AND AIRWAYS: Patent central airways.  A 1 cm ill-defined   groundglass opacity in the left lower lobe is nonspecific. Subpleural air   cysts. No pulmonary consolidation. Mild biapical scarring.  PLEURA: No pleural effusion.  MEDIASTINUM AND PHILLIP: No lymphadenopathy.  VESSELS: Atherosclerotic calcifications of the aorta and coronary   arteries.  HEART: Heart size is normal. No pericardial effusion.  CHEST WALL AND LOWER NECK: Within normal limits.  VISUALIZED UPPER ABDOMEN: Status post left nephrectomy. Cholecystectomy.  BONES: Tendon anchors in the right humeral head. Degenerative changes of   the spine..    IMPRESSION:  Ill-defined nonspecific 1 cm left lower lobe groundglass opacity for   which a 3-6 month follow-up CT is recommended to ensure stability.    No CT evidence for pneumonia.    < end of copied text >

## 2022-01-22 NOTE — PATIENT PROFILE ADULT - FALL HARM RISK - HARM RISK INTERVENTIONS

## 2022-01-22 NOTE — CHART NOTE - NSCHARTNOTEFT_GEN_A_CORE
Asked to evaluate pt. who had some alarms on the monitor with ST wave changes. Asked to evaluate pt. who had some alarms on the monitor with ST wave changes.  Pt. denies any pain or discomfort, denies chills/fever, Sob, Cp, Palpitations, diaphoresis, abd.pain/N/V/D/C, but states poor appetite, and generalized weakness.  Pt. denies any sick contacts, any recent trauma, any operative procedures, any URI, or any travel  He states about 1 week ago he was trying to eat some cereal in the morning and all of a sudden had an aversion to the food    Vital Signs Last 24 Hrs  T(C): 38.3 (01-22-22 @ 07:57), Max: 39.2 (01-22-22 @ 04:48)  T(F): 101 (01-22-22 @ 07:57), Max: 102.5 (01-22-22 @ 04:48)  HR: 81 (01-22-22 @ 07:57) (69 - 134)  BP: 154/75 (01-22-22 @ 07:57) (132/71 - 171/77)  RR: 18 (01-22-22 @ 07:57) (18 - 18)  SpO2: 95% (01-22-22 @ 07:57) (93% - 97%) Asked to evaluate pt. who had some alarms on the monitor with ST wave changes.  Pt. denies any pain or discomfort, denies chills/fever, Sob, Cp, Palpitations, diaphoresis, abd.pain/N/V/D/C, but states poor appetite, and generalized weakness.  Pt. denies any sick contacts, any recent trauma, any operative procedures, any URI, or any travel  He states about 1 week ago he was trying to eat some cereal in the morning and all of a sudden had an aversion to the food    Vital Signs Last 24 Hrs  T(C): 38.3 (01-22-22 @ 07:57), Max: 39.2 (01-22-22 @ 04:48)  T(F): 101 (01-22-22 @ 07:57), Max: 102.5 (01-22-22 @ 04:48)  HR: 81 (01-22-22 @ 07:57) (69 - 134)  BP: 154/75 (01-22-22 @ 07:57) (132/71 - 171/77)  RR: 18 (01-22-22 @ 07:57) (18 - 18)  SpO2: 95% (01-22-22 @ 07:57) (93% - 97%)    EKG : NSR, at 78BPM.  Additional blood work CPK, troponin, LFTs, Mg, Phos didn't result yet  will F/U

## 2022-01-22 NOTE — PROGRESS NOTE ADULT - SUBJECTIVE AND OBJECTIVE BOX
HEALTH ISSUES - PROBLEM Dx:  76 year old male with HTN, HLD, CAD s/p PCI x 4, asthma, s/p left nephrectomy and left RCC on immunotherapy (biweekly infusions per son with [facility]) presenting from home with recurrent falls and profound weakness x 10 days.    FUO    INTERVAL HPI/ OVERNIGHT EVENTS:    lying in bed, comfortable,   falls asleep during the conversations after every 2 sentence  when awakened, appropriate answers and understands what is explained  offers no acute complaints other than lack of appetite/ gen weakness/ somnolence    REVIEW OF SYSTEMS:    as above    Vital Signs Last 24 Hrs  T(C): 38.3 (22 Jan 2022 07:57), Max: 39.2 (22 Jan 2022 04:48)  T(F): 101 (22 Jan 2022 07:57), Max: 102.5 (22 Jan 2022 04:48)  HR: 81 (22 Jan 2022 07:57) (69 - 134)  BP: 154/75 (22 Jan 2022 07:57) (132/71 - 171/77)  BP(mean): --  RR: 18 (22 Jan 2022 07:57) (18 - 18)  SpO2: 95% (22 Jan 2022 07:57) (93% - 97%)    PHYSICAL EXAM-  CONSTITUTIONAL: Non toxic appearing, lying in bed  ENMT: Moist oral mucosa, no pharyngeal injection or exudates; normal dentition  RESPIRATORY: Normal respiratory effort; lungs are clear to auscultation bilaterally  CARDIOVASCULAR: Regular rate and rhythm, normal S1 and S2, no murmur/rub/gallop; No lower extremity edema; Peripheral pulses are 2+ bilaterally  ABDOMEN: Nontender to palpation, normoactive bowel sounds, no rebound/guarding; No hepatosplenomegaly  MUSCLOSKELETAL:  Normal gait; no clubbing or cyanosis of digits; no joint swelling or tenderness to palpation  PSYCH: A+O to person, place, and time; affect appropriate  NEUROLOGY: CN 2-12 are intact and symmetric; no gross sensory deficits;   SKIN: No rashes; no palpable lesions    MEDICATIONS  (STANDING):  ascorbic acid  Oral Tab/Cap - Peds 1000 milliGRAM(s) Oral daily  azithromycin  IVPB      azithromycin  IVPB 250 milliGRAM(s) IV Intermittent every 24 hours  cefTRIAXone   IVPB 1000 milliGRAM(s) IV Intermittent every 24 hours  cholecalciferol Oral Tab/Cap - Peds 5000 Unit(s) Oral daily  clopidogrel Tablet 75 milliGRAM(s) Oral daily  DULoxetine 60 milliGRAM(s) Oral daily  enoxaparin Injectable 40 milliGRAM(s) SubCutaneous daily  escitalopram 10 milliGRAM(s) Oral daily  famotidine  Oral Tab/Cap - Peds 40 milliGRAM(s) Oral at bedtime  fenofibrate Tablet 145 milliGRAM(s) Oral daily  levothyroxine 12.5 MICROGram(s) Oral daily  metoprolol tartrate 25 milliGRAM(s) Oral two times a day  mometasone 220 MICROgram(s) Inhaler 1 Puff(s) Inhalation daily  montelukast 10 milliGRAM(s) Oral daily  pantoprazole    Tablet 40 milliGRAM(s) Oral before breakfast  senna 2 Tablet(s) Oral at bedtime  tamsulosin 0.4 milliGRAM(s) Oral at bedtime    MEDICATIONS  (PRN):  acetaminophen     Tablet .. 650 milliGRAM(s) Oral every 6 hours PRN Temp greater or equal to 38C (100.4F), Mild Pain (1 - 3)  ALBUTerol    90 MICROgram(s) HFA Inhaler 2 Puff(s) Inhalation every 6 hours PRN Bronchospasm  bisacodyl 10 milliGRAM(s) Oral once PRN Constipation  metoprolol tartrate Injectable 5 milliGRAM(s) IV Push every 6 hours PRN HR > 120  oxycodone    5 mG/acetaminophen 325 mG 1 Tablet(s) Oral every 4 hours PRN Moderate Pain (4 - 6)  polyethylene glycol 3350 17 Gram(s) Oral daily PRN Constipation      LABS:                        11.0   5.02  )-----------( 204      ( 22 Jan 2022 07:51 )             31.8     01-22    132<L>  |  96<L>  |  11.1  ----------------------------<  106<H>  3.8   |  19.0<L>  |  1.09    Ca    8.7      22 Jan 2022 07:51    TPro  6.8  /  Alb  3.4  /  TBili  0.5  /  DBili  x   /  AST  25  /  ALT  21  /  AlkPhos  87  01-22

## 2022-01-22 NOTE — PROGRESS NOTE ADULT - ASSESSMENT
76 year old male with HTN, HLD, CAD s/p PCI x 4, asthma, s/p left nephrectomy and left RCC on immunotherapy (biweekly infusions per son with [facility]) presenting from home with recurrent falls and profound weakness x 10 days. Placed in observation waiting for dsc to [facility]. Noted to be febrile and hyponatremic while in ER    # FUO while on Immunotherapy   CT chest- ruled out PNA or other acute findings  Bld and Urine C/S NGTD. Bld C/S was done while on Doxycycline.  TFTs- WNL  Ordered AM cortisol levels (s/p nephrectomy and questionable radiation) though doubt adrenal insuff given other parameters  Was started on empiric antibx for CAP on admission.   ID consulted today for possible escalation of antibx/ other recs    # Gen Weakness/ Somnolence  infectious process w/u ongoing as above  Ammonia, ABG ordered STAT    # RCC s/p nephrectomy  Await [facility] consult- regarding pts current regimen and check for potential medication side effects    # Hyponatremia - resolving  UA notable for ketonuria, suspect recent starvation ketosis given pts history   Start 0.9NS @ 100 ml/hr     # Ketosis- likely sec to starvation/ dehydration  IVF as above  Add Ensure to diet    # NAGMA  Cont IVF and monitor     # Hypothyroidism  Continue home dose of synthroid 12.5 mcg daily  TFTs essentially WNL    # Constipation  Fleet enema x 1, bowel regimen     # CAD / HTN / HLD  Continue Metoprolol 25 mg BID   Continue Plavix 75 mg daily  In sins rhythm now, previously had been on Cardizem (no longer per home meds)  Metoprolol 5 mg IVP Q 6 for sustained HR > 120 (>15 mins)  On Alirocumab bi weekly (Praluent). PCSK9 inh non formulary. Will start Tricor for now   Telemetry monitoring x 24 hours     DVT ppx : Lovenox  Dispo : Acute  DNR/ DNI (MOLST filled and placed in chart)  HCP : Ricky (son) [phone]

## 2022-01-23 LAB
ANION GAP SERPL CALC-SCNC: 15 MMOL/L — SIGNIFICANT CHANGE UP (ref 5–17)
BASOPHILS # BLD AUTO: 0 K/UL — SIGNIFICANT CHANGE UP (ref 0–0.2)
BASOPHILS NFR BLD AUTO: 0 % — SIGNIFICANT CHANGE UP (ref 0–2)
BUN SERPL-MCNC: 9.6 MG/DL — SIGNIFICANT CHANGE UP (ref 8–20)
CALCIUM SERPL-MCNC: 8.7 MG/DL — SIGNIFICANT CHANGE UP (ref 8.6–10.2)
CHLORIDE SERPL-SCNC: 95 MMOL/L — LOW (ref 98–107)
CO2 SERPL-SCNC: 21 MMOL/L — LOW (ref 22–29)
CREAT SERPL-MCNC: 0.97 MG/DL — SIGNIFICANT CHANGE UP (ref 0.5–1.3)
EOSINOPHIL # BLD AUTO: 0.11 K/UL — SIGNIFICANT CHANGE UP (ref 0–0.5)
EOSINOPHIL NFR BLD AUTO: 2.6 % — SIGNIFICANT CHANGE UP (ref 0–6)
GLUCOSE SERPL-MCNC: 118 MG/DL — HIGH (ref 70–99)
HCT VFR BLD CALC: 32.5 % — LOW (ref 39–50)
HGB BLD-MCNC: 11.4 G/DL — LOW (ref 13–17)
LYMPHOCYTES # BLD AUTO: 0.15 K/UL — LOW (ref 1–3.3)
LYMPHOCYTES # BLD AUTO: 3.5 % — LOW (ref 13–44)
MAGNESIUM SERPL-MCNC: 1.7 MG/DL — SIGNIFICANT CHANGE UP (ref 1.6–2.6)
MANUAL SMEAR VERIFICATION: SIGNIFICANT CHANGE UP
MCHC RBC-ENTMCNC: 30.9 PG — SIGNIFICANT CHANGE UP (ref 27–34)
MCHC RBC-ENTMCNC: 35.1 GM/DL — SIGNIFICANT CHANGE UP (ref 32–36)
MCV RBC AUTO: 88.1 FL — SIGNIFICANT CHANGE UP (ref 80–100)
MONOCYTES # BLD AUTO: 0.25 K/UL — SIGNIFICANT CHANGE UP (ref 0–0.9)
MONOCYTES NFR BLD AUTO: 6.1 % — SIGNIFICANT CHANGE UP (ref 2–14)
NEUTROPHILS # BLD AUTO: 3.49 K/UL — SIGNIFICANT CHANGE UP (ref 1.8–7.4)
NEUTROPHILS NFR BLD AUTO: 82.5 % — HIGH (ref 43–77)
NEUTS BAND # BLD: 0.9 % — SIGNIFICANT CHANGE UP (ref 0–8)
PHOSPHATE SERPL-MCNC: 2.1 MG/DL — LOW (ref 2.4–4.7)
PLAT MORPH BLD: NORMAL — SIGNIFICANT CHANGE UP
PLATELET # BLD AUTO: 180 K/UL — SIGNIFICANT CHANGE UP (ref 150–400)
POLYCHROMASIA BLD QL SMEAR: SIGNIFICANT CHANGE UP
POTASSIUM SERPL-MCNC: 4.1 MMOL/L — SIGNIFICANT CHANGE UP (ref 3.5–5.3)
POTASSIUM SERPL-SCNC: 4.1 MMOL/L — SIGNIFICANT CHANGE UP (ref 3.5–5.3)
RBC # BLD: 3.69 M/UL — LOW (ref 4.2–5.8)
RBC # FLD: 12.9 % — SIGNIFICANT CHANGE UP (ref 10.3–14.5)
RBC BLD AUTO: ABNORMAL
SODIUM SERPL-SCNC: 131 MMOL/L — LOW (ref 135–145)
VARIANT LYMPHS # BLD: 4.4 % — SIGNIFICANT CHANGE UP (ref 0–6)
WBC # BLD: 4.18 K/UL — SIGNIFICANT CHANGE UP (ref 3.8–10.5)
WBC # FLD AUTO: 4.18 K/UL — SIGNIFICANT CHANGE UP (ref 3.8–10.5)

## 2022-01-23 PROCEDURE — 99233 SBSQ HOSP IP/OBS HIGH 50: CPT

## 2022-01-23 PROCEDURE — 99232 SBSQ HOSP IP/OBS MODERATE 35: CPT

## 2022-01-23 PROCEDURE — 93970 EXTREMITY STUDY: CPT | Mod: 26

## 2022-01-23 RX ORDER — SODIUM,POTASSIUM PHOSPHATES 278-250MG
1 POWDER IN PACKET (EA) ORAL THREE TIMES A DAY
Refills: 0 | Status: COMPLETED | OUTPATIENT
Start: 2022-01-23 | End: 2022-01-26

## 2022-01-23 RX ORDER — METOPROLOL TARTRATE 50 MG
50 TABLET ORAL
Refills: 0 | Status: DISCONTINUED | OUTPATIENT
Start: 2022-01-23 | End: 2022-01-27

## 2022-01-23 RX ADMIN — ESCITALOPRAM OXALATE 10 MILLIGRAM(S): 10 TABLET, FILM COATED ORAL at 10:49

## 2022-01-23 RX ADMIN — MONTELUKAST 10 MILLIGRAM(S): 4 TABLET, CHEWABLE ORAL at 10:49

## 2022-01-23 RX ADMIN — Medication 50 MILLIGRAM(S): at 22:27

## 2022-01-23 RX ADMIN — Medication 1 PACKET(S): at 10:48

## 2022-01-23 RX ADMIN — FAMOTIDINE 40 MILLIGRAM(S): 10 INJECTION INTRAVENOUS at 22:27

## 2022-01-23 RX ADMIN — Medication 145 MILLIGRAM(S): at 10:49

## 2022-01-23 RX ADMIN — Medication 5000 UNIT(S): at 10:48

## 2022-01-23 RX ADMIN — Medication 1 PACKET(S): at 18:26

## 2022-01-23 RX ADMIN — Medication 25 MILLIGRAM(S): at 06:16

## 2022-01-23 RX ADMIN — Medication 50 MILLIGRAM(S): at 10:48

## 2022-01-23 RX ADMIN — TAMSULOSIN HYDROCHLORIDE 0.4 MILLIGRAM(S): 0.4 CAPSULE ORAL at 22:27

## 2022-01-23 RX ADMIN — Medication 1 PACKET(S): at 22:28

## 2022-01-23 RX ADMIN — DULOXETINE HYDROCHLORIDE 60 MILLIGRAM(S): 30 CAPSULE, DELAYED RELEASE ORAL at 10:49

## 2022-01-23 RX ADMIN — Medication 1000 MILLIGRAM(S): at 10:49

## 2022-01-23 RX ADMIN — SENNA PLUS 2 TABLET(S): 8.6 TABLET ORAL at 22:26

## 2022-01-23 RX ADMIN — CLOPIDOGREL BISULFATE 75 MILLIGRAM(S): 75 TABLET, FILM COATED ORAL at 10:49

## 2022-01-23 RX ADMIN — Medication 12.5 MICROGRAM(S): at 06:16

## 2022-01-23 RX ADMIN — MOMETASONE FUROATE 1 PUFF(S): 220 INHALANT RESPIRATORY (INHALATION) at 09:00

## 2022-01-23 RX ADMIN — ENOXAPARIN SODIUM 40 MILLIGRAM(S): 100 INJECTION SUBCUTANEOUS at 10:50

## 2022-01-23 RX ADMIN — PANTOPRAZOLE SODIUM 40 MILLIGRAM(S): 20 TABLET, DELAYED RELEASE ORAL at 06:16

## 2022-01-23 NOTE — PROGRESS NOTE ADULT - SUBJECTIVE AND OBJECTIVE BOX
[facility]  INFECTIOUS DISEASES AND INTERNAL MEDICINE at [place] and [place]  =======================================================                               Alec Epperson MD#  Victor Manuel Minor MD*                                     Brianna Ndiaye MD*    Melida Gil MD*            Diplomates American Board of Internal Medicine & Infectious Diseases                # [place] Office - Appt - Tel  [phone] Fax [phone]                * [place] Office - Appt - Tel [phone] Fax [phone]                                  Hospital Consult line:  [phone]  =======================================================    KRISTAN DENNIS [id number]    Follow up:      Allergies:  No Known Allergies           REVIEW OF SYSTEMS:  CONSTITUTIONAL:  No Fever or chills  HEENT:   No diplopia or blurred vision.  No earache, sore throat or runny nose.  CARDIOVASCULAR:  No pressure, squeezing, strangling, tightness, heaviness or aching about the chest, neck, axilla or epigastrium.  RESPIRATORY:  No cough, shortness of breath  GASTROINTESTINAL:  No nausea, vomiting or diarrhea.  GENITOURINARY:  No dysuria, frequency or urgency. No Blood in urine  MUSCULOSKELETAL:  no joint aches, no muscle pain  SKIN:  No change in skin, hair or nails.  NEUROLOGIC:  No Headaches, seizures or weakness.  PSYCHIATRIC:  No disorder of thought or mood.  ENDOCRINE:  No heat or cold intolerance  HEMATOLOGICAL:  No easy bruising or bleeding.       Physical Exam:  GEN: NAD, pleasant  HEENT: normocephalic and atraumatic. EOMI. PERRL.  Anicteric   NECK: Supple.   LUNGS: Clear to auscultation.  HEART: Regular rate and rhythm without murmur.  ABDOMEN: Soft, nontender, and nondistended.  Positive bowel sounds.    : No CVA tenderness  EXTREMITIES: Without any edema.  MSK: no joint swelling  NEUROLOGIC: Cranial nerves II through XII are grossly intact. No focal deficits  PSYCHIATRIC: Appropriate affect .  SKIN: No Rash      Vitals:    T(F): 100.1 (23 Jan 2022 08:18), Max: 100.2 (23 Jan 2022 04:51)  HR: 110 (23 Jan 2022 08:18)  BP: 183/83 (23 Jan 2022 08:18)  RR: 19 (23 Jan 2022 08:18)  SpO2: 95% (23 Jan 2022 08:18) (91% - 97%)  temp max in last 48H T(F): , Max: 102.5 (01-22-22 @ 04:48)    Current Antibiotics:    Other medications:  ascorbic acid  Oral Tab/Cap - Peds 1000 milliGRAM(s) Oral daily  cholecalciferol Oral Tab/Cap - Peds 5000 Unit(s) Oral daily  clopidogrel Tablet 75 milliGRAM(s) Oral daily  DULoxetine 60 milliGRAM(s) Oral daily  enoxaparin Injectable 40 milliGRAM(s) SubCutaneous daily  escitalopram 10 milliGRAM(s) Oral daily  famotidine  Oral Tab/Cap - Peds 40 milliGRAM(s) Oral at bedtime  fenofibrate Tablet 145 milliGRAM(s) Oral daily  levothyroxine 12.5 MICROGram(s) Oral daily  metoprolol tartrate 25 milliGRAM(s) Oral two times a day  mometasone 220 MICROgram(s) Inhaler 1 Puff(s) Inhalation daily  montelukast 10 milliGRAM(s) Oral daily  pantoprazole    Tablet 40 milliGRAM(s) Oral before breakfast  senna 2 Tablet(s) Oral at bedtime  tamsulosin 0.4 milliGRAM(s) Oral at bedtime                            11.4   4.18  )-----------( 180      ( 23 Jan 2022 03:15 )             32.5     01-23    131<L>  |  95<L>  |  9.6  ----------------------------<  118<H>  4.1   |  21.0<L>  |  0.97    Ca    8.7      23 Jan 2022 03:15  Phos  2.1     01-23  Mg     1.7     01-23    TPro  7.1  /  Alb  3.2<L>  /  TBili  0.6  /  DBili  0.2  /  AST  36  /  ALT  24  /  AlkPhos  88  01-22      RECENT CULTURES:  01-21 @ 02:09 Clean Catch Clean Catch (Midstream)     <10,000 CFU/mL Normal Urogenital [place]    01-20 @ 01:04 .Blood Blood     No growth at 48 hours    01-17 @ 23:03 Clean Catch Clean Catch (Midstream)     <10,000 CFU/mL Normal Urogenital [place]ita      WBC Count: 4.18 K/uL (01-23-22 @ 03:15)  WBC Count: 5.02 K/uL (01-22-22 @ 07:51)  WBC Count: 5.18 K/uL (01-20-22 @ 01:04)    Creatinine, Serum: 0.97 mg/dL (01-23-22 @ 03:15)  Creatinine, Serum: 1.09 mg/dL (01-22-22 @ 07:51)  Creatinine, Serum: 1.00 mg/dL (01-21-22 @ 09:31)  Creatinine, Serum: 1.01 mg/dL (01-21-22 @ 06:57)  Creatinine, Serum: 0.92 mg/dL (01-20-22 @ 21:30)  Creatinine, Serum: 1.04 mg/dL (01-20-22 @ 05:08)  Creatinine, Serum: 1.12 mg/dL (01-20-22 @ 02:15)  Creatinine, Serum: 1.11 mg/dL (01-20-22 @ 01:04)    Procalcitonin, Serum: 0.43 ng/mL (01-22-22 @ 11:49)  Procalcitonin, Serum: 0.28 ng/mL (01-21-22 @ 16:22)     Rapid RVP Result: NotDetec (01-20-22 @ 01:04)  SARS-CoV-2: NotDetec (01-20-22 @ 01:04)  SARS-CoV-2: NotDetec (01-17-22 @ 15:02)  Rapid RVP Result: NotDetec (01-17-22 @ 15:02)    SARS-CoV-2: NotDetec (01-20-22 @ 01:04)  SARS-CoV-2: NotDetec (01-17-22 @ 15:02)

## 2022-01-23 NOTE — PROGRESS NOTE ADULT - ASSESSMENT
76 year old male with HTN, HLD, CAD s/p PCI x 4, asthma, s/p left nephrectomy and left RCC on immunotherapy (biweekly infusions per son with [facility]) presenting from home with recurrent falls and profound weakness x 10 days. Placed in observation waiting for dsc to [facility]. Noted to be febrile and hyponatremic while in ER    # FUO while on Immunotherapy - now with low grade temps only  CT chest- ruled out PNA or other acute findings  Bld and Urine C/S NGTD. Bld C/S was done while on Doxycycline.  TFTs- WNL  Will defer AM cortisol levels (s/p nephrectomy and questionable radiation) doubt adrenal insuff given other parameters  CT A/P ordered  ID recs appreciated- Monitor off anitbiotics    # Gen Weakness/ Somnolence  infectious process w/u ongoing as above  Ammonia WNL  IF all w/u negative, will start low dose appetite stimulant    # RCC s/p nephrectomy  Await [facility] consult- regarding pts current regimen and check for potential medication side effects    # Hyponatremia - resolving  UA notable for ketonuria, suspect recent starvation ketosis given pts history   s/p 0.9NS @ 100 ml/hr     # Ketosis- improving  likely sec to starvation/ dehydration  s/p IVF as above  Add Ensure to diet    # NAGMA- improving  s/p IVF and monitor     # Hypothyroidism  Continue home dose of synthroid 12.5 mcg daily  TFTs essentially WNL    # Constipation  Fleet enema x 1, bowel regimen     # CAD / HTN / HLD  Continue Metoprolol 25 mg BID   Continue Plavix 75 mg daily  In sins rhythm now, previously had been on Cardizem (no longer per home meds)  Metoprolol 5 mg IVP Q 6 for sustained HR > 120 (>15 mins)  On Alirocumab bi weekly (Praluent). PCSK9 inh non formulary. Will start Tricor for now     DVT ppx : Lovenox  Dispo : tbd  DNR/ DNI ([facility] filled and placed in chart)  HCP : Ricky (son) [phone]- updated  Plan: nwo off IVF. MOnitor 76 year old male with HTN, HLD, CAD s/p PCI x 4, asthma, s/p left nephrectomy and left RCC on immunotherapy (biweekly infusions per son with [place] [facility]) presenting from home with recurrent falls and profound weakness x 10 days. Placed in observation waiting for dsc to [facility]. Noted to be febrile and hyponatremic while in ER    # FUO while on Immunotherapy - now with low grade temps only  CT chest- ruled out PNA or other acute findings  Bld and Urine C/S NGTD. Bld C/S was done while on Doxycycline.  TFTs- WNL  Will defer AM cortisol levels (s/p nephrectomy and questionable radiation) doubt adrenal insuff given other parameters  CT A/P ordered  ID recs appreciated- Monitor off anitbiotics    # Gen Weakness/ Somnolence  infectious process w/u ongoing as above  Ammonia WNL  IF all w/u negative, will start low dose appetite stimulant    # Uncontrolled HTN this AM  -likely related to IVF. Now off IVF.  -BB increased to 50mg bid  - monitor    # RCC s/p nephrectomy  Await [facility] consult- regarding pts current regimen and check for potential medication side effects    # Hyponatremia - resolving  UA notable for ketonuria, suspect recent starvation ketosis given pts history   s/p 0.9NS @ 100 ml/hr     # Ketosis- improving  likely sec to starvation/ dehydration  s/p IVF as above  Add Ensure to diet    # NAGMA- improving  s/p IVF and monitor     # Hypothyroidism  Continue home dose of synthroid 12.5 mcg daily  TFTs essentially WNL    # Constipation  Fleet enema x 1, bowel regimen     # CAD / HTN / HLD  Continue Metoprolol 25 mg BID   Continue Plavix 75 mg daily  In sins rhythm now, previously had been on Cardizem (no longer per home meds)  Metoprolol 5 mg IVP Q 6 for sustained HR > 120 (>15 mins)  On Alirocumab bi weekly (Praluent). PCSK9 inh non formulary. Will start Tricor for now     DVT ppx : Lovenox  Dispo : tbd  DNR/ DNI ([facility] filled and placed in chart)  HCP : Ricky (son) [phone]- updated  Plan: nwo off IVF. MOnitor 76 year old male with HTN, HLD, CAD s/p PCI x 4, asthma, s/p left nephrectomy and left RCC on immunotherapy (biweekly infusions per son with [facility]) presenting from home with recurrent falls and profound weakness x 10 days. Placed in observation waiting for dsc to [facility]. Noted to be febrile and hyponatremic while in ER    # FUO while on Immunotherapy - now with low grade temps only  CT chest- ruled out PNA or other acute findings  Bld and Urine C/S NGTD. Bld C/S was done while on Doxycycline.  TFTs- WNL  Will defer AM cortisol levels (s/p nephrectomy and questionable radiation) doubt adrenal insuff given other parameters  CT A/P ordered  ID recs appreciated- Monitor off anitbiotics    # Gen Weakness/ Somnolence  infectious process w/u ongoing as above  Ammonia WNL  IF all w/u negative, will start low dose appetite stimulant    # Uncontrolled HTN this AM  -likely related to IVF. Now off IVF.  -BB increased to 50mg bid  - monitor    # Hypophosphatemia  -replete    # RCC s/p nephrectomy  Await [facility] consult- regarding pts current regimen and check for potential medication side effects    # Hyponatremia - resolving  UA notable for ketonuria, suspect recent starvation ketosis given pts history   s/p 0.9NS @ 100 ml/hr     # Ketosis- improving  likely sec to starvation/ dehydration  s/p IVF as above  Add Ensure to diet    # NAGMA- improving  s/p IVF and monitor     # Hypothyroidism  Continue home dose of synthroid 12.5 mcg daily  TFTs essentially WNL    # Constipation  Fleet enema x 1, bowel regimen     # CAD / HTN / HLD  Continue Metoprolol 25 mg BID   Continue Plavix 75 mg daily  In sins rhythm now, previously had been on Cardizem (no longer per home meds)  Metoprolol 5 mg IVP Q 6 for sustained HR > 120 (>15 mins)  On Alirocumab bi weekly (Praluent). PCSK9 inh non formulary. Will start Tricor for now     DVT ppx : Lovenox  Dispo : tbd  DNR/ DNI ([facility] filled and placed in chart)  HCP : Ricky (son) [phone]- updated  Plan: nwo off IVF. MOnitor

## 2022-01-23 NOTE — PROGRESS NOTE ADULT - SUBJECTIVE AND OBJECTIVE BOX
HEALTH ISSUES - PROBLEM Dx:  76 year old male with HTN, HLD, CAD s/p PCI x 4, asthma, s/p left nephrectomy and left RCC on immunotherapy (biweekly infusions per son with [place] [facility]) presenting from home with recurrent falls and profound weakness x 10 days.    FUO    INTERVAL HPI/ OVERNIGHT EVENTS:    sitting in recliner, comfortable,   states too weak to sit up and eat by himself  also lack of appetite  being fed now by aide  states he feels terrible but does not specify how or why    REVIEW OF SYSTEMS:    as above      Vital Signs Last 24 Hrs  T(C): 37 (23 Jan 2022 11:43), Max: 37.9 (23 Jan 2022 04:51)  T(F): 98.6 (23 Jan 2022 11:43), Max: 100.2 (23 Jan 2022 04:51)  HR: 90 (23 Jan 2022 11:43) (78 - 128)  BP: 147/85 (23 Jan 2022 11:43) (104/74 - 183/83)  BP(mean): --  RR: 18 (23 Jan 2022 11:43) (18 - 19)  SpO2: 97% (23 Jan 2022 11:43) (91% - 99%)    PHYSICAL EXAM-  CONSTITUTIONAL: Non toxic appearing, lying in bed  ENMT: Moist oral mucosa, no pharyngeal injection or exudates; normal dentition  RESPIRATORY: Normal respiratory effort; lungs are clear to auscultation bilaterally  CARDIOVASCULAR: Regular rate and rhythm, normal S1 and S2, no murmur/rub/gallop; No lower extremity edema; Peripheral pulses are 2+ bilaterally  ABDOMEN: Nontender to palpation, normoactive bowel sounds, no rebound/guarding; No hepatosplenomegaly  MUSCLOSKELETAL:  Normal gait; no clubbing or cyanosis of digits; no joint swelling or tenderness to palpation  PSYCH: A+O to person, place, and time; affect appropriate  NEUROLOGY: CN 2-12 are intact and symmetric; no gross sensory deficits;   SKIN: No rashes; no palpable lesions    MEDICATIONS  (STANDING):  ascorbic acid  Oral Tab/Cap - Peds 1000 milliGRAM(s) Oral daily  cholecalciferol Oral Tab/Cap - Peds 5000 Unit(s) Oral daily  clopidogrel Tablet 75 milliGRAM(s) Oral daily  DULoxetine 60 milliGRAM(s) Oral daily  enoxaparin Injectable 40 milliGRAM(s) SubCutaneous daily  escitalopram 10 milliGRAM(s) Oral daily  famotidine  Oral Tab/Cap - Peds 40 milliGRAM(s) Oral at bedtime  fenofibrate Tablet 145 milliGRAM(s) Oral daily  levothyroxine 12.5 MICROGram(s) Oral daily  metoprolol tartrate 50 milliGRAM(s) Oral two times a day  mometasone 220 MICROgram(s) Inhaler 1 Puff(s) Inhalation daily  montelukast 10 milliGRAM(s) Oral daily  pantoprazole    Tablet 40 milliGRAM(s) Oral before breakfast  potassium phosphate / sodium phosphate Powder (PHOS-NaK) 1 Packet(s) Oral three times a day  senna 2 Tablet(s) Oral at bedtime  tamsulosin 0.4 milliGRAM(s) Oral at bedtime    MEDICATIONS  (PRN):  acetaminophen     Tablet .. 650 milliGRAM(s) Oral every 6 hours PRN Temp greater or equal to 38C (100.4F), Mild Pain (1 - 3)  ALBUTerol    90 MICROgram(s) HFA Inhaler 2 Puff(s) Inhalation every 6 hours PRN Bronchospasm  bisacodyl 10 milliGRAM(s) Oral once PRN Constipation  metoprolol tartrate Injectable 5 milliGRAM(s) IV Push every 6 hours PRN HR > 120  oxycodone    5 mG/acetaminophen 325 mG 1 Tablet(s) Oral every 4 hours PRN Moderate Pain (4 - 6)  polyethylene glycol 3350 17 Gram(s) Oral daily PRN Constipation      LABS:                        11.4   4.18  )-----------( 180      ( 23 Jan 2022 03:15 )             32.5     01-23    131<L>  |  95<L>  |  9.6  ----------------------------<  118<H>  4.1   |  21.0<L>  |  0.97    Ca    8.7      23 Jan 2022 03:15  Phos  2.1     01-23  Mg     1.7     01-23    TPro  7.1  /  Alb  3.2<L>  /  TBili  0.6  /  DBili  0.2  /  AST  36  /  ALT  24  /  AlkPhos  88  01-22

## 2022-01-23 NOTE — PROGRESS NOTE ADULT - ASSESSMENT
76y  Male with h/o HTN, HLD, CAD s/p PCI x 4, asthma, s/p left nephrectomy and left RCC on immunotherapy (biweekly infusions per son with [place] [facility]). Patient was brought to the ER by his son for recurrent falls and profound weakness x 10 days.  He reports we was well up until one day after waking up and having weakness. No other associated symptoms. Denies sick contacts. No noted fever at home. In the ER patient was febrile to 102.5F no leukocytosis. Started on Ceftriaxone and Azithromycin.       Fever  left RCC on immunotherapy      - Blood cultures no growth   - Repeat blood cultures pending  - Check CT A/P   - RVP/COVID 19 PCR Negative   - Urine culture not significant   - CT Chest reporting no PNA   - UA not suggestive of UTI   - Procalcitonin level 0.43  - D/C Ceftriaxone and Azithromycin   - Fevers improved off antibiotics   - Follow up cultures  - Trend Fever  - Trend WBC      Will Follow      d/w Dr Horowitz

## 2022-01-24 ENCOUNTER — TRANSCRIPTION ENCOUNTER (OUTPATIENT)
Age: 77
End: 2022-01-24

## 2022-01-24 LAB
ANION GAP SERPL CALC-SCNC: 10 MMOL/L — SIGNIFICANT CHANGE UP (ref 5–17)
BUN SERPL-MCNC: 10.2 MG/DL — SIGNIFICANT CHANGE UP (ref 8–20)
CALCIUM SERPL-MCNC: 9 MG/DL — SIGNIFICANT CHANGE UP (ref 8.6–10.2)
CHLORIDE SERPL-SCNC: 92 MMOL/L — LOW (ref 98–107)
CO2 SERPL-SCNC: 25 MMOL/L — SIGNIFICANT CHANGE UP (ref 22–29)
CREAT SERPL-MCNC: 0.95 MG/DL — SIGNIFICANT CHANGE UP (ref 0.5–1.3)
GLUCOSE SERPL-MCNC: 114 MG/DL — HIGH (ref 70–99)
MAGNESIUM SERPL-MCNC: 1.8 MG/DL — SIGNIFICANT CHANGE UP (ref 1.6–2.6)
PHOSPHATE SERPL-MCNC: 2.9 MG/DL — SIGNIFICANT CHANGE UP (ref 2.4–4.7)
POTASSIUM SERPL-MCNC: 4 MMOL/L — SIGNIFICANT CHANGE UP (ref 3.5–5.3)
POTASSIUM SERPL-SCNC: 4 MMOL/L — SIGNIFICANT CHANGE UP (ref 3.5–5.3)
SODIUM SERPL-SCNC: 127 MMOL/L — LOW (ref 135–145)

## 2022-01-24 PROCEDURE — 74177 CT ABD & PELVIS W/CONTRAST: CPT | Mod: 26

## 2022-01-24 PROCEDURE — 99232 SBSQ HOSP IP/OBS MODERATE 35: CPT

## 2022-01-24 RX ORDER — AMLODIPINE BESYLATE 2.5 MG/1
10 TABLET ORAL AT BEDTIME
Refills: 0 | Status: DISCONTINUED | OUTPATIENT
Start: 2022-01-24 | End: 2022-01-29

## 2022-01-24 RX ORDER — LOSARTAN POTASSIUM 100 MG/1
25 TABLET, FILM COATED ORAL DAILY
Refills: 0 | Status: DISCONTINUED | OUTPATIENT
Start: 2022-01-24 | End: 2022-01-27

## 2022-01-24 RX ORDER — DRONABINOL 2.5 MG
2.5 CAPSULE ORAL
Refills: 0 | Status: DISCONTINUED | OUTPATIENT
Start: 2022-01-24 | End: 2022-01-29

## 2022-01-24 RX ORDER — SODIUM CHLORIDE 9 MG/ML
3 INJECTION INTRAMUSCULAR; INTRAVENOUS; SUBCUTANEOUS THREE TIMES A DAY
Refills: 0 | Status: DISCONTINUED | OUTPATIENT
Start: 2022-01-24 | End: 2022-01-28

## 2022-01-24 RX ADMIN — Medication 1 PACKET(S): at 21:40

## 2022-01-24 RX ADMIN — SODIUM CHLORIDE 3 GRAM(S): 9 INJECTION INTRAMUSCULAR; INTRAVENOUS; SUBCUTANEOUS at 21:41

## 2022-01-24 RX ADMIN — Medication 2.5 MILLIGRAM(S): at 17:14

## 2022-01-24 RX ADMIN — Medication 1 PACKET(S): at 06:36

## 2022-01-24 RX ADMIN — Medication 1 PACKET(S): at 17:15

## 2022-01-24 RX ADMIN — SENNA PLUS 2 TABLET(S): 8.6 TABLET ORAL at 21:41

## 2022-01-24 RX ADMIN — Medication 12.5 MICROGRAM(S): at 06:36

## 2022-01-24 RX ADMIN — MONTELUKAST 10 MILLIGRAM(S): 4 TABLET, CHEWABLE ORAL at 17:14

## 2022-01-24 RX ADMIN — AMLODIPINE BESYLATE 10 MILLIGRAM(S): 2.5 TABLET ORAL at 21:42

## 2022-01-24 RX ADMIN — Medication 50 MILLIGRAM(S): at 06:36

## 2022-01-24 RX ADMIN — TAMSULOSIN HYDROCHLORIDE 0.4 MILLIGRAM(S): 0.4 CAPSULE ORAL at 21:41

## 2022-01-24 RX ADMIN — CLOPIDOGREL BISULFATE 75 MILLIGRAM(S): 75 TABLET, FILM COATED ORAL at 11:22

## 2022-01-24 RX ADMIN — SODIUM CHLORIDE 3 GRAM(S): 9 INJECTION INTRAMUSCULAR; INTRAVENOUS; SUBCUTANEOUS at 17:15

## 2022-01-24 RX ADMIN — Medication 5000 UNIT(S): at 11:22

## 2022-01-24 RX ADMIN — ENOXAPARIN SODIUM 40 MILLIGRAM(S): 100 INJECTION SUBCUTANEOUS at 11:21

## 2022-01-24 RX ADMIN — LOSARTAN POTASSIUM 25 MILLIGRAM(S): 100 TABLET, FILM COATED ORAL at 17:14

## 2022-01-24 RX ADMIN — Medication 1000 MILLIGRAM(S): at 11:22

## 2022-01-24 RX ADMIN — Medication 50 MILLIGRAM(S): at 17:17

## 2022-01-24 RX ADMIN — PANTOPRAZOLE SODIUM 40 MILLIGRAM(S): 20 TABLET, DELAYED RELEASE ORAL at 06:36

## 2022-01-24 RX ADMIN — Medication 145 MILLIGRAM(S): at 11:23

## 2022-01-24 RX ADMIN — FAMOTIDINE 40 MILLIGRAM(S): 10 INJECTION INTRAVENOUS at 21:42

## 2022-01-24 RX ADMIN — ESCITALOPRAM OXALATE 10 MILLIGRAM(S): 10 TABLET, FILM COATED ORAL at 11:30

## 2022-01-24 NOTE — PROGRESS NOTE ADULT - ASSESSMENT
76y  Male with h/o HTN, HLD, CAD s/p PCI x 4, asthma, s/p left nephrectomy and left RCC on immunotherapy (biweekly infusions per son with [place] [facility] Ca). Patient was brought to the ER by his son for recurrent falls and profound weakness x 10 days.  He reports we was well up until one day after waking up and having weakness. No other associated symptoms. Denies sick contacts. No noted fever at home. In the ER patient was febrile to 102.5F no leukocytosis. Started on Ceftriaxone and Azithromycin.       Fever  left RCC on immunotherapy      - Blood cultures no growth   - Repeat blood cultures pending  - Check CT A/P   - RVP/COVID 19 PCR Negative   - Urine culture not significant   - CT Chest reporting no PNA   - UA not suggestive of UTI   - Procalcitonin level 0.43  - Fevers improved off antibiotics   - Follow up cultures  - Trend Fever  - Trend WBC      Will Follow      d/w Dr Horowitz

## 2022-01-24 NOTE — CHART NOTE - NSCHARTNOTEFT_GEN_A_CORE
Called to see pt who staff found on floor.  Pt states he got up to change the channel on the TV.  Pt states he did not hurt himself, offers no complaints of pain or discomfort.  Denies LOC  HPI:   Briefly pt is a 76 year old male with HTN, HLD, CAD s/p PCI x 4, asthma, s/p left nephrectomy and left RCC on immunotherapy (biweekly infusions per son with [place] [facility]) presenting from home with recurrent falls and profound weakness x 10 days. Per pt and his son (Ricky via Telephone) pt woke up with sudden onset of fatigue slept ~ 4 days continuously, has been unable to go for scheduled infusion therapy due to weakness. Prior to thsi pt reports he was independent able to ambulate w/o assistance. Recently was told he had PNA (1/17) and dsc on Doxycyline BID.    (21 Jan 2022 15:34)    PAST MEDICAL & SURGICAL HISTORY:  CAD (coronary artery disease)  Myocardial infarction x 3  Dyslipidemia  Asthma  GERD (gastroesophageal reflux disease)  Rheumatoid arthritis  Tinnitus  CAD S/P percutaneous coronary angioplasty Stent to Proximal LAD and Proximal CX  Hx of cholecystectomy  H/O arthroscopy of left knee  H/O inguinal hernia repair  H/O foot surgery Right Foot    ROS Negative except for HPI    Allergies:  No Known Allergies  No known Intolerances    FAMILY HISTORY:  Denied by pt    Social History:  Lives at home, ambulates w/o assistance (prior to 10 days ago), former smoker ( pack years) quit 25 years ago (21 Jan 2022 15:34)    Current Medications:  acetaminophen     Tablet .. 650 milliGRAM(s) Oral every 6 hours PRN  ALBUTerol    90 MICROgram(s) HFA Inhaler 2 Puff(s) Inhalation every 6 hours PRN  ascorbic acid  Oral Tab/Cap - Peds 1000 milliGRAM(s) Oral daily  bisacodyl 10 milliGRAM(s) Oral once PRN  cholecalciferol Oral Tab/Cap - Peds 5000 Unit(s) Oral daily  clopidogrel Tablet 75 milliGRAM(s) Oral daily  DULoxetine 60 milliGRAM(s) Oral daily  enoxaparin Injectable 40 milliGRAM(s) SubCutaneous daily  escitalopram 10 milliGRAM(s) Oral daily  famotidine  Oral Tab/Cap - Peds 40 milliGRAM(s) Oral at bedtime  fenofibrate Tablet 145 milliGRAM(s) Oral daily  levothyroxine 12.5 MICROGram(s) Oral daily  metoprolol tartrate 50 milliGRAM(s) Oral two times a day  metoprolol tartrate Injectable 5 milliGRAM(s) IV Push every 6 hours PRN  mometasone 220 MICROgram(s) Inhaler 1 Puff(s) Inhalation daily  montelukast 10 milliGRAM(s) Oral daily  oxycodone    5 mG/acetaminophen 325 mG 1 Tablet(s) Oral every 4 hours PRN  pantoprazole    Tablet 40 milliGRAM(s) Oral before breakfast  polyethylene glycol 3350 17 Gram(s) Oral daily PRN  potassium phosphate / sodium phosphate Powder (PHOS-NaK) 1 Packet(s) Oral three times a day  senna 2 Tablet(s) Oral at bedtime  tamsulosin 0.4 milliGRAM(s) Oral at bedtime    On examination:  Vital Signs Last 24 Hrs  T(C): 36.9 (24 Jan 2022 05:13), Max: 37.8 (23 Jan 2022 08:18)  T(F): 98.5 (24 Jan 2022 05:13), Max: 100.1 (23 Jan 2022 08:18)  HR: 96 (24 Jan 2022 05:13) (84 - 128)  BP: 170/85 (24 Jan 2022 05:13) (147/85 - 185/90)  BP(mean): 114 (24 Jan 2022 05:13) (114 - 122)  RR: 18 (24 Jan 2022 05:13) (18 - 19)  SpO2: 95% (24 Jan 2022 05:13) (93% - 99%)    Pt lying on stretcher red socks on feet sleeping, easily woken  Head- NCAT  Neck- supple without pain; full ROM  Thorax/Back- nontender to palpation  Pelvis- negative pain on pelvic rock  Extremities - full ROM without pain; no external rotation or shortening of legs                     arms without evidence of trauma  Neuro- CN II-XII grossly intact without sensory or motor deficits    Impression: 1- atraumatic incident  Plan: 1- no treatment at this time

## 2022-01-24 NOTE — PROGRESS NOTE ADULT - SUBJECTIVE AND OBJECTIVE BOX
[facility]  INFECTIOUS DISEASES AND INTERNAL MEDICINE at [place] and [place]  =======================================================                               Alec Epperson MD#  iVctor Manuel Minor MD*                                     Brianna Ndiaye MD*    Melida Gil MD*            Diplomates American Board of Internal Medicine & Infectious Diseases                # [place] Office - Appt - Tel  [phone] Fax [phone]                * [place] Office - Appt - Tel [phone] Fax [phone]                                  Hospital Consult line:  [phone]  =======================================================    KRISTAN DENNIS [id number]    Follow up: Fever    No more fevers       Allergies:  No Known Allergies       REVIEW OF SYSTEMS:  CONSTITUTIONAL:  No Fever or chills  HEENT:  No diplopia or blurred vision.  No earache, sore throat or runny nose.  CARDIOVASCULAR:  No pressure, squeezing, strangling, tightness, heaviness or aching about the chest, neck, axilla or epigastrium.  RESPIRATORY:  No cough, shortness of breath  GASTROINTESTINAL:  No nausea, vomiting or diarrhea.  GENITOURINARY:  No dysuria, frequency or urgency.   MUSCULOSKELETAL:  no joint aches, no muscle pain  SKIN:  No change in skin, hair or nails.  NEUROLOGIC:  No Headaches, seizures   PSYCHIATRIC:  No disorder of thought or mood.  ENDOCRINE:  No heat or cold intolerance  HEMATOLOGICAL:  No easy bruising or bleeding.       Physical Exam:  GEN: NAD, pleasant  HEENT: normocephalic and atraumatic. EOMI. PERRL.  Anicteric  NECK: Supple.   LUNGS: Decreased Basal BS B/L   HEART: Regular rate and rhythm   ABDOMEN: Soft, nontender, and nondistended.  Positive bowel sounds.    : No CVA tenderness  EXTREMITIES: Without any edema.  MSK: No joint swelling  NEUROLOGIC: No Focal Deficits  PSYCHIATRIC: Appropriate affect .  SKIN: No Rash      Vitals:  T(F): 100.1 (23 Jan 2022 08:18), Max: 100.2 (23 Jan 2022 04:51)  HR: 110 (23 Jan 2022 08:18)  BP: 183/83 (23 Jan 2022 08:18)  RR: 19 (23 Jan 2022 08:18)  SpO2: 95% (23 Jan 2022 08:18) (91% - 97%)  temp max in last 48H T(F): , Max: 102.5 (01-22-22 @ 04:48)    Current Antibiotics:    Other medications:  ascorbic acid  Oral Tab/Cap - Peds 1000 milliGRAM(s) Oral daily  cholecalciferol Oral Tab/Cap - Peds 5000 Unit(s) Oral daily  clopidogrel Tablet 75 milliGRAM(s) Oral daily  DULoxetine 60 milliGRAM(s) Oral daily  enoxaparin Injectable 40 milliGRAM(s) SubCutaneous daily  escitalopram 10 milliGRAM(s) Oral daily  famotidine  Oral Tab/Cap - Peds 40 milliGRAM(s) Oral at bedtime  fenofibrate Tablet 145 milliGRAM(s) Oral daily  levothyroxine 12.5 MICROGram(s) Oral daily  metoprolol tartrate 25 milliGRAM(s) Oral two times a day  mometasone 220 MICROgram(s) Inhaler 1 Puff(s) Inhalation daily  montelukast 10 milliGRAM(s) Oral daily  pantoprazole    Tablet 40 milliGRAM(s) Oral before breakfast  senna 2 Tablet(s) Oral at bedtime  tamsulosin 0.4 milliGRAM(s) Oral at bedtime                            11.4   4.18  )-----------( 180      ( 23 Jan 2022 03:15 )             32.5     01-23    131<L>  |  95<L>  |  9.6  ----------------------------<  118<H>  4.1   |  21.0<L>  |  0.97    Ca    8.7      23 Jan 2022 03:15  Phos  2.1     01-23  Mg     1.7     01-23    TPro  7.1  /  Alb  3.2<L>  /  TBili  0.6  /  DBili  0.2  /  AST  36  /  ALT  24  /  AlkPhos  88  01-22      RECENT CULTURES:  01-21 @ 02:09 Clean Catch Clean Catch (Midstream)     <10,000 CFU/mL Normal Urogenital [place]ita    01-20 @ 01:04 .Blood Blood     No growth at 48 hours    01-17 @ 23:03 Clean Catch Clean Catch (Midstream)     <10,000 CFU/mL Normal Urogenital Clarita      WBC Count: 4.18 K/uL (01-23-22 @ 03:15)  WBC Count: 5.02 K/uL (01-22-22 @ 07:51)  WBC Count: 5.18 K/uL (01-20-22 @ 01:04)    Creatinine, Serum: 0.97 mg/dL (01-23-22 @ 03:15)  Creatinine, Serum: 1.09 mg/dL (01-22-22 @ 07:51)  Creatinine, Serum: 1.00 mg/dL (01-21-22 @ 09:31)  Creatinine, Serum: 1.01 mg/dL (01-21-22 @ 06:57)  Creatinine, Serum: 0.92 mg/dL (01-20-22 @ 21:30)  Creatinine, Serum: 1.04 mg/dL (01-20-22 @ 05:08)  Creatinine, Serum: 1.12 mg/dL (01-20-22 @ 02:15)  Creatinine, Serum: 1.11 mg/dL (01-20-22 @ 01:04)    Procalcitonin, Serum: 0.43 ng/mL (01-22-22 @ 11:49)  Procalcitonin, Serum: 0.28 ng/mL (01-21-22 @ 16:22)     Rapid RVP Result: NotDetec (01-20-22 @ 01:04)  SARS-CoV-2: NotDetec (01-20-22 @ 01:04)  SARS-CoV-2: NotDetec (01-17-22 @ 15:02)  Rapid RVP Result: NotDetec (01-17-22 @ 15:02)    SARS-CoV-2: NotDetec (01-20-22 @ 01:04)  SARS-CoV-2: NotDetec (01-17-22 @ 15:02)

## 2022-01-24 NOTE — DISCHARGE NOTE PROVIDER - HOSPITAL COURSE
76 year old male with HTN, HLD, CAD s/p PCI x 4, asthma, s/p left nephrectomy and left RCC on immunotherapy (biweekly infusions per son with [place] [facility]) presenting from home with recurrent falls and profound weakness x 10 days.  Admitted with hyponatremia, fever and profound weakness. UA notable for ketonuria, recent starvation ketosis suspected given pts history. Patient was treated with 0.9% NS with gradual improvement in sodium. No infectious source for fever was identified through cultures, labs and imaging so empiric antibiotics were discontinued by ID. [id number] with a prior evaluation in the emergency department and prescribed antibiotics for pneumonia presented with a persistent history of recurrent falls and profound weakness. On presentation, Na(128), K(5.6). CT of the head noted no acute intracranial hemorrhage, mass effect, midline shift, or acute large territorial infarct, noted age-appropriate brain parenchymal volume loss, chronic small vessel ischemic disease. CT of the chest noted a nonspecific 1cm ill-defined groundglass opacity in the left lower lobe, The patient was placed under observation status in the emergency department with empiric antibiotics. Xray of the hips was without acute findings. He was evaluated by Speech Pathology and continued on a regular consistency diet. He was also evaluated by Physical Therapy and thought to benefit from further treatment at a rehabilitation facility. The patient was noted to have fever(101.3) and persistent hyponatremia and was admitted to the hospital for further management. Intravenous fluids were initiated. Blood cultures were without growth. The patient was seen by Infectious Disease in consultation and no source of infection was noted and antibiotics discontinued. Repeat studies noted improvement in the hyponatremia. Lower extremity doppler was without deep venous thrombosis. Blood cultures were without growth. CT of the abdomen noted minimal bibasilar dependent atelectatic changes, possible mild bladder wall thickening. The patient was seen by Oncology in consultation and noted that a prior CT had noted that the cancer had shown response to therapy. The patient was seen by Nephrology in consultation for hyponatremia and urea was initiated. The patient had recurrent fever. The patient was noted to have recurrent weakness and confusion. Repeat CT of the head noted no acute intracranial bleed, mass effect, or acute territorial infarct, no significant interval change. Repeat blood cultures were without growth. The patient was seen by Neurology in consultation and noted to be without focality or imaging findings to suggest stroke, delirium was thought to be secondary to prolonged hospital stay.  Prednisone was initiated for treatment of possible side effect from immunotherapy. The patient was evaluated by Speech Pathology with recommendations to avoid oral intake. The patient had no recurrent fevers and steroids were later discontinued. EEG was without seizures, noted mild to moderate generalized slowing. A nasogastric tube was inserted for medications and nutrition but the patient later dislodged the tube. The patient had slow improvement in his mentation. The patient was re-evaluated by Speech Pathology and started on a modified consistency diet.      38 minutes total time

## 2022-01-24 NOTE — PROGRESS NOTE ADULT - SUBJECTIVE AND OBJECTIVE BOX
HEALTH ISSUES - PROBLEM Dx:  76 year old male with HTN, HLD, CAD s/p PCI x 4, asthma, s/p left nephrectomy and left RCC on immunotherapy (biweekly infusions per son with [place] [facility]) presenting from home with recurrent falls and profound weakness x 10 days.    FUO    INTERVAL HPI/ OVERNIGHT EVENTS:    Overnight events of fall, noted    currently sleeping in bed and still c/o weakness  keeps stating he feels 'terrible' every day  However he is clearly more alert and awake and able to move in bed and reposition himself well as compared to past 2 days  states he didn't feel like eating his BF today - lack of appetite     REVIEW OF SYSTEMS:    as above      Vital Signs Last 24 Hrs  T(C): 37.2 (24 Jan 2022 12:05), Max: 37.2 (24 Jan 2022 12:05)  T(F): 98.9 (24 Jan 2022 12:05), Max: 98.9 (24 Jan 2022 12:05)  HR: 84 (24 Jan 2022 12:05) (84 - 96)  BP: 171/90 (24 Jan 2022 12:05) (156/84 - 185/90)  BP(mean): 114 (24 Jan 2022 05:13) (114 - 122)  RR: 18 (24 Jan 2022 12:05) (18 - 18)  SpO2: 96% (24 Jan 2022 12:05) (93% - 97%)    PHYSICAL EXAM-  CONSTITUTIONAL: Non toxic appearing, lying in bed  ENMT: Moist oral mucosa, no pharyngeal injection or exudates; normal dentition  RESPIRATORY: Normal respiratory effort; lungs are clear to auscultation bilaterally  CARDIOVASCULAR: Regular rate and rhythm, normal S1 and S2, no murmur/rub/gallop; No lower extremity edema; Peripheral pulses are 2+ bilaterally  ABDOMEN: Nontender to palpation, normoactive bowel sounds, no rebound/guarding; No hepatosplenomegaly  MUSCLOSKELETAL:  Normal gait; no clubbing or cyanosis of digits; no joint swelling or tenderness to palpation  PSYCH: A+O to person, place, and time; affect appropriate  NEUROLOGY: CN 2-12 are intact and symmetric; no gross sensory deficits;   SKIN: No rashes; no palpable lesions    MEDICATIONS  (STANDING):  amLODIPine   Tablet 10 milliGRAM(s) Oral at bedtime  ascorbic acid  Oral Tab/Cap - Peds 1000 milliGRAM(s) Oral daily  cholecalciferol Oral Tab/Cap - Peds 5000 Unit(s) Oral daily  clopidogrel Tablet 75 milliGRAM(s) Oral daily  DULoxetine 60 milliGRAM(s) Oral daily  enoxaparin Injectable 40 milliGRAM(s) SubCutaneous daily  escitalopram 10 milliGRAM(s) Oral daily  famotidine  Oral Tab/Cap - Peds 40 milliGRAM(s) Oral at bedtime  fenofibrate Tablet 145 milliGRAM(s) Oral daily  levothyroxine 12.5 MICROGram(s) Oral daily  metoprolol tartrate 50 milliGRAM(s) Oral two times a day  mometasone 220 MICROgram(s) Inhaler 1 Puff(s) Inhalation daily  montelukast 10 milliGRAM(s) Oral daily  pantoprazole    Tablet 40 milliGRAM(s) Oral before breakfast  potassium phosphate / sodium phosphate Powder (PHOS-NaK) 1 Packet(s) Oral three times a day  senna 2 Tablet(s) Oral at bedtime  sodium chloride 3 Gram(s) Oral three times a day  tamsulosin 0.4 milliGRAM(s) Oral at bedtime    MEDICATIONS  (PRN):  acetaminophen     Tablet .. 650 milliGRAM(s) Oral every 6 hours PRN Temp greater or equal to 38C (100.4F), Mild Pain (1 - 3)  ALBUTerol    90 MICROgram(s) HFA Inhaler 2 Puff(s) Inhalation every 6 hours PRN Bronchospasm  bisacodyl 10 milliGRAM(s) Oral once PRN Constipation  metoprolol tartrate Injectable 5 milliGRAM(s) IV Push every 6 hours PRN HR > 120  oxycodone    5 mG/acetaminophen 325 mG 1 Tablet(s) Oral every 4 hours PRN Moderate Pain (4 - 6)  polyethylene glycol 3350 17 Gram(s) Oral daily PRN Constipation      LABS:                        11.4   4.18  )-----------( 180      ( 23 Jan 2022 03:15 )             32.5     01-24    127<L>  |  92<L>  |  10.2  ----------------------------<  114<H>  4.0   |  25.0  |  0.95    Ca    9.0      24 Jan 2022 09:14  Phos  2.9     01-24  Mg     1.8     01-24

## 2022-01-24 NOTE — DISCHARGE NOTE PROVIDER - NSDCMRMEDTOKEN_GEN_ALL_CORE_FT
albuterol 0.63 mg/3 mL (0.021%) inhalation solution:  inhaled , As Needed  DULoxetine 60 mg oral delayed release capsule: 1 cap(s) orally once a day  escitalopram 10 mg oral tablet: 1 tab(s) orally once a day  famotidine 40 mg oral tablet: 1 tab(s) orally once a day (at bedtime)  Flomax 0.4 mg oral capsule: 1 cap(s) orally once a day   Flomax 0.4 mg oral capsule: 1 cap(s) orally once a day   HYDROcodone-homatropine 5 mg-1.5 mg/5 mL oral syrup: 5 milliliter(s) orally every 8 hours MDD:15  metoprolol 25 mg oral tablet: 1 tab(s) orally 2 times a day  Percocet 5/325 oral tablet: 1 tab(s) orally every 6 hours, As Needed for moderate to severe pain MDD:4 tabs  Plavix 75 mg oral tablet: 1 tab(s) orally once a day  Praluent Pen 75 mg/mL subcutaneous solution: 1 application subcutaneous 2 times a month  Protonix 40 mg oral delayed release tablet: 1 tab(s) orally once a day  Pulmicort Flexhaler 180 mcg/inh inhalation powder: 1 puff(s) inhaled 2 times a day  Singulair 10 mg oral tablet: 1 tab(s) orally once a day (in the evening)  Synthroid: 12.5 microgram(s) orally once a day  Tylenol 8 Hour 650 mg oral tablet, extended release: 2 tab(s) orally every 8 hours   Vitamin C 500 mg oral tablet: 2 tab(s) orally once a day  Vitamin D3 5000 intl units oral capsule: 1 cap(s) orally once a day   albuterol 0.63 mg/3 mL (0.021%) inhalation solution:  inhaled , As Needed  escitalopram 10 mg oral tablet: 1 tab(s) orally once a day  famotidine 40 mg oral tablet: 1 tab(s) orally once a day (at bedtime)  Flomax 0.4 mg oral capsule: 1 cap(s) orally once a day   metoprolol 25 mg oral tablet: 1 tab(s) orally 2 times a day  Plavix 75 mg oral tablet: 1 tab(s) orally once a day  Protonix 40 mg oral delayed release tablet: 1 tab(s) orally once a day  Pulmicort Flexhaler 180 mcg/inh inhalation powder: 1 puff(s) inhaled 2 times a day  Singulair 10 mg oral tablet: 1 tab(s) orally once a day (in the evening)  Synthroid: 12.5 microgram(s) orally once a day  Vitamin C 500 mg oral tablet: 2 tab(s) orally once a day  Vitamin D3 5000 intl units oral capsule: 1 cap(s) orally once a day

## 2022-01-24 NOTE — DISCHARGE NOTE PROVIDER - DETAILS OF MALNUTRITION DIAGNOSIS/DIAGNOSES
This patient has been assessed with a concern for Malnutrition and was treated during this hospitalization for the following Nutrition diagnosis/diagnoses:     -  01/27/2022: Moderate protein-calorie malnutrition

## 2022-01-24 NOTE — DISCHARGE NOTE PROVIDER - CARE PROVIDER_API CALL
Neri Barber)  Medical Oncology  [address] [place] [address]  Phone: [phone]  Fax: [phone]  Follow Up Time:

## 2022-01-24 NOTE — PROGRESS NOTE ADULT - ASSESSMENT
76 year old male with HTN, HLD, CAD s/p PCI x 4, asthma, s/p left nephrectomy and left RCC on immunotherapy (biweekly infusions per son with [place] [facility]) presenting from home with recurrent falls and profound weakness x 10 days. Placed in observation waiting for dsc to [facility]. Noted to be febrile and hyponatremic while in ER    # FUO while on Immunotherapy - resolved  CT chest- ruled out PNA or other acute findings  Bld and Urine C/S NGTD. Bld C/S was done while on Doxycycline.  TFTs- WNL  AM cortisol levels ordered (s/p nephrectomy and questionable radiation) doubt adrenal insuff given other parameters  CT A/P ordered- pending  ID recs appreciated- Monitor off anitbiotics    # Gen Weakness  infectious process w/u ongoing as above  Ammonia WNL  Start low dose appetite stimulants  PT eval pending    # Uncontrolled HTN   -likely related to IVF. Now off IVF.  -BB increased to 50mg bid  -Norvasc and Losartan added today    # Hypophosphatemia  -repleted    # RCC s/p nephrectomy  [facility] Ca consulted today- regarding pts current regimen and check for potential medication side effects    # Hyponatremia -  s/p possible starvation ketosis   s/p 0.9NS @ 100 ml/hr   off IVF Na worsened to 127  PO Nacl tid started   if e/o dehydration sec to poor PO intake noted, will restart IVF.    # Ketosis- resolved  likely sec to starvation/ dehydration  s/p IVF as above  Ensure    # NAGMA- resolved  s/p IVF and monitor     # Hypothyroidism  Continue home dose of synthroid 12.5 mcg daily  TFTs essentially WNL    # Constipation  s/p Fleet enema x 1,   bowel regimen     # CAD / HTN / HLD  Continue Metoprolol 25 mg BID   Continue Plavix 75 mg daily  In sins rhythm now, previously had been on Cardizem (no longer per home meds)  Metoprolol 5 mg IVP Q 6 for sustained HR > 120 (>15 mins)  On Alirocumab bi weekly (Praluent). PCSK9 inh non formulary. Will start Tricor for now     DVT ppx : Lovenox  Dispo : tbd. likely [facility]. PT eval pending  DNR/ DNI ([facility] filled and placed in chart)  HCP : Ricky (son) [phone]- updated

## 2022-01-24 NOTE — DISCHARGE NOTE PROVIDER - NSDCCPCAREPLAN_GEN_ALL_CORE_FT
PRINCIPAL DISCHARGE DIAGNOSIS  Diagnosis: Delirium  Assessment and Plan of Treatment: Continue with supportive care.      SECONDARY DISCHARGE DIAGNOSES  Diagnosis: Hyponatremia  Assessment and Plan of Treatment: Maintain adequate oral intake. Monitor sodium levels.    Diagnosis: Renal cell carcinoma  Assessment and Plan of Treatment: Follow up with Oncology for further recommendations.    Diagnosis: Hypothyroidism  Assessment and Plan of Treatment: Continue on levothyroxine.

## 2022-01-25 LAB
APPEARANCE UR: CLEAR — SIGNIFICANT CHANGE UP
BACTERIA # UR AUTO: ABNORMAL
BILIRUB UR-MCNC: NEGATIVE — SIGNIFICANT CHANGE UP
COD CRY URNS QL: ABNORMAL
COLOR SPEC: YELLOW — SIGNIFICANT CHANGE UP
CORTIS AM PEAK SERPL-MCNC: 13.5 UG/DL — SIGNIFICANT CHANGE UP (ref 6–18.4)
CULTURE RESULTS: SIGNIFICANT CHANGE UP
CULTURE RESULTS: SIGNIFICANT CHANGE UP
DIFF PNL FLD: ABNORMAL
EPI CELLS # UR: SIGNIFICANT CHANGE UP
GLUCOSE UR QL: NEGATIVE MG/DL — SIGNIFICANT CHANGE UP
KETONES UR-MCNC: NEGATIVE — SIGNIFICANT CHANGE UP
LEUKOCYTE ESTERASE UR-ACNC: NEGATIVE — SIGNIFICANT CHANGE UP
NITRITE UR-MCNC: NEGATIVE — SIGNIFICANT CHANGE UP
OSMOLALITY UR: 550 MOSM/KG — SIGNIFICANT CHANGE UP (ref 300–1000)
PH UR: 6.5 — SIGNIFICANT CHANGE UP (ref 5–8)
POTASSIUM UR-SCNC: 25 MMOL/L — SIGNIFICANT CHANGE UP
PROT UR-MCNC: 30 MG/DL
RBC CASTS # UR COMP ASSIST: ABNORMAL /HPF (ref 0–4)
SODIUM UR-SCNC: 103 MMOL/L — SIGNIFICANT CHANGE UP
SP GR SPEC: 1.01 — SIGNIFICANT CHANGE UP (ref 1.01–1.02)
SPECIMEN SOURCE: SIGNIFICANT CHANGE UP
SPECIMEN SOURCE: SIGNIFICANT CHANGE UP
UROBILINOGEN FLD QL: NEGATIVE MG/DL — SIGNIFICANT CHANGE UP
WBC UR QL: SIGNIFICANT CHANGE UP /HPF (ref 0–5)

## 2022-01-25 PROCEDURE — 99232 SBSQ HOSP IP/OBS MODERATE 35: CPT

## 2022-01-25 PROCEDURE — 99233 SBSQ HOSP IP/OBS HIGH 50: CPT

## 2022-01-25 RX ORDER — SODIUM CHLORIDE 9 MG/ML
1000 INJECTION INTRAMUSCULAR; INTRAVENOUS; SUBCUTANEOUS
Refills: 0 | Status: DISCONTINUED | OUTPATIENT
Start: 2022-01-25 | End: 2022-01-27

## 2022-01-25 RX ORDER — ACETAMINOPHEN 500 MG
1000 TABLET ORAL ONCE
Refills: 0 | Status: COMPLETED | OUTPATIENT
Start: 2022-01-25 | End: 2022-01-25

## 2022-01-25 RX ORDER — UREA 15 G
30 POWDER IN PACKET (EA) ORAL
Refills: 0 | Status: DISCONTINUED | OUTPATIENT
Start: 2022-01-25 | End: 2022-01-28

## 2022-01-25 RX ADMIN — Medication 1000 MILLIGRAM(S): at 12:54

## 2022-01-25 RX ADMIN — Medication 12.5 MICROGRAM(S): at 05:18

## 2022-01-25 RX ADMIN — Medication 1 PACKET(S): at 12:42

## 2022-01-25 RX ADMIN — LOSARTAN POTASSIUM 25 MILLIGRAM(S): 100 TABLET, FILM COATED ORAL at 05:18

## 2022-01-25 RX ADMIN — ESCITALOPRAM OXALATE 10 MILLIGRAM(S): 10 TABLET, FILM COATED ORAL at 12:54

## 2022-01-25 RX ADMIN — Medication 5000 UNIT(S): at 12:32

## 2022-01-25 RX ADMIN — PANTOPRAZOLE SODIUM 40 MILLIGRAM(S): 20 TABLET, DELAYED RELEASE ORAL at 05:22

## 2022-01-25 RX ADMIN — Medication 30 GRAM(S): at 17:32

## 2022-01-25 RX ADMIN — SODIUM CHLORIDE 3 GRAM(S): 9 INJECTION INTRAMUSCULAR; INTRAVENOUS; SUBCUTANEOUS at 12:31

## 2022-01-25 RX ADMIN — SODIUM CHLORIDE 3 GRAM(S): 9 INJECTION INTRAMUSCULAR; INTRAVENOUS; SUBCUTANEOUS at 23:24

## 2022-01-25 RX ADMIN — Medication 50 MILLIGRAM(S): at 05:18

## 2022-01-25 RX ADMIN — Medication 145 MILLIGRAM(S): at 12:32

## 2022-01-25 RX ADMIN — SENNA PLUS 2 TABLET(S): 8.6 TABLET ORAL at 21:46

## 2022-01-25 RX ADMIN — TAMSULOSIN HYDROCHLORIDE 0.4 MILLIGRAM(S): 0.4 CAPSULE ORAL at 21:47

## 2022-01-25 RX ADMIN — FAMOTIDINE 40 MILLIGRAM(S): 10 INJECTION INTRAVENOUS at 21:47

## 2022-01-25 RX ADMIN — Medication 2.5 MILLIGRAM(S): at 05:22

## 2022-01-25 RX ADMIN — CLOPIDOGREL BISULFATE 75 MILLIGRAM(S): 75 TABLET, FILM COATED ORAL at 12:54

## 2022-01-25 RX ADMIN — MONTELUKAST 10 MILLIGRAM(S): 4 TABLET, CHEWABLE ORAL at 12:31

## 2022-01-25 RX ADMIN — Medication 1 PACKET(S): at 23:23

## 2022-01-25 RX ADMIN — SODIUM CHLORIDE 3 GRAM(S): 9 INJECTION INTRAMUSCULAR; INTRAVENOUS; SUBCUTANEOUS at 05:17

## 2022-01-25 RX ADMIN — ENOXAPARIN SODIUM 40 MILLIGRAM(S): 100 INJECTION SUBCUTANEOUS at 12:32

## 2022-01-25 RX ADMIN — Medication 400 MILLIGRAM(S): at 23:22

## 2022-01-25 RX ADMIN — AMLODIPINE BESYLATE 10 MILLIGRAM(S): 2.5 TABLET ORAL at 21:47

## 2022-01-25 RX ADMIN — SODIUM CHLORIDE 75 MILLILITER(S): 9 INJECTION INTRAMUSCULAR; INTRAVENOUS; SUBCUTANEOUS at 23:22

## 2022-01-25 RX ADMIN — Medication 2.5 MILLIGRAM(S): at 18:00

## 2022-01-25 RX ADMIN — Medication 50 MILLIGRAM(S): at 17:33

## 2022-01-25 RX ADMIN — DULOXETINE HYDROCHLORIDE 60 MILLIGRAM(S): 30 CAPSULE, DELAYED RELEASE ORAL at 12:55

## 2022-01-25 RX ADMIN — Medication 1 PACKET(S): at 05:18

## 2022-01-25 NOTE — PROGRESS NOTE ADULT - ASSESSMENT
76y  Male with h/o HTN, HLD, CAD s/p PCI x 4, asthma, s/p left nephrectomy and left RCC on immunotherapy (biweekly infusions per son with [place] [facility]). Patient was brought to the ER by his son for recurrent falls and profound weakness x 10 days.  He reports we was well up until one day after waking up and having weakness. No other associated symptoms. Denies sick contacts. No noted fever at home. In the ER patient was febrile to 102.5F no leukocytosis. Started on Ceftriaxone and Azithromycin.       Fever  left RCC on immunotherapy      - If has another fever (Temp >100.4) repeat fever work up  - Blood cultures no growth 1/20  - Repeat blood cultures no growth 1/22   - CT A/P  with no acute findings   - RVP/COVID 19 PCR Negative   - Urine culture not significant   - CT Chest reporting no PNA   - UA not suggestive of UTI   - Procalcitonin level 0.43  - Fevers improved off antibiotics   - Hem/Onc consult noted   - Follow up cultures  - Trend Fever  - Trend WBC      Will sign off. Please call PRN.

## 2022-01-25 NOTE — PROGRESS NOTE ADULT - SUBJECTIVE AND OBJECTIVE BOX
HEALTH ISSUES - PROBLEM Dx:  76 year old male with HTN, HLD, CAD s/p PCI x 4, asthma, s/p left nephrectomy and left RCC on immunotherapy (biweekly infusions per son with [place] [facility]) presenting from home with recurrent falls and profound weakness x 10 days.    FUO, weakness,     INTERVAL HPI/ OVERNIGHT EVENTS:    awake, alert x 2  cannot offer any other details  regularly he states he feels 'terrible'  clinically evident he is not somnolent any more, more awake and alert now  still with poor appetite  per son was noted to have progressive malaise with poor PO intake and lack of motivation since 2nd week of Jan, day prior to admission he was completely lethargic and weakness to the point of unable to lift his hands/ open his eyes    REVIEW OF SYSTEMS:    as above      Vital Signs Last 24 Hrs  T(C): 37.8 (25 Jan 2022 08:30), Max: 37.8 (25 Jan 2022 08:30)  T(F): 100.1 (25 Jan 2022 08:30), Max: 100.1 (25 Jan 2022 08:30)  HR: 80 (25 Jan 2022 08:30) (80 - 98)  BP: 141/73 (25 Jan 2022 08:30) (127/80 - 171/90)  BP(mean): --  RR: 18 (25 Jan 2022 08:30) (18 - 18)  SpO2: 96% (25 Jan 2022 08:30) (95% - 96%)    PHYSICAL EXAM-  CONSTITUTIONAL: Non toxic appearing, lying in bed  ENMT: Moist oral mucosa, no pharyngeal injection or exudates; normal dentition  RESPIRATORY: Normal respiratory effort; lungs are clear to auscultation bilaterally  CARDIOVASCULAR: Regular rate and rhythm, normal S1 and S2, no murmur/rub/gallop; No lower extremity edema; Peripheral pulses are 2+ bilaterally  ABDOMEN: Nontender to palpation, normoactive bowel sounds, no rebound/guarding; No hepatosplenomegaly  MUSCLOSKELETAL:  Normal gait; no clubbing or cyanosis of digits; no joint swelling or tenderness to palpation  PSYCH: A+O to person, place, and time; affect appropriate  NEUROLOGY: CN 2-12 are intact and symmetric; no gross sensory deficits;   SKIN: No rashes; no palpable lesions    MEDICATIONS  (STANDING):  amLODIPine   Tablet 10 milliGRAM(s) Oral at bedtime  ascorbic acid  Oral Tab/Cap - Peds 1000 milliGRAM(s) Oral daily  cholecalciferol Oral Tab/Cap - Peds 5000 Unit(s) Oral daily  clopidogrel Tablet 75 milliGRAM(s) Oral daily  dronabinol 2.5 milliGRAM(s) Oral two times a day  DULoxetine 60 milliGRAM(s) Oral daily  enoxaparin Injectable 40 milliGRAM(s) SubCutaneous daily  escitalopram 10 milliGRAM(s) Oral daily  famotidine  Oral Tab/Cap - Peds 40 milliGRAM(s) Oral at bedtime  fenofibrate Tablet 145 milliGRAM(s) Oral daily  levothyroxine 12.5 MICROGram(s) Oral daily  losartan 25 milliGRAM(s) Oral daily  metoprolol tartrate 50 milliGRAM(s) Oral two times a day  mometasone 220 MICROgram(s) Inhaler 1 Puff(s) Inhalation daily  montelukast 10 milliGRAM(s) Oral daily  pantoprazole    Tablet 40 milliGRAM(s) Oral before breakfast  potassium phosphate / sodium phosphate Powder (PHOS-NaK) 1 Packet(s) Oral three times a day  senna 2 Tablet(s) Oral at bedtime  sodium chloride 3 Gram(s) Oral three times a day  tamsulosin 0.4 milliGRAM(s) Oral at bedtime    MEDICATIONS  (PRN):  acetaminophen     Tablet .. 650 milliGRAM(s) Oral every 6 hours PRN Temp greater or equal to 38C (100.4F), Mild Pain (1 - 3)  ALBUTerol    90 MICROgram(s) HFA Inhaler 2 Puff(s) Inhalation every 6 hours PRN Bronchospasm  bisacodyl 10 milliGRAM(s) Oral once PRN Constipation  metoprolol tartrate Injectable 5 milliGRAM(s) IV Push every 6 hours PRN HR > 120  oxycodone    5 mG/acetaminophen 325 mG 1 Tablet(s) Oral every 4 hours PRN Moderate Pain (4 - 6)  polyethylene glycol 3350 17 Gram(s) Oral daily PRN Constipation      LABS:    01-24    127<L>  |  92<L>  |  10.2  ----------------------------<  114<H>  4.0   |  25.0  |  0.95    Ca    9.0      24 Jan 2022 09:14  Phos  2.9     01-24  Mg     1.8     01-24

## 2022-01-25 NOTE — CONSULT NOTE ADULT - SUBJECTIVE AND OBJECTIVE BOX
76 year old W - male with HTN, CAD s/p PCI x 4, asthma, Urothelial cancer s/p surgery , RT (from 10/01/2021 - 11/18/2021) & Treated  with nivolumab since 9/17/2021.     The last dose was on 12/31/2021.     On 01/05/2022 CT  showed excellent response to therapy.     He has presented with progressive weakness and fatigue.     He was admitted with persistent fever.     Workup has been unrevealing.    Patient is very weak, confused. He is unable to provide history. In No PATRICA,     No Seizures,     PAST MEDICAL & SURGICAL HISTORY:    CAD (coronary artery disease)    Myocardial infarction x 3    Rheumatoid arthritis,    Surgeries :     CAD S/P percutaneous coronary angioplasty,    Stent to Proximal LAD and Proximal CX    Hx of cholecystectomy    H/O arthroscopy of left knee    H/O inguinal hernia repair    H/O Surgery - Right Foot    Social History:    Lives at home, ambulates w/o assistance (prior to 10 days ago), former smoker ( pack years) quit 25 years ago (21 Jan 2022 15:34)    MEDICATIONS  (STANDING):    amLODIPine   Tablet 10 milliGRAM(s) Oral at bedtime  ascorbic acid  Oral Tab/Cap - Peds 1000 milliGRAM(s) Oral daily  cholecalciferol Oral Tab/Cap - Peds 5000 Unit(s) Oral daily  clopidogrel Tablet 75 milliGRAM(s) Oral daily  dronabinol 2.5 milliGRAM(s) Oral two times a day  DULoxetine 60 milliGRAM(s) Oral daily  enoxaparin Injectable 40 milliGRAM(s) SubCutaneous daily  escitalopram 10 milliGRAM(s) Oral daily  famotidine  Oral Tab/Cap - Peds 40 milliGRAM(s) Oral at bedtime  fenofibrate Tablet 145 milliGRAM(s) Oral daily  levothyroxine 12.5 MICROGram(s) Oral daily  losartan 25 milliGRAM(s) Oral daily  metoprolol tartrate 50 milliGRAM(s) Oral two times a day  mometasone 220 MICROgram(s) Inhaler 1 Puff(s) Inhalation daily  montelukast 10 milliGRAM(s) Oral daily  pantoprazole    Tablet 40 milliGRAM(s) Oral before breakfast  potassium phosphate / sodium phosphate Powder (PHOS-NaK) 1 Packet(s) Oral three times a day  senna 2 Tablet(s) Oral at bedtime  sodium chloride 3 Gram(s) Oral three times a day  tamsulosin 0.4 milliGRAM(s) Oral at bedtime    MEDICATIONS  (PRN):  acetaminophen     Tablet .. 650 milliGRAM(s) Oral every 6 hours PRN Temp greater or equal to 38C (100.4F), Mild Pain (1 - 3)  ALBUTerol    90 MICROgram(s) HFA Inhaler 2 Puff(s) Inhalation every 6 hours PRN Bronchospasm  bisacodyl 10 milliGRAM(s) Oral once PRN Constipation  metoprolol tartrate Injectable 5 milliGRAM(s) IV Push every 6 hours PRN HR > 120  oxycodone    5 mG/acetaminophen 325 mG 1 Tablet(s) Oral every 4 hours PRN Moderate Pain (4 - 6)  polyethylene glycol 3350 17 Gram(s) Oral daily PRN Constipation    Vital Signs Last 24 Hrs  T(C): 37.8 (25 Jan 2022 08:30), Max: 37.8 (25 Jan 2022 08:30)  T(F): 100.1 (25 Jan 2022 08:30), Max: 100.1 (25 Jan 2022 08:30)  HR: 80 (25 Jan 2022 08:30) (80 - 98)  BP: 141/73 (25 Jan 2022 08:30) (127/80 - 171/90)  RR: 18 (25 Jan 2022 08:30) (18 - 18)  SpO2: 96% (25 Jan 2022 08:30) (95% - 96%)    Gen: Pale, Awake, In NAD,   neuro: Arousable,  but confused, very weak voice  S1S2 Normal, No Rub,   Chest Clear,   abd - Non tender, Surgical Scar, No Hepato Splenomegaly,   No Edema,     Labs:    01-24    127<L>  |  92<L>  |  10.2  ----------------------------<  114<H>  4.0   |  25.0  |  0.95    Ca    9.0      24 Jan 2022 09:14  Phos  2.9     01-24  Mg     1.8     01-24    Sodium, Serum: 127 mmol/L (01.24.22 @ 09:14)       Historical Values  Sodium, Serum: 127 mmol/L (01.24.22 @ 09:14)   Sodium, Serum: 131 mmol/L (01.23.22 @ 03:15)   Sodium, Serum: 132 mmol/L (01.22.22 @ 07:51)   Sodium, Serum: 127 mmol/L (01.21.22 @ 09:31)   Sodium, Serum: 130 mmol/L (01.21.22 @ 06:57)   Sodium, Serum: 127 mmol/L (01.20.22 @ 21:30)   Sodium, Serum: 129 mmol/L (01.20.22 @ 05:08)   Sodium, Serum: 127 mmol/L (01.20.22 @ 02:15)   Sodium, Serum: 128 mmol/L (01.20.22 @ 01:04)   Sodium, Serum: 132 mmol/L (01.17.22 @ 15:03)   Sodium, Serum: 137 mmol/L (03.17.20 @ 21:56)   Sodium, Serum: 139 mmol/L (09.24.18 @ 12:20)   Sodium, Serum: 139 mmol/L (12.27.16 @ 16:04)   Sodium, Serum: 139 mmol/L (02.05.14 @ 07:07)   Sodium, Serum: 137 mmol/L (02.04.14 @ 19:08)   Sodium, Serum: 136 mmol/L (12.17.12 @ 06:22)   Sodium, Serum: 137 mmol/L (12.16.12 @ 06:39)   Sodium, Serum: 134 mmol/L (12.15.12 @ 07:43)   Sodium, Serum: 141 mmol/L (12.14.12 @ 12:47)   Sodium, Serum: 141 mmol/L (12.03.12 @ 15:07)   Sodium, Serum: 137 mmol/L (09.24.10 @ 13:40)     In Conclusion,     Urothelial cancer - S/P  nephrectomy, RT, immunotherapy on nivolumab. Last dose on 12/31/2021. No inpatient therapy.    He had CT scans a couple of weeks ago showing a good response to therapy,    Hyponatremia - SNa+ 138 Meq.,  as an outpatient.

## 2022-01-25 NOTE — CONSULT NOTE ADULT - SUBJECTIVE AND OBJECTIVE BOX
76 year old male with HTN, HLD, CAD s/p PCI x 4, asthma, urothelial cancer s/p surgery (surg path [id number]), RT (from 10/01/2021 - 11/18/2021) given with nivolumab since 9/17/2021. The last dose was on 12/31/2021. On 01/05/2022 CT a/p showed excellent response to therapy. He has presented with progressive weakness and fatigue. He was admitted with persistent fevers. Workup has been unrevealing. Temp this morning was 100.1F.  Patient is very weak, confused. Unclear baseline. He is unable to provide history.      PAST MEDICAL & SURGICAL HISTORY:  CAD (coronary artery disease)    Myocardial infarction  x 3    Dyslipidemia    Asthma    GERD (gastroesophageal reflux disease)    Rheumatoid arthritis    Tinnitus    CAD S/P percutaneous coronary angioplasty  Stent to Proximal LAD and Proximal CX    Hx of cholecystectomy    H/O arthroscopy of left knee    H/O inguinal hernia repair    H/O foot surgery  Right Foot    Social History:  Lives at home, ambulates w/o assistance (prior to 10 days ago), former smoker ( pack years) quit 25 years ago (21 Jan 2022 15:34)    MEDICATIONS  (STANDING):  amLODIPine   Tablet 10 milliGRAM(s) Oral at bedtime  ascorbic acid  Oral Tab/Cap - Peds 1000 milliGRAM(s) Oral daily  cholecalciferol Oral Tab/Cap - Peds 5000 Unit(s) Oral daily  clopidogrel Tablet 75 milliGRAM(s) Oral daily  dronabinol 2.5 milliGRAM(s) Oral two times a day  DULoxetine 60 milliGRAM(s) Oral daily  enoxaparin Injectable 40 milliGRAM(s) SubCutaneous daily  escitalopram 10 milliGRAM(s) Oral daily  famotidine  Oral Tab/Cap - Peds 40 milliGRAM(s) Oral at bedtime  fenofibrate Tablet 145 milliGRAM(s) Oral daily  levothyroxine 12.5 MICROGram(s) Oral daily  losartan 25 milliGRAM(s) Oral daily  metoprolol tartrate 50 milliGRAM(s) Oral two times a day  mometasone 220 MICROgram(s) Inhaler 1 Puff(s) Inhalation daily  montelukast 10 milliGRAM(s) Oral daily  pantoprazole    Tablet 40 milliGRAM(s) Oral before breakfast  potassium phosphate / sodium phosphate Powder (PHOS-NaK) 1 Packet(s) Oral three times a day  senna 2 Tablet(s) Oral at bedtime  sodium chloride 3 Gram(s) Oral three times a day  tamsulosin 0.4 milliGRAM(s) Oral at bedtime    MEDICATIONS  (PRN):  acetaminophen     Tablet .. 650 milliGRAM(s) Oral every 6 hours PRN Temp greater or equal to 38C (100.4F), Mild Pain (1 - 3)  ALBUTerol    90 MICROgram(s) HFA Inhaler 2 Puff(s) Inhalation every 6 hours PRN Bronchospasm  bisacodyl 10 milliGRAM(s) Oral once PRN Constipation  metoprolol tartrate Injectable 5 milliGRAM(s) IV Push every 6 hours PRN HR > 120  oxycodone    5 mG/acetaminophen 325 mG 1 Tablet(s) Oral every 4 hours PRN Moderate Pain (4 - 6)  polyethylene glycol 3350 17 Gram(s) Oral daily PRN Constipation        Vital Signs Last 24 Hrs  T(C): 37.8 (25 Jan 2022 08:30), Max: 37.8 (25 Jan 2022 08:30)  T(F): 100.1 (25 Jan 2022 08:30), Max: 100.1 (25 Jan 2022 08:30)  HR: 80 (25 Jan 2022 08:30) (80 - 98)  BP: 141/73 (25 Jan 2022 08:30) (127/80 - 171/90)  BP(mean): --  RR: 18 (25 Jan 2022 08:30) (18 - 18)  SpO2: 96% (25 Jan 2022 08:30) (95% - 96%)  Gen: nad  neuro: arousable but confused, very weak voice  rrr  abd - non tender  ctab      Labs:        01-24    127<L>  |  92<L>  |  10.2  ----------------------------<  114<H>  4.0   |  25.0  |  0.95    Ca    9.0      24 Jan 2022 09:14  Phos  2.9     01-24  Mg     1.8     01-24

## 2022-01-25 NOTE — PROGRESS NOTE ADULT - ASSESSMENT
76 year old male with HTN, HLD, CAD s/p PCI x 4, asthma, s/p left nephrectomy and left RCC on immunotherapy (biweekly infusions per son with [place] [facility]) presenting from home with recurrent falls and profound weakness x 10 days. Placed in observation waiting for dsc to [facility]. Noted to be febrile and hyponatremic while in ER    # Hyponatremia - Baseline 138 outpatient   s/p possible starvation ketosis   s/p 0.9NS @ 100 ml/hr   off IVF,  Na worsened to 127 and then improved with IVF. now downtrending again off IVF  PO Nacl tid started on 1/24  per Heme/ Onc- It is possible that immunotherapy can lead to fever, weakness and electrolyte abnormalities.    Now with low grade temp and fluctuating Na, Renal consulted. Will hdl off on IVF until eval by renal.    # FUO while on Immunotherapy - Had resolved. Now off IVF noted low grade temp today  CT chest- ruled out PNA or other acute findings  Bld and Urine C/S NGTD. Initial Bld C/S was done while on Doxycycline. Rpt Bld c/s 2 diff dates are NGTD  TFTs- WNL  AM cortisol levels testing (s/p nephrectomy and questionable radiation) doubt adrenal insuff given other parameters  CT A/P ordered- Non acute  ID recs appreciated- Monitor off antibiotics  per Heme/ Onc- It is possible that immunotherapy can lead to fever, weakness and electrolyte abnormalities.    # Gen Weakness  CTH non acute  infectious process w/u ongoing as above  Ammonia WNL  Start low dose appetite stimulants  PT eval pending  appetite stimulants added    # s/p Uncontrolled HTN - now controlled  -BB increased to 50mg bid  -Norvasc and Losartan added 1/24    # Hypophosphatemia  -repleted    # urothelial Cancer s/p nephrectomy, s/p RT and on immunotherapy since 9/2021. Last dose 12/31  [facility] and Ca consulted appreciated regarding   per Heme/ Onc- It is possible that immunotherapy can lead to fever, weakness and electrolyte abnormalities.  CT scans a couple of weeks ago showing a good response to therapy so unlikely due to worsening cancer.    # Ketosis- resolved  likely sec to starvation/ dehydration  s/p IVF as above  Ensure    # NAGMA- resolved  s/p IVF and monitor     # Hypothyroidism  Continue home dose of synthroid 12.5 mcg daily  TFTs essentially WNL    # Constipation  s/p Fleet enema x 1,   bowel regimen     # CAD / HTN / HLD  Continue Metoprolol   Continue Plavix 75 mg daily  In sins rhythm now, previously had been on Cardizem (no longer per home meds)  Metoprolol 5 mg IVP Q 6 for sustained HR > 120 (>15 mins)  On Alirocumab bi weekly (Praluent). PCSK9 inh non formulary. Will start Tricor for now     DVT ppx : Lovenox  Dispo : [facility]. PT eval pending  DNR/ DNI ([facility] filled and placed in chart)  HCP : Ricky (son) [phone]- updated

## 2022-01-25 NOTE — PROGRESS NOTE ADULT - SUBJECTIVE AND OBJECTIVE BOX
[facility]  INFECTIOUS DISEASES AND INTERNAL MEDICINE at [place] and [place]  =======================================================                               Alec Epperson MD#  Victor Manuel Minor MD*                                     Brianna Ndiaye MD*    Melida Gil MD*            Diplomates American Board of Internal Medicine & Infectious Diseases                # [place] Office - Appt - Tel  [phone] Fax [phone]                * [place] Office - Appt - Tel [phone] Fax [phone]                                  Hospital Consult line:  [phone]  =======================================================    KRISTAN DENNIS [id number]    Follow up: Fever    No more fevers       Allergies:  No Known Allergies       REVIEW OF SYSTEMS:  CONSTITUTIONAL:  No Fever or chills  HEENT:  No diplopia or blurred vision.  No earache, sore throat or runny nose.  CARDIOVASCULAR:  No pressure, squeezing, strangling, tightness, heaviness or aching about the chest, neck, axilla or epigastrium.  RESPIRATORY:  No cough, shortness of breath  GASTROINTESTINAL:  No nausea, vomiting or diarrhea.  GENITOURINARY:  No dysuria, frequency or urgency.   MUSCULOSKELETAL:  no joint aches, no muscle pain  SKIN:  No change in skin, hair or nails.  NEUROLOGIC:  No Headaches, seizures   PSYCHIATRIC:  No disorder of thought or mood.  ENDOCRINE:  No heat or cold intolerance  HEMATOLOGICAL:  No easy bruising or bleeding.       Physical Exam:  GEN: NAD, pleasant  HEENT: normocephalic and atraumatic. EOMI. PERRL.  Anicteric  NECK: Supple.   LUNGS: Decreased Basal BS B/L   HEART: Regular rate and rhythm   ABDOMEN: Soft, nontender, and nondistended.  Positive bowel sounds.    : No CVA tenderness  EXTREMITIES: Without any edema.  MSK: No joint swelling  NEUROLOGIC: No Focal Deficits  PSYCHIATRIC: Appropriate affect .  SKIN: No Rash      Vitals:    T(F): 98.2 (25 Jan 2022 11:43), Max: 100.1 (25 Jan 2022 08:30)  HR: 89 (25 Jan 2022 11:43)  BP: 149/84 (25 Jan 2022 11:43)  RR: 18 (25 Jan 2022 11:43)  SpO2: 97% (25 Jan 2022 11:43) (95% - 97%)  temp max in last 48H T(F): , Max: 100.1 (01-25-22 @ 08:30)    Current Antibiotics:    Other medications:  amLODIPine   Tablet 10 milliGRAM(s) Oral at bedtime  ascorbic acid  Oral Tab/Cap - Peds 1000 milliGRAM(s) Oral daily  cholecalciferol Oral Tab/Cap - Peds 5000 Unit(s) Oral daily  clopidogrel Tablet 75 milliGRAM(s) Oral daily  dronabinol 2.5 milliGRAM(s) Oral two times a day  DULoxetine 60 milliGRAM(s) Oral daily  enoxaparin Injectable 40 milliGRAM(s) SubCutaneous daily  escitalopram 10 milliGRAM(s) Oral daily  famotidine  Oral Tab/Cap - Peds 40 milliGRAM(s) Oral at bedtime  fenofibrate Tablet 145 milliGRAM(s) Oral daily  levothyroxine 12.5 MICROGram(s) Oral daily  losartan 25 milliGRAM(s) Oral daily  metoprolol tartrate 50 milliGRAM(s) Oral two times a day  mometasone 220 MICROgram(s) Inhaler 1 Puff(s) Inhalation daily  montelukast 10 milliGRAM(s) Oral daily  pantoprazole    Tablet 40 milliGRAM(s) Oral before breakfast  potassium phosphate / sodium phosphate Powder (PHOS-NaK) 1 Packet(s) Oral three times a day  senna 2 Tablet(s) Oral at bedtime  sodium chloride 3 Gram(s) Oral three times a day  tamsulosin 0.4 milliGRAM(s) Oral at bedtime        01-24    127<L>  |  92<L>  |  10.2  ----------------------------<  114<H>  4.0   |  25.0  |  0.95    Ca    9.0      24 Jan 2022 09:14  Phos  2.9     01-24  Mg     1.8     01-24      RECENT CULTURES:  01-22 @ 17:24 .Blood Blood-Peripheral     No growth at 48 hours    01-21 @ 02:09 Clean Catch Clean Catch (Midstream)     <10,000 CFU/mL Normal Urogenital Clarita    01-20 @ 01:04 .Blood Blood     No growth at 5 days.    01-17 @ 23:03 Clean Catch Clean Catch (Midstream)     <10,000 CFU/mL Normal Urogenital Clarita      WBC Count: 4.18 K/uL (01-23-22 @ 03:15)  WBC Count: 5.02 K/uL (01-22-22 @ 07:51)    Creatinine, Serum: 0.95 mg/dL (01-24-22 @ 09:14)  Creatinine, Serum: 0.97 mg/dL (01-23-22 @ 03:15)  Creatinine, Serum: 1.09 mg/dL (01-22-22 @ 07:51)  Creatinine, Serum: 1.00 mg/dL (01-21-22 @ 09:31)  Creatinine, Serum: 1.01 mg/dL (01-21-22 @ 06:57)  Creatinine, Serum: 0.92 mg/dL (01-20-22 @ 21:30)    Procalcitonin, Serum: 0.43 ng/mL (01-22-22 @ 11:49)  Procalcitonin, Serum: 0.28 ng/mL (01-21-22 @ 16:22)     Rapid RVP Result: NotDetec (01-20-22 @ 01:04)  SARS-CoV-2: NotDetec (01-20-22 @ 01:04)  SARS-CoV-2: NotDetec (01-17-22 @ 15:02)  Rapid RVP Result: NotDetec (01-17-22 @ 15:02)    SARS-CoV-2: NotDetec (01-20-22 @ 01:04)  SARS-CoV-2: NotDetec (01-17-22 @ 15:02)      < from: CT Abdomen and Pelvis w/ Oral Cont and w/ IV Cont (01.24.22 @ 22:20) >  ACC: [id number] EXAM:  CT ABDOMEN AND PELVIS OC IC                          PROCEDURE DATE:  01/24/2022          INTERPRETATION:  CLINICAL INFORMATION: Recurrent fever. Weakness. History   of renal cell carcinoma.    COMPARISON: CT scan abdomen 7/17/2021.    CONTRAST/COMPLICATIONS:  IV Contrast: Omnipaque 300  88 cc administered   12 cc discarded  Oral Contrast: Smoothie Readi-Cat 2  Complications: None reported at time of study completion    PROCEDURE:  CT of the Abdomen and Pelvis was performed.  Sagittal and coronal reformats were performed.    FINDINGS:    LOWER CHEST:  Minimal bibasilar dependent atelectatic changes.  Bullae anterior right lower lobe.  Coronary artery calcifications.    Respiratory motion artifact degrades image quality.    LIVER:  Small, subcentimeter indeterminate hypodense lesion caudate lobe.  Focal area of decreased attenuation adjacent to falciform ligament,   likely reflecting fat.  BILE DUCTS: Normal caliber.  GALLBLADDER: Status post cholecystectomy.  SPLEEN: Within normal limits.  PANCREAS: Within normal limits.  ADRENALS: Within normal limits.  KIDNEYS/URETERS:  Left nephrectomy.  Minimal thickening in the surgical bed. No discrete mass noted.  No right-sided hydronephrosis.    BLADDER: Possible mild bladder wall thickening.  REPRODUCTIVE ORGANS: Coarse central prostate calcifications.    BOWEL:  Colonic diverticulosis, without CT evidence of diverticulitis.  No bowel obstruction.   Appendix within normal limits.  PERITONEUM: No ascites.    VESSELS: Atherosclerotic changes.  Infrarenal abdominal aortic ectasia measuring 2.6 cm.  RETROPERITONEUM/LYMPH NODES: No lymphadenopathy.    ABDOMINAL WALL:  Small fat-containing right inguinal hernia.    BONES: Degenerative changes.    IMPRESSION:    Possible mild bladder wall thickening, correlate clinically for cystitis.    Other findings as discussed above.    < end of copied text >

## 2022-01-26 LAB
ALBUMIN SERPL ELPH-MCNC: 3.6 G/DL — SIGNIFICANT CHANGE UP (ref 3.3–5.2)
ALP SERPL-CCNC: 79 U/L — SIGNIFICANT CHANGE UP (ref 40–120)
ALT FLD-CCNC: 31 U/L — SIGNIFICANT CHANGE UP
ANION GAP SERPL CALC-SCNC: 12 MMOL/L — SIGNIFICANT CHANGE UP (ref 5–17)
AST SERPL-CCNC: 30 U/L — SIGNIFICANT CHANGE UP
BASOPHILS # BLD AUTO: 0.03 K/UL — SIGNIFICANT CHANGE UP (ref 0–0.2)
BASOPHILS NFR BLD AUTO: 0.6 % — SIGNIFICANT CHANGE UP (ref 0–2)
BILIRUB SERPL-MCNC: 0.5 MG/DL — SIGNIFICANT CHANGE UP (ref 0.4–2)
BUN SERPL-MCNC: 19.9 MG/DL — SIGNIFICANT CHANGE UP (ref 8–20)
CALCIUM SERPL-MCNC: 9.2 MG/DL — SIGNIFICANT CHANGE UP (ref 8.6–10.2)
CHLORIDE SERPL-SCNC: 99 MMOL/L — SIGNIFICANT CHANGE UP (ref 98–107)
CO2 SERPL-SCNC: 23 MMOL/L — SIGNIFICANT CHANGE UP (ref 22–29)
CREAT SERPL-MCNC: 1.19 MG/DL — SIGNIFICANT CHANGE UP (ref 0.5–1.3)
EOSINOPHIL # BLD AUTO: 0.1 K/UL — SIGNIFICANT CHANGE UP (ref 0–0.5)
EOSINOPHIL NFR BLD AUTO: 2.1 % — SIGNIFICANT CHANGE UP (ref 0–6)
GLUCOSE SERPL-MCNC: 130 MG/DL — HIGH (ref 70–99)
HCT VFR BLD CALC: 35.6 % — LOW (ref 39–50)
HGB BLD-MCNC: 12.2 G/DL — LOW (ref 13–17)
IMM GRANULOCYTES NFR BLD AUTO: 0.4 % — SIGNIFICANT CHANGE UP (ref 0–1.5)
LYMPHOCYTES # BLD AUTO: 0.72 K/UL — LOW (ref 1–3.3)
LYMPHOCYTES # BLD AUTO: 14.8 % — SIGNIFICANT CHANGE UP (ref 13–44)
MCHC RBC-ENTMCNC: 30.7 PG — SIGNIFICANT CHANGE UP (ref 27–34)
MCHC RBC-ENTMCNC: 34.3 GM/DL — SIGNIFICANT CHANGE UP (ref 32–36)
MCV RBC AUTO: 89.4 FL — SIGNIFICANT CHANGE UP (ref 80–100)
MONOCYTES # BLD AUTO: 0.62 K/UL — SIGNIFICANT CHANGE UP (ref 0–0.9)
MONOCYTES NFR BLD AUTO: 12.8 % — SIGNIFICANT CHANGE UP (ref 2–14)
NEUTROPHILS # BLD AUTO: 3.37 K/UL — SIGNIFICANT CHANGE UP (ref 1.8–7.4)
NEUTROPHILS NFR BLD AUTO: 69.3 % — SIGNIFICANT CHANGE UP (ref 43–77)
PLATELET # BLD AUTO: 194 K/UL — SIGNIFICANT CHANGE UP (ref 150–400)
POTASSIUM SERPL-MCNC: 3.9 MMOL/L — SIGNIFICANT CHANGE UP (ref 3.5–5.3)
POTASSIUM SERPL-SCNC: 3.9 MMOL/L — SIGNIFICANT CHANGE UP (ref 3.5–5.3)
PROCALCITONIN SERPL-MCNC: 0.63 NG/ML — HIGH (ref 0.02–0.1)
PROT SERPL-MCNC: 6.9 G/DL — SIGNIFICANT CHANGE UP (ref 6.6–8.7)
RBC # BLD: 3.98 M/UL — LOW (ref 4.2–5.8)
RBC # FLD: 13.1 % — SIGNIFICANT CHANGE UP (ref 10.3–14.5)
SODIUM SERPL-SCNC: 134 MMOL/L — LOW (ref 135–145)
WBC # BLD: 4.86 K/UL — SIGNIFICANT CHANGE UP (ref 3.8–10.5)
WBC # FLD AUTO: 4.86 K/UL — SIGNIFICANT CHANGE UP (ref 3.8–10.5)

## 2022-01-26 PROCEDURE — 99233 SBSQ HOSP IP/OBS HIGH 50: CPT

## 2022-01-26 RX ADMIN — SODIUM CHLORIDE 3 GRAM(S): 9 INJECTION INTRAMUSCULAR; INTRAVENOUS; SUBCUTANEOUS at 22:06

## 2022-01-26 RX ADMIN — Medication 5000 UNIT(S): at 13:30

## 2022-01-26 RX ADMIN — MONTELUKAST 10 MILLIGRAM(S): 4 TABLET, CHEWABLE ORAL at 13:29

## 2022-01-26 RX ADMIN — Medication 2.5 MILLIGRAM(S): at 07:04

## 2022-01-26 RX ADMIN — ENOXAPARIN SODIUM 40 MILLIGRAM(S): 100 INJECTION SUBCUTANEOUS at 13:27

## 2022-01-26 RX ADMIN — PANTOPRAZOLE SODIUM 40 MILLIGRAM(S): 20 TABLET, DELAYED RELEASE ORAL at 07:00

## 2022-01-26 RX ADMIN — Medication 1000 MILLIGRAM(S): at 13:29

## 2022-01-26 RX ADMIN — Medication 145 MILLIGRAM(S): at 13:29

## 2022-01-26 RX ADMIN — Medication 30 GRAM(S): at 07:03

## 2022-01-26 RX ADMIN — AMLODIPINE BESYLATE 10 MILLIGRAM(S): 2.5 TABLET ORAL at 22:06

## 2022-01-26 RX ADMIN — ESCITALOPRAM OXALATE 10 MILLIGRAM(S): 10 TABLET, FILM COATED ORAL at 13:28

## 2022-01-26 RX ADMIN — Medication 12.5 MICROGRAM(S): at 07:00

## 2022-01-26 RX ADMIN — Medication 50 MILLIGRAM(S): at 07:01

## 2022-01-26 RX ADMIN — FAMOTIDINE 40 MILLIGRAM(S): 10 INJECTION INTRAVENOUS at 22:04

## 2022-01-26 RX ADMIN — TAMSULOSIN HYDROCHLORIDE 0.4 MILLIGRAM(S): 0.4 CAPSULE ORAL at 22:05

## 2022-01-26 RX ADMIN — CLOPIDOGREL BISULFATE 75 MILLIGRAM(S): 75 TABLET, FILM COATED ORAL at 13:28

## 2022-01-26 RX ADMIN — SENNA PLUS 2 TABLET(S): 8.6 TABLET ORAL at 22:06

## 2022-01-26 RX ADMIN — SODIUM CHLORIDE 3 GRAM(S): 9 INJECTION INTRAMUSCULAR; INTRAVENOUS; SUBCUTANEOUS at 07:02

## 2022-01-26 RX ADMIN — DULOXETINE HYDROCHLORIDE 60 MILLIGRAM(S): 30 CAPSULE, DELAYED RELEASE ORAL at 13:29

## 2022-01-26 RX ADMIN — Medication 1 PACKET(S): at 07:02

## 2022-01-26 RX ADMIN — LOSARTAN POTASSIUM 25 MILLIGRAM(S): 100 TABLET, FILM COATED ORAL at 07:01

## 2022-01-26 NOTE — PROGRESS NOTE ADULT - ASSESSMENT
76 year old male with HTN, HLD, CAD s/p PCI x 4, asthma, s/p left nephrectomy and left RCC on immunotherapy (biweekly infusions per son with [place] Bld & Ca) presenting from home with recurrent falls and profound weakness x 10 days. Placed in observation waiting for dsc to [facility]. Noted to be febrile and hyponatremic while in ER    # FUO while on Immunotherapy - Had resolved. Recurred today  Rpt set of bld c/s today  CT chest- ruled out PNA or other acute findings  Bld and Urine C/S NGTD. Initial Bld C/S was done while on Doxycycline. Rpt Bld c/s 2 diff dates are NGTD  TFTs- WNL  AM cortisol levels WNL  CT A/P ordered- Non acute  ID recs appreciated  per Heme/ Onc- It is possible that immunotherapy can lead to fever, weakness and electrolyte abnormalities. If no source found today she can be treated with high dose prednisone for immunotherapy related side effect.     # Gen Weakness  CTH non acute  infectious process w/u ongoing as above  Ammonia WNL  Start low dose appetite stimulants  PT eval - to [facility]  appetite stimulants added  Sec to fevers and gen weakness, maintenance IVF started    # Hyponatremia - Baseline 138 outpatient   s/p possible starvation ketosis   s/p 0.9NS @ 100 ml/hr   Ure- Na started on 1/25 by Renal with good results  Cont PO Nacl tid started on 1/24  Per Heme/ Onc- It is possible that immunotherapy can lead to fever, weakness and electrolyte abnormalities.    # s/p Uncontrolled HTN - now controlled  -BB increased to 50mg bid  -Norvasc and Losartan added 1/24    # Hypophosphatemia  - repleted    # Urothelial Cancer s/p nephrectomy, s/p RT and on immunotherapy since 9/2021. Last dose 12/31  [facility]ld and Ca consulted appreciated regarding   per Heme/ Onc- It is possible that immunotherapy can lead to fever, weakness and electrolyte abnormalities.  CT scans a couple of weeks ago showing a good response to therapy so unlikely due to worsening cancer.    # Ketosis- resolved  likely sec to starvation/ dehydration  s/p IVF as above  Ensure    # NAGMA- resolved  s/p IVF and monitor     # Hypothyroidism  Continue home dose of synthroid 12.5 mcg daily  TFTs essentially WNL    # Constipation  s/p Fleet enema x 1,   bowel regimen     # CAD / HTN / HLD  Continue Metoprolol   Continue Plavix 75 mg daily  In sinus rhythm now, previously had been on Cardizem (no longer per home meds)  Metoprolol 5 mg IVP Q 6 for sustained HR > 120 (>15 mins)  On Alirocumab bi weekly (Praluent). PCSK9 inh non formulary. Will start Tricor for now     DVT ppx : Lovenox  Dispo : [facility]   DNR/ DNI ([facility] filled and placed in chart)  HCP : Ricky (son) [phone]- updated

## 2022-01-26 NOTE — PROGRESS NOTE ADULT - SUBJECTIVE AND OBJECTIVE BOX
HEALTH ISSUES - PROBLEM Dx:  76 year old male with HTN, HLD, CAD s/p PCI x 4, asthma, s/p left nephrectomy and left RCC on immunotherapy (biweekly infusions per son with [place] [facility]ld [facility] Ca) presenting from home with recurrent falls and profound weakness x 10 days.    FUO, weakness,     INTERVAL HPI/ OVERNIGHT EVENTS:    lying in bed with chills  states his name and knows he is in hospital states he is OK, names his son and dgtr correctly  seems to follow some of the detailed medical conversation  states he still does not feel like eating  unable to recall his QoL prior to beginning of the symptoms  When explained today that he needs to work on better eating and some physical exercise, he said "maybe they can do it here"    REVIEW OF SYSTEMS:    as above    Vital Signs Last 24 Hrs  T(C): 36.4 (2022 12:08), Max: 39 (2022 21:38)  T(F): 97.6 (2022 12:08), Max: 102.2 (2022 21:38)  HR: 75 (2022 12:08) (62 - 118)  BP: 152/80 (2022 12:08) (117/63 - 170/89)  BP(mean): --  RR: 19 (2022 12:08) (18 - 20)  SpO2: 95% (2022 12:08) (94% - 97%)    PHYSICAL EXAM-  CONSTITUTIONAL: Non toxic appearing, lying in bed, able to converse better today  ENT: Moist oral mucosa, no pharyngeal injection or exudates; normal dentition  RESPIRATORY: Normal respiratory effort; lungs are clear to auscultation bilaterally  CARDIOVASCULAR: Regular rate and rhythm, normal S1 and S2, no murmur/ rub/ gallop; No lower extremity edema; Peripheral pulses are 2+ bilaterally  ABDOMEN: Nontender to palpation, normoactive bowel sounds, no rebound/guarding; No hepatosplenomegaly  MUSCULOSKELETAL:  Normal gait; no clubbing or cyanosis of digits; no joint swelling or tenderness to palpation  PSYCH: A+O to person, place, and time; affect appropriate  NEUROLOGY: CN 2-12 are intact and symmetric; no gross sensory deficits;   SKIN: No rashes; no palpable lesions    MEDICATIONS  (STANDING):  amLODIPine   Tablet 10 milliGRAM(s) Oral at bedtime  ascorbic acid  Oral Tab/Cap - Peds 1000 milliGRAM(s) Oral daily  cholecalciferol Oral Tab/Cap - Peds 5000 Unit(s) Oral daily  clopidogrel Tablet 75 milliGRAM(s) Oral daily  dronabinol 2.5 milliGRAM(s) Oral two times a day  DULoxetine 60 milliGRAM(s) Oral daily  enoxaparin Injectable 40 milliGRAM(s) SubCutaneous daily  escitalopram 10 milliGRAM(s) Oral daily  famotidine  Oral Tab/Cap - Peds 40 milliGRAM(s) Oral at bedtime  fenofibrate Tablet 145 milliGRAM(s) Oral daily  levothyroxine 12.5 MICROGram(s) Oral daily  losartan 25 milliGRAM(s) Oral daily  metoprolol tartrate 50 milliGRAM(s) Oral two times a day  mometasone 220 MICROgram(s) Inhaler 1 Puff(s) Inhalation daily  montelukast 10 milliGRAM(s) Oral daily  pantoprazole    Tablet 40 milliGRAM(s) Oral before breakfast  senna 2 Tablet(s) Oral at bedtime  sodium chloride 3 Gram(s) Oral three times a day  sodium chloride 0.9%. 1000 milliLiter(s) (75 mL/Hr) IV Continuous <Continuous>  tamsulosin 0.4 milliGRAM(s) Oral at bedtime  urea Oral Powder 30 Gram(s) Oral two times a day    MEDICATIONS  (PRN):  acetaminophen     Tablet .. 650 milliGRAM(s) Oral every 6 hours PRN Temp greater or equal to 38C (100.4F), Mild Pain (1 - 3)  ALBUTerol    90 MICROgram(s) HFA Inhaler 2 Puff(s) Inhalation every 6 hours PRN Bronchospasm  bisacodyl 10 milliGRAM(s) Oral once PRN Constipation  metoprolol tartrate Injectable 5 milliGRAM(s) IV Push every 6 hours PRN HR > 120  oxycodone    5 mG/acetaminophen 325 mG 1 Tablet(s) Oral every 4 hours PRN Moderate Pain (4 - 6)  polyethylene glycol 3350 17 Gram(s) Oral daily PRN Constipation      LABS:                        12.2   4.86  )-----------( 194      ( 2022 04:21 )             35.6     01-26    134<L>  |  99  |  19.9  ----------------------------<  130<H>  3.9   |  23.0  |  1.19    Ca    9.2      2022 04:21    TPro  6.9  /  Alb  3.6  /  TBili  0.5  /  DBili  x   /  AST  30  /  ALT  31  /  AlkPhos  79  -      Urinalysis Basic - ( 2022 15:06 )    Color: Yellow / Appearance: Clear / S.015 / pH: x  Gluc: x / Ketone: Negative  / Bili: Negative / Urobili: Negative mg/dL   Blood: x / Protein: 30 mg/dL / Nitrite: Negative   Leuk Esterase: Negative / RBC: 3-5 /HPF / WBC 0-2 /HPF   Sq Epi: x / Non Sq Epi: Occasional / Bacteria: Occasional

## 2022-01-26 NOTE — PROGRESS NOTE ADULT - SUBJECTIVE AND OBJECTIVE BOX
[facility] DIVISION OF KIDNEY DISEASES AND HYPERTENSION -- FOLLOW UP NOTE  --------------------------------------------------------------------------------  Chief Complaint:  Hyponatremia    24 hour events/subjective:  Seen/examined  Na improving;      PAST HISTORY  --------------------------------------------------------------------------------  No significant changes to PMH, PSH, FHx, SHx, unless otherwise noted    ALLERGIES & MEDICATIONS  --------------------------------------------------------------------------------  Allergies    No Known Allergies    Intolerances      Standing Inpatient Medications  amLODIPine   Tablet 10 milliGRAM(s) Oral at bedtime  ascorbic acid  Oral Tab/Cap - Peds 1000 milliGRAM(s) Oral daily  cholecalciferol Oral Tab/Cap - Peds 5000 Unit(s) Oral daily  clopidogrel Tablet 75 milliGRAM(s) Oral daily  dronabinol 2.5 milliGRAM(s) Oral two times a day  DULoxetine 60 milliGRAM(s) Oral daily  enoxaparin Injectable 40 milliGRAM(s) SubCutaneous daily  escitalopram 10 milliGRAM(s) Oral daily  famotidine  Oral Tab/Cap - Peds 40 milliGRAM(s) Oral at bedtime  fenofibrate Tablet 145 milliGRAM(s) Oral daily  levothyroxine 12.5 MICROGram(s) Oral daily  losartan 25 milliGRAM(s) Oral daily  metoprolol tartrate 50 milliGRAM(s) Oral two times a day  mometasone 220 MICROgram(s) Inhaler 1 Puff(s) Inhalation daily  montelukast 10 milliGRAM(s) Oral daily  pantoprazole    Tablet 40 milliGRAM(s) Oral before breakfast  senna 2 Tablet(s) Oral at bedtime  sodium chloride 3 Gram(s) Oral three times a day  sodium chloride 0.9%. 1000 milliLiter(s) IV Continuous <Continuous>  tamsulosin 0.4 milliGRAM(s) Oral at bedtime  urea Oral Powder 30 Gram(s) Oral two times a day    PRN Inpatient Medications  acetaminophen     Tablet .. 650 milliGRAM(s) Oral every 6 hours PRN  ALBUTerol    90 MICROgram(s) HFA Inhaler 2 Puff(s) Inhalation every 6 hours PRN  bisacodyl 10 milliGRAM(s) Oral once PRN  metoprolol tartrate Injectable 5 milliGRAM(s) IV Push every 6 hours PRN  oxycodone    5 mG/acetaminophen 325 mG 1 Tablet(s) Oral every 4 hours PRN  polyethylene glycol 3350 17 Gram(s) Oral daily PRN      REVIEW OF SYSTEMS  --------------------------------------------------------------------------------  Gen: No weight changes, fatigue, fevers/chills, weakness  Skin: No rashes  Head/Eyes/Ears/Mouth: No headache; Normal hearing; Normal vision w/o blurriness; No sinus pain/discomfort, sore throat  Respiratory: No dyspnea, cough, wheezing, hemoptysis  CV: No chest pain, PND, orthopnea  GI: No abdominal pain, diarrhea, constipation, nausea, vomiting, melena, hematochezia  : No increased frequency, dysuria, hematuria, nocturia  MSK: No joint pain/swelling; no back pain; no edema  Neuro: No dizziness/lightheadedness, weakness, seizures, numbness, tingling  Heme: No easy bruising or bleeding  Endo: No heat/cold intolerance  Psych: No significant nervousness, anxiety, stress, depression    All other systems were reviewed and are negative, except as noted.    VITALS/PHYSICAL EXAM  --------------------------------------------------------------------------------  T(C): 36.4 (01-26-22 @ 12:08), Max: 39 (01-25-22 @ 21:38)  HR: 75 (01-26-22 @ 12:08) (62 - 118)  BP: 152/80 (01-26-22 @ 12:08) (117/63 - 170/89)  RR: 19 (01-26-22 @ 12:08) (18 - 20)  SpO2: 95% (01-26-22 @ 12:08) (94% - 97%)  Wt(kg): --        01-25-22 @ 07:01  -  01-26-22 @ 07:00  --------------------------------------------------------  IN: 0 mL / OUT: 1000 mL / NET: -1000 mL      Physical Exam:  	Gen: NAD   	HEENT: PERRL, supple neck, clear oropharynx  	Pulm: CTA B/L  	CV: RRR, S1S2; no rub  	Back: No spinal or CVA tenderness; no sacral edema  	Abd: +BS, soft, nontender/nondistended  	: No suprapubic tenderness  	UE: Warm, FROM, no clubbing, intact strength; no edema; no asterixis  	LE: Warm, FROM, no clubbing, intact strength; no edema  	Neuro: No focal deficits, intact gait  	Psych: Normal affect and mood  	Skin: Warm, without rashes  	Vascular access:    LABS/STUDIES  --------------------------------------------------------------------------------              12.2   4.86  >-----------<  194      [01-26-22 @ 04:21]              35.6     134  |  99  |  19.9  ----------------------------<  130      [01-26-22 @ 04:21]  3.9   |  23.0  |  1.19        Ca     9.2     [01-26-22 @ 04:21]    TPro  6.9  /  Alb  3.6  /  TBili  0.5  /  DBili  x   /  AST  30  /  ALT  31  /  AlkPhos  79  [01-26-22 @ 04:21]          Creatinine Trend:  SCr 1.19 [01-26 @ 04:21]  SCr 0.95 [01-24 @ 09:14]  SCr 0.97 [01-23 @ 03:15]  SCr 1.09 [01-22 @ 07:51]  SCr 1.00 [01-21 @ 09:31]    Urinalysis - [01-25-22 @ 15:06]      Color Yellow / Appearance Clear / SG 1.015 / pH 6.5      Gluc Negative / Ketone Negative  / Bili Negative / Urobili Negative       Blood Trace / Protein 30 / Leuk Est Negative / Nitrite Negative      RBC 3-5 / WBC 0-2 / Hyaline  / Gran  / Sq Epi  / Non Sq Epi Occasional / Bacteria Occasional    Urine Sodium 103      [01-25-22 @ 15:05]  Urine Potassium 25      [01-25-22 @ 15:05]  Urine Osmolality 550      [01-25-22 @ 15:06]    TSH 3.56      [01-21-22 @ 16:22]

## 2022-01-26 NOTE — PROGRESS NOTE ADULT - ASSESSMENT
Admitted with fever, weakness.      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 Last dose on 12/31/2021. No inpatient therapy.  It is possible that immunotherapy can lead to fever, weakness and electrolyte abnormalities.  He had CT scans a couple of weeks ago showing a good response to therapy so unlikely due to worsening cancer.  CT a/p here was unremarkable.    Hyponatremia - baseline is about 138 as an outpatient. Has fluctuated during his stay down to 127 but today it is better at 134.  Could be contributing to clinical picture.  Consider nephrology consult.    FUO - ID on board. Febrile again today at 102F. Not on antibiotics. ID workup has been unremarkable so far, cultures negative.  ID on board.  If no source found today she can be treated with high dose prednisone for immunotherapy related side effect.     Thank you,    Neir Barber MD  [facility]

## 2022-01-26 NOTE — PROGRESS NOTE ADULT - SUBJECTIVE AND OBJECTIVE BOX
[facility]  INFECTIOUS DISEASES AND INTERNAL MEDICINE at [place] and [place]  =======================================================                               Alec Epperson MD#  Victor Manuel Minor MD*                                     Brianna Ndiaye MD*    Melida Gil MD*            Diplomates American Board of Internal Medicine & Infectious Diseases                # [place] Office - Appt - Tel  [phone] Fax [phone]                * [place] Office - Appt - Tel [phone] Fax [phone]                                  Hospital Consult line:  [phone]  =======================================================    KRISTAN DENNIS [id number]    Follow up: Fever    Had fever to 102.2F 22      Allergies:  No Known Allergies       REVIEW OF SYSTEMS:  CONSTITUTIONAL:  No Fever or chills  HEENT:  No diplopia or blurred vision.  No earache, sore throat or runny nose.  CARDIOVASCULAR:  No pressure, squeezing, strangling, tightness, heaviness or aching about the chest, neck, axilla or epigastrium.  RESPIRATORY:  No cough, shortness of breath  GASTROINTESTINAL:  No nausea, vomiting or diarrhea.  GENITOURINARY:  No dysuria, frequency or urgency.   MUSCULOSKELETAL:  no joint aches, no muscle pain  SKIN:  No change in skin, hair or nails.  NEUROLOGIC:  No Headaches, seizures   PSYCHIATRIC:  No disorder of thought or mood.  ENDOCRINE:  No heat or cold intolerance  HEMATOLOGICAL:  No easy bruising or bleeding.       Physical Exam:  GEN: NAD, pleasant  HEENT: normocephalic and atraumatic. EOMI. PERRL.  Anicteric  NECK: Supple.   LUNGS: Decreased Basal BS B/L   HEART: Regular rate and rhythm   ABDOMEN: Soft, nontender, and nondistended.  Positive bowel sounds.    : No CVA tenderness  EXTREMITIES: Without any edema.  MSK: No joint swelling  NEUROLOGIC: No Focal Deficits  PSYCHIATRIC: Appropriate affect .  SKIN: No Rash      Vitals:  T(F): 96.8 (2022 04:42), Max: 102.2 (2022 21:38)  HR: 62 (2022 04:42)  BP: 117/63 (2022 04:42)  RR: 19 (2022 00:55)  SpO2: 96% (2022 04:42) (94% - 97%)  temp max in last 48H T(F): , Max: 102.2 (22 @ 21:38)      Current Antibiotics:    Other medications:  amLODIPine   Tablet 10 milliGRAM(s) Oral at bedtime  ascorbic acid  Oral Tab/Cap - Peds 1000 milliGRAM(s) Oral daily  cholecalciferol Oral Tab/Cap - Peds 5000 Unit(s) Oral daily  clopidogrel Tablet 75 milliGRAM(s) Oral daily  dronabinol 2.5 milliGRAM(s) Oral two times a day  DULoxetine 60 milliGRAM(s) Oral daily  enoxaparin Injectable 40 milliGRAM(s) SubCutaneous daily  escitalopram 10 milliGRAM(s) Oral daily  famotidine  Oral Tab/Cap - Peds 40 milliGRAM(s) Oral at bedtime  fenofibrate Tablet 145 milliGRAM(s) Oral daily  levothyroxine 12.5 MICROGram(s) Oral daily  losartan 25 milliGRAM(s) Oral daily  metoprolol tartrate 50 milliGRAM(s) Oral two times a day  mometasone 220 MICROgram(s) Inhaler 1 Puff(s) Inhalation daily  montelukast 10 milliGRAM(s) Oral daily  pantoprazole    Tablet 40 milliGRAM(s) Oral before breakfast  senna 2 Tablet(s) Oral at bedtime  sodium chloride 3 Gram(s) Oral three times a day  sodium chloride 0.9%. 1000 milliLiter(s) IV Continuous <Continuous>  tamsulosin 0.4 milliGRAM(s) Oral at bedtime  urea Oral Powder 30 Gram(s) Oral two times a day                            12.2   4.86  )-----------( 194      ( 2022 04:21 )             35.6     01-    134<L>  |  99  |  19.9  ----------------------------<  130<H>  3.9   |  23.0  |  1.19    Ca    9.2      2022 04:21  Phos  2.9       Mg     1.8         TPro  6.9  /  Alb  3.6  /  TBili  0.5  /  DBili  x   /  AST  30  /  ALT  31  /  AlkPhos  79      RECENT CULTURES:   17:24 .Blood Blood-Peripheral     No growth at 48 hours     @ 02:09 Clean Catch Clean Catch (Midstream)     <10,000 CFU/mL Normal Urogenital Clarita     @ 01:04 .Blood Blood     No growth at 5 days.     23:03 Clean Catch Clean Catch (Midstream)     <10,000 CFU/mL Normal Urogenital Clarita      WBC Count: 4.86 K/uL (22 @ 04:21)  WBC Count: 4.18 K/uL (22 @ 03:15)  WBC Count: 5.02 K/uL (22 @ 07:51)    Creatinine, Serum: 1.19 mg/dL (22 @ 04:21)  Creatinine, Serum: 0.95 mg/dL (22 @ 09:14)  Creatinine, Serum: 0.97 mg/dL (22 @ 03:15)  Creatinine, Serum: 1.09 mg/dL (22 @ 07:51)  Creatinine, Serum: 1.00 mg/dL (22 @ 09:31)    Procalcitonin, Serum: 0.43 ng/mL (22 @ 11:49)  Procalcitonin, Serum: 0.28 ng/mL (22 @ 16:22)     Rapid RVP Result: NotDetec (22 @ 01:04)  SARS-CoV-2: NotDetec (22 @ 01:04)  SARS-CoV-2: NotDetec (22 @ 15:02)  Rapid RVP Result: NotDetec (22 @ 15:02)    SARS-CoV-2: NotDetec (22 @ 01:04)  SARS-CoV-2: NotDetec (22 @ 15:02)      Urinalysis Basic - ( 2022 15:06 )    Color: Yellow / Appearance: Clear / S.015 / pH: x  Gluc: x / Ketone: Negative  / Bili: Negative / Urobili: Negative mg/dL   Blood: x / Protein: 30 mg/dL / Nitrite: Negative   Leuk Esterase: Negative / RBC: 3-5 /HPF / WBC 0-2 /HPF   Sq Epi: x / Non Sq Epi: Occasional / Bacteria: Occasional        < from: CT Abdomen and Pelvis w/ Oral Cont and w/ IV Cont (22 @ 22:20) >  ACC: [id number] EXAM:  CT ABDOMEN AND PELVIS OC IC                          PROCEDURE DATE:  2022          INTERPRETATION:  CLINICAL INFORMATION: Recurrent fever. Weakness. History   of renal cell carcinoma.    COMPARISON: CT scan abdomen 2021.    CONTRAST/COMPLICATIONS:  IV Contrast: Omnipaque 300  88 cc administered   12 cc discarded  Oral Contrast: Smoothie Readi-Cat 2  Complications: None reported at time of study completion    PROCEDURE:  CT of the Abdomen and Pelvis was performed.  Sagittal and coronal reformats were performed.    FINDINGS:    LOWER CHEST:  Minimal bibasilar dependent atelectatic changes.  Bullae anterior right lower lobe.  Coronary artery calcifications.    Respiratory motion artifact degrades image quality.    LIVER:  Small, subcentimeter indeterminate hypodense lesion caudate lobe.  Focal area of decreased attenuation adjacent to falciform ligament,   likely reflecting fat.  BILE DUCTS: Normal caliber.  GALLBLADDER: Status post cholecystectomy.  SPLEEN: Within normal limits.  PANCREAS: Within normal limits.  ADRENALS: Within normal limits.  KIDNEYS/URETERS:  Left nephrectomy.  Minimal thickening in the surgical bed. No discrete mass noted.  No right-sided hydronephrosis.    BLADDER: Possible mild bladder wall thickening.  REPRODUCTIVE ORGANS: Coarse central prostate calcifications.    BOWEL:  Colonic diverticulosis, without CT evidence of diverticulitis.  No bowel obstruction.   Appendix within normal limits.  PERITONEUM: No ascites.    VESSELS: Atherosclerotic changes.  Infrarenal abdominal aortic ectasia measuring 2.6 cm.  RETROPERITONEUM/LYMPH NODES: No lymphadenopathy.    ABDOMINAL WALL:  Small fat-containing right inguinal hernia.    BONES: Degenerative changes.    IMPRESSION:    Possible mild bladder wall thickening, correlate clinically for cystitis.    Other findings as discussed above.    < end of copied text >

## 2022-01-26 NOTE — PROGRESS NOTE ADULT - SUBJECTIVE AND OBJECTIVE BOX
76 year old male with HTN, HLD, CAD s/p PCI x 4, asthma, urothelial cancer s/p surgery (surg path [id number]), RT (from 10/01/2021 - 11/18/2021) given with nivolumab since 9/17/2021. The last dose was on 12/31/2021. On 01/05/2022 CT a/p showed excellent response to therapy. He has presented with progressive weakness and fatigue. He was admitted with persistent fevers. Workup has been unrevealing. Temp this morning was 100.1F.  Patient is very weak, confused. Unclear baseline. He is unable to provide history.        MEDICATIONS  (STANDING):  amLODIPine   Tablet 10 milliGRAM(s) Oral at bedtime  ascorbic acid  Oral Tab/Cap - Peds 1000 milliGRAM(s) Oral daily  cholecalciferol Oral Tab/Cap - Peds 5000 Unit(s) Oral daily  clopidogrel Tablet 75 milliGRAM(s) Oral daily  dronabinol 2.5 milliGRAM(s) Oral two times a day  DULoxetine 60 milliGRAM(s) Oral daily  enoxaparin Injectable 40 milliGRAM(s) SubCutaneous daily  escitalopram 10 milliGRAM(s) Oral daily  famotidine  Oral Tab/Cap - Peds 40 milliGRAM(s) Oral at bedtime  fenofibrate Tablet 145 milliGRAM(s) Oral daily  levothyroxine 12.5 MICROGram(s) Oral daily  losartan 25 milliGRAM(s) Oral daily  metoprolol tartrate 50 milliGRAM(s) Oral two times a day  mometasone 220 MICROgram(s) Inhaler 1 Puff(s) Inhalation daily  montelukast 10 milliGRAM(s) Oral daily  pantoprazole    Tablet 40 milliGRAM(s) Oral before breakfast  senna 2 Tablet(s) Oral at bedtime  sodium chloride 3 Gram(s) Oral three times a day  sodium chloride 0.9%. 1000 milliLiter(s) (75 mL/Hr) IV Continuous <Continuous>  tamsulosin 0.4 milliGRAM(s) Oral at bedtime  urea Oral Powder 30 Gram(s) Oral two times a day    MEDICATIONS  (PRN):  acetaminophen     Tablet .. 650 milliGRAM(s) Oral every 6 hours PRN Temp greater or equal to 38C (100.4F), Mild Pain (1 - 3)  ALBUTerol    90 MICROgram(s) HFA Inhaler 2 Puff(s) Inhalation every 6 hours PRN Bronchospasm  bisacodyl 10 milliGRAM(s) Oral once PRN Constipation  metoprolol tartrate Injectable 5 milliGRAM(s) IV Push every 6 hours PRN HR > 120  oxycodone    5 mG/acetaminophen 325 mG 1 Tablet(s) Oral every 4 hours PRN Moderate Pain (4 - 6)  polyethylene glycol 3350 17 Gram(s) Oral daily PRN Constipation    Vital Signs Last 24 Hrs  T(C): 36.1 (26 Jan 2022 08:23), Max: 39 (25 Jan 2022 21:38)  T(F): 97 (26 Jan 2022 08:23), Max: 102.2 (25 Jan 2022 21:38)  HR: 64 (26 Jan 2022 08:23) (62 - 118)  BP: 123/66 (26 Jan 2022 08:23) (117/63 - 170/89)  BP(mean): --  RR: 20 (26 Jan 2022 08:23) (18 - 20)  SpO2: 97% (26 Jan 2022 08:23) (94% - 97%)  Gen: nad  neuro: arousable but confused, very weak voice  rrr  abd - non tender  ctab      Labs:                          12.2   4.86  )-----------( 194      ( 26 Jan 2022 04:21 )             35.6     01-26    134<L>  |  99  |  19.9  ----------------------------<  130<H>  3.9   |  23.0  |  1.19    Ca    9.2      26 Jan 2022 04:21    TPro  6.9  /  Alb  3.6  /  TBili  0.5  /  DBili  x   /  AST  30  /  ALT  31  /  AlkPhos  79  01-26 01-24    127<L>  |  92<L>  |  10.2  ----------------------------<  114<H>  4.0   |  25.0  |  0.95    Ca    9.0      24 Jan 2022 09:14  Phos  2.9     01-24  Mg     1.8     01-24    proc 0.2 ---> 0.4 on 1/22/2022 ----> 0.6 today 1/26/2022

## 2022-01-26 NOTE — PROGRESS NOTE ADULT - ASSESSMENT
76y  Male with h/o HTN, HLD, CAD s/p PCI x 4, asthma, s/p left nephrectomy and left RCC on immunotherapy (biweekly infusions per son with [place] Bld [facility] Ca). Patient was brought to the ER by his son for recurrent falls and profound weakness x 10 days.  He reports we was well up until one day after waking up and having weakness. No other associated symptoms. Denies sick contacts. No noted fever at home. In the ER patient was febrile to 102.5F no leukocytosis. Started on Ceftriaxone and Azithromycin.       Fever  left RCC on immunotherapy      - Had fever 1/25 but no fever work up done  - Will order blood cultures  - Repeat UA with no UTI   - Blood cultures no growth 1/20  - Repeat blood cultures no growth 1/22   - CT A/P  with no acute findings   - RVP/COVID 19 PCR Negative   - Urine culture not significant   - CT Chest reporting no PNA   - UA not suggestive of UTI   - Procalcitonin level 0.43  - Hem/Onc consult noted   - Follow up cultures  - Trend Fever  - Trend WBC      Will follow     d/w Dr Horowitz

## 2022-01-26 NOTE — PROGRESS NOTE ADULT - ASSESSMENT
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

## 2022-01-27 ENCOUNTER — TRANSCRIPTION ENCOUNTER (OUTPATIENT)
Age: 77
End: 2022-01-27

## 2022-01-27 LAB
CULTURE RESULTS: SIGNIFICANT CHANGE UP
CULTURE RESULTS: SIGNIFICANT CHANGE UP
SARS-COV-2 RNA SPEC QL NAA+PROBE: SIGNIFICANT CHANGE UP
SPECIMEN SOURCE: SIGNIFICANT CHANGE UP
SPECIMEN SOURCE: SIGNIFICANT CHANGE UP

## 2022-01-27 PROCEDURE — 99233 SBSQ HOSP IP/OBS HIGH 50: CPT

## 2022-01-27 PROCEDURE — 70450 CT HEAD/BRAIN W/O DYE: CPT | Mod: 26

## 2022-01-27 PROCEDURE — 99232 SBSQ HOSP IP/OBS MODERATE 35: CPT

## 2022-01-27 RX ORDER — SODIUM CHLORIDE 9 MG/ML
1000 INJECTION, SOLUTION INTRAVENOUS
Refills: 0 | Status: DISCONTINUED | OUTPATIENT
Start: 2022-01-27 | End: 2022-01-31

## 2022-01-27 RX ORDER — LOSARTAN POTASSIUM 100 MG/1
50 TABLET, FILM COATED ORAL DAILY
Refills: 0 | Status: DISCONTINUED | OUTPATIENT
Start: 2022-01-27 | End: 2022-01-29

## 2022-01-27 RX ORDER — METOPROLOL TARTRATE 50 MG
75 TABLET ORAL
Refills: 0 | Status: DISCONTINUED | OUTPATIENT
Start: 2022-01-27 | End: 2022-01-29

## 2022-01-27 RX ORDER — HYDRALAZINE HCL 50 MG
10 TABLET ORAL ONCE
Refills: 0 | Status: COMPLETED | OUTPATIENT
Start: 2022-01-27 | End: 2022-01-27

## 2022-01-27 RX ORDER — SODIUM CHLORIDE 9 MG/ML
1000 INJECTION INTRAMUSCULAR; INTRAVENOUS; SUBCUTANEOUS
Refills: 0 | Status: DISCONTINUED | OUTPATIENT
Start: 2022-01-27 | End: 2022-01-27

## 2022-01-27 RX ADMIN — Medication 30 GRAM(S): at 17:51

## 2022-01-27 RX ADMIN — SENNA PLUS 2 TABLET(S): 8.6 TABLET ORAL at 21:54

## 2022-01-27 RX ADMIN — PANTOPRAZOLE SODIUM 40 MILLIGRAM(S): 20 TABLET, DELAYED RELEASE ORAL at 06:09

## 2022-01-27 RX ADMIN — Medication 2.5 MILLIGRAM(S): at 17:56

## 2022-01-27 RX ADMIN — MOMETASONE FUROATE 1 PUFF(S): 220 INHALANT RESPIRATORY (INHALATION) at 09:53

## 2022-01-27 RX ADMIN — DULOXETINE HYDROCHLORIDE 60 MILLIGRAM(S): 30 CAPSULE, DELAYED RELEASE ORAL at 11:35

## 2022-01-27 RX ADMIN — SODIUM CHLORIDE 3 GRAM(S): 9 INJECTION INTRAMUSCULAR; INTRAVENOUS; SUBCUTANEOUS at 21:54

## 2022-01-27 RX ADMIN — Medication 5000 UNIT(S): at 11:36

## 2022-01-27 RX ADMIN — TAMSULOSIN HYDROCHLORIDE 0.4 MILLIGRAM(S): 0.4 CAPSULE ORAL at 21:54

## 2022-01-27 RX ADMIN — MONTELUKAST 10 MILLIGRAM(S): 4 TABLET, CHEWABLE ORAL at 17:56

## 2022-01-27 RX ADMIN — LOSARTAN POTASSIUM 50 MILLIGRAM(S): 100 TABLET, FILM COATED ORAL at 18:52

## 2022-01-27 RX ADMIN — FAMOTIDINE 40 MILLIGRAM(S): 10 INJECTION INTRAVENOUS at 21:54

## 2022-01-27 RX ADMIN — SODIUM CHLORIDE 75 MILLILITER(S): 9 INJECTION INTRAMUSCULAR; INTRAVENOUS; SUBCUTANEOUS at 02:03

## 2022-01-27 RX ADMIN — Medication 10 MILLIGRAM(S): at 03:44

## 2022-01-27 RX ADMIN — SODIUM CHLORIDE 100 MILLILITER(S): 9 INJECTION, SOLUTION INTRAVENOUS at 18:52

## 2022-01-27 RX ADMIN — Medication 1000 MILLIGRAM(S): at 11:36

## 2022-01-27 RX ADMIN — CLOPIDOGREL BISULFATE 75 MILLIGRAM(S): 75 TABLET, FILM COATED ORAL at 11:35

## 2022-01-27 RX ADMIN — Medication 75 MILLIGRAM(S): at 17:51

## 2022-01-27 RX ADMIN — SODIUM CHLORIDE 3 GRAM(S): 9 INJECTION INTRAMUSCULAR; INTRAVENOUS; SUBCUTANEOUS at 13:58

## 2022-01-27 RX ADMIN — ESCITALOPRAM OXALATE 10 MILLIGRAM(S): 10 TABLET, FILM COATED ORAL at 17:56

## 2022-01-27 RX ADMIN — SODIUM CHLORIDE 100 MILLILITER(S): 9 INJECTION, SOLUTION INTRAVENOUS at 21:54

## 2022-01-27 RX ADMIN — Medication 12.5 MICROGRAM(S): at 06:08

## 2022-01-27 RX ADMIN — SODIUM CHLORIDE 50 MILLILITER(S): 9 INJECTION INTRAMUSCULAR; INTRAVENOUS; SUBCUTANEOUS at 09:40

## 2022-01-27 RX ADMIN — ENOXAPARIN SODIUM 40 MILLIGRAM(S): 100 INJECTION SUBCUTANEOUS at 11:36

## 2022-01-27 RX ADMIN — AMLODIPINE BESYLATE 10 MILLIGRAM(S): 2.5 TABLET ORAL at 21:53

## 2022-01-27 RX ADMIN — LOSARTAN POTASSIUM 25 MILLIGRAM(S): 100 TABLET, FILM COATED ORAL at 06:08

## 2022-01-27 RX ADMIN — Medication 5 MILLIGRAM(S): at 02:03

## 2022-01-27 RX ADMIN — Medication 50 MILLIGRAM(S): at 06:08

## 2022-01-27 RX ADMIN — Medication 145 MILLIGRAM(S): at 17:56

## 2022-01-27 RX ADMIN — Medication 2.5 MILLIGRAM(S): at 06:08

## 2022-01-27 RX ADMIN — SODIUM CHLORIDE 3 GRAM(S): 9 INJECTION INTRAMUSCULAR; INTRAVENOUS; SUBCUTANEOUS at 06:08

## 2022-01-27 NOTE — PROGRESS NOTE ADULT - ASSESSMENT
76y  Male with h/o HTN, HLD, CAD s/p PCI x 4, asthma, s/p left nephrectomy and left RCC on immunotherapy (biweekly infusions per son with [place] Bld & Ca). Patient was brought to the ER by his son for recurrent falls and profound weakness x 10 days.  He reports we was well up until one day after waking up and having weakness. No other associated symptoms. Denies sick contacts. No noted fever at home. In the ER patient was febrile to 102.5F no leukocytosis. Started on Ceftriaxone and Azithromycin.       Fever  left RCC on immunotherapy      - Had fever 1/25 but no fever work up done  - blood cultures 1/26 pending  - Repeat UA with no UTI   - Blood cultures no growth 1/20  - Repeat blood cultures no growth 1/22   - CT A/P  with no acute findings   - RVP/COVID 19 PCR Negative   - Urine culture not significant   - CT Chest reporting no PNA   - UA not suggestive of UTI   - Procalcitonin level 0.63  - Hem/Onc consult noted   - Follow up cultures  - Trend Fever  - Trend WBC      Will follow     d/w Dr Horowitz

## 2022-01-27 NOTE — DIETITIAN INITIAL EVALUATION ADULT. - NUTRITON FOCUSED PHYSICAL EXAM
How Severe Is Your Skin Lesion?: mild Have Your Skin Lesions Been Treated?: not been treated Is This A New Presentation, Or A Follow-Up?: Growth yes...

## 2022-01-27 NOTE — DIETITIAN NUTRITION RISK NOTIFICATION - ADDITIONAL COMMENTS/DIETITIAN RECOMMENDATIONS
1) Consider change diet to Soft & Bite Sized for ease mastication  2) Add Ensure Pudding TID  3) Rx MVI daily   4) Encourage HBV protein sources  5) Provide encouragement/assistance as needed during mealtimes to inc PO   6) Monitor weights daily for trend/accuracy

## 2022-01-27 NOTE — PROGRESS NOTE ADULT - SUBJECTIVE AND OBJECTIVE BOX
76 year old male with HTN, HLD, CAD s/p PCI x 4, asthma, urothelial cancer s/p surgery (surg path [id number]), RT (from 10/01/2021 - 11/18/2021) given with nivolumab since 9/17/2021. The last dose was on 12/31/2021. On 01/05/2022 CT a/p showed excellent response to therapy. He has presented with progressive weakness and fatigue. He was admitted with persistent fevers. Workup has been unrevealing. Temp this morning was 100.1F.  Patient is very weak, confused. Unclear baseline. He is unable to provide history.  afebrile ON        MEDICATIONS  (STANDING):  amLODIPine   Tablet 10 milliGRAM(s) Oral at bedtime  ascorbic acid  Oral Tab/Cap - Peds 1000 milliGRAM(s) Oral daily  cholecalciferol Oral Tab/Cap - Peds 5000 Unit(s) Oral daily  clopidogrel Tablet 75 milliGRAM(s) Oral daily  dronabinol 2.5 milliGRAM(s) Oral two times a day  DULoxetine 60 milliGRAM(s) Oral daily  enoxaparin Injectable 40 milliGRAM(s) SubCutaneous daily  escitalopram 10 milliGRAM(s) Oral daily  famotidine  Oral Tab/Cap - Peds 40 milliGRAM(s) Oral at bedtime  fenofibrate Tablet 145 milliGRAM(s) Oral daily  levothyroxine 12.5 MICROGram(s) Oral daily  losartan 50 milliGRAM(s) Oral daily  metoprolol tartrate 75 milliGRAM(s) Oral two times a day  mometasone 220 MICROgram(s) Inhaler 1 Puff(s) Inhalation daily  montelukast 10 milliGRAM(s) Oral daily  pantoprazole    Tablet 40 milliGRAM(s) Oral before breakfast  senna 2 Tablet(s) Oral at bedtime  sodium chloride 3 Gram(s) Oral three times a day  sodium chloride 0.9%. 1000 milliLiter(s) (50 mL/Hr) IV Continuous <Continuous>  tamsulosin 0.4 milliGRAM(s) Oral at bedtime  urea Oral Powder 30 Gram(s) Oral two times a day    MEDICATIONS  (PRN):  acetaminophen     Tablet .. 650 milliGRAM(s) Oral every 6 hours PRN Temp greater or equal to 38C (100.4F), Mild Pain (1 - 3)  ALBUTerol    90 MICROgram(s) HFA Inhaler 2 Puff(s) Inhalation every 6 hours PRN Bronchospasm  bisacodyl 10 milliGRAM(s) Oral once PRN Constipation  metoprolol tartrate Injectable 5 milliGRAM(s) IV Push every 6 hours PRN HR > 120  oxycodone    5 mG/acetaminophen 325 mG 1 Tablet(s) Oral every 4 hours PRN Moderate Pain (4 - 6)  polyethylene glycol 3350 17 Gram(s) Oral daily PRN Constipation      ICU Vital Signs Last 24 Hrs  T(C): 36 (27 Jan 2022 05:06), Max: 37.7 (27 Jan 2022 01:57)  T(F): 96.8 (27 Jan 2022 05:06), Max: 99.9 (27 Jan 2022 01:57)  HR: 83 (27 Jan 2022 05:06) (75 - 118)  BP: 168/83 (27 Jan 2022 05:06) (147/78 - 187/99)  BP(mean): --  ABP: --  ABP(mean): --  RR: 18 (27 Jan 2022 05:06) (18 - 20)  SpO2: 93% (27 Jan 2022 05:06) (90% - 95%)    Gen: nad  neuro: arousable but confused, very weak voice  rrr  abd - non tender  ctab      Labs:                          12.2   4.86  )-----------( 194      ( 26 Jan 2022 04:21 )             35.6                             12.2   4.86  )-----------( 194      ( 26 Jan 2022 04:21 )             35.6       01-26    134<L>  |  99  |  19.9  ----------------------------<  130<H>  3.9   |  23.0  |  1.19    Ca    9.2      26 Jan 2022 04:21    TPro  6.9  /  Alb  3.6  /  TBili  0.5  /  DBili  x   /  AST  30  /  ALT  31  /  AlkPhos  79  01-26 01-26    134<L>  |  99  |  19.9  ----------------------------<  130<H>  3.9   |  23.0  |  1.19    Ca    9.2      26 Jan 2022 04:21    TPro  6.9  /  Alb  3.6  /  TBili  0.5  /  DBili  x   /  AST  30  /  ALT  31  /  AlkPhos  79  01-26 01-24    127<L>  |  92<L>  |  10.2  ----------------------------<  114<H>  4.0   |  25.0  |  0.95    Ca    9.0      24 Jan 2022 09:14  Phos  2.9     01-24  Mg     1.8     01-24    proc 0.2 ---> 0.4 on 1/22/2022 ----> 0.6 today 1/26/2022

## 2022-01-27 NOTE — DIETITIAN INITIAL EVALUATION ADULT. - OTHER INFO
76 year old male with HTN, HLD, CAD s/p PCI x 4, asthma, s/p left nephrectomy and left RCC on immunotherapy (biweekly infusions per son with [place] [facility] Ca) presenting from home with recurrent falls and profound weakness x 10 days. Per pt and his son, pt woke up with sudden onset of fatigue slept ~ 4 days continuously, has been unable to go for scheduled infusion therapy due to weakness. Prior to this pt reports he was independent able to ambulate w/o assistance. Recently was told he had PNA (1/17) and dsc on Doxycyline BID. Pt admitted with fever, weakness now hyponatremic. Pt not responding to RD when prompted. Per RN Pt with poor PO intake despite assistance, taking Ensure Clear well. RD bedscale weight 133lbs, unclear accuracy of admission weight. [facility] Sept 2021 (167lbs), will continue to monitor though some degree of weight loss likely consistent with findings of NFPE.

## 2022-01-27 NOTE — DISCHARGE NOTE NURSING/CASE MANAGEMENT/SOCIAL WORK - NSDCFUADDAPPT_GEN_ALL_CORE_FT
Declines meds to bed   Patient/ daughter prefer to make their own follow up Cardiology appointment with [facility]  [facility]

## 2022-01-27 NOTE — DISCHARGE NOTE NURSING/CASE MANAGEMENT/SOCIAL WORK - NSDCPEFALRISK_GEN_ALL_CORE
For information on Fall & Injury Prevention, visit: [email or web address] OR  [email or web address] OR  https://www.cdc.gov/steadi/patient.html

## 2022-01-27 NOTE — DIETITIAN INITIAL EVALUATION ADULT. - ADD RECOMMEND
Add ensure Pudding TID; Rx MVI daily; Encourage HBV protein sources; Provide encouragement/assistance as needed during mealtimes to inc PO

## 2022-01-27 NOTE — PROGRESS NOTE ADULT - SUBJECTIVE AND OBJECTIVE BOX
HEALTH ISSUES - PROBLEM Dx:  76 year old male with HTN, HLD, CAD s/p PCI x 4, asthma, s/p left nephrectomy and left RCC on immunotherapy (biweekly infusions per son with [place] [facility]ld [facility] Ca) presenting from home with recurrent falls and profound weakness x 10 days.    FUO, weakness,     INTERVAL HPI/ OVERNIGHT EVENTS:    today more weak and confused  still with poor PO intake    REVIEW OF SYSTEMS:    as above    Vital Signs Last 24 Hrs  T(C): 36.4 (27 Jan 2022 11:16), Max: 37.7 (27 Jan 2022 01:57)  T(F): 97.5 (27 Jan 2022 11:16), Max: 99.9 (27 Jan 2022 01:57)  HR: 80 (27 Jan 2022 11:16) (79 - 118)  BP: 152/75 (27 Jan 2022 11:16) (147/78 - 187/99)  BP(mean): --  RR: 18 (27 Jan 2022 11:16) (18 - 20)  SpO2: 93% (27 Jan 2022 11:16) (90% - 95%)    PHYSICAL EXAM-  CONSTITUTIONAL: lethargic, confused today as compared to yesterday  ENT: Moist oral mucosa, no pharyngeal injection or exudates; normal dentition  RESPIRATORY: Normal respiratory effort; lungs are clear to auscultation bilaterally  CARDIOVASCULAR: Regular rate and rhythm, normal S1 and S2, no murmur/ rub/ gallop; No lower extremity edema; Peripheral pulses are 2+ bilaterally  ABDOMEN: Nontender to palpation, normoactive bowel sounds, no rebound/guarding; No hepatosplenomegaly  MUSCULOSKELETAL:  Normal gait; no clubbing or cyanosis of digits; no joint swelling or tenderness to palpation  PSYCH: A+O to person, place, and time; affect appropriate  NEUROLOGY: CN 2-12 are intact and symmetric; no gross sensory deficits;   SKIN: No rashes; no palpable lesions    MEDICATIONS  (STANDING):  amLODIPine   Tablet 10 milliGRAM(s) Oral at bedtime  ascorbic acid  Oral Tab/Cap - Peds 1000 milliGRAM(s) Oral daily  cholecalciferol Oral Tab/Cap - Peds 5000 Unit(s) Oral daily  clopidogrel Tablet 75 milliGRAM(s) Oral daily  dronabinol 2.5 milliGRAM(s) Oral two times a day  DULoxetine 60 milliGRAM(s) Oral daily  enoxaparin Injectable 40 milliGRAM(s) SubCutaneous daily  escitalopram 10 milliGRAM(s) Oral daily  famotidine  Oral Tab/Cap - Peds 40 milliGRAM(s) Oral at bedtime  fenofibrate Tablet 145 milliGRAM(s) Oral daily  lactated ringers. 1000 milliLiter(s) (100 mL/Hr) IV Continuous <Continuous>  levothyroxine 12.5 MICROGram(s) Oral daily  losartan 50 milliGRAM(s) Oral daily  metoprolol tartrate 75 milliGRAM(s) Oral two times a day  mometasone 220 MICROgram(s) Inhaler 1 Puff(s) Inhalation daily  montelukast 10 milliGRAM(s) Oral daily  pantoprazole    Tablet 40 milliGRAM(s) Oral before breakfast  senna 2 Tablet(s) Oral at bedtime  sodium chloride 3 Gram(s) Oral three times a day  tamsulosin 0.4 milliGRAM(s) Oral at bedtime  urea Oral Powder 30 Gram(s) Oral two times a day    MEDICATIONS  (PRN):  acetaminophen     Tablet .. 650 milliGRAM(s) Oral every 6 hours PRN Temp greater or equal to 38C (100.4F), Mild Pain (1 - 3)  ALBUTerol    90 MICROgram(s) HFA Inhaler 2 Puff(s) Inhalation every 6 hours PRN Bronchospasm  bisacodyl 10 milliGRAM(s) Oral once PRN Constipation  metoprolol tartrate Injectable 5 milliGRAM(s) IV Push every 6 hours PRN HR > 120  oxycodone    5 mG/acetaminophen 325 mG 1 Tablet(s) Oral every 4 hours PRN Moderate Pain (4 - 6)  polyethylene glycol 3350 17 Gram(s) Oral daily PRN Constipation      LABS:                        12.2   4.86  )-----------( 194      ( 26 Jan 2022 04:21 )             35.6     01-26    134<L>  |  99  |  19.9  ----------------------------<  130<H>  3.9   |  23.0  |  1.19    Ca    9.2      26 Jan 2022 04:21    TPro  6.9  /  Alb  3.6  /  TBili  0.5  /  DBili  x   /  AST  30  /  ALT  31  /  AlkPhos  79  01-26

## 2022-01-27 NOTE — DIETITIAN INITIAL EVALUATION ADULT. - ETIOLOGY
related to inability to meet sufficient protein-energy needs in setting of PNA, urothelial CA s/p L nephrectomy on immunotherapy, advanced age

## 2022-01-27 NOTE — PROGRESS NOTE ADULT - ASSESSMENT
Admitted with fever, weakness.    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 Last dose on 12/31/2021. No inpatient therapy.  It is possible that immunotherapy can lead to fever, weakness and electrolyte abnormalities.  He had CT scans a couple of weeks ago showing a good response to therapy so unlikely due to worsening cancer.  CT a/p here was unremarkable.    Hyponatremia - baseline is about 138 as an outpatient. Has fluctuated during his stay down to 127 but  better at 134.  Could be contributing to clinical picture.  Consider nephrology consult.    FUO - ID on board.  afebrile over last 24 hours, fever w/u in progress.  If no source found today she can be treated with high dose prednisone for immunotherapy related side effect.

## 2022-01-27 NOTE — DISCHARGE NOTE NURSING/CASE MANAGEMENT/SOCIAL WORK - PATIENT PORTAL LINK FT
You can access the FollowMyHealth Patient Portal offered by [facility] by registering at the following website: [email or web address]. By joining [facility]’s FollowMyHealth portal, you will also be able to view your health information using other applications (apps) compatible with our system.

## 2022-01-27 NOTE — DIETITIAN NUTRITION RISK NOTIFICATION - TREATMENT: THE FOLLOWING DIET HAS BEEN RECOMMENDED
Diet, Regular:   Supplement Feeding Modality:  Oral  Ensure Clear Cans or Servings Per Day:  1       Frequency:  Three Times a day (01-23-22 @ 09:02) [Active]

## 2022-01-27 NOTE — PROGRESS NOTE ADULT - ASSESSMENT
76 year old male with HTN, HLD, CAD s/p PCI x 4, asthma, s/p left nephrectomy and left RCC on immunotherapy (biweekly infusions per son with [place] [facility]ld [facility] Ca) presenting from home with recurrent falls and profound weakness x 10 days. Placed in observation waiting for dsc to [facility]. Noted to be febrile and hyponatremic while in ER    # FUO while on Immunotherapy -  Rpt set of bld c/s 1/26  CT chest- ruled out PNA or other acute findings  Bld and Urine C/S NGTD. Initial Bld C/S was done while on Doxycycline. Rpt Bld c/s 2 diff dates are NGTD  TFTs- WNL  AM cortisol levels WNL  CT A/P ordered- Non acute  ID recs appreciated  Per Heme/ Onc- It is possible that immunotherapy can lead to fever, weakness and electrolyte abnormalities. If no source found today she can be treated with high dose prednisone for immunotherapy related side effect.     Await rpt bld c/s results if neg start steroids    # Gen Weakness  CTH non acute  infectious process w/u ongoing as above  Ammonia WNL  Start low dose appetite stimulants  PT eval - to [facility]  appetite stimulants added  Sec to fevers and gen weakness, maintenance IVF started    # Hyponatremia - Baseline 138 outpatient   s/p possible starvation ketosis   s/p 0.9NS @ 100 ml/hr   Ure- Na started on 1/25 by Renal with good results  Cont PO Nacl tid started on 1/24  Per Heme/ Onc- It is possible that immunotherapy can lead to fever, weakness and electrolyte abnormalities.    # s/p Uncontrolled HTN   -related to IVF  -BB increased to 50mg bid- increased to 75mg  -Norvasc and Losartan added 1/24 and increased to 50 mg     # Hypophosphatemia  - repleted    # Urothelial Cancer s/p nephrectomy, s/p RT and on immunotherapy since 9/2021. Last dose 12/31  [place]ld and Ca consulted appreciated regarding   per Heme/ Onc- It is possible that immunotherapy can lead to fever, weakness and electrolyte abnormalities.  CT scans a couple of weeks ago showing a good response to therapy so unlikely due to worsening cancer.    # Ketosis- resolved  likely sec to starvation/ dehydration  s/p IVF as above  Ensure    # NAGMA- resolved  s/p IVF and monitor     # Hypothyroidism  Continue home dose of synthroid 12.5 mcg daily  TFTs essentially WNL    # Constipation  s/p Fleet enema x 1,   bowel regimen     # CAD / HTN / HLD  Continue Metoprolol   Continue Plavix 75 mg daily  In sinus rhythm now, previously had been on Cardizem (no longer per home meds)  Metoprolol 5 mg IVP Q 6 for sustained HR > 120 (>15 mins)  On Alirocumab bi weekly (Praluent). PCSK9 inh non formulary. Will start Tricor for now     DVT ppx : Lovenox  Dispo : [facility].   DNR/ DNI (MOLST filled and placed in chart)  HCP : Ricky (son) [phone]- updated

## 2022-01-27 NOTE — PROGRESS NOTE ADULT - SUBJECTIVE AND OBJECTIVE BOX
[facility]  INFECTIOUS DISEASES AND INTERNAL MEDICINE at [place] [facility] [place]  =======================================================                               Alec Epperson MD#  Victor Manuel Minor MD*                                     Brianna Ndiaye MD*    Melida Gil MD*            Diplomates American Board of Internal Medicine & Infectious Diseases                # [place] Office - Appt - Tel  [phone] Fax [phone]                * [place] Office - Appt - Tel [phone] Fax [phone]                                  Hospital Consult line:  [phone]  =======================================================    KRISTAN DENNIS [id number]    Follow up: Fever    Had fever to 102.2F 1/25/22      Allergies:  No Known Allergies       REVIEW OF SYSTEMS:  CONSTITUTIONAL:  No Fever or chills  HEENT:  No diplopia or blurred vision.  No earache, sore throat or runny nose.  CARDIOVASCULAR:  No pressure, squeezing, strangling, tightness, heaviness or aching about the chest, neck, axilla or epigastrium.  RESPIRATORY:  No cough, shortness of breath  GASTROINTESTINAL:  No nausea, vomiting or diarrhea.  GENITOURINARY:  No dysuria, frequency or urgency.   MUSCULOSKELETAL:  no joint aches, no muscle pain  SKIN:  No change in skin, hair or nails.  NEUROLOGIC:  No Headaches, seizures   PSYCHIATRIC:  No disorder of thought or mood.  ENDOCRINE:  No heat or cold intolerance  HEMATOLOGICAL:  No easy bruising or bleeding.       Physical Exam:  GEN: NAD, pleasant  HEENT: normocephalic and atraumatic. EOMI. PERRL.  Anicteric  NECK: Supple.   LUNGS: Decreased Basal BS B/L   HEART: Regular rate and rhythm   ABDOMEN: Soft, nontender, and nondistended.  Positive bowel sounds.    : No CVA tenderness  EXTREMITIES: Without any edema.  MSK: No joint swelling  NEUROLOGIC: No Focal Deficits  PSYCHIATRIC: Appropriate affect .  SKIN: No Rash      Vitals:    T(F): 96.8 (27 Jan 2022 05:06), Max: 99.9 (27 Jan 2022 01:57)  HR: 83 (27 Jan 2022 05:06)  BP: 168/83 (27 Jan 2022 05:06)  RR: 18 (27 Jan 2022 05:06)  SpO2: 93% (27 Jan 2022 05:06) (90% - 95%)  temp max in last 48H T(F): , Max: 102.2 (01-25-22 @ 21:38)    Current Antibiotics:    Other medications:  amLODIPine   Tablet 10 milliGRAM(s) Oral at bedtime  ascorbic acid  Oral Tab/Cap - Peds 1000 milliGRAM(s) Oral daily  cholecalciferol Oral Tab/Cap - Peds 5000 Unit(s) Oral daily  clopidogrel Tablet 75 milliGRAM(s) Oral daily  dronabinol 2.5 milliGRAM(s) Oral two times a day  DULoxetine 60 milliGRAM(s) Oral daily  enoxaparin Injectable 40 milliGRAM(s) SubCutaneous daily  escitalopram 10 milliGRAM(s) Oral daily  famotidine  Oral Tab/Cap - Peds 40 milliGRAM(s) Oral at bedtime  fenofibrate Tablet 145 milliGRAM(s) Oral daily  levothyroxine 12.5 MICROGram(s) Oral daily  losartan 50 milliGRAM(s) Oral daily  metoprolol tartrate 75 milliGRAM(s) Oral two times a day  mometasone 220 MICROgram(s) Inhaler 1 Puff(s) Inhalation daily  montelukast 10 milliGRAM(s) Oral daily  pantoprazole    Tablet 40 milliGRAM(s) Oral before breakfast  senna 2 Tablet(s) Oral at bedtime  sodium chloride 3 Gram(s) Oral three times a day  sodium chloride 0.9%. 1000 milliLiter(s) IV Continuous <Continuous>  tamsulosin 0.4 milliGRAM(s) Oral at bedtime  urea Oral Powder 30 Gram(s) Oral two times a day                            12.2   4.86  )-----------( 194      ( 26 Jan 2022 04:21 )             35.6     01-26    134<L>  |  99  |  19.9  ----------------------------<  130<H>  3.9   |  23.0  |  1.19    Ca    9.2      26 Jan 2022 04:21    TPro  6.9  /  Alb  3.6  /  TBili  0.5  /  DBili  x   /  AST  30  /  ALT  31  /  AlkPhos  79  01-26    RECENT CULTURES:  01-22 @ 17:24 .Blood Blood-Peripheral     No growth at 48 hours    01-21 @ 02:09 Clean Catch Clean Catch (Midstream)     <10,000 CFU/mL Normal Urogenital [place]    01-20 @ 01:04 .Blood Blood     No growth at 5 days.    01-17 @ 23:03 Clean Catch Clean Catch (Midstream)     <10,000 CFU/mL Normal Urogenital [place]      WBC Count: 4.86 K/uL (01-26-22 @ 04:21)  WBC Count: 4.18 K/uL (01-23-22 @ 03:15)    Creatinine, Serum: 1.19 mg/dL (01-26-22 @ 04:21)  Creatinine, Serum: 0.95 mg/dL (01-24-22 @ 09:14)  Creatinine, Serum: 0.97 mg/dL (01-23-22 @ 03:15)    Procalcitonin, Serum: 0.63 ng/mL (01-26-22 @ 07:46)  Procalcitonin, Serum: 0.43 ng/mL (01-22-22 @ 11:49)  Procalcitonin, Serum: 0.28 ng/mL (01-21-22 @ 16:22)     Rapid RVP Result: NotDetec (01-20-22 @ 01:04)  SARS-CoV-2: NotDetec (01-20-22 @ 01:04)  SARS-CoV-2: NotDetec (01-17-22 @ 15:02)  Rapid RVP Result: NotDetec (01-17-22 @ 15:02)    SARS-CoV-2: NotDetec (01-20-22 @ 01:04)  SARS-CoV-2: NotDetec (01-17-22 @ 15:02)            < from: CT Abdomen and Pelvis w/ Oral Cont and w/ IV Cont (01.24.22 @ 22:20) >  ACC: [id number] EXAM:  CT ABDOMEN AND PELVIS OC IC                          PROCEDURE DATE:  01/24/2022      INTERPRETATION:  CLINICAL INFORMATION: Recurrent fever. Weakness. History   of renal cell carcinoma.    COMPARISON: CT scan abdomen 7/17/2021.    CONTRAST/COMPLICATIONS:  IV Contrast: Omnipaque 300  88 cc administered   12 cc discarded  Oral Contrast: Smoothie Readi-Cat 2  Complications: None reported at time of study completion    PROCEDURE:  CT of the Abdomen and Pelvis was performed.  Sagittal and coronal reformats were performed.    FINDINGS:    LOWER CHEST:  Minimal bibasilar dependent atelectatic changes.  Bullae anterior right lower lobe.  Coronary artery calcifications.    Respiratory motion artifact degrades image quality.    LIVER:  Small, subcentimeter indeterminate hypodense lesion caudate lobe.  Focal area of decreased attenuation adjacent to falciform ligament,   likely reflecting fat.  BILE DUCTS: Normal caliber.  GALLBLADDER: Status post cholecystectomy.  SPLEEN: Within normal limits.  PANCREAS: Within normal limits.  ADRENALS: Within normal limits.  KIDNEYS/URETERS:  Left nephrectomy.  Minimal thickening in the surgical bed. No discrete mass noted.  No right-sided hydronephrosis.    BLADDER: Possible mild bladder wall thickening.  REPRODUCTIVE ORGANS: Coarse central prostate calcifications.    BOWEL:  Colonic diverticulosis, without CT evidence of diverticulitis.  No bowel obstruction.   Appendix within normal limits.  PERITONEUM: No ascites.    VESSELS: Atherosclerotic changes.  Infrarenal abdominal aortic ectasia measuring 2.6 cm.  RETROPERITONEUM/LYMPH NODES: No lymphadenopathy.    ABDOMINAL WALL:  Small fat-containing right inguinal hernia.    BONES: Degenerative changes.    IMPRESSION:    Possible mild bladder wall thickening, correlate clinically for cystitis.    Other findings as discussed above.    < end of copied text >

## 2022-01-28 LAB
ANION GAP SERPL CALC-SCNC: 12 MMOL/L — SIGNIFICANT CHANGE UP (ref 5–17)
BUN SERPL-MCNC: 17.7 MG/DL — SIGNIFICANT CHANGE UP (ref 8–20)
CALCIUM SERPL-MCNC: 8.9 MG/DL — SIGNIFICANT CHANGE UP (ref 8.6–10.2)
CHLORIDE SERPL-SCNC: 103 MMOL/L — SIGNIFICANT CHANGE UP (ref 98–107)
CO2 SERPL-SCNC: 24 MMOL/L — SIGNIFICANT CHANGE UP (ref 22–29)
CREAT SERPL-MCNC: 0.95 MG/DL — SIGNIFICANT CHANGE UP (ref 0.5–1.3)
GLUCOSE SERPL-MCNC: 118 MG/DL — HIGH (ref 70–99)
MAGNESIUM SERPL-MCNC: 1.7 MG/DL — SIGNIFICANT CHANGE UP (ref 1.6–2.6)
PHOSPHATE SERPL-MCNC: 2.3 MG/DL — LOW (ref 2.4–4.7)
POTASSIUM SERPL-MCNC: 3.5 MMOL/L — SIGNIFICANT CHANGE UP (ref 3.5–5.3)
POTASSIUM SERPL-SCNC: 3.5 MMOL/L — SIGNIFICANT CHANGE UP (ref 3.5–5.3)
PROCALCITONIN SERPL-MCNC: 0.37 NG/ML — HIGH (ref 0.02–0.1)
SARS-COV-2 RNA SPEC QL NAA+PROBE: SIGNIFICANT CHANGE UP
SODIUM SERPL-SCNC: 139 MMOL/L — SIGNIFICANT CHANGE UP (ref 135–145)

## 2022-01-28 PROCEDURE — 99232 SBSQ HOSP IP/OBS MODERATE 35: CPT

## 2022-01-28 PROCEDURE — 99233 SBSQ HOSP IP/OBS HIGH 50: CPT

## 2022-01-28 RX ORDER — LANOLIN ALCOHOL/MO/W.PET/CERES
3 CREAM (GRAM) TOPICAL
Refills: 0 | Status: DISCONTINUED | OUTPATIENT
Start: 2022-01-28 | End: 2022-01-29

## 2022-01-28 RX ORDER — NYSTATIN 500MM UNIT
500000 POWDER (EA) MISCELLANEOUS
Refills: 0 | Status: DISCONTINUED | OUTPATIENT
Start: 2022-01-28 | End: 2022-01-29

## 2022-01-28 RX ORDER — SODIUM CHLORIDE 9 MG/ML
3 INJECTION INTRAMUSCULAR; INTRAVENOUS; SUBCUTANEOUS
Refills: 0 | Status: DISCONTINUED | OUTPATIENT
Start: 2022-01-28 | End: 2022-01-29

## 2022-01-28 RX ADMIN — LOSARTAN POTASSIUM 50 MILLIGRAM(S): 100 TABLET, FILM COATED ORAL at 05:34

## 2022-01-28 RX ADMIN — AMLODIPINE BESYLATE 10 MILLIGRAM(S): 2.5 TABLET ORAL at 22:37

## 2022-01-28 RX ADMIN — MOMETASONE FUROATE 1 PUFF(S): 220 INHALANT RESPIRATORY (INHALATION) at 08:49

## 2022-01-28 RX ADMIN — Medication 12.5 MICROGRAM(S): at 05:34

## 2022-01-28 RX ADMIN — Medication 75 MILLIGRAM(S): at 17:43

## 2022-01-28 RX ADMIN — SODIUM CHLORIDE 3 GRAM(S): 9 INJECTION INTRAMUSCULAR; INTRAVENOUS; SUBCUTANEOUS at 17:44

## 2022-01-28 RX ADMIN — FAMOTIDINE 40 MILLIGRAM(S): 10 INJECTION INTRAVENOUS at 22:37

## 2022-01-28 RX ADMIN — Medication 2.5 MILLIGRAM(S): at 05:32

## 2022-01-28 RX ADMIN — Medication 500000 UNIT(S): at 22:36

## 2022-01-28 RX ADMIN — MONTELUKAST 10 MILLIGRAM(S): 4 TABLET, CHEWABLE ORAL at 11:46

## 2022-01-28 RX ADMIN — Medication 62.5 MILLIMOLE(S): at 10:29

## 2022-01-28 RX ADMIN — Medication 3 MILLIGRAM(S): at 17:43

## 2022-01-28 RX ADMIN — Medication 500000 UNIT(S): at 17:45

## 2022-01-28 RX ADMIN — TAMSULOSIN HYDROCHLORIDE 0.4 MILLIGRAM(S): 0.4 CAPSULE ORAL at 22:37

## 2022-01-28 RX ADMIN — Medication 145 MILLIGRAM(S): at 11:46

## 2022-01-28 RX ADMIN — SODIUM CHLORIDE 100 MILLILITER(S): 9 INJECTION, SOLUTION INTRAVENOUS at 22:38

## 2022-01-28 RX ADMIN — ESCITALOPRAM OXALATE 10 MILLIGRAM(S): 10 TABLET, FILM COATED ORAL at 11:46

## 2022-01-28 RX ADMIN — DULOXETINE HYDROCHLORIDE 60 MILLIGRAM(S): 30 CAPSULE, DELAYED RELEASE ORAL at 11:46

## 2022-01-28 RX ADMIN — ENOXAPARIN SODIUM 40 MILLIGRAM(S): 100 INJECTION SUBCUTANEOUS at 11:47

## 2022-01-28 RX ADMIN — Medication 5000 UNIT(S): at 11:46

## 2022-01-28 RX ADMIN — CLOPIDOGREL BISULFATE 75 MILLIGRAM(S): 75 TABLET, FILM COATED ORAL at 11:46

## 2022-01-28 RX ADMIN — Medication 1000 MILLIGRAM(S): at 11:45

## 2022-01-28 RX ADMIN — Medication 30 MILLIGRAM(S): at 14:35

## 2022-01-28 RX ADMIN — Medication 2.5 MILLIGRAM(S): at 17:44

## 2022-01-28 RX ADMIN — SENNA PLUS 2 TABLET(S): 8.6 TABLET ORAL at 22:37

## 2022-01-28 NOTE — PROGRESS NOTE ADULT - SUBJECTIVE AND OBJECTIVE BOX
HEALTH ISSUES - PROBLEM Dx:  76 year old male with HTN, HLD, CAD s/p PCI x 4, asthma, s/p left nephrectomy and left RCC on immunotherapy (biweekly infusions per son with [place] Bld & Ca) presenting from home with recurrent falls and profound weakness x 10 days.    FUO, weakness, encephalopathy    INTERVAL HPI/ OVERNIGHT EVENTS:    confused, focuses, answers 'fine'  cannot state his full name  knows he is in hospital  mumbles something undecipherable when asked for his name  ate 2 spoonfuls for BF    REVIEW OF SYSTEMS:    as above    Vital Signs Last 24 Hrs  T(C): 36.7 (28 Jan 2022 11:48), Max: 36.9 (28 Jan 2022 04:50)  T(F): 98.1 (28 Jan 2022 11:48), Max: 98.4 (28 Jan 2022 04:50)  HR: 90 (28 Jan 2022 11:48) (82 - 96)  BP: 165/87 (28 Jan 2022 11:48) (150/74 - 165/87)  BP(mean): --  RR: 18 (28 Jan 2022 11:48) (18 - 19)  SpO2: 96% (28 Jan 2022 11:48) (91% - 96%)    PHYSICAL EXAM-  CONSTITUTIONAL: confused, unable to state his name  ENT: Moist oral mucosa, no pharyngeal injection or exudates;   RESPIRATORY: Normal respiratory effort; lungs are clear to auscultation bilaterally  CARDIOVASCULAR: Regular rate and rhythm, normal S1 and S2, no murmur/ rub/ gallop; No lower extremity edema; Peripheral pulses are 2+ bilaterally  ABDOMEN: Nontender to palpation, normoactive bowel sounds, no rebound/guarding; No hepatosplenomegaly  MUSCULOSKELETAL:  no clubbing or cyanosis of digits; no joint swelling or tenderness to palpation  PSYCH: A+O to person, place, and time; affect appropriate  NEUROLOGY: CN 2-12 are intact and symmetric; no gross sensory deficits;   SKIN: No rashes; no palpable lesions      MEDICATIONS  (STANDING):  amLODIPine   Tablet 10 milliGRAM(s) Oral at bedtime  ascorbic acid  Oral Tab/Cap - Peds 1000 milliGRAM(s) Oral daily  cholecalciferol Oral Tab/Cap - Peds 5000 Unit(s) Oral daily  clopidogrel Tablet 75 milliGRAM(s) Oral daily  dronabinol 2.5 milliGRAM(s) Oral two times a day  DULoxetine 60 milliGRAM(s) Oral daily  enoxaparin Injectable 40 milliGRAM(s) SubCutaneous daily  escitalopram 10 milliGRAM(s) Oral daily  famotidine  Oral Tab/Cap - Peds 40 milliGRAM(s) Oral at bedtime  fenofibrate Tablet 145 milliGRAM(s) Oral daily  lactated ringers. 1000 milliLiter(s) (100 mL/Hr) IV Continuous <Continuous>  levothyroxine 12.5 MICROGram(s) Oral daily  losartan 50 milliGRAM(s) Oral daily  metoprolol tartrate 75 milliGRAM(s) Oral two times a day  mometasone 220 MICROgram(s) Inhaler 1 Puff(s) Inhalation daily  montelukast 10 milliGRAM(s) Oral daily  pantoprazole    Tablet 40 milliGRAM(s) Oral before breakfast  senna 2 Tablet(s) Oral at bedtime  sodium chloride 3 Gram(s) Oral two times a day  tamsulosin 0.4 milliGRAM(s) Oral at bedtime  urea Oral Powder 30 Gram(s) Oral two times a day    MEDICATIONS  (PRN):  acetaminophen     Tablet .. 650 milliGRAM(s) Oral every 6 hours PRN Temp greater or equal to 38C (100.4F), Mild Pain (1 - 3)  ALBUTerol    90 MICROgram(s) HFA Inhaler 2 Puff(s) Inhalation every 6 hours PRN Bronchospasm  bisacodyl 10 milliGRAM(s) Oral once PRN Constipation  metoprolol tartrate Injectable 5 milliGRAM(s) IV Push every 6 hours PRN HR > 120  oxycodone    5 mG/acetaminophen 325 mG 1 Tablet(s) Oral every 4 hours PRN Moderate Pain (4 - 6)  polyethylene glycol 3350 17 Gram(s) Oral daily PRN Constipation      LABS:    01-28    139  |  103  |  17.7  ----------------------------<  118<H>  3.5   |  24.0  |  0.95    Ca    8.9      28 Jan 2022 06:34  Phos  2.3     01-28  Mg     1.7     01-28

## 2022-01-28 NOTE — PROGRESS NOTE ADULT - ASSESSMENT
Admitted with fever, weakness.    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 Last dose on 12/31/2021. No inpatient therapy.  It is possible that immunotherapy can lead to fever, weakness and electrolyte abnormalities.  He had CT scans a couple of weeks ago showing a good response to therapy so unlikely due to worsening cancer.  CT a/p here was unremarkable.    Hyponatremia - baseline is about 138 as an outpatient. Has fluctuated during his stay down to 127 but  better at 134.  Could be contributing to clinical picture.  Consider nephrology consult.    FUO - ID on board.  afebrile over last 48 hours, fever w/u in progress.  cultures negative  would hold off on steroids if remains afebrile

## 2022-01-28 NOTE — PROGRESS NOTE ADULT - ASSESSMENT
76y  Male with h/o HTN, HLD, CAD s/p PCI x 4, asthma, s/p left nephrectomy and left RCC on immunotherapy (biweekly infusions per son with [place] [facility]). Patient was brought to the ER by his son for recurrent falls and profound weakness x 10 days.  He reports we was well up until one day after waking up and having weakness. No other associated symptoms. Denies sick contacts. No noted fever at home. In the ER patient was febrile to 102.5F no leukocytosis. Started on Ceftriaxone and Azithromycin.       Fever  left RCC on immunotherapy      - Had fever 1/25 but no fever work up done  - blood cultures 1/26 no growth   - Repeat UA with no UTI   - Blood cultures no growth 1/20  - Repeat blood cultures no growth 1/22   - CT A/P  with no acute findings   - RVP/COVID 19 PCR Negative   - Urine culture not significant   - CT Chest reporting no PNA   - UA not suggestive of UTI   - Procalcitonin level 0.37  - Hem/Onc consult noted   - If need steroids can do so   - Follow up cultures  - Trend Fever  - Trend WBC      Will follow, please call with any concerns over the weekend     d/w Dr Horowitz

## 2022-01-28 NOTE — CONSULT NOTE ADULT - ASSESSMENT
76y  Male with h/o HTN, HLD, CAD s/p PCI x 4, asthma, s/p left nephrectomy and left RCC on immunotherapy (biweekly infusions per son with [place] [facility]). Patient was brought to the ER by his son for recurrent falls and profound weakness x 10 days.  He reports we was well up until one day after waking up and having weakness. No other associated symptoms. Denies sick contacts. No noted fever at home. In the ER patient was febrile to 102.5F no leukocytosis. Started on Ceftriaxone and Azithromycin.       Fever  left RCC on immunotherapy      - Blood cultures no growth   - Repeat blood cultures ordered  - Check CT A/P   - RVP/COVID 19 PCR Negative   - Urine culture not significant   - CT Chest reporting no PNA   - UA not suggestive of UTI   - Procalcitonin level 0.43  - D/C Ceftriaxone and Azithromycin   - Follow up cultures  - Trend Fever  - Trend WBC      Thank you for allowing me to participate in the care of your patient.   Will Follow      d/w Dr Horowitz     
The patient is a 76y Male with multiple medical issues now admitted with fevers and diffuse weakness.     Altered mental status   Likely delirium secondary to prolonged hospital confinement.   No focality or imaging finding to suggest stroke.     Fevers   Subsided.   Off antibiotics at present.     Case discussed with Dr Horowitz.     
DX : Reset Osmostat  Vs. SIADH - Related to SSRI,       Rec : Repeat Urine studies,     On Oral Na- Cl ,    Will add Urea, No Need for HTS,     Modest Oral FR,     Trend SNa+, Will Follow,     
Admitted with fever, weakness.      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 Last dose on 12/31/2021. No inpatient therapy.  It is possible that immunotherapy can lead to fever, weakness and electrolyte abnormalities.  He had CT scans a couple of weeks ago showing a good response to therapy so unlikely due to worsening cancer.  CT a/p ordered.    Hyponatremia - baseline is about 138 as an outpatient. Has fluctuated during stay but yesterday it was 127. Could be contributing to clinical picture. Today he is confused.  Consider nephrology consult.    FUO - ID on board. Febrile this morning. Not on antibiotics. Could contribute to confusion. Infectious workup in process.    Thank you,    Neri Barber MD  [facility]

## 2022-01-28 NOTE — CONSULT NOTE ADULT - SUBJECTIVE AND OBJECTIVE BOX
[facility]                                        Neurology at [place]                                 Maribell Baptiste, & Brent                                  [address]                                        [place], [place], [address]                                             [phone]        CC: altered mental status     HPI:   The patient is a 76 year old man who had been seen by Dr Reyna in the office in December of 2019 for tremors.   His work up was negative at that time.   He has been receiving immunotherapy for renal cell cancer and in early January of 2022 developed diffuse weakness and decreased PO intake.   He was admitted 1/21/22 after he presented late 1/19 (initially he was kept in the ER observation unit)  with increased weakness and recurrent falls.   He was initially able to converse with staff/family.   On 1/27 he was noted to be more confused and was not recognizing his son.     Neurology was called to evaluate on 1/28/22.    PMH:  CAD  Cholesterol  Hypertension   RA    Social:  Former smoker.      ROS:   Denies headache or dizziness.  Denies chest pain.  Denies shortness of breath.    MEDICATIONS  (STANDING):  amLODIPine   Tablet 10 milliGRAM(s) Oral at bedtime  ascorbic acid  Oral Tab/Cap - Peds 1000 milliGRAM(s) Oral daily  cholecalciferol Oral Tab/Cap - Peds 5000 Unit(s) Oral daily  clopidogrel Tablet 75 milliGRAM(s) Oral daily  dronabinol 2.5 milliGRAM(s) Oral two times a day  DULoxetine 60 milliGRAM(s) Oral daily  enoxaparin Injectable 40 milliGRAM(s) SubCutaneous daily  escitalopram 10 milliGRAM(s) Oral daily  famotidine  Oral Tab/Cap - Peds 40 milliGRAM(s) Oral at bedtime  fenofibrate Tablet 145 milliGRAM(s) Oral daily  lactated ringers. 1000 milliLiter(s) (100 mL/Hr) IV Continuous <Continuous>  levothyroxine 12.5 MICROGram(s) Oral daily  losartan 50 milliGRAM(s) Oral daily  metoprolol tartrate 75 milliGRAM(s) Oral two times a day  mometasone 220 MICROgram(s) Inhaler 1 Puff(s) Inhalation daily  montelukast 10 milliGRAM(s) Oral daily  pantoprazole    Tablet 40 milliGRAM(s) Oral before breakfast  senna 2 Tablet(s) Oral at bedtime  sodium chloride 3 Gram(s) Oral two times a day  tamsulosin 0.4 milliGRAM(s) Oral at bedtime  urea Oral Powder 30 Gram(s) Oral two times a day    Vital Signs Last 24 Hrs  T(C): 36.9 (28 Jan 2022 04:50), Max: 36.9 (28 Jan 2022 04:50)  T(F): 98.4 (28 Jan 2022 04:50), Max: 98.4 (28 Jan 2022 04:50)  HR: 88 (28 Jan 2022 08:49) (80 - 96)  BP: 160/83 (28 Jan 2022 04:50) (150/74 - 160/83)  RR: 19 (28 Jan 2022 04:50) (18 - 19)  SpO2: 91% (28 Jan 2022 04:50) (91% - 95%)    No neck stiffness.     Detailed Neurologic Exam:    Mental status: The patient opens eyes to voice. He is currently non verbal. He does not follow instructions.     Cranial nerves: Pupils equal and react symmetrically to light. He blinks to threat to confrontation. He tracks with gaze. Facial sensation is intact. Facial musculature is symmetric. Palate elevates symmetrically. Tongue is midline.    Motor/Sensory: There is normal bulk and tone.  There is a subtle chin tremor.  Symmetric limb movement to stimuli.     Reflexes: 1+ throughout and plantar responses are flexor.    Cerebellar: Cannot be tested.     Labs:     01-28    139  |  103  |  17.7  ----------------------------<  118<H>  3.5   |  24.0  |  0.95    Ca    8.9      28 Jan 2022 06:34  Phos  2.3     01-28  Mg     1.7     01-28            Rad:   CT head images reviewed (and concur with report): There is no acute pathology. No change from 1/20/22.                                   [facility]                                        Neurology at [place]                                 Maribell Baptiste, & Brent                                  [address]                                        [place], [address]                                             [phone]        CC: altered mental status     HPI:   The patient is a 76 year old man who had been seen by Dr Reyna in the office in December of 2019 for tremors.   His work up was negative at that time.   He has been receiving immunotherapy for renal cell cancer and in early January of 2022 developed diffuse weakness and decreased PO intake.   He was admitted 1/21/22 after he presented late 1/19 (initially he was kept in the ER observation unit)  with increased weakness and recurrent falls.   He was initially able to converse with staff/family.   On 1/27 he was noted to be more confused and was not recognizing his son.     Neurology was called to evaluate on 1/28/22.    PMH:  CAD  Cholesterol  Hypertension   RA    Social:  Former smoker.    ROS:   Unobtainable due to patient's condition.     MEDICATIONS  (STANDING):  amLODIPine   Tablet 10 milliGRAM(s) Oral at bedtime  ascorbic acid  Oral Tab/Cap - Peds 1000 milliGRAM(s) Oral daily  cholecalciferol Oral Tab/Cap - Peds 5000 Unit(s) Oral daily  clopidogrel Tablet 75 milliGRAM(s) Oral daily  dronabinol 2.5 milliGRAM(s) Oral two times a day  DULoxetine 60 milliGRAM(s) Oral daily  enoxaparin Injectable 40 milliGRAM(s) SubCutaneous daily  escitalopram 10 milliGRAM(s) Oral daily  famotidine  Oral Tab/Cap - Peds 40 milliGRAM(s) Oral at bedtime  fenofibrate Tablet 145 milliGRAM(s) Oral daily  lactated ringers. 1000 milliLiter(s) (100 mL/Hr) IV Continuous <Continuous>  levothyroxine 12.5 MICROGram(s) Oral daily  losartan 50 milliGRAM(s) Oral daily  metoprolol tartrate 75 milliGRAM(s) Oral two times a day  mometasone 220 MICROgram(s) Inhaler 1 Puff(s) Inhalation daily  montelukast 10 milliGRAM(s) Oral daily  pantoprazole    Tablet 40 milliGRAM(s) Oral before breakfast  senna 2 Tablet(s) Oral at bedtime  sodium chloride 3 Gram(s) Oral two times a day  tamsulosin 0.4 milliGRAM(s) Oral at bedtime  urea Oral Powder 30 Gram(s) Oral two times a day    Vital Signs Last 24 Hrs  T(C): 36.9 (28 Jan 2022 04:50), Max: 36.9 (28 Jan 2022 04:50)  T(F): 98.4 (28 Jan 2022 04:50), Max: 98.4 (28 Jan 2022 04:50)  HR: 88 (28 Jan 2022 08:49) (80 - 96)  BP: 160/83 (28 Jan 2022 04:50) (150/74 - 160/83)  RR: 19 (28 Jan 2022 04:50) (18 - 19)  SpO2: 91% (28 Jan 2022 04:50) (91% - 95%)    No neck stiffness.     Detailed Neurologic Exam:    Mental status: The patient opens eyes to voice. He is currently non verbal. He does not follow instructions.     Cranial nerves: Pupils equal and react symmetrically to light. He blinks to threat to confrontation. He tracks with gaze. Facial sensation is intact. Facial musculature is symmetric. Palate elevates symmetrically. Tongue is midline.    Motor/Sensory: There is normal bulk and tone.  There is a subtle chin tremor.  Symmetric limb movement to stimuli.     Reflexes: 1+ throughout and plantar responses are flexor.    Cerebellar: Cannot be tested.     Labs:     01-28    139  |  103  |  17.7  ----------------------------<  118<H>  3.5   |  24.0  |  0.95    Ca    8.9      28 Jan 2022 06:34  Phos  2.3     01-28  Mg     1.7     01-28            Rad:   CT head images reviewed (and concur with report): There is no acute pathology. No change from 1/20/22.

## 2022-01-28 NOTE — PROGRESS NOTE ADULT - SUBJECTIVE AND OBJECTIVE BOX
[facility]  INFECTIOUS DISEASES AND INTERNAL MEDICINE at [place] and [place]  =======================================================                               Alec Epperson MD#  Victor Manuel Minor MD*                                     Brianna Ndiaye MD*    Melida Gil MD*            Diplomates American Board of Internal Medicine & Infectious Diseases                # [place] Office - Appt - Tel  [phone] Fax [phone]                * [place] Office - Appt - Tel [phone] Fax [phone]                                  Hospital Consult line:  [phone]  =======================================================    KRISTAN DENNIS [id number]    Follow up: Fever    Had fever to 102.2F 1/25/22  No fever since       Allergies:  No Known Allergies       REVIEW OF SYSTEMS:  CONSTITUTIONAL:  No Fever or chills  HEENT:  No diplopia or blurred vision.  No earache, sore throat or runny nose.  CARDIOVASCULAR:  No pressure, squeezing, strangling, tightness, heaviness or aching about the chest, neck, axilla or epigastrium.  RESPIRATORY:  No cough, shortness of breath  GASTROINTESTINAL:  No nausea, vomiting or diarrhea.  GENITOURINARY:  No dysuria, frequency or urgency.   MUSCULOSKELETAL:  no joint aches, no muscle pain  SKIN:  No change in skin, hair or nails.  NEUROLOGIC:  No Headaches, seizures   PSYCHIATRIC:  No disorder of thought or mood.  ENDOCRINE:  No heat or cold intolerance  HEMATOLOGICAL:  No easy bruising or bleeding.       Physical Exam:  GEN: NAD, pleasant  HEENT: normocephalic and atraumatic. EOMI. PERRL.  Anicteric  NECK: Supple.   LUNGS: Decreased Basal BS B/L   HEART: Regular rate and rhythm   ABDOMEN: Soft, nontender, and nondistended.  Positive bowel sounds.    : No CVA tenderness  EXTREMITIES: Without any edema.  MSK: No joint swelling  NEUROLOGIC: No Focal Deficits  PSYCHIATRIC: Appropriate affect .  SKIN: No Rash      Vitals:  T(F): 98.4 (28 Jan 2022 04:50), Max: 98.4 (28 Jan 2022 04:50)  HR: 88 (28 Jan 2022 08:49)  BP: 160/83 (28 Jan 2022 04:50)  RR: 19 (28 Jan 2022 04:50)  SpO2: 91% (28 Jan 2022 04:50) (91% - 95%)  temp max in last 48H T(F): , Max: 99.9 (01-27-22 @ 01:57)      Current Antibiotics:    Other medications:  amLODIPine   Tablet 10 milliGRAM(s) Oral at bedtime  ascorbic acid  Oral Tab/Cap - Peds 1000 milliGRAM(s) Oral daily  cholecalciferol Oral Tab/Cap - Peds 5000 Unit(s) Oral daily  clopidogrel Tablet 75 milliGRAM(s) Oral daily  dronabinol 2.5 milliGRAM(s) Oral two times a day  DULoxetine 60 milliGRAM(s) Oral daily  enoxaparin Injectable 40 milliGRAM(s) SubCutaneous daily  escitalopram 10 milliGRAM(s) Oral daily  famotidine  Oral Tab/Cap - Peds 40 milliGRAM(s) Oral at bedtime  fenofibrate Tablet 145 milliGRAM(s) Oral daily  lactated ringers. 1000 milliLiter(s) IV Continuous <Continuous>  levothyroxine 12.5 MICROGram(s) Oral daily  losartan 50 milliGRAM(s) Oral daily  metoprolol tartrate 75 milliGRAM(s) Oral two times a day  mometasone 220 MICROgram(s) Inhaler 1 Puff(s) Inhalation daily  montelukast 10 milliGRAM(s) Oral daily  pantoprazole    Tablet 40 milliGRAM(s) Oral before breakfast  senna 2 Tablet(s) Oral at bedtime  sodium chloride 3 Gram(s) Oral two times a day  tamsulosin 0.4 milliGRAM(s) Oral at bedtime  urea Oral Powder 30 Gram(s) Oral two times a day        01-28    139  |  103  |  17.7  ----------------------------<  118<H>  3.5   |  24.0  |  0.95    Ca    8.9      28 Jan 2022 06:34  Phos  2.3     01-28  Mg     1.7     01-28      RECENT CULTURES:  01-26 @ 07:46 .Blood Blood-Peripheral     No growth at 48 hours    01-22 @ 17:24 .Blood Blood-Peripheral     No growth at 5 days.    01-21 @ 02:09 Clean Catch Clean Catch (Midstream)     <10,000 CFU/mL Normal Urogenital [place]    01-20 @ 01:04 .Blood Blood     No growth at 5 days.    01-17 @ 23:03 Clean Catch Clean Catch (Midstream)     <10,000 CFU/mL Normal Urogenital [place]      WBC Count: 4.86 K/uL (01-26-22 @ 04:21)    Creatinine, Serum: 0.95 mg/dL (01-28-22 @ 06:34)  Creatinine, Serum: 1.19 mg/dL (01-26-22 @ 04:21)  Creatinine, Serum: 0.95 mg/dL (01-24-22 @ 09:14)    Procalcitonin, Serum: 0.37 ng/mL (01-28-22 @ 06:34)  Procalcitonin, Serum: 0.63 ng/mL (01-26-22 @ 07:46)  Procalcitonin, Serum: 0.43 ng/mL (01-22-22 @ 11:49)  Procalcitonin, Serum: 0.28 ng/mL (01-21-22 @ 16:22)     Rapid RVP Result: NotDetec (01-20-22 @ 01:04)  SARS-CoV-2: NotDetec (01-20-22 @ 01:04)  SARS-CoV-2: NotDetec (01-17-22 @ 15:02)  Rapid RVP Result: NotDetec (01-17-22 @ 15:02)    COVID-19 PCR: NotDetec (01-27-22 @ 01:33)  SARS-CoV-2: NotDetec (01-20-22 @ 01:04)  SARS-CoV-2: NotDetec (01-17-22 @ 15:02)

## 2022-01-28 NOTE — PROGRESS NOTE ADULT - SUBJECTIVE AND OBJECTIVE BOX
76 year old male with HTN, HLD, CAD s/p PCI x 4, asthma, urothelial cancer s/p surgery (surg path [id number]), RT (from 10/01/2021 - 11/18/2021) given with nivolumab since 9/17/2021. The last dose was on 12/31/2021. On 01/05/2022 CT a/p showed excellent response to therapy. He has presented with progressive weakness and fatigue. He was admitted with persistent fevers. Workup has been unrevealing.   Patient is very weak, confused. Unclear baseline. He is unable to provide history.  afebrile ON    MEDICATIONS  (STANDING):  amLODIPine   Tablet 10 milliGRAM(s) Oral at bedtime  ascorbic acid  Oral Tab/Cap - Peds 1000 milliGRAM(s) Oral daily  cholecalciferol Oral Tab/Cap - Peds 5000 Unit(s) Oral daily  clopidogrel Tablet 75 milliGRAM(s) Oral daily  dronabinol 2.5 milliGRAM(s) Oral two times a day  DULoxetine 60 milliGRAM(s) Oral daily  enoxaparin Injectable 40 milliGRAM(s) SubCutaneous daily  escitalopram 10 milliGRAM(s) Oral daily  famotidine  Oral Tab/Cap - Peds 40 milliGRAM(s) Oral at bedtime  fenofibrate Tablet 145 milliGRAM(s) Oral daily  lactated ringers. 1000 milliLiter(s) (100 mL/Hr) IV Continuous <Continuous>  levothyroxine 12.5 MICROGram(s) Oral daily  losartan 50 milliGRAM(s) Oral daily  metoprolol tartrate 75 milliGRAM(s) Oral two times a day  mometasone 220 MICROgram(s) Inhaler 1 Puff(s) Inhalation daily  montelukast 10 milliGRAM(s) Oral daily  pantoprazole    Tablet 40 milliGRAM(s) Oral before breakfast  senna 2 Tablet(s) Oral at bedtime  sodium chloride 3 Gram(s) Oral two times a day  sodium phosphate IVPB 15 milliMole(s) IV Intermittent once  tamsulosin 0.4 milliGRAM(s) Oral at bedtime  urea Oral Powder 30 Gram(s) Oral two times a day          ICU Vital Signs Last 24 Hrs  T(C): 36.9 (28 Jan 2022 04:50), Max: 36.9 (28 Jan 2022 04:50)  T(F): 98.4 (28 Jan 2022 04:50), Max: 98.4 (28 Jan 2022 04:50)  HR: 88 (28 Jan 2022 08:49) (79 - 96)  BP: 160/83 (28 Jan 2022 04:50) (150/74 - 160/83)  BP(mean): --  ABP: --  ABP(mean): --  RR: 19 (28 Jan 2022 04:50) (18 - 19)  SpO2: 91% (28 Jan 2022 04:50) (91% - 95%)    BP(mean): --  ABP: --  ABP(mean): --  RR: 18 (27 Jan 2022 05:06) (18 - 20)  SpO2: 93% (27 Jan 2022 05:06) (90% - 95%)    Gen: nad  neuro: arousable but confused, very weak voice  rrr  abd - non tender  ctab      Labs:                              12.2   4.86  )-----------( 194      ( 26 Jan 2022 04:21 )             35.6                             12.2   4.86  )-----------( 194      ( 26 Jan 2022 04:21 )             35.6       01-26    134<L>  |  99  |  19.9  ----------------------------<  130<H>  3.9   |  23.0  |  1.19    Ca    9.2      26 Jan 2022 04:21    TPro  6.9  /  Alb  3.6  /  TBili  0.5  /  DBili  x   /  AST  30  /  ALT  31  /  AlkPhos  79  01-26 01-26    134<L>  |  99  |  19.9  ----------------------------<  130<H>  3.9   |  23.0  |  1.19    Ca    9.2      26 Jan 2022 04:21    TPro  6.9  /  Alb  3.6  /  TBili  0.5  /  DBili  x   /  AST  30  /  ALT  31  /  AlkPhos  79  01-26 01-24    127<L>  |  92<L>  |  10.2  ----------------------------<  114<H>  4.0   |  25.0  |  0.95    Ca    9.0      24 Jan 2022 09:14  Phos  2.9     01-24  Mg     1.8     01-24    proc 0.2 ---> 0.4 on 1/22/2022 ----> 0.6 today 1/26/2022

## 2022-01-28 NOTE — PROGRESS NOTE ADULT - ASSESSMENT
76 year old male with HTN, HLD, CAD s/p PCI x 4, asthma, s/p left nephrectomy and left RCC on immunotherapy (biweekly infusions per son with [place] [facility]) presenting from home with recurrent falls and profound weakness x 10 days. Placed in observation waiting for dsc to [facility]. Noted to be febrile and hyponatremic while in ER and admitted to Inpatient. Noted fever/ weakness/dehydration/ electrolyte imbalance/ poor PO intake/ gen debility. Monitored off antibx. fever w/u so far negative. deemed likely SE of immunotherpay by Onc. High dose steroids being started today for same. Pt was moved around in CDU and then to MEd surg floor when noted new onset acute delirium. CM working towards [facility] placement    # Subacute Delirium  Noted since yesterday- disoriented to name and place and not interactive  likely hospitalization induced  Rpt CTH non acute. Neuro consulted- eval noted  no need for antipsychotics unless agitated/ aggressive  Cont antidepressants as before  Add Melatonin to promote sleep wake cycle    # FUO while on Immunotherapy - resolved  All c/s negative to date; Rpt set of bld c/s 1/26 NGTD  CT chest/ A/P- ruled out PNA or other acute findings  TFTs- WNL  AM cortisol levels WNL  ID recs appreciated  Per Heme/ Onc- It is possible that immunotherapy can lead to fever, weakness and electrolyte abnormalities. If no source found today she can be treated with high dose prednisone for immunotherapy related side effect.   starting prednisone 0.5 mg/ kg/day today. PPI    # Gen Weakness  CTH non acute  infectious process w/u ongoing as above  Ammonia WNL  PT eval - to [facility]  Sec to fevers and gen weakness, maintenance IVF started  Steroids as above    # Poor PO intake  appetite stimulants added  complete feeding assistance  nutritional supplements    # Hyponatremia - now resolved. Baseline 138 outpatient   etio likely starvation ketosis which has resolved since  s/p 0.9NS @ 100 ml/hr   Cont PO Nacl tid started on 1/24  Ure- Na started on 1/25 by Renal with good results. d/jeannie 1/28. restart as needed  Per Heme/ Onc- It is possible that immunotherapy can lead to fever, weakness and electrolyte abnormalities.    # s/p Uncontrolled HTN   -related to IVF  -BB increased to 50mg bid- increased to 75mg  -Norvasc and Losartan added 1/24 and increased to 50 mg     # Hypophosphatemia  - repleted    # Urothelial Cancer s/p nephrectomy, s/p RT and on immunotherapy since 9/2021. Last dose 12/31  [place] [facility] [place] consulted appreciated regarding   per Heme/ Onc- It is possible that immunotherapy can lead to fever, weakness and electrolyte abnormalities.  CT scans a couple of weeks ago showing a good response to therapy so unlikely due to worsening cancer.    # Ketosis- resolved  likely sec to starvation/ dehydration    # NAGMA- resolved  s/p IVF and monitor     # Hypothyroidism  Continue home dose of synthroid 12.5 mcg daily  TFTs essentially WNL    # Constipation- resolved  s/p Fleet enema x 1,   bowel regimen     # CAD / HTN / HLD  Continue Metoprolol   Continue Plavix 75 mg daily  In sinus rhythm now, previously had been on Cardizem (no longer per home meds)  Metoprolol 5 mg IVP Q 6 for sustained HR > 120 (>15 mins)  On Alirocumab bi weekly (Praluent). PCSK9 inh non formulary. Will start Tricor for now     DVT ppx : Lovenox  Activity- OOB to C  Dispo : [facility].   DNR/ DNI ([facility] filled and placed in chart)  HCP : Ricky (son) [phone]- updated    SIgnout- Was being prepped for discharge when Delirium noted. D/W CM, if Delirium resolved can d/c over weekend, otherwise likely d/c plan for Monday. RPt COVID done today is negative. 76 year old male with HTN, HLD, CAD s/p PCI x 4, asthma, s/p left nephrectomy and left RCC on immunotherapy (biweekly infusions per son with [place] [facility] [employer] [facility]) presenting from home with recurrent falls and profound weakness x 10 days. Placed in observation waiting for dsc to [facility]. Noted to be febrile and hyponatremic while in ER and admitted to Inpatient. Noted fever/ weakness/dehydration/ electrolyte imbalance/ poor PO intake/ gen debility. Monitored off antibx. fever w/u so far negative. deemed likely SE of immunotherpay by Onc. High dose steroids being started today for same. Pt was moved around in CDU and then to MEd surg floor when noted new onset acute delirium. CM working towards [facility] placement    # Subacute Delirium  Noted since yesterday- disoriented to name and place and not interactive  likely hospitalization induced  Rpt CTH non acute. Neuro consulted- eval noted  no need for antipsychotics unless agitated/ aggressive  Cont antidepressants as before  Add Melatonin to promote sleep wake cycle    # FUO while on Immunotherapy - resolved  All c/s negative to date; Rpt set of bld c/s 1/26 NGTD  CT chest/ A/P- ruled out PNA or other acute findings  TFTs- WNL  AM cortisol levels WNL  ID recs appreciated  Per Heme/ Onc- It is possible that immunotherapy can lead to fever, weakness and electrolyte abnormalities. If no source found today she can be treated with high dose prednisone for immunotherapy related side effect.   starting prednisone 0.5 mg/ kg/day today. PPI    # Gen Weakness  CTH non acute  infectious process w/u ongoing as above  Ammonia WNL  PT eval - to [facility]  Sec to fevers and gen weakness, maintenance IVF started  Steroids as above    # Poor PO intake  appetite stimulants added  complete feeding assistance  nutritional supplements    # Hyponatremia - now resolved. Baseline 138 outpatient   etio likely starvation ketosis which has resolved since  s/p 0.9NS @ 100 ml/hr   Cont PO Nacl tid started on 1/24- taper to d/c as possible.  Ure- Na started on 1/25 by Renal with good results. d/jeannie 1/28. restart as needed  Per Heme/ Onc- It is possible that immunotherapy can lead to fever, weakness and electrolyte abnormalities.    # s/p Uncontrolled HTN   -related to IVF  -BB increased to 50mg bid- increased to 75mg  -Norvasc and Losartan added 1/24 and increased to 50 mg     # Hypophosphatemia  - repleted    # Urothelial Cancer s/p nephrectomy, s/p RT and on immunotherapy since 9/2021. Last dose 12/31  [place] [facility] [place] consulted appreciated regarding   per Heme/ Onc- It is possible that immunotherapy can lead to fever, weakness and electrolyte abnormalities.  CT scans a couple of weeks ago showing a good response to therapy so unlikely due to worsening cancer.    # Ketosis- resolved  likely sec to starvation/ dehydration    # NAGMA- resolved  s/p IVF and monitor     # Hypothyroidism  Continue home dose of synthroid 12.5 mcg daily  TFTs essentially WNL    # Constipation- resolved  s/p Fleet enema x 1,   bowel regimen     # CAD / HTN / HLD  Continue Metoprolol   Continue Plavix 75 mg daily  In sinus rhythm now, previously had been on Cardizem (no longer per home meds)  Metoprolol 5 mg IVP Q 6 for sustained HR > 120 (>15 mins)  On Alirocumab bi weekly (Praluent). PCSK9 inh non formulary. Will start Tricor for now     DVT ppx : Lovenox  Activity- OOB to C  Dispo : [facility].   DNR/ DNI ([facility] filled and placed in chart)  HCP : Ricky (son) [phone]- updated    SIgnout- Was being prepped for discharge when Delirium noted. D/W CM, if Delirium resolved can d/c over weekend, otherwise likely d/c plan for Monday. RPt COVID done today is negative.

## 2022-01-29 LAB
ANION GAP SERPL CALC-SCNC: 14 MMOL/L — SIGNIFICANT CHANGE UP (ref 5–17)
BUN SERPL-MCNC: 14.5 MG/DL — SIGNIFICANT CHANGE UP (ref 8–20)
CALCIUM SERPL-MCNC: 9.1 MG/DL — SIGNIFICANT CHANGE UP (ref 8.6–10.2)
CHLORIDE SERPL-SCNC: 102 MMOL/L — SIGNIFICANT CHANGE UP (ref 98–107)
CO2 SERPL-SCNC: 23 MMOL/L — SIGNIFICANT CHANGE UP (ref 22–29)
CREAT SERPL-MCNC: 0.72 MG/DL — SIGNIFICANT CHANGE UP (ref 0.5–1.3)
GLUCOSE SERPL-MCNC: 119 MG/DL — HIGH (ref 70–99)
MAGNESIUM SERPL-MCNC: 1.8 MG/DL — SIGNIFICANT CHANGE UP (ref 1.6–2.6)
PHOSPHATE SERPL-MCNC: 2.8 MG/DL — SIGNIFICANT CHANGE UP (ref 2.4–4.7)
POTASSIUM SERPL-MCNC: 3.6 MMOL/L — SIGNIFICANT CHANGE UP (ref 3.5–5.3)
POTASSIUM SERPL-SCNC: 3.6 MMOL/L — SIGNIFICANT CHANGE UP (ref 3.5–5.3)
SODIUM SERPL-SCNC: 138 MMOL/L — SIGNIFICANT CHANGE UP (ref 135–145)

## 2022-01-29 PROCEDURE — 99233 SBSQ HOSP IP/OBS HIGH 50: CPT

## 2022-01-29 PROCEDURE — 99232 SBSQ HOSP IP/OBS MODERATE 35: CPT

## 2022-01-29 RX ORDER — PANTOPRAZOLE SODIUM 20 MG/1
40 TABLET, DELAYED RELEASE ORAL DAILY
Refills: 0 | Status: DISCONTINUED | OUTPATIENT
Start: 2022-01-29 | End: 2022-02-04

## 2022-01-29 RX ORDER — LEVOTHYROXINE SODIUM 125 MCG
6.25 TABLET ORAL AT BEDTIME
Refills: 0 | Status: DISCONTINUED | OUTPATIENT
Start: 2022-01-29 | End: 2022-02-04

## 2022-01-29 RX ADMIN — MOMETASONE FUROATE 1 PUFF(S): 220 INHALANT RESPIRATORY (INHALATION) at 09:01

## 2022-01-29 RX ADMIN — PANTOPRAZOLE SODIUM 40 MILLIGRAM(S): 20 TABLET, DELAYED RELEASE ORAL at 16:12

## 2022-01-29 RX ADMIN — SODIUM CHLORIDE 100 MILLILITER(S): 9 INJECTION, SOLUTION INTRAVENOUS at 23:43

## 2022-01-29 RX ADMIN — Medication 40 MILLIGRAM(S): at 16:12

## 2022-01-29 RX ADMIN — Medication 500000 UNIT(S): at 05:29

## 2022-01-29 NOTE — PROGRESS NOTE ADULT - ASSESSMENT
76y Male with multiple medical issues now admitted with fevers and diffuse weakness.     Altered mental status   Likely delirium secondary to prolonged hospital confinement.   No focality or imaging finding to suggest stroke.     Fevers   Subsided.   Off antibiotics at present.

## 2022-01-29 NOTE — SWALLOW BEDSIDE ASSESSMENT ADULT - ASR SWALLOW ASPIRATION MONITOR
change of breathing pattern/oral hygiene/position upright (90Y)/cough/gurgly voice/fever/pneumonia/throat clearing/upper respiratory infection
change of breathing pattern/oral hygiene/position upright (90Y)/cough/gurgly voice/pneumonia/throat clearing

## 2022-01-29 NOTE — SWALLOW BEDSIDE ASSESSMENT ADULT - SWALLOW EVAL: DIAGNOSIS
Oral & pharyngeal stage of swallow clinically unremarkable for assessed PO trials with no overt s/s aspiration. Additionally, pt denied globus sensation or feeling of food "coming up"
Severe oral dysphagia confounded by reduced cognition and lethargy. Unable to assess pharyngeal stage of swallow 2* no swallow triggered and bolus removed from pt's mouth

## 2022-01-29 NOTE — SWALLOW BEDSIDE ASSESSMENT ADULT - SLP PERTINENT HISTORY OF CURRENT PROBLEM
Pt known to this service from this admission, last seen 1/20/22 at which time a regular/thin liquid diet was rx'd. Per RN report and review of chart, pt with decline in mental stauts, delirium and has not been tolerating PO intake wth coughing, oral pocketing. Swallow re-evaluation requested.
Upon ?, pt reported: "the nurses at the clinic took pictures & told me the food goes down & comes assisted up". Pt reported this was recent,. however, unable to recall when & where. Upon further ?, pt reported it is better now as " I am aware of it"

## 2022-01-29 NOTE — CHART NOTE - NSCHARTNOTEFT_GEN_A_CORE
RN noted significant difficulty w/ PO meds. Dysphagia and coughing.     - NPO   - Hold all PO meds  - IVF: LR @ 100ml/hr  - Vitals will be assessed in the AM and IV meds will be given accordingly  - Speech and swallow eval in the AM  - RN to notify for any changes

## 2022-01-29 NOTE — PROGRESS NOTE ADULT - SUBJECTIVE AND OBJECTIVE BOX
[facility]                                        Neurology at [place]                                 Maribell Baptiste, & Brent                                  [address]                                        [place], [place], [address]                                             [phone]        CC: altered mental status     HPI:   The patient is a 76 year old man who had been seen by Dr Reyna in the office in December of 2019 for tremors.   His work up was negative at that time.   He has been receiving immunotherapy for renal cell cancer and in early January of 2022 developed diffuse weakness and decreased PO intake.   He was admitted 1/21/22 after he presented late 1/19 (initially he was kept in the ER observation unit)  with increased weakness and recurrent falls.   He was initially able to converse with staff/family.   On 1/27 he was noted to be more confused and was not recognizing his son.     Neurology was called to evaluate on 1/28/22.    Interim history:  Remains on [address].     ROS:   Unobtainable due to patient's condition.     MEDICATIONS  (STANDING):  amLODIPine   Tablet 10 milliGRAM(s) Oral at bedtime  ascorbic acid  Oral Tab/Cap - Peds 1000 milliGRAM(s) Oral daily  cholecalciferol Oral Tab/Cap - Peds 5000 Unit(s) Oral daily  clopidogrel Tablet 75 milliGRAM(s) Oral daily  dronabinol 2.5 milliGRAM(s) Oral two times a day  DULoxetine 60 milliGRAM(s) Oral daily  enoxaparin Injectable 40 milliGRAM(s) SubCutaneous daily  escitalopram 10 milliGRAM(s) Oral daily  famotidine  Oral Tab/Cap - Peds 40 milliGRAM(s) Oral at bedtime  fenofibrate Tablet 145 milliGRAM(s) Oral daily  lactated ringers. 1000 milliLiter(s) (100 mL/Hr) IV Continuous <Continuous>  levothyroxine 12.5 MICROGram(s) Oral daily  losartan 50 milliGRAM(s) Oral daily  melatonin 3 milliGRAM(s) Oral <User Schedule>  metoprolol tartrate 75 milliGRAM(s) Oral two times a day  mometasone 220 MICROgram(s) Inhaler 1 Puff(s) Inhalation daily  montelukast 10 milliGRAM(s) Oral daily  nystatin    Suspension 518562 Unit(s) Swish and Swallow four times a day  pantoprazole    Tablet 40 milliGRAM(s) Oral before breakfast  predniSONE   Tablet 30 milliGRAM(s) Oral daily  senna 2 Tablet(s) Oral at bedtime  sodium chloride 3 Gram(s) Oral two times a day  tamsulosin 0.4 milliGRAM(s) Oral at bedtime    Vital Signs Last 24 Hrs  T(C): 36.3 (29 Jan 2022 10:23), Max: 36.7 (28 Jan 2022 11:48)  T(F): 97.4 (29 Jan 2022 10:23), Max: 98.1 (28 Jan 2022 11:48)  HR: 64 (29 Jan 2022 10:23) (64 - 96)  BP: 149/78 (29 Jan 2022 10:23) (127/74 - 165/87)  RR: 18 (29 Jan 2022 10:23) (18 - 18)  SpO2: 96% (29 Jan 2022 10:23) (94% - 96%)    Detailed Neurologic Exam:    Mental status: The patient opens eyes to voice. He is currently non verbal. He does not follow instructions.     Cranial nerves: Pupils equal and react symmetrically to light. He blinks to threat to confrontation. He tracks with gaze. Facial sensation is intact. Facial musculature is symmetric. Palate elevates symmetrically. Tongue is midline.    Motor/Sensory: There is normal bulk and tone.  There is a subtle chin tremor.  Symmetric limb movement to stimuli.     Reflexes: 1+ throughout and plantar responses are flexor.    Cerebellar: Cannot be tested.       Labs:     01-29    138  |  102  |  14.5  ----------------------------<  119<H>  3.6   |  23.0  |  0.72    Ca    9.1      29 Jan 2022 09:29  Phos  2.8     01-29  Mg     1.8     01-29

## 2022-01-29 NOTE — PROGRESS NOTE ADULT - SUBJECTIVE AND OBJECTIVE BOX
KRISTAN DENNIS  ----------------------------------------  The patient was seen at bedside. Patient with delirium. Awake but poorly communicative.     Vital Signs Last 24 Hrs  T(C): 36.3 (29 Jan 2022 10:23), Max: 36.4 (28 Jan 2022 17:44)  T(F): 97.4 (29 Jan 2022 10:23), Max: 97.5 (28 Jan 2022 17:44)  HR: 64 (29 Jan 2022 10:23) (64 - 96)  BP: 149/78 (29 Jan 2022 10:23) (127/74 - 149/78)  BP(mean): --  RR: 18 (29 Jan 2022 10:23) (18 - 18)  SpO2: 96% (29 Jan 2022 10:23) (94% - 96%)    PHYSICAL EXAMINATION:  ----------------------------------------  General appearance: No acute distress, Awake, Alert  HEENT: Normocephalic, Atraumatic, Conjunctiva clear, EOMI  Neck: Supple, No JVD, No tenderness  Lungs: Breath sound equal bilaterally, No wheezes, No rales  Cardiovascular: S1S2, Regular rhythm  Abdomen: Soft, Nontender, Nondistended, No guarding/rebound, Positive bowel sounds  Extremities: No clubbing, No cyanosis, No edema, No calf tenderness  Neuro: Strength equal bilaterally, No tremors  Psychiatric: Flat affect    LABORATORY STUDIES:  ----------------------------------------  01-29    138  |  102  |  14.5  ----------------------------<  119<H>  3.6   |  23.0  |  0.72    Ca    9.1      29 Jan 2022 09:29  Phos  2.8     01-29  Mg     1.8     01-29    MEDICATIONS  (STANDING):  amLODIPine   Tablet 10 milliGRAM(s) Oral at bedtime  ascorbic acid  Oral Tab/Cap - Peds 1000 milliGRAM(s) Oral daily  cholecalciferol Oral Tab/Cap - Peds 5000 Unit(s) Oral daily  clopidogrel Tablet 75 milliGRAM(s) Oral daily  dronabinol 2.5 milliGRAM(s) Oral two times a day  DULoxetine 60 milliGRAM(s) Oral daily  enoxaparin Injectable 40 milliGRAM(s) SubCutaneous daily  escitalopram 10 milliGRAM(s) Oral daily  famotidine  Oral Tab/Cap - Peds 40 milliGRAM(s) Oral at bedtime  fenofibrate Tablet 145 milliGRAM(s) Oral daily  lactated ringers. 1000 milliLiter(s) (100 mL/Hr) IV Continuous <Continuous>  levothyroxine 12.5 MICROGram(s) Oral daily  losartan 50 milliGRAM(s) Oral daily  melatonin 3 milliGRAM(s) Oral <User Schedule>  metoprolol tartrate 75 milliGRAM(s) Oral two times a day  mometasone 220 MICROgram(s) Inhaler 1 Puff(s) Inhalation daily  montelukast 10 milliGRAM(s) Oral daily  nystatin    Suspension [id number] Unit(s) Swish and Swallow four times a day  pantoprazole    Tablet 40 milliGRAM(s) Oral before breakfast  predniSONE   Tablet 30 milliGRAM(s) Oral daily  senna 2 Tablet(s) Oral at bedtime  sodium chloride 3 Gram(s) Oral two times a day  tamsulosin 0.4 milliGRAM(s) Oral at bedtime    MEDICATIONS  (PRN):  acetaminophen     Tablet .. 650 milliGRAM(s) Oral every 6 hours PRN Temp greater or equal to 38C (100.4F), Mild Pain (1 - 3)  ALBUTerol    90 MICROgram(s) HFA Inhaler 2 Puff(s) Inhalation every 6 hours PRN Bronchospasm  bisacodyl 10 milliGRAM(s) Oral once PRN Constipation  metoprolol tartrate Injectable 5 milliGRAM(s) IV Push every 6 hours PRN HR > 120  polyethylene glycol 3350 17 Gram(s) Oral daily PRN Constipation      ASSESSMENT / PLAN:  ----------------------------------------  76 year old male with HTN, HLD, CAD s/p PCI x 4, asthma, s/p left nephrectomy and left RCC on immunotherapy (biweekly infusions per son with [place] [facility] Ca) presenting from home with recurrent falls and profound weakness x 10 days. Placed in observation waiting for dsc to [facility]. Noted to be febrile and hyponatremic while in ER and admitted to Inpatient. Noted fever/ weakness/dehydration/ electrolyte imbalance/ poor PO intake/ gen debility. Monitored off antibx. fever w/u so far negative. deemed likely SE of immunotherpay by Onc. High dose steroids being started today for same. Pt was moved around in CDU and then to MEd surg floor when noted new onset acute delirium. CM working towards [facility] placement    Delirium - CT of the head was without acute intracranial pathology. Continue with supportive care. Neurology consultation noted. Suspect secondary to prolonged hospital stay. No focal deficits noted.    Weakness / Poor oral intake - Was previously treated with dronabinol. Now NPO after Speech Pathology evaluation with recommendations to avoid oral intake. On intravenous fluids.    Fever - Resolved. Blood cultures were without growth. COVID-19 negative.    Renal cell carcinoma - Noted to have a history of nephrectomy and immunotherapy. On prednisone for possible immunotherapy side effect.    Hyponatremia - Sodium level improved on repeat studies.    Coronary artery disease / Hypertension - Close blood pressure monitoring. For resumption of antiplatelet and statin therapy when tolerating oral intake.    Hypothyroidism - On levothyroxine.

## 2022-01-29 NOTE — SWALLOW BEDSIDE ASSESSMENT ADULT - SLP GENERAL OBSERVATIONS
Pt received asleep in bed, arousable with maximal multi-modality cues, however very lethargic throughout, nonverbal, nodding head "yes" intermittently, followed commands to open mouth and stick out tongue, however no other command following, reduced cognition (this is a change in mental status as compared to initial evaluation), nonverbal pain 0/10 pre/post eval
Pt received & seen seated upright in bed, awake/alert, oriented , reduced cognition, 0/10 pain

## 2022-01-29 NOTE — SWALLOW BEDSIDE ASSESSMENT ADULT - ORAL PREPARATORY PHASE
Within functional limits
reduced stripping of spoon, reduced attention to bolus, which remained in anterior oral cavity without attempts at bolus maniupulation or posterior propulsion. Bolus removed by clinician./Reduced oral grading

## 2022-01-29 NOTE — SWALLOW BEDSIDE ASSESSMENT ADULT - COMMENTS
76 year old male with HTN, HLD, CAD s/p PCI x 4, asthma, s/p left nephrectomy and left RCC on immunotherapy (biweekly infusions per son with [place] [facility]ld [facility] Ca) presenting from home with recurrent falls and profound weakness x 10 days. Placed in observation waiting for dsc to [facility]. Noted to be febrile and hyponatremic while in ER and admitted to Inpatient. Noted fever/ weakness/dehydration/ electrolyte imbalance/ poor PO intake/ gen debility. Monitored off antibx. fever w/u so far negative. deemed likely SE of immunotherpay by Onc. High dose steroids being started today for same. Pt was moved around in CDU and then to MEd surg floor when noted new onset acute delirium. CM working towards [facility] placement  Subacute delirium; Noted since yesterday- disoriented to name and place and not interactive. Was being prepped for discharge when Delirium noted. D/W CM, if Delirium resolved can d/c over weekend, otherwise likely d/c plan for Monday. RPt COVID done today is negative.

## 2022-01-30 LAB
ANION GAP SERPL CALC-SCNC: 16 MMOL/L — SIGNIFICANT CHANGE UP (ref 5–17)
BUN SERPL-MCNC: 14.7 MG/DL — SIGNIFICANT CHANGE UP (ref 8–20)
CALCIUM SERPL-MCNC: 9 MG/DL — SIGNIFICANT CHANGE UP (ref 8.6–10.2)
CHLORIDE SERPL-SCNC: 99 MMOL/L — SIGNIFICANT CHANGE UP (ref 98–107)
CO2 SERPL-SCNC: 21 MMOL/L — LOW (ref 22–29)
CREAT SERPL-MCNC: 0.8 MG/DL — SIGNIFICANT CHANGE UP (ref 0.5–1.3)
GLUCOSE SERPL-MCNC: 127 MG/DL — HIGH (ref 70–99)
HCT VFR BLD CALC: 37.4 % — LOW (ref 39–50)
HGB BLD-MCNC: 12.7 G/DL — LOW (ref 13–17)
MCHC RBC-ENTMCNC: 30.5 PG — SIGNIFICANT CHANGE UP (ref 27–34)
MCHC RBC-ENTMCNC: 34 GM/DL — SIGNIFICANT CHANGE UP (ref 32–36)
MCV RBC AUTO: 89.7 FL — SIGNIFICANT CHANGE UP (ref 80–100)
PLATELET # BLD AUTO: 291 K/UL — SIGNIFICANT CHANGE UP (ref 150–400)
POTASSIUM SERPL-MCNC: 3.9 MMOL/L — SIGNIFICANT CHANGE UP (ref 3.5–5.3)
POTASSIUM SERPL-SCNC: 3.9 MMOL/L — SIGNIFICANT CHANGE UP (ref 3.5–5.3)
RBC # BLD: 4.17 M/UL — LOW (ref 4.2–5.8)
RBC # FLD: 13 % — SIGNIFICANT CHANGE UP (ref 10.3–14.5)
SODIUM SERPL-SCNC: 136 MMOL/L — SIGNIFICANT CHANGE UP (ref 135–145)
WBC # BLD: 3.63 K/UL — LOW (ref 3.8–10.5)
WBC # FLD AUTO: 3.63 K/UL — LOW (ref 3.8–10.5)

## 2022-01-30 PROCEDURE — 99233 SBSQ HOSP IP/OBS HIGH 50: CPT

## 2022-01-30 PROCEDURE — 99232 SBSQ HOSP IP/OBS MODERATE 35: CPT

## 2022-01-30 RX ADMIN — Medication 6.25 MICROGRAM(S): at 21:24

## 2022-01-30 RX ADMIN — SODIUM CHLORIDE 100 MILLILITER(S): 9 INJECTION, SOLUTION INTRAVENOUS at 00:07

## 2022-01-30 RX ADMIN — Medication 6.25 MICROGRAM(S): at 00:07

## 2022-01-30 RX ADMIN — SODIUM CHLORIDE 100 MILLILITER(S): 9 INJECTION, SOLUTION INTRAVENOUS at 21:24

## 2022-01-30 RX ADMIN — Medication 40 MILLIGRAM(S): at 05:10

## 2022-01-30 RX ADMIN — PANTOPRAZOLE SODIUM 40 MILLIGRAM(S): 20 TABLET, DELAYED RELEASE ORAL at 10:34

## 2022-01-30 RX ADMIN — MOMETASONE FUROATE 1 PUFF(S): 220 INHALANT RESPIRATORY (INHALATION) at 08:22

## 2022-01-30 RX ADMIN — ENOXAPARIN SODIUM 40 MILLIGRAM(S): 100 INJECTION SUBCUTANEOUS at 10:34

## 2022-01-30 NOTE — PROGRESS NOTE ADULT - SUBJECTIVE AND OBJECTIVE BOX
KRISTAN DENNIS  ----------------------------------------  The patient was seen at bedside. Patient with delirium. Appears in no distress. Opens eyes to verbal prompts and followed some simple commands. Not communicative.    Vital Signs Last 24 Hrs  T(C): 36.7 (30 Jan 2022 09:18), Max: 37.1 (30 Jan 2022 04:46)  T(F): 98 (30 Jan 2022 09:18), Max: 98.7 (30 Jan 2022 04:46)  HR: 94 (30 Jan 2022 09:18) (74 - 116)  BP: 160/95 (30 Jan 2022 09:18) (151/89 - 160/95)  BP(mean): --  RR: 18 (30 Jan 2022 09:18) (18 - 18)  SpO2: 98% (30 Jan 2022 09:18) (95% - 98%)    PHYSICAL EXAMINATION:  ----------------------------------------  General appearance: No acute distress, Awake, Alert  HEENT: Normocephalic, Atraumatic, Conjunctiva clear, EOMI  Neck: Supple, No JVD, No tenderness  Lungs: Breath sound equal bilaterally, No wheezes, No rales  Cardiovascular: S1S2, Regular rhythm  Abdomen: Soft, Nontender, Nondistended, No guarding/rebound, Positive bowel sounds  Extremities: No clubbing, No cyanosis, No edema, No calf tenderness  Neuro: Strength equal bilaterally, No tremors  Psychiatric: Flat affect    LABORATORY STUDIES:  ----------------------------------------             12.7   3.63  )-----------( 291      ( 30 Jan 2022 11:01 )             37.4     01-30    136  |  99  |  14.7  ----------------------------<  127<H>  3.9   |  21.0<L>  |  0.80    Ca    9.0      30 Jan 2022 11:01  Phos  2.8     01-29  Mg     1.8     01-29    MEDICATIONS  (STANDING):  enoxaparin Injectable 40 milliGRAM(s) SubCutaneous daily  lactated ringers. 1000 milliLiter(s) (100 mL/Hr) IV Continuous <Continuous>  levothyroxine Injectable 6.25 MICROGram(s) IV Push at bedtime  mometasone 220 MICROgram(s) Inhaler 1 Puff(s) Inhalation daily  pantoprazole  Injectable 40 milliGRAM(s) IV Push daily    MEDICATIONS  (PRN):  ALBUTerol    90 MICROgram(s) HFA Inhaler 2 Puff(s) Inhalation every 6 hours PRN Bronchospasm  metoprolol tartrate Injectable 5 milliGRAM(s) IV Push every 6 hours PRN HR > 120      ASSESSMENT / PLAN:  ----------------------------------------  76 year old male with HTN, HLD, CAD s/p PCI x 4, asthma, s/p left nephrectomy and left RCC on immunotherapy (biweekly infusions per son with [place] [facility] Ca) presenting from home with recurrent falls and profound weakness x 10 days. Placed in observation waiting for dsc to [facility]. Noted to be febrile and hyponatremic while in ER and admitted to Inpatient. Noted fever/ weakness/dehydration/ electrolyte imbalance/ poor PO intake/ gen debility. Monitored off antibx. fever w/u so far negative. deemed likely SE of immunotherpay by Onc. High dose steroids being started today for same. Pt was moved around in CDU and then to MEd surg floor when noted new onset acute delirium. CM working towards [facility] placement    Delirium - CT of the head was without acute intracranial pathology. Suspect secondary to prolonged hospital stay. Discussed with Neurology, for EEG to evaluate for possible seizure activity. Speech Pathology evaluation with recommendations to avoid oral intake. On intravenous fluids.    Weakness / Poor oral intake - Was previously treated with dronabinol. Now NPO after Speech Pathology evaluation with recommendations to avoid oral intake. On intravenous fluids.    Fever - Blood cultures were without growth. COVID-19 negative. Was previously on steroids for possible side effect from immunotherapy, to discontinue as fevers had resolved.    Renal cell carcinoma - Noted to have a history of nephrectomy and immunotherapy.    Hyponatremia - Sodium level improved on repeat studies.    Coronary artery disease / Hypertension - Close blood pressure monitoring. For resumption of antiplatelet and statin therapy when tolerating oral intake.    Hypothyroidism - On levothyroxine.

## 2022-01-30 NOTE — PROGRESS NOTE ADULT - SUBJECTIVE AND OBJECTIVE BOX
[facility]                                        [facility] [place]                                 Maribell Baptiste, & Brent                                  [address]                                        [place], [place], [address]                                             [phone]        CC: altered mental status     HPI:   The patient is a 76 year old man who had been seen by Dr Reyna in the office in December of 2019 for tremors.   His work up was negative at that time.   He has been receiving immunotherapy for renal cell cancer and in early January of 2022 developed diffuse weakness and decreased PO intake.   He was admitted 1/21/22 after he presented late 1/19 (initially he was kept in the ER observation unit)  with increased weakness and recurrent falls.   He was initially able to converse with staff/family.   On 1/27 he was noted to be more confused and was not recognizing his son.     Neurology was called to evaluate on 1/28/22.    Interim history:  Remains on [address] [place].     ROS:   Unobtainable due to patient's condition.     MEDICATIONS  (STANDING):  enoxaparin Injectable 40 milliGRAM(s) SubCutaneous daily  lactated ringers. 1000 milliLiter(s) (100 mL/Hr) IV Continuous <Continuous>  levothyroxine Injectable 6.25 MICROGram(s) IV Push at bedtime  methylPREDNISolone sodium succinate Injectable 40 milliGRAM(s) IV Push daily  mometasone 220 MICROgram(s) Inhaler 1 Puff(s) Inhalation daily  pantoprazole  Injectable 40 milliGRAM(s) IV Push daily    Vital Signs Last 24 Hrs  T(C): 36.7 (30 Jan 2022 09:18), Max: 37.1 (30 Jan 2022 04:46)  T(F): 98 (30 Jan 2022 09:18), Max: 98.7 (30 Jan 2022 04:46)  HR: 94 (30 Jan 2022 09:18) (74 - 116)  BP: 160/95 (30 Jan 2022 09:18) (151/89 - 160/95)  RR: 18 (30 Jan 2022 09:18) (18 - 18)  SpO2: 98% (30 Jan 2022 09:18) (95% - 98%)    Detailed Neurologic Exam:    Mental status: The patient opens eyes to voice. He is currently non verbal. He does not follow instructions.     Cranial nerves: Pupils equal and react symmetrically to light. He blinks to threat to confrontation. He tracks with gaze. Facial sensation is intact. Facial musculature is symmetric. Palate elevates symmetrically. Tongue is midline.    Motor/Sensory: There is normal bulk and tone.  There is a subtle chin tremor.  Symmetric limb movement to stimuli.     Reflexes: 1+ throughout and plantar responses are flexor.    Cerebellar: Cannot be tested.     Labs:     01-29    138  |  102  |  14.5  ----------------------------<  119<H>  3.6   |  23.0  |  0.72    Ca    9.1      29 Jan 2022 09:29  Phos  2.8     01-29  Mg     1.8     01-29                              12.7   3.63  )-----------( 291      ( 30 Jan 2022 11:01 )             37.4       Rad:   CT head negative x 2

## 2022-01-30 NOTE — PROGRESS NOTE ADULT - ASSESSMENT
76y Male with multiple medical issues now admitted with fevers and diffuse weakness.     Altered mental status   Likely delirium secondary to prolonged hospital confinement.   No focality or imaging finding to suggest stroke.   Will check EEG.    Fevers   Subsided.   Off antibiotics at present.

## 2022-01-31 LAB
ANION GAP SERPL CALC-SCNC: 12 MMOL/L — SIGNIFICANT CHANGE UP (ref 5–17)
BUN SERPL-MCNC: 20.1 MG/DL — HIGH (ref 8–20)
CALCIUM SERPL-MCNC: 8.9 MG/DL — SIGNIFICANT CHANGE UP (ref 8.6–10.2)
CHLORIDE SERPL-SCNC: 103 MMOL/L — SIGNIFICANT CHANGE UP (ref 98–107)
CO2 SERPL-SCNC: 23 MMOL/L — SIGNIFICANT CHANGE UP (ref 22–29)
CREAT SERPL-MCNC: 0.85 MG/DL — SIGNIFICANT CHANGE UP (ref 0.5–1.3)
CULTURE RESULTS: SIGNIFICANT CHANGE UP
CULTURE RESULTS: SIGNIFICANT CHANGE UP
GLUCOSE SERPL-MCNC: 92 MG/DL — SIGNIFICANT CHANGE UP (ref 70–99)
POTASSIUM SERPL-MCNC: 3.5 MMOL/L — SIGNIFICANT CHANGE UP (ref 3.5–5.3)
POTASSIUM SERPL-SCNC: 3.5 MMOL/L — SIGNIFICANT CHANGE UP (ref 3.5–5.3)
SODIUM SERPL-SCNC: 137 MMOL/L — SIGNIFICANT CHANGE UP (ref 135–145)
SPECIMEN SOURCE: SIGNIFICANT CHANGE UP
SPECIMEN SOURCE: SIGNIFICANT CHANGE UP

## 2022-01-31 PROCEDURE — 99232 SBSQ HOSP IP/OBS MODERATE 35: CPT

## 2022-01-31 PROCEDURE — 99233 SBSQ HOSP IP/OBS HIGH 50: CPT

## 2022-01-31 PROCEDURE — 95720 EEG PHY/QHP EA INCR W/VEEG: CPT

## 2022-01-31 RX ORDER — SODIUM CHLORIDE 9 MG/ML
1000 INJECTION INTRAMUSCULAR; INTRAVENOUS; SUBCUTANEOUS
Refills: 0 | Status: DISCONTINUED | OUTPATIENT
Start: 2022-01-31 | End: 2022-02-02

## 2022-01-31 RX ORDER — LABETALOL HCL 100 MG
10 TABLET ORAL EVERY 6 HOURS
Refills: 0 | Status: DISCONTINUED | OUTPATIENT
Start: 2022-01-31 | End: 2022-02-04

## 2022-01-31 RX ADMIN — Medication 6.25 MICROGRAM(S): at 21:35

## 2022-01-31 RX ADMIN — Medication 10 MILLIGRAM(S): at 17:46

## 2022-01-31 RX ADMIN — SODIUM CHLORIDE 50 MILLILITER(S): 9 INJECTION INTRAMUSCULAR; INTRAVENOUS; SUBCUTANEOUS at 17:46

## 2022-01-31 RX ADMIN — PANTOPRAZOLE SODIUM 40 MILLIGRAM(S): 20 TABLET, DELAYED RELEASE ORAL at 10:48

## 2022-01-31 RX ADMIN — ENOXAPARIN SODIUM 40 MILLIGRAM(S): 100 INJECTION SUBCUTANEOUS at 10:48

## 2022-01-31 RX ADMIN — MOMETASONE FUROATE 1 PUFF(S): 220 INHALANT RESPIRATORY (INHALATION) at 08:53

## 2022-01-31 NOTE — PROGRESS NOTE ADULT - SUBJECTIVE AND OBJECTIVE BOX
[facility]  INFECTIOUS DISEASES AND INTERNAL MEDICINE at [place] and [place]  =======================================================                               Alec Epperson MD#  Victor Manuel Minor MD*                                     Brianna Ndiaye MD*    Melida Gil MD*            Diplomates American Board of Internal Medicine & Infectious Diseases                # [place] Office - Appt - Tel  [phone] Fax [phone]                * [place] Office - Appt - Tel [phone] Fax [phone]                                  Hospital Consult line:  [phone]  =======================================================    KRISTAN DENNIS [id number]    Follow up: Fever    Had fever to 102.2F 1/25/22  No fever since     Patient with AMS now       Allergies:  No Known Allergies       REVIEW OF SYSTEMS:  Patient is confused.       Physical Exam:  GEN: NAD, pleasant  HEENT: normocephalic and atraumatic. EOMI. PERRL.  Anicteric  NECK: Supple.   LUNGS: Decreased Basal BS B/L   HEART: Regular rate and rhythm   ABDOMEN: Soft, nontender, and nondistended.  Positive bowel sounds.    : No CVA tenderness  EXTREMITIES: Without any edema.  MSK: No joint swelling  NEUROLOGIC: No Focal Deficits  PSYCHIATRIC: Confused   SKIN: No Rash      Vitals:    T(F): 97.4 (31 Jan 2022 11:24), Max: 97.5 (31 Jan 2022 04:38)  HR: 68 (31 Jan 2022 11:24)  BP: 152/98 (31 Jan 2022 11:24)  RR: 18 (31 Jan 2022 11:24)  SpO2: 95% (31 Jan 2022 11:24) (95% - 96%)  temp max in last 48H T(F): , Max: 98.7 (01-30-22 @ 04:46)    Current Antibiotics:    Other medications:  enoxaparin Injectable 40 milliGRAM(s) SubCutaneous daily  lactated ringers. 1000 milliLiter(s) IV Continuous <Continuous>  levothyroxine Injectable 6.25 MICROGram(s) IV Push at bedtime  mometasone 220 MICROgram(s) Inhaler 1 Puff(s) Inhalation daily  pantoprazole  Injectable 40 milliGRAM(s) IV Push daily                            12.7   3.63  )-----------( 291      ( 30 Jan 2022 11:01 )             37.4     01-31    137  |  103  |  20.1<H>  ----------------------------<  92  3.5   |  23.0  |  0.85    Ca    8.9      31 Jan 2022 06:08      RECENT CULTURES:  01-26 @ 07:46 .Blood Blood-Peripheral     No growth at 5 days.    01-22 @ 17:24 .Blood Blood-Peripheral     No growth at 5 days.    01-21 @ 02:09 Clean Catch Clean Catch (Midstream)     <10,000 CFU/mL Normal Urogenital [place]    01-20 @ 01:04 .Blood Blood     No growth at 5 days.    01-17 @ 23:03 Clean Catch Clean Catch (Midstream)     <10,000 CFU/mL Normal Urogenital [place]      WBC Count: 3.63 K/uL (01-30-22 @ 11:01)    Creatinine, Serum: 0.85 mg/dL (01-31-22 @ 06:08)  Creatinine, Serum: 0.80 mg/dL (01-30-22 @ 11:01)  Creatinine, Serum: 0.72 mg/dL (01-29-22 @ 09:29)  Creatinine, Serum: 0.95 mg/dL (01-28-22 @ 06:34)    Procalcitonin, Serum: 0.37 ng/mL (01-28-22 @ 06:34)  Procalcitonin, Serum: 0.63 ng/mL (01-26-22 @ 07:46)  Procalcitonin, Serum: 0.43 ng/mL (01-22-22 @ 11:49)  Procalcitonin, Serum: 0.28 ng/mL (01-21-22 @ 16:22)     COVID-19 PCR: NotDetec (01-28-22 @ 06:37)  COVID-19 PCR: NotDetec (01-27-22 @ 01:33)  SARS-CoV-2: NotDetec (01-20-22 @ 01:04)  SARS-CoV-2: NotDetec (01-17-22 @ 15:02)

## 2022-01-31 NOTE — PROGRESS NOTE ADULT - ASSESSMENT
76y Male with multiple medical issues now admitted with fevers and diffuse weakness.     Altered mental status   Hypoactive delirium  Likely delirium secondary to prolonged hospital confinement.   No focality or imaging finding to suggest stroke.   video EEG set up today and report will be followed up upon    Fevers   Subsided.   Off antibiotics at present.     will follow with you    Cali Reyna MD PhD   [id number]

## 2022-01-31 NOTE — PROGRESS NOTE ADULT - SUBJECTIVE AND OBJECTIVE BOX
KRISTAN DENNIS  ----------------------------------------  The patient was seen at bedside. Patient with delirium. In no distress, easily awakened by voice and makes eye contact and nods/shakes head to questions but not speaking.    Vital Signs Last 24 Hrs  T(C): 36.4 (31 Jan 2022 04:38), Max: 36.4 (31 Jan 2022 04:38)  T(F): 97.5 (31 Jan 2022 04:38), Max: 97.5 (31 Jan 2022 04:38)  HR: 82 (31 Jan 2022 08:53) (82 - 84)  BP: 154/95 (31 Jan 2022 04:38) (154/95 - 154/95)  BP(mean): --  RR: 18 (31 Jan 2022 04:38) (18 - 18)  SpO2: 96% (31 Jan 2022 04:38) (96% - 96%)    PHYSICAL EXAMINATION:  ----------------------------------------  General appearance: No acute distress, Awake, Alert  HEENT: Normocephalic, Atraumatic, Conjunctiva clear, EOMI  Neck: Supple, No JVD, No tenderness  Lungs: Breath sound equal bilaterally, No wheezes, No rales  Cardiovascular: S1S2, Regular rhythm  Abdomen: Soft, Nontender, Nondistended, No guarding/rebound, Positive bowel sounds  Extremities: No clubbing, No cyanosis, No edema, No calf tenderness  Neuro: Strength equal bilaterally, No tremors  Psychiatric: Flat affect    LABORATORY STUDIES:  ----------------------------------------             12.7   3.63  )-----------( 291      ( 30 Jan 2022 11:01 )             37.4     01-31    137  |  103  |  20.1<H>  ----------------------------<  92  3.5   |  23.0  |  0.85    Ca    8.9      31 Jan 2022 06:08    MEDICATIONS  (STANDING):  enoxaparin Injectable 40 milliGRAM(s) SubCutaneous daily  lactated ringers. 1000 milliLiter(s) (100 mL/Hr) IV Continuous <Continuous>  levothyroxine Injectable 6.25 MICROGram(s) IV Push at bedtime  mometasone 220 MICROgram(s) Inhaler 1 Puff(s) Inhalation daily  pantoprazole  Injectable 40 milliGRAM(s) IV Push daily    MEDICATIONS  (PRN):  ALBUTerol    90 MICROgram(s) HFA Inhaler 2 Puff(s) Inhalation every 6 hours PRN Bronchospasm  metoprolol tartrate Injectable 5 milliGRAM(s) IV Push every 6 hours PRN HR > 120      ASSESSMENT / PLAN:  ----------------------------------------  76 year old male with HTN, HLD, CAD s/p PCI x 4, asthma, s/p left nephrectomy and left RCC on immunotherapy (biweekly infusions per son with [place] Bld & Ca) presenting from home with recurrent falls and profound weakness x 10 days. Placed in observation waiting for dsc to [facility]. Noted to be febrile and hyponatremic while in ER and admitted to Inpatient. Noted fever/ weakness/dehydration/ electrolyte imbalance/ poor PO intake/ gen debility. Monitored off antibx. fever w/u so far negative. deemed likely SE of immunotherpay by Onc. High dose steroids being started today for same. Pt was moved around in CDU and then to MEd surg floor when noted new onset acute delirium.    Delirium - CT of the head was without acute intracranial pathology. Neurology follow up noted, suspect secondary to prolonged hospital stay. For EEG.    Weakness / Poor oral intake - Was previously treated with dronabinol. Now NPO after Speech Pathology evaluation with recommendations to avoid oral intake. On intravenous fluids for hydration.    Fever - Resolved. Blood cultures were without growth. COVID-19 negative.    Renal cell carcinoma - Noted to have a history of nephrectomy and immunotherapy.    Hyponatremia - Sodium level improved on repeat studies.    Coronary artery disease / Hypertension - Close blood pressure monitoring. For resumption of antiplatelet and statin therapy if tolerating oral intake.    Hypothyroidism - On levothyroxine.

## 2022-01-31 NOTE — PROGRESS NOTE ADULT - SUBJECTIVE AND OBJECTIVE BOX
[facility]                                        Neurology at [place]                                 Maribell Baptiste, & Brent                                  [address]                                        [place], [place], [address]                                             [phone]        CC: altered mental status     HPI:   The patient is a 76 year old man who had been seen by Dr Reyna in the office in December of 2019 for tremors.   His work up was negative at that time.   He has been receiving immunotherapy for renal cell cancer and in early January of 2022 developed diffuse weakness and decreased PO intake.   He was admitted 1/21/22 after he presented late 1/19 (initially he was kept in the ER observation unit)  with increased weakness and recurrent falls.   He was initially able to converse with staff/family.   On 1/27 he was noted to be more confused and was not recognizing his son.     Neurology was called to evaluate on 1/28/22. (JW)    Interim history: quiet, low voice, on vEEG    ROS:   Unobtainable due to patient's condition.     MEDICATIONS  (STANDING):  enoxaparin Injectable 40 milliGRAM(s) SubCutaneous daily  lactated ringers. 1000 milliLiter(s) (100 mL/Hr) IV Continuous <Continuous>  levothyroxine Injectable 6.25 MICROGram(s) IV Push at bedtime  mometasone 220 MICROgram(s) Inhaler 1 Puff(s) Inhalation daily  pantoprazole  Injectable 40 milliGRAM(s) IV Push daily    MEDICATIONS  (PRN):  ALBUTerol    90 MICROgram(s) HFA Inhaler 2 Puff(s) Inhalation every 6 hours PRN Bronchospasm  metoprolol tartrate Injectable 5 milliGRAM(s) IV Push every 6 hours PRN HR > 120      Vital Signs Last 24 Hrs  T(C): 36.3 (31 Jan 2022 11:24), Max: 36.4 (31 Jan 2022 04:38)  T(F): 97.4 (31 Jan 2022 11:24), Max: 97.5 (31 Jan 2022 04:38)  HR: 68 (31 Jan 2022 11:24) (68 - 84)  BP: 152/98 (31 Jan 2022 11:24) (152/98 - 154/95)  BP(mean): --  RR: 18 (31 Jan 2022 11:24) (18 - 18)  SpO2: 95% (31 Jan 2022 11:24) (95% - 96%)    Detailed Neurologic Exam:    Mental status: The patient opens eyes to voice. He is has very quiet voice.  He is oriented to self, hospital and 2021. He follows instructions, but slowly    Cranial nerves: Pupils equal and react symmetrically to light. He blinks to threat to confrontation. He tracks with gaze. Facial sensation is intact. Facial musculature is symmetric. Palate elevates symmetrically. Tongue is midline.    Motor/Sensory: There is normal bulk and tone.  There is a subtle chin tremor.  Symmetric limb movement to stimuli.     Reflexes: 1+ throughout and plantar responses are flexor.    Cerebellar: Cannot be tested.     Labs:                           12.7   3.63  )-----------( 291      ( 30 Jan 2022 11:01 )             37.4     01-31    137  |  103  |  20.1<H>  ----------------------------<  92  3.5   |  23.0  |  0.85    Ca    8.9      31 Jan 2022 06:08      Rad:   CT head 1/27/22- no stroke, mass or blood

## 2022-01-31 NOTE — PROGRESS NOTE ADULT - ASSESSMENT
76y  Male with h/o HTN, HLD, CAD s/p PCI x 4, asthma, s/p left nephrectomy and left RCC on immunotherapy (biweekly infusions per son with [place] Bld & Ca). Patient was brought to the ER by his son for recurrent falls and profound weakness x 10 days.  He reports we was well up until one day after waking up and having weakness. No other associated symptoms. Denies sick contacts. No noted fever at home. In the ER patient was febrile to 102.5F no leukocytosis. Started on Ceftriaxone and Azithromycin.       Fever - resolved   left RCC on immunotherapy      - Had fever 1/25 but no fever work up done  - blood cultures 1/26 no growth   - Repeat UA with no UTI   - Blood cultures no growth 1/20  - Repeat blood cultures no growth 1/22   - CT A/P  with no acute findings   - RVP/COVID 19 PCR Negative   - Urine culture not significant   - CT Chest reporting no PNA   - UA not suggestive of UTI   - Procalcitonin level 0.37  - Hem/Onc consult noted   - Follow up cultures  - Trend Fever  - Trend WBC      Will sign off. please call PRN.       d/w Dr Wise

## 2022-02-01 LAB
ANION GAP SERPL CALC-SCNC: 15 MMOL/L — SIGNIFICANT CHANGE UP (ref 5–17)
BUN SERPL-MCNC: 18.9 MG/DL — SIGNIFICANT CHANGE UP (ref 8–20)
CALCIUM SERPL-MCNC: 9.2 MG/DL — SIGNIFICANT CHANGE UP (ref 8.6–10.2)
CHLORIDE SERPL-SCNC: 104 MMOL/L — SIGNIFICANT CHANGE UP (ref 98–107)
CO2 SERPL-SCNC: 22 MMOL/L — SIGNIFICANT CHANGE UP (ref 22–29)
CREAT SERPL-MCNC: 0.78 MG/DL — SIGNIFICANT CHANGE UP (ref 0.5–1.3)
GLUCOSE SERPL-MCNC: 106 MG/DL — HIGH (ref 70–99)
HCT VFR BLD CALC: 38.3 % — LOW (ref 39–50)
HGB BLD-MCNC: 13.2 G/DL — SIGNIFICANT CHANGE UP (ref 13–17)
MCHC RBC-ENTMCNC: 30.6 PG — SIGNIFICANT CHANGE UP (ref 27–34)
MCHC RBC-ENTMCNC: 34.5 GM/DL — SIGNIFICANT CHANGE UP (ref 32–36)
MCV RBC AUTO: 88.7 FL — SIGNIFICANT CHANGE UP (ref 80–100)
PLATELET # BLD AUTO: 303 K/UL — SIGNIFICANT CHANGE UP (ref 150–400)
POTASSIUM SERPL-MCNC: 3.8 MMOL/L — SIGNIFICANT CHANGE UP (ref 3.5–5.3)
POTASSIUM SERPL-SCNC: 3.8 MMOL/L — SIGNIFICANT CHANGE UP (ref 3.5–5.3)
RBC # BLD: 4.32 M/UL — SIGNIFICANT CHANGE UP (ref 4.2–5.8)
RBC # FLD: 13.1 % — SIGNIFICANT CHANGE UP (ref 10.3–14.5)
SARS-COV-2 RNA SPEC QL NAA+PROBE: SIGNIFICANT CHANGE UP
SODIUM SERPL-SCNC: 141 MMOL/L — SIGNIFICANT CHANGE UP (ref 135–145)
WBC # BLD: 4.77 K/UL — SIGNIFICANT CHANGE UP (ref 3.8–10.5)
WBC # FLD AUTO: 4.77 K/UL — SIGNIFICANT CHANGE UP (ref 3.8–10.5)

## 2022-02-01 PROCEDURE — 99233 SBSQ HOSP IP/OBS HIGH 50: CPT

## 2022-02-01 PROCEDURE — 71045 X-RAY EXAM CHEST 1 VIEW: CPT | Mod: 26

## 2022-02-01 PROCEDURE — 99232 SBSQ HOSP IP/OBS MODERATE 35: CPT

## 2022-02-01 RX ORDER — LOSARTAN POTASSIUM 100 MG/1
50 TABLET, FILM COATED ORAL DAILY
Refills: 0 | Status: DISCONTINUED | OUTPATIENT
Start: 2022-02-01 | End: 2022-02-02

## 2022-02-01 RX ORDER — CLOPIDOGREL BISULFATE 75 MG/1
75 TABLET, FILM COATED ORAL DAILY
Refills: 0 | Status: DISCONTINUED | OUTPATIENT
Start: 2022-02-01 | End: 2022-02-02

## 2022-02-01 RX ORDER — DOXAZOSIN MESYLATE 4 MG
4 TABLET ORAL AT BEDTIME
Refills: 0 | Status: DISCONTINUED | OUTPATIENT
Start: 2022-02-01 | End: 2022-02-02

## 2022-02-01 RX ORDER — METOPROLOL TARTRATE 50 MG
50 TABLET ORAL
Refills: 0 | Status: DISCONTINUED | OUTPATIENT
Start: 2022-02-01 | End: 2022-02-02

## 2022-02-01 RX ADMIN — Medication 10 MILLIGRAM(S): at 17:08

## 2022-02-01 RX ADMIN — Medication 4 MILLIGRAM(S): at 21:38

## 2022-02-01 RX ADMIN — PANTOPRAZOLE SODIUM 40 MILLIGRAM(S): 20 TABLET, DELAYED RELEASE ORAL at 11:54

## 2022-02-01 RX ADMIN — Medication 10 MILLIGRAM(S): at 00:50

## 2022-02-01 RX ADMIN — Medication 10 MILLIGRAM(S): at 04:27

## 2022-02-01 RX ADMIN — Medication 50 MILLIGRAM(S): at 21:38

## 2022-02-01 RX ADMIN — ENOXAPARIN SODIUM 40 MILLIGRAM(S): 100 INJECTION SUBCUTANEOUS at 11:55

## 2022-02-01 RX ADMIN — Medication 6.25 MICROGRAM(S): at 21:38

## 2022-02-01 NOTE — PROCEDURE NOTE - NSINFORMCONSENT_GEN_A_CORE
Dr. Wise spoke to son/Benefits, risks, and possible complications of procedure explained to patient/caregiver who verbalized understanding and gave verbal consent.

## 2022-02-01 NOTE — CHART NOTE - NSCHARTNOTEFT_GEN_A_CORE
Source: Patient [ ]  Family [ ]   other [x ] EMR, staff and ID rounds     Current Diet:    Diet, NPO (01-29-22 @ 00:38)    Current Weight:   (1/27)  133 lbs   (1/19) 140 lbs     % Weight Change: Unclear accuracy of weight 2/2 inconsistency     Pertinent Medications: MEDICATIONS  (STANDING):  enoxaparin Injectable 40 milliGRAM(s) SubCutaneous daily  levothyroxine Injectable 6.25 MICROGram(s) IV Push at bedtime  mometasone 220 MICROgram(s) Inhaler 1 Puff(s) Inhalation daily  pantoprazole  Injectable 40 milliGRAM(s) IV Push daily  sodium chloride 0.9%. 1000 milliLiter(s) (50 mL/Hr) IV Continuous <Continuous>    MEDICATIONS  (PRN):  ALBUTerol    90 MICROgram(s) HFA Inhaler 2 Puff(s) Inhalation every 6 hours PRN Bronchospasm  labetalol Injectable 10 milliGRAM(s) IV Push every 6 hours PRN SBP > 160    Pertinent Labs: CBC Full  -  ( 01 Feb 2022 05:38 )  WBC Count : 4.77 K/uL  RBC Count : 4.32 M/uL  Hemoglobin : 13.2 g/dL  Hematocrit : 38.3 %  Platelet Count - Automated : 303 K/uL  Mean Cell Volume : 88.7 fl  Mean Cell Hemoglobin : 30.6 pg  Mean Cell Hemoglobin Concentration : 34.5 gm/dL  02-01 Na141 mmol/L Glu 106 mg/dL<H> K+ 3.8 mmol/L Cr  0.78 mg/dL BUN 18.9 mg/dL Phos n/a   Alb n/a   PAB n/a       Skin: No skin breakdown/edema noted     Nutrition focused physical exam conducted - found signs of malnutrition [ ]absent [x ]present    Subcutaneous fat loss: [ ] Orbital fat pads region, [x ]Buccal fat region, [ ]Triceps region,  [ ]Ribs region    Muscle wasting: [x ]Temples region, [ x]Clavicle region, [ ]Shoulder region, [ ]Scapula region, [ ]Interosseous region,  [ ]thigh region, [ ]Calf region    Estimated Needs:   [x ] no change since previous assessment  [ ] recalculated:     Current Nutrition Diagnosis: Pt remains at high nutrition risk secondary to malnutrition (moderate acute/chronic) related to inability to meet sufficient protein-energy needs in setting of PNA, 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  Pt s/p SLP eval 1/29 and re-eval 2/1 with recommendations for NPO status.      Recommendations:   1) As medically feasible, recommend Jevity 1.5 -- initiated at 20 ml/hr and advance 10 ml/hr q4 hrs until goal rate of 60 ml/hr (x20 hrs) to provide 1200 ml, 1800 kcal, 77g protein, 912 ml free water, and >100% of RDIs for vitamins/minerals. Additional free water per MD discretion.   2) Monitor weights daily for trend/accuracy       Monitoring and Evaluation:   [ ] PO intake [ x] Tolerance to diet prescription [X] Weights  [X] Follow up per protocol [X] Labs:

## 2022-02-01 NOTE — PROGRESS NOTE ADULT - ASSESSMENT
76y Male with multiple medical issues now admitted with fevers and diffuse weakness.     Altered mental status   Hypoactive delirium  Likely delirium secondary to prolonged hospital confinement.   No focality or imaging finding to suggest stroke.   video EEG did not show seizure.  Non specific slowing    Fevers   Subsided.   Off antibiotics at present.     will follow with you    Cali Reyna MD PhD   [id number]

## 2022-02-01 NOTE — PROGRESS NOTE ADULT - SUBJECTIVE AND OBJECTIVE BOX
KRISTAN DENNIS  ----------------------------------------  The patient was seen at bedside. Patient with delirium. Minimally more awake and alert today. Makes eye contact and able to mumble some words. Nods and shakes his head to questions.    Vital Signs Last 24 Hrs  T(C): 36.7 (01 Feb 2022 11:52), Max: 36.7 (01 Feb 2022 11:52)  T(F): 98 (01 Feb 2022 11:52), Max: 98 (01 Feb 2022 11:52)  HR: 92 (01 Feb 2022 11:52) (67 - 102)  BP: 154/85 (01 Feb 2022 11:52) (154/85 - 171/92)  BP(mean): --  RR: 19 (01 Feb 2022 11:52) (18 - 19)  SpO2: 95% (01 Feb 2022 11:52) (95% - 100%)    PHYSICAL EXAMINATION:  ----------------------------------------  General appearance: No acute distress, Awake, Alert  HEENT: Normocephalic, Atraumatic, Conjunctiva clear, EOMI  Neck: Supple, No JVD, No tenderness  Lungs: Breath sound equal bilaterally, No wheezes, No rales  Cardiovascular: S1S2, Regular rhythm  Abdomen: Soft, Nontender, Nondistended, No guarding/rebound, Positive bowel sounds  Extremities: No clubbing, No cyanosis, No edema, No calf tenderness  Neuro: Strength equal bilaterally, No tremors  Psychiatric: Flat affect    LABORATORY STUDIES:  ----------------------------------------             13.2   4.77  )-----------( 303      ( 01 Feb 2022 05:38 )             38.3     02-01    141  |  104  |  18.9  ----------------------------<  106<H>  3.8   |  22.0  |  0.78    Ca    9.2      01 Feb 2022 05:38    MEDICATIONS  (STANDING):  enoxaparin Injectable 40 milliGRAM(s) SubCutaneous daily  levothyroxine Injectable 6.25 MICROGram(s) IV Push at bedtime  mometasone 220 MICROgram(s) Inhaler 1 Puff(s) Inhalation daily  pantoprazole  Injectable 40 milliGRAM(s) IV Push daily  sodium chloride 0.9%. 1000 milliLiter(s) (50 mL/Hr) IV Continuous <Continuous>    MEDICATIONS  (PRN):  ALBUTerol    90 MICROgram(s) HFA Inhaler 2 Puff(s) Inhalation every 6 hours PRN Bronchospasm  labetalol Injectable 10 milliGRAM(s) IV Push every 6 hours PRN SBP > 160      ASSESSMENT / PLAN:  ----------------------------------------  76 year old male with HTN, HLD, CAD s/p PCI x 4, asthma, s/p left nephrectomy and left RCC on immunotherapy (biweekly infusions per son with [place] Bld & Ca) presenting from home with recurrent falls and profound weakness x 10 days. Placed in observation waiting for dsc to [facility]. Noted to be febrile and hyponatremic while in ER and admitted to Inpatient. Noted fever/ weakness/dehydration/ electrolyte imbalance/ poor PO intake/ gen debility. Monitored off antibx. fever w/u so far negative. deemed likely SE of immunotherpay by Onc. High dose steroids being started today for same. Pt was moved around in CDU and then to MEd surg floor when noted new onset acute delirium.    Delirium - CT of the head was without acute intracranial pathology. EEG was without seizure activity.    Weakness / Poor oral intake - Speech Pathology evaluation with recommendations to avoid oral intake. On intravenous fluids for hydration. For insertion of a nasogastric tube for nutrition and medications.    Fever - Resolved. Blood cultures were without growth. COVID-19 negative.    Renal cell carcinoma - Noted to have a history of nephrectomy and immunotherapy. Renal function stable.    Hyponatremia - Sodium level improved on repeat studies.    Coronary artery disease / Hypertension - Close blood pressure monitoring. For resumption of antiplatelet and statin therapy if tolerating oral intake.    Hypothyroidism - On levothyroxine.

## 2022-02-01 NOTE — PROGRESS NOTE ADULT - SUBJECTIVE AND OBJECTIVE BOX
76 year old male with HTN, HLD, CAD s/p PCI x 4, asthma, urothelial cancer s/p surgery (surg path [id number]), RT (from 10/01/2021 - 11/18/2021) given with nivolumab since 9/17/2021. The last dose was on 12/31/2021. On 01/05/2022 CT a/p showed excellent response to therapy. He has presented with progressive weakness and fatigue. He was admitted with persistent fevers. Workup has been unrevealing.   Patient is very weak, confused. Unclear baseline. He is unable to provide history.  afebrile ON  Eyes open, not responding  EEG in progress    MEDICATIONS  (STANDING):  amLODIPine   Tablet 10 milliGRAM(s) Oral at bedtime  ascorbic acid  Oral Tab/Cap - Peds 1000 milliGRAM(s) Oral daily  cholecalciferol Oral Tab/Cap - Peds 5000 Unit(s) Oral daily  clopidogrel Tablet 75 milliGRAM(s) Oral daily  dronabinol 2.5 milliGRAM(s) Oral two times a day  DULoxetine 60 milliGRAM(s) Oral daily  enoxaparin Injectable 40 milliGRAM(s) SubCutaneous daily  escitalopram 10 milliGRAM(s) Oral daily  famotidine  Oral Tab/Cap - Peds 40 milliGRAM(s) Oral at bedtime  fenofibrate Tablet 145 milliGRAM(s) Oral daily  lactated ringers. 1000 milliLiter(s) (100 mL/Hr) IV Continuous <Continuous>  levothyroxine 12.5 MICROGram(s) Oral daily  losartan 50 milliGRAM(s) Oral daily  metoprolol tartrate 75 milliGRAM(s) Oral two times a day  mometasone 220 MICROgram(s) Inhaler 1 Puff(s) Inhalation daily  montelukast 10 milliGRAM(s) Oral daily  pantoprazole    Tablet 40 milliGRAM(s) Oral before breakfast  senna 2 Tablet(s) Oral at bedtime  sodium chloride 3 Gram(s) Oral two times a day  sodium phosphate IVPB 15 milliMole(s) IV Intermittent once  tamsulosin 0.4 milliGRAM(s) Oral at bedtime  urea Oral Powder 30 Gram(s) Oral two times a day          ICU Vital Signs Last 24 Hrs  T(C): 36.9 (28 Jan 2022 04:50), Max: 36.9 (28 Jan 2022 04:50)  T(F): 98.4 (28 Jan 2022 04:50), Max: 98.4 (28 Jan 2022 04:50)  HR: 88 (28 Jan 2022 08:49) (79 - 96)  BP: 160/83 (28 Jan 2022 04:50) (150/74 - 160/83)  BP(mean): --  ABP: --  ABP(mean): --  RR: 19 (28 Jan 2022 04:50) (18 - 19)  SpO2: 91% (28 Jan 2022 04:50) (91% - 95%)    BP(mean): --  ABP: --  ABP(mean): --  RR: 18 (27 Jan 2022 05:06) (18 - 20)  SpO2: 93% (27 Jan 2022 05:06) (90% - 95%)    Gen: nad  neuro: arousable but confused, very weak voice; not currently responding    rrr  abd - non tender  ctab      Labs:      4.77 WBC  H/H 13.2/38.3, plt ct 303,000 (2/1/2022)                        12.2   4.86  )-----------( 194      ( 26 Jan 2022 04:21 )             35.6                             12.2   4.86  )-----------( 194      ( 26 Jan 2022 04:21 )             35.6       01-26    134<L>  |  99  |  19.9  ----------------------------<  130<H>  3.9   |  23.0  |  1.19    Ca    9.2      26 Jan 2022 04:21    TPro  6.9  /  Alb  3.6  /  TBili  0.5  /  DBili  x   /  AST  30  /  ALT  31  /  AlkPhos  79  01-26 01-26    134<L>  |  99  |  19.9  ----------------------------<  130<H>  3.9   |  23.0  |  1.19    Ca    9.2      26 Jan 2022 04:21    TPro  6.9  /  Alb  3.6  /  TBili  0.5  /  DBili  x   /  AST  30  /  ALT  31  /  AlkPhos  79  01-26 01-24    127<L>  |  92<L>  |  10.2  ----------------------------<  114<H>  4.0   |  25.0  |  0.95    Ca    9.0      24 Jan 2022 09:14  Phos  2.9     01-24  Mg     1.8     01-24    proc 0.2 ---> 0.4 on 1/22/2022 ----> 0.6 today 1/26/2022

## 2022-02-01 NOTE — EEG REPORT - NS EEG TEXT BOX
[facility]  Epilepsy Monitoring Unit Report    [facility]: [address], [place] [address], Phone [phone]  [facility]: [address] [place], [place] [address] [address], Phone [phone]  [place] [facility]: [address], [address], [place] [address] Phone [phone]    [facility]: [address] [place] [address], Phone [phone]  [facility] [facility]: [address] [place] [address], Phone [phone]    Patient Name: Edil Cool    Age: 76 year, : 1945  Patient ID: -, MRN #: -, Quiroz: -    Physician Ordering Inpatient EEG: Matty Avina  Referral Source to EMU: non-elective admission – from     EMU Study Started: 08:52 on 22    EMU Study Ended: pending discharge    Study Information:    EEG Recording Technique:  The patient underwent continuous Video-EEG monitoring, using Telemetry System hardware on the XLTek Digital System. EEG and video data were stored on a computer hard drive with important events saved in digital archive files. The material was reviewed by a physician (electroencephalographer / epileptologist) on a daily basis. Alexis and seizure detection algorithms were utilized and reviewed. An EEG Technician attended to the patient, and was available throughout daytime work hours.  The epilepsy center neurologist was available in person or on call 24-hours per day.    EEG Placement and Labeling of Electrodes:  The EEG was performed utilizing 20 channel referential EEG connections (coronal over temporal over parasagittal montage) using all standard 10-20 electrode placements with EKG, with additional electrodes placed in the inferior temporal region using the modified 10-10 montage electrode placements for elective admissions, or if deemed necessary. Recording was at a sampling rate of 256 samples per second per channel. Time synchronized digital video recording was done simultaneously with EEG recording. A low light infrared camera was used for low light recording.         History:  76 year old male with HTN, HLD, CAD s/p PCI x 4, asthma, s/p left nephrectomy and left RCC on immunotherapy (biweekly infusions per son with [place] Bld & Ca) presenting from home with recurrent falls and profound weakness x 10 days.    Home Antiepileptic Medication and Device  none    Interpretation:    Start Date: 2022 – Day 1                                Start Time – 08:52       Duration – 22h 33m    Daily EEG Visual Analysis  Findings: The background was continuous and reactive. During wakefulness, the posterior dominant rhythm consisted of fragments of 7-8 Hz activity, with amplitude to 30 uV, that attenuated to eye opening.     Background Slowing:  Diffuse theta and polymorphic delta slowing.    Focal Slowing:   Intermittent polymorphic delta slowing in the right hemisphere.    Sleep Background:  Drowsiness was characterized by fragmentation, attenuation, and slowing of the background activity.    Stage II sleep transients were not recorded.    Other Non-Epileptiform Findings:  None were present.    Interictal Epileptiform Activity:   None were present.    Events:  No events or seizures recorded.    Artifacts:  Intermittent myogenic and movement artifacts were noted.    ECG:  The heart rate on single channel ECG was predominantly between 60-90 BPM.    ASM:  none    EEG Summary:  Abnormal EEG in an altered patient.  - Intermittent polymorphic delta slowing in the right hemisphere.  - Mild to moderate generalized slowing.    Impression/Clinical Correlate:   – : no event or seizure    No events or seizures were recorded. Interictal findings suggest functional abnormality in the right hemisphere and mild to moderate nonspecific diffuse or multifocal cerebral dysfunction.     ________________________________________    Louie Dash MD  Director, Epilepsy/EMU - Health system

## 2022-02-01 NOTE — PROCEDURE NOTE - ADDITIONAL PROCEDURE DETAILS
marion fierro NGtube for feedings placed into left nare. Placement confirmed with CXR. Patient tolerated procedure well.   Attending notified, will begin tube feeds.

## 2022-02-01 NOTE — PROGRESS NOTE ADULT - SUBJECTIVE AND OBJECTIVE BOX
[facility]                                        Neurology at [place]                                 Maribell Baptiste, & Brent                                  [address]                                        [place], [place], [address]                                             [phone]        CC: altered mental status     HPI:   The patient is a 76 year old man who had been seen by Dr Reyna in the office in December of 2019 for tremors.   His work up was negative at that time.   He has been receiving immunotherapy for renal cell cancer and in early January of 2022 developed diffuse weakness and decreased PO intake.   He was admitted 1/21/22 after he presented late 1/19 (initially he was kept in the ER observation unit)  with increased weakness and recurrent falls.   He was initially able to converse with staff/family.   On 1/27 he was noted to be more confused and was not recognizing his son.     Neurology was called to evaluate on 1/28/22. (KELLI)    ROS: unable to obtain    MEDICATIONS  (STANDING):  enoxaparin Injectable 40 milliGRAM(s) SubCutaneous daily  levothyroxine Injectable 6.25 MICROGram(s) IV Push at bedtime  mometasone 220 MICROgram(s) Inhaler 1 Puff(s) Inhalation daily  pantoprazole  Injectable 40 milliGRAM(s) IV Push daily  sodium chloride 0.9%. 1000 milliLiter(s) (50 mL/Hr) IV Continuous <Continuous>    MEDICATIONS  (PRN):  ALBUTerol    90 MICROgram(s) HFA Inhaler 2 Puff(s) Inhalation every 6 hours PRN Bronchospasm  labetalol Injectable 10 milliGRAM(s) IV Push every 6 hours PRN SBP > 160      Vital Signs Last 24 Hrs  T(C): 36.7 (01 Feb 2022 11:52), Max: 36.7 (01 Feb 2022 11:52)  T(F): 98 (01 Feb 2022 11:52), Max: 98 (01 Feb 2022 11:52)  HR: 92 (01 Feb 2022 11:52) (67 - 102)  BP: 154/85 (01 Feb 2022 11:52) (154/85 - 171/92)  BP(mean): --  RR: 19 (01 Feb 2022 11:52) (18 - 19)  SpO2: 95% (01 Feb 2022 11:52) (95% - 100%)  Interim history: quiet, low voice, completed vEEG    ROS:   Unobtainable due to patient's condition.     Detailed Neurologic Exam:    Mental status: The patient opens eyes to voice. He is has very quiet voice.  He is oriented to self only. He follows instructions, but slowly    Cranial nerves: Pupils equal and react symmetrically to light. He blinks to threat to confrontation. He tracks with gaze. Facial sensation is intact. Facial musculature is symmetric. Palate elevates symmetrically. Tongue is midline.    Motor/Sensory: There is normal bulk and tone.  There is a subtle chin tremor.  Symmetric limb movement to stimuli. Weak sym movements to request    Reflexes: 1+ throughout and plantar responses are flexor.    Cerebellar: Cannot be tested.     Labs:                           13.2   4.77  )-----------( 303      ( 01 Feb 2022 05:38 )             38.3     02-01    141  |  104  |  18.9  ----------------------------<  106<H>  3.8   |  22.0  |  0.78    Ca    9.2      01 Feb 2022 05:38      Rad:   CT head 1/27/22- no stroke, mass or blood, (+) SVID      EEG Summary: 1/31-2/1  Abnormal EEG in an altered patient.  - Intermittent polymorphic delta slowing in the right hemisphere.  - Mild to moderate generalized slowing.    Impression/Clinical Correlate:  1/31 – 2/1: no event or seizure    No events or seizures were recorded. Interictal findings suggest functional abnormality in the right hemisphere and mild to moderate nonspecific diffuse or multifocal cerebral dysfunction.     ________________________________________

## 2022-02-01 NOTE — PROGRESS NOTE ADULT - ASSESSMENT
Admitted with fever, weakness.    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 Last dose on 12/31/2021. No inpatient therapy.  It is possible that immunotherapy can lead to fever, weakness and electrolyte abnormalities.  He had CT scans a couple of weeks ago showing a good response to therapy so unlikely due to worsening cancer.  CT a/p here was unremarkable.    Hyponatremia - baseline is about 138 as an outpatient. Has fluctuated during his stay down to 127 but  better at 134.  Could be contributing to clinical picture.  Consider nephrology consult.    FUO - ID on board.  afebrile over last 48 hours, fever w/u in progress.  cultures negative  would hold off on steroids if remains afebrile    AMS-Neurology input appreciated, EEG in progress    Will follow

## 2022-02-02 LAB
ANION GAP SERPL CALC-SCNC: 14 MMOL/L — SIGNIFICANT CHANGE UP (ref 5–17)
BUN SERPL-MCNC: 29.3 MG/DL — HIGH (ref 8–20)
CALCIUM SERPL-MCNC: 9.1 MG/DL — SIGNIFICANT CHANGE UP (ref 8.6–10.2)
CHLORIDE SERPL-SCNC: 106 MMOL/L — SIGNIFICANT CHANGE UP (ref 98–107)
CO2 SERPL-SCNC: 21 MMOL/L — LOW (ref 22–29)
CREAT SERPL-MCNC: 1.15 MG/DL — SIGNIFICANT CHANGE UP (ref 0.5–1.3)
GLUCOSE BLDC GLUCOMTR-MCNC: 129 MG/DL — HIGH (ref 70–99)
GLUCOSE BLDC GLUCOMTR-MCNC: 131 MG/DL — HIGH (ref 70–99)
GLUCOSE SERPL-MCNC: 110 MG/DL — HIGH (ref 70–99)
POTASSIUM SERPL-MCNC: 4.2 MMOL/L — SIGNIFICANT CHANGE UP (ref 3.5–5.3)
POTASSIUM SERPL-SCNC: 4.2 MMOL/L — SIGNIFICANT CHANGE UP (ref 3.5–5.3)
SODIUM SERPL-SCNC: 141 MMOL/L — SIGNIFICANT CHANGE UP (ref 135–145)

## 2022-02-02 PROCEDURE — 99232 SBSQ HOSP IP/OBS MODERATE 35: CPT

## 2022-02-02 PROCEDURE — 99233 SBSQ HOSP IP/OBS HIGH 50: CPT

## 2022-02-02 RX ORDER — SODIUM CHLORIDE 9 MG/ML
1000 INJECTION, SOLUTION INTRAVENOUS
Refills: 0 | Status: DISCONTINUED | OUTPATIENT
Start: 2022-02-02 | End: 2022-02-03

## 2022-02-02 RX ORDER — DEXTROSE 50 % IN WATER 50 %
12.5 SYRINGE (ML) INTRAVENOUS ONCE
Refills: 0 | Status: DISCONTINUED | OUTPATIENT
Start: 2022-02-02 | End: 2022-02-04

## 2022-02-02 RX ORDER — DEXTROSE 50 % IN WATER 50 %
15 SYRINGE (ML) INTRAVENOUS ONCE
Refills: 0 | Status: DISCONTINUED | OUTPATIENT
Start: 2022-02-02 | End: 2022-02-04

## 2022-02-02 RX ORDER — DEXTROSE 50 % IN WATER 50 %
25 SYRINGE (ML) INTRAVENOUS ONCE
Refills: 0 | Status: DISCONTINUED | OUTPATIENT
Start: 2022-02-02 | End: 2022-02-04

## 2022-02-02 RX ORDER — GLUCAGON INJECTION, SOLUTION 0.5 MG/.1ML
1 INJECTION, SOLUTION SUBCUTANEOUS ONCE
Refills: 0 | Status: DISCONTINUED | OUTPATIENT
Start: 2022-02-02 | End: 2022-02-04

## 2022-02-02 RX ADMIN — MOMETASONE FUROATE 1 PUFF(S): 220 INHALANT RESPIRATORY (INHALATION) at 08:43

## 2022-02-02 RX ADMIN — PANTOPRAZOLE SODIUM 40 MILLIGRAM(S): 20 TABLET, DELAYED RELEASE ORAL at 12:40

## 2022-02-02 RX ADMIN — Medication 6.25 MICROGRAM(S): at 23:00

## 2022-02-02 RX ADMIN — ENOXAPARIN SODIUM 40 MILLIGRAM(S): 100 INJECTION SUBCUTANEOUS at 12:40

## 2022-02-02 RX ADMIN — SODIUM CHLORIDE 100 MILLILITER(S): 9 INJECTION, SOLUTION INTRAVENOUS at 15:09

## 2022-02-02 RX ADMIN — SODIUM CHLORIDE 50 MILLILITER(S): 9 INJECTION INTRAMUSCULAR; INTRAVENOUS; SUBCUTANEOUS at 06:28

## 2022-02-02 NOTE — CHART NOTE - NSCHARTNOTEFT_GEN_A_CORE
Contacted by RN due to patient pulling out NGT overnight.   Multiple attempts to replace with both [place]obbhoff and [place] sump were unsuccessful.  Patient unable to tolerate replacement.   Please consider PICC line for TPN vs PEG.

## 2022-02-02 NOTE — PROGRESS NOTE ADULT - SUBJECTIVE AND OBJECTIVE BOX
[facility]                                        Neurology at [place]                                 Maribell Baptiste, & Brent                                  [address]                                        [place], [place], [address]                                             [phone]        CC: altered mental status     HPI:   The patient is a 76 year old man who had been seen by Dr Reyna in the office in December of 2019 for tremors.   His work up was negative at that time.   He has been receiving immunotherapy for renal cell cancer and in early January of 2022 developed diffuse weakness and decreased PO intake.   He was admitted 1/21/22 after he presented late 1/19 (initially he was kept in the ER observation unit)  with increased weakness and recurrent falls.   He was initially able to converse with staff/family.   On 1/27 he was noted to be more confused and was not recognizing his son.     Neurology was called to evaluate on 1/28/22. (JW)    Interval: he is more alert today    ROS: unable to obtain    MEDICATIONS  (STANDING):  enoxaparin Injectable 40 milliGRAM(s) SubCutaneous daily  levothyroxine Injectable 6.25 MICROGram(s) IV Push at bedtime  mometasone 220 MICROgram(s) Inhaler 1 Puff(s) Inhalation daily  pantoprazole  Injectable 40 milliGRAM(s) IV Push daily  sodium chloride 0.9%. 1000 milliLiter(s) (50 mL/Hr) IV Continuous <Continuous>    MEDICATIONS  (PRN):  ALBUTerol    90 MICROgram(s) HFA Inhaler 2 Puff(s) Inhalation every 6 hours PRN Bronchospasm  labetalol Injectable 10 milliGRAM(s) IV Push every 6 hours PRN SBP > 160      Vital Signs Last 24 Hrs  T(C): 36.7 (02 Feb 2022 11:44), Max: 36.9 (01 Feb 2022 16:30)  T(F): 98 (02 Feb 2022 11:44), Max: 98.5 (01 Feb 2022 16:30)  HR: 70 (02 Feb 2022 11:44) (70 - 103)  BP: 123/85 (02 Feb 2022 11:44) (115/74 - 168/114)  BP(mean): --  RR: 18 (02 Feb 2022 11:44) (18 - 18)  SpO2: 93% (02 Feb 2022 11:44) (93% - 98%)      Detailed Neurologic Exam:    Mental status: More alert today. The patient opens eyes to voice. He is has very quiet voice.  He is oriented to self and hospital only. He follows instructions, but slowly    Cranial nerves: Pupils equal and react symmetrically to light. He blinks to threat to confrontation. He tracks with gaze. Facial sensation is intact. Facial musculature is symmetric. Palate elevates symmetrically. Tongue is midline.    Motor/Sensory: There is normal bulk and tone.  There is no tremor.  Symmetric limb movement to stimuli. Weak sym movements to request    Reflexes: 1+ throughout and plantar responses are flexor.    Cerebellar: Cannot be tested.     Labs:                           13.2   4.77  )-----------( 303      ( 01 Feb 2022 05:38 )             38.3     02-02    141  |  106  |  29.3<H>  ----------------------------<  110<H>  4.2   |  21.0<L>  |  1.15    Ca    9.1      02 Feb 2022 06:47      Rad:   CT head 1/27/22- no stroke, mass or blood, (+) SVID      EEG Summary: 1/31-2/1  Abnormal EEG in an altered patient.  - Intermittent polymorphic delta slowing in the right hemisphere.  - Mild to moderate generalized slowing.    Impression/Clinical Correlate:  1/31 – 2/1: no event or seizure    No events or seizures were recorded. Interictal findings suggest functional abnormality in the right hemisphere and mild to moderate nonspecific diffuse or multifocal cerebral dysfunction.     ________________________________________

## 2022-02-02 NOTE — PROGRESS NOTE ADULT - ASSESSMENT
76y Male with multiple medical issues now admitted with fevers and diffuse weakness.     Altered mental status   Hypoactive delirium, improved today  Likely delirium secondary to prolonged hospital confinement.   No focality or imaging finding to suggest stroke.   video EEG did not show seizure.  Non specific slowing    Fevers   Subsided.   Off antibiotics at present.     will follow with you    Cali Reyna MD PhD   [id number]

## 2022-02-02 NOTE — PROGRESS NOTE ADULT - SUBJECTIVE AND OBJECTIVE BOX
KRISTAN DENNIS  ----------------------------------------  The patient was seen at bedside. Patient with delirium. Opens eyes and nods his head but not speaking.    Vital Signs Last 24 Hrs  T(C): 36.7 (02 Feb 2022 11:44), Max: 36.9 (01 Feb 2022 16:30)  T(F): 98 (02 Feb 2022 11:44), Max: 98.5 (01 Feb 2022 16:30)  HR: 70 (02 Feb 2022 11:44) (70 - 103)  BP: 123/85 (02 Feb 2022 11:44) (115/74 - 168/114)  BP(mean): --  RR: 18 (02 Feb 2022 11:44) (18 - 18)  SpO2: 93% (02 Feb 2022 11:44) (93% - 98%)    PHYSICAL EXAMINATION:  ----------------------------------------  General appearance: No acute distress, Awake, Alert  HEENT: Normocephalic, Atraumatic, Conjunctiva clear, EOMI  Neck: Supple, No JVD, No tenderness  Lungs: Breath sound equal bilaterally, No wheezes, No rales  Cardiovascular: S1S2, Regular rhythm  Abdomen: Soft, Nontender, Nondistended, No guarding/rebound, Positive bowel sounds  Extremities: No clubbing, No cyanosis, No edema, No calf tenderness  Neuro: Strength equal bilaterally, No tremors  Psychiatric: Flat affect    LABORATORY STUDIES:  ----------------------------------------             13.2   4.77  )-----------( 303      ( 01 Feb 2022 05:38 )             38.3     02-02    141  |  106  |  29.3<H>  ----------------------------<  110<H>  4.2   |  21.0<L>  |  1.15    Ca    9.1      02 Feb 2022 06:47    MEDICATIONS  (STANDING):  enoxaparin Injectable 40 milliGRAM(s) SubCutaneous daily  levothyroxine Injectable 6.25 MICROGram(s) IV Push at bedtime  mometasone 220 MICROgram(s) Inhaler 1 Puff(s) Inhalation daily  pantoprazole  Injectable 40 milliGRAM(s) IV Push daily  sodium chloride 0.9%. 1000 milliLiter(s) (50 mL/Hr) IV Continuous <Continuous>    MEDICATIONS  (PRN):  ALBUTerol    90 MICROgram(s) HFA Inhaler 2 Puff(s) Inhalation every 6 hours PRN Bronchospasm  labetalol Injectable 10 milliGRAM(s) IV Push every 6 hours PRN SBP > 160      ASSESSMENT / PLAN:  ----------------------------------------  76 year old male with HTN, HLD, CAD s/p PCI x 4, asthma, s/p left nephrectomy and left RCC on immunotherapy (biweekly infusions per son with [place] Bld & Ca) presenting from home with recurrent falls and profound weakness x 10 days. Placed in observation waiting for dsc to [facility]. Noted to be febrile and hyponatremic while in ER and admitted to Inpatient. Noted fever/ weakness/dehydration/ electrolyte imbalance/ poor PO intake/ gen debility. Monitored off antibx. fever w/u so far negative. deemed likely SE of immunotherpay by Onc. High dose steroids being started today for same. Pt was moved around in CDU and then to [facility] floor when noted new onset acute delirium.    Delirium - CT of the head was without acute intracranial pathology. EEG was without seizure activity. Suspect due to prolonged hospital stay.    Weakness / Poor oral intake - Speech Pathology evaluation with recommendations to avoid oral intake. On intravenous fluids for hydration. Nasogastric tube was previously inserted but dislodged. The patient did not tolerate further attempts at reinsertion.    Fever - Resolved. Blood cultures were without growth. COVID-19 negative.    Renal cell carcinoma - Noted to have a history of nephrectomy and immunotherapy. Renal function stable.    Hyponatremia - Sodium level improved on repeat studies.    Coronary artery disease / Hypertension - Close blood pressure monitoring. Antiplatelet and statin therapy held due to inability to safely administer oral medications.    Hypothyroidism - On levothyroxine.

## 2022-02-02 NOTE — CHART NOTE - NSCHARTNOTEFT_GEN_A_CORE
Multiple attempts made to replace NGT with both Dobbhoff and [place] sump were unsuccessful by 2 providers.  Patient unable to tolerate replacement and adamantly screams "No" if any further attempts are made.  NGT placement deferred at this time, hospitalist to further discuss nutritional GOC and options with the family.  PO medications discontinued by MD.     The above patient and plan was discussed with Dr. Wise and RN.

## 2022-02-03 LAB
ANION GAP SERPL CALC-SCNC: 8 MMOL/L — SIGNIFICANT CHANGE UP (ref 5–17)
BUN SERPL-MCNC: 23.6 MG/DL — HIGH (ref 8–20)
CALCIUM SERPL-MCNC: 8.5 MG/DL — LOW (ref 8.6–10.2)
CHLORIDE SERPL-SCNC: 111 MMOL/L — HIGH (ref 98–107)
CO2 SERPL-SCNC: 23 MMOL/L — SIGNIFICANT CHANGE UP (ref 22–29)
CREAT SERPL-MCNC: 1.12 MG/DL — SIGNIFICANT CHANGE UP (ref 0.5–1.3)
GLUCOSE BLDC GLUCOMTR-MCNC: 102 MG/DL — HIGH (ref 70–99)
GLUCOSE BLDC GLUCOMTR-MCNC: 106 MG/DL — HIGH (ref 70–99)
GLUCOSE BLDC GLUCOMTR-MCNC: 92 MG/DL — SIGNIFICANT CHANGE UP (ref 70–99)
GLUCOSE SERPL-MCNC: 121 MG/DL — HIGH (ref 70–99)
HCT VFR BLD CALC: 33.6 % — LOW (ref 39–50)
HGB BLD-MCNC: 11.2 G/DL — LOW (ref 13–17)
MCHC RBC-ENTMCNC: 30.9 PG — SIGNIFICANT CHANGE UP (ref 27–34)
MCHC RBC-ENTMCNC: 33.3 GM/DL — SIGNIFICANT CHANGE UP (ref 32–36)
MCV RBC AUTO: 92.6 FL — SIGNIFICANT CHANGE UP (ref 80–100)
PLATELET # BLD AUTO: 200 K/UL — SIGNIFICANT CHANGE UP (ref 150–400)
POTASSIUM SERPL-MCNC: 3.4 MMOL/L — LOW (ref 3.5–5.3)
POTASSIUM SERPL-SCNC: 3.4 MMOL/L — LOW (ref 3.5–5.3)
RBC # BLD: 3.63 M/UL — LOW (ref 4.2–5.8)
RBC # FLD: 13.2 % — SIGNIFICANT CHANGE UP (ref 10.3–14.5)
SODIUM SERPL-SCNC: 142 MMOL/L — SIGNIFICANT CHANGE UP (ref 135–145)
WBC # BLD: 3.8 K/UL — SIGNIFICANT CHANGE UP (ref 3.8–10.5)
WBC # FLD AUTO: 3.8 K/UL — SIGNIFICANT CHANGE UP (ref 3.8–10.5)

## 2022-02-03 PROCEDURE — 99232 SBSQ HOSP IP/OBS MODERATE 35: CPT

## 2022-02-03 RX ORDER — DEXTROSE MONOHYDRATE, SODIUM CHLORIDE, AND POTASSIUM CHLORIDE 50; .745; 4.5 G/1000ML; G/1000ML; G/1000ML
1000 INJECTION, SOLUTION INTRAVENOUS
Refills: 0 | Status: DISCONTINUED | OUTPATIENT
Start: 2022-02-03 | End: 2022-02-04

## 2022-02-03 RX ADMIN — PANTOPRAZOLE SODIUM 40 MILLIGRAM(S): 20 TABLET, DELAYED RELEASE ORAL at 15:05

## 2022-02-03 RX ADMIN — SODIUM CHLORIDE 100 MILLILITER(S): 9 INJECTION, SOLUTION INTRAVENOUS at 06:34

## 2022-02-03 RX ADMIN — ENOXAPARIN SODIUM 40 MILLIGRAM(S): 100 INJECTION SUBCUTANEOUS at 15:05

## 2022-02-03 RX ADMIN — Medication 6.25 MICROGRAM(S): at 21:10

## 2022-02-03 NOTE — PROGRESS NOTE ADULT - SUBJECTIVE AND OBJECTIVE BOX
KRISTAN DENNIS  ----------------------------------------  The patient was seen at bedside. Patient with delirium. Offered no complaints. Awake and able to speak more today.    Vital Signs Last 24 Hrs  T(C): 36.4 (03 Feb 2022 10:30), Max: 36.7 (02 Feb 2022 11:44)  T(F): 97.6 (03 Feb 2022 10:30), Max: 98.1 (03 Feb 2022 04:30)  HR: 69 (03 Feb 2022 10:30) (68 - 71)  BP: 134/80 (03 Feb 2022 10:30) (123/85 - 147/76)  BP(mean): --  RR: 18 (03 Feb 2022 10:30) (18 - 18)  SpO2: 99% (03 Feb 2022 10:30) (93% - 99%)    CAPILLARY BLOOD GLUCOSE  POCT Blood Glucose.: 106 mg/dL (03 Feb 2022 06:35)  POCT Blood Glucose.: 129 mg/dL (02 Feb 2022 23:46)  POCT Blood Glucose.: 131 mg/dL (02 Feb 2022 16:45)    PHYSICAL EXAMINATION:  ----------------------------------------  General appearance: No acute distress, Awake, Alert  HEENT: Normocephalic, Atraumatic, Conjunctiva clear, EOMI  Neck: Supple, No JVD, No tenderness  Lungs: Breath sound equal bilaterally, No wheezes, No rales  Cardiovascular: S1S2, Regular rhythm  Abdomen: Soft, Nontender, Nondistended, No guarding/rebound, Positive bowel sounds  Extremities: No clubbing, No cyanosis, No edema, No calf tenderness  Neuro: Strength equal bilaterally, No tremors  Psychiatric: Flat affect    LABORATORY STUDIES:  ----------------------------------------             11.2   3.80  )-----------( 200      ( 03 Feb 2022 05:41 )             33.6     02-03    142  |  111<H>  |  23.6<H>  ----------------------------<  121<H>  3.4<L>   |  23.0  |  1.12    Ca    8.5<L>      03 Feb 2022 05:41    MEDICATIONS  (STANDING):  dextrose 40% Gel 15 Gram(s) Oral once  dextrose 5% + sodium chloride 0.45% with potassium chloride 20 mEq/L 1000 milliLiter(s) (75 mL/Hr) IV Continuous <Continuous>  dextrose 50% Injectable 25 Gram(s) IV Push once  dextrose 50% Injectable 12.5 Gram(s) IV Push once  dextrose 50% Injectable 25 Gram(s) IV Push once  enoxaparin Injectable 40 milliGRAM(s) SubCutaneous daily  glucagon  Injectable 1 milliGRAM(s) IntraMuscular once  levothyroxine Injectable 6.25 MICROGram(s) IV Push at bedtime  mometasone 220 MICROgram(s) Inhaler 1 Puff(s) Inhalation daily  pantoprazole  Injectable 40 milliGRAM(s) IV Push daily    MEDICATIONS  (PRN):  ALBUTerol    90 MICROgram(s) HFA Inhaler 2 Puff(s) Inhalation every 6 hours PRN Bronchospasm  labetalol Injectable 10 milliGRAM(s) IV Push every 6 hours PRN SBP > 160      ASSESSMENT / PLAN:  ----------------------------------------  76 year old male with HTN, HLD, CAD s/p PCI x 4, asthma, s/p left nephrectomy and left RCC on immunotherapy (biweekly infusions per son with [place] Bld & Ca) presenting from home with recurrent falls and profound weakness x 10 days. Placed in observation waiting for dsc to [facility]. Noted to be febrile and hyponatremic while in ER and admitted to Inpatient. Noted fever/ weakness/dehydration/ electrolyte imbalance/ poor PO intake/ gen debility. Monitored off antibx. fever w/u so far negative. deemed likely SE of immunotherpay by Onc. High dose steroids being started today for same. Pt was moved around in CDU and then to MEd surg floor when noted new onset acute delirium.    Delirium - CT of the head was without acute intracranial pathology. EEG was without seizure activity. Suspect due to prolonged hospital stay. Noted some improvement yesterday and today.     Weakness / Poor oral intake - Speech Pathology evaluation noted, for initiation of a modified consistency diet with aspiration precautions.    Fever - Resolved. Blood cultures were without growth. COVID-19 negative.    Renal cell carcinoma - Noted to have a history of nephrectomy and immunotherapy. Renal function stable.    Hyponatremia - Sodium level improved on repeat studies.    Coronary artery disease / Hypertension - Close blood pressure monitoring. For resumption of antiplatelet and statin therapy if tolerated oral intake.    Hypothyroidism - On levothyroxine. KRISTAN DENNIS  ----------------------------------------  The patient was seen at bedside. Patient with delirium. Offered no complaints. Awake and able to speak more today.    Vital Signs Last 24 Hrs  T(C): 36.4 (03 Feb 2022 10:30), Max: 36.7 (02 Feb 2022 11:44)  T(F): 97.6 (03 Feb 2022 10:30), Max: 98.1 (03 Feb 2022 04:30)  HR: 69 (03 Feb 2022 10:30) (68 - 71)  BP: 134/80 (03 Feb 2022 10:30) (123/85 - 147/76)  BP(mean): --  RR: 18 (03 Feb 2022 10:30) (18 - 18)  SpO2: 99% (03 Feb 2022 10:30) (93% - 99%)    CAPILLARY BLOOD GLUCOSE  POCT Blood Glucose.: 106 mg/dL (03 Feb 2022 06:35)  POCT Blood Glucose.: 129 mg/dL (02 Feb 2022 23:46)  POCT Blood Glucose.: 131 mg/dL (02 Feb 2022 16:45)    PHYSICAL EXAMINATION:  ----------------------------------------  General appearance: No acute distress, Awake, Alert  HEENT: Normocephalic, Atraumatic, Conjunctiva clear, EOMI  Neck: Supple, No JVD, No tenderness  Lungs: Breath sound equal bilaterally, No wheezes, No rales  Cardiovascular: S1S2, Regular rhythm  Abdomen: Soft, Nontender, Nondistended, No guarding/rebound, Positive bowel sounds  Extremities: No clubbing, No cyanosis, No edema, No calf tenderness  Neuro: Strength equal bilaterally, No tremors  Psychiatric: Flat affect    LABORATORY STUDIES:  ----------------------------------------             11.2   3.80  )-----------( 200      ( 03 Feb 2022 05:41 )             33.6     02-03    142  |  111<H>  |  23.6<H>  ----------------------------<  121<H>  3.4<L>   |  23.0  |  1.12    Ca    8.5<L>      03 Feb 2022 05:41    MEDICATIONS  (STANDING):  dextrose 40% Gel 15 Gram(s) Oral once  dextrose 5% + sodium chloride 0.45% with potassium chloride 20 mEq/L 1000 milliLiter(s) (75 mL/Hr) IV Continuous <Continuous>  dextrose 50% Injectable 25 Gram(s) IV Push once  dextrose 50% Injectable 12.5 Gram(s) IV Push once  dextrose 50% Injectable 25 Gram(s) IV Push once  enoxaparin Injectable 40 milliGRAM(s) SubCutaneous daily  glucagon  Injectable 1 milliGRAM(s) IntraMuscular once  levothyroxine Injectable 6.25 MICROGram(s) IV Push at bedtime  mometasone 220 MICROgram(s) Inhaler 1 Puff(s) Inhalation daily  pantoprazole  Injectable 40 milliGRAM(s) IV Push daily    MEDICATIONS  (PRN):  ALBUTerol    90 MICROgram(s) HFA Inhaler 2 Puff(s) Inhalation every 6 hours PRN Bronchospasm  labetalol Injectable 10 milliGRAM(s) IV Push every 6 hours PRN SBP > 160      ASSESSMENT / PLAN:  ----------------------------------------  76 year old male with HTN, HLD, CAD s/p PCI x 4, asthma, s/p left nephrectomy and left RCC on immunotherapy (biweekly infusions per son with [place] Bld & Ca) presenting from home with recurrent falls and profound weakness x 10 days. Placed in observation waiting for dsc to [facility]. Noted to be febrile and hyponatremic while in ER and admitted to Inpatient. Noted fever/ weakness/dehydration/ electrolyte imbalance/ poor PO intake/ gen debility. Monitored off antibx. fever w/u so far negative. deemed likely SE of immunotherpay by Onc. High dose steroids being started today for same. Pt was moved around in CDU and then to MEd surg floor when noted new onset acute delirium.    Delirium - CT of the head was without acute intracranial pathology. EEG was without seizure activity. Suspect due to prolonged hospital stay. Noted some improvement yesterday and today.     Weakness / Poor oral intake - Speech Pathology evaluation noted, for initiation of a modified consistency diet with aspiration precautions.    Fever - Resolved. Blood cultures were without growth. COVID-19 negative.    Renal cell carcinoma - Noted to have a history of nephrectomy and immunotherapy. Renal function stable.    Hyponatremia - Sodium level improved on repeat studies.    Hypokalemia - For supplementation of potassium.    Coronary artery disease / Hypertension - Close blood pressure monitoring. For resumption of antiplatelet and statin therapy if tolerated oral intake.    Hypothyroidism - On levothyroxine.

## 2022-02-03 NOTE — PROGRESS NOTE ADULT - ASSESSMENT
Admitted with fever, weakness.    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 Last dose on 12/31/2021. No inpatient therapy.  It is possible that immunotherapy can lead to fever, weakness and electrolyte abnormalities.  He had CT scans a couple of weeks ago showing a good response to therapy so unlikely due to worsening cancer.  CT a/p here was unremarkable.    Hyponatremia - baseline is about 138 as an outpatient. Has fluctuated during his stay down to 127 but  better at 134.  Could be contributing to clinical picture.  Consider nephrology consult.    FUO - ID on board.  afebrile over last 48 hours, fever w/u in progress.  cultures negative  more alert now, off ABX    AMS-Neurology input appreciated, likely delirium  neuro w/u so far negative    Will follow

## 2022-02-03 NOTE — PROGRESS NOTE ADULT - SUBJECTIVE AND OBJECTIVE BOX
76 year old male with HTN, HLD, CAD s/p PCI x 4, asthma, urothelial cancer s/p surgery (surg path [id number]), RT (from 10/01/2021 - 11/18/2021) given with nivolumab since 9/17/2021. The last dose was on 12/31/2021. On 01/05/2022 CT a/p showed excellent response to therapy. He has presented with progressive weakness and fatigue. He was admitted with persistent fevers. Workup has been unrevealing.   Patient is very weak, confused. Unclear baseline. He is unable to provide history.  afebrile ON  more alert this am, calm, s/p EEG      MEDICATIONS  (STANDING):  dextrose 40% Gel 15 Gram(s) Oral once  dextrose 5% + sodium chloride 0.45% with potassium chloride 20 mEq/L 1000 milliLiter(s) (75 mL/Hr) IV Continuous <Continuous>  dextrose 50% Injectable 25 Gram(s) IV Push once  dextrose 50% Injectable 12.5 Gram(s) IV Push once  dextrose 50% Injectable 25 Gram(s) IV Push once  enoxaparin Injectable 40 milliGRAM(s) SubCutaneous daily  glucagon  Injectable 1 milliGRAM(s) IntraMuscular once  levothyroxine Injectable 6.25 MICROGram(s) IV Push at bedtime  mometasone 220 MICROgram(s) Inhaler 1 Puff(s) Inhalation daily  pantoprazole  Injectable 40 milliGRAM(s) IV Push daily          ICU Vital Signs Last 24 Hrs  T(C): 36.7 (03 Feb 2022 04:30), Max: 36.7 (02 Feb 2022 11:44)  T(F): 98.1 (03 Feb 2022 04:30), Max: 98.1 (03 Feb 2022 04:30)  HR: 68 (03 Feb 2022 04:30) (68 - 71)  BP: 147/76 (03 Feb 2022 04:30) (123/85 - 147/76)  BP(mean): --  ABP: --  ABP(mean): --  RR: 18 (03 Feb 2022 04:30) (18 - 18)  SpO2: 99% (03 Feb 2022 04:30) (93% - 99%)      BP(mean): --  ABP: --  ABP(mean): --  RR: 18 (27 Jan 2022 05:06) (18 - 20)  SpO2: 93% (27 Jan 2022 05:06) (90% - 95%)    Gen: nad  neuro: more alert this am    rrr  abd - non tender  ctab      Labs:                          11.2   3.80  )-----------( 200      ( 03 Feb 2022 05:41 )             33.6         4.77 WBC  H/H 13.2/38.3, plt ct 303,000 (2/1/2022)                        12.2   4.86  )-----------( 194      ( 26 Jan 2022 04:21 )             35.6                             12.2   4.86  )-----------( 194      ( 26 Jan 2022 04:21 )             35.6       01-26    134<L>  |  99  |  19.9  ----------------------------<  130<H>  3.9   |  23.0  |  1.19    Ca    9.2      26 Jan 2022 04:21    TPro  6.9  /  Alb  3.6  /  TBili  0.5  /  DBili  x   /  AST  30  /  ALT  31  /  AlkPhos  79  01-26 01-26    134<L>  |  99  |  19.9  ----------------------------<  130<H>  3.9   |  23.0  |  1.19    Ca    9.2      26 Jan 2022 04:21    TPro  6.9  /  Alb  3.6  /  TBili  0.5  /  DBili  x   /  AST  30  /  ALT  31  /  AlkPhos  79  01-26 01-24    127<L>  |  92<L>  |  10.2  ----------------------------<  114<H>  4.0   |  25.0  |  0.95    Ca    9.0      24 Jan 2022 09:14  Phos  2.9     01-24  Mg     1.8     01-24    proc 0.2 ---> 0.4 on 1/22/2022 ----> 0.6 today 1/26/2022

## 2022-02-04 VITALS
SYSTOLIC BLOOD PRESSURE: 151 MMHG | RESPIRATION RATE: 18 BRPM | HEART RATE: 81 BPM | TEMPERATURE: 98 F | OXYGEN SATURATION: 98 % | DIASTOLIC BLOOD PRESSURE: 97 MMHG

## 2022-02-04 LAB
ANION GAP SERPL CALC-SCNC: 12 MMOL/L — SIGNIFICANT CHANGE UP (ref 5–17)
BUN SERPL-MCNC: 11.6 MG/DL — SIGNIFICANT CHANGE UP (ref 8–20)
CALCIUM SERPL-MCNC: 9 MG/DL — SIGNIFICANT CHANGE UP (ref 8.6–10.2)
CHLORIDE SERPL-SCNC: 106 MMOL/L — SIGNIFICANT CHANGE UP (ref 98–107)
CO2 SERPL-SCNC: 23 MMOL/L — SIGNIFICANT CHANGE UP (ref 22–29)
CREAT SERPL-MCNC: 0.87 MG/DL — SIGNIFICANT CHANGE UP (ref 0.5–1.3)
GLUCOSE BLDC GLUCOMTR-MCNC: 113 MG/DL — HIGH (ref 70–99)
GLUCOSE BLDC GLUCOMTR-MCNC: 122 MG/DL — HIGH (ref 70–99)
GLUCOSE BLDC GLUCOMTR-MCNC: 128 MG/DL — HIGH (ref 70–99)
GLUCOSE SERPL-MCNC: 113 MG/DL — HIGH (ref 70–99)
POTASSIUM SERPL-MCNC: 3.2 MMOL/L — LOW (ref 3.5–5.3)
POTASSIUM SERPL-SCNC: 3.2 MMOL/L — LOW (ref 3.5–5.3)
SODIUM SERPL-SCNC: 141 MMOL/L — SIGNIFICANT CHANGE UP (ref 135–145)

## 2022-02-04 PROCEDURE — 71045 X-RAY EXAM CHEST 1 VIEW: CPT

## 2022-02-04 PROCEDURE — 82550 ASSAY OF CK (CPK): CPT

## 2022-02-04 PROCEDURE — 71250 CT THORAX DX C-: CPT | Mod: MA

## 2022-02-04 PROCEDURE — 99239 HOSP IP/OBS DSCHRG MGMT >30: CPT

## 2022-02-04 PROCEDURE — 85025 COMPLETE CBC W/AUTO DIFF WBC: CPT

## 2022-02-04 PROCEDURE — 93970 EXTREMITY STUDY: CPT

## 2022-02-04 PROCEDURE — 84436 ASSAY OF TOTAL THYROXINE: CPT

## 2022-02-04 PROCEDURE — 84443 ASSAY THYROID STIM HORMONE: CPT

## 2022-02-04 PROCEDURE — 87086 URINE CULTURE/COLONY COUNT: CPT

## 2022-02-04 PROCEDURE — 84100 ASSAY OF PHOSPHORUS: CPT

## 2022-02-04 PROCEDURE — 87040 BLOOD CULTURE FOR BACTERIA: CPT

## 2022-02-04 PROCEDURE — 84300 ASSAY OF URINE SODIUM: CPT

## 2022-02-04 PROCEDURE — 80076 HEPATIC FUNCTION PANEL: CPT

## 2022-02-04 PROCEDURE — 82962 GLUCOSE BLOOD TEST: CPT

## 2022-02-04 PROCEDURE — 80048 BASIC METABOLIC PNL TOTAL CA: CPT

## 2022-02-04 PROCEDURE — 96372 THER/PROPH/DIAG INJ SC/IM: CPT

## 2022-02-04 PROCEDURE — 81001 URINALYSIS AUTO W/SCOPE: CPT

## 2022-02-04 PROCEDURE — U0003: CPT

## 2022-02-04 PROCEDURE — 82140 ASSAY OF AMMONIA: CPT

## 2022-02-04 PROCEDURE — 95714 VEEG EA 12-26 HR UNMNTR: CPT

## 2022-02-04 PROCEDURE — 85027 COMPLETE CBC AUTOMATED: CPT

## 2022-02-04 PROCEDURE — 70450 CT HEAD/BRAIN W/O DYE: CPT | Mod: MA

## 2022-02-04 PROCEDURE — 95700 EEG CONT REC W/VID EEG TECH: CPT

## 2022-02-04 PROCEDURE — 82553 CREATINE MB FRACTION: CPT

## 2022-02-04 PROCEDURE — 84145 PROCALCITONIN (PCT): CPT

## 2022-02-04 PROCEDURE — 92526 ORAL FUNCTION THERAPY: CPT

## 2022-02-04 PROCEDURE — 93005 ELECTROCARDIOGRAM TRACING: CPT

## 2022-02-04 PROCEDURE — 84484 ASSAY OF TROPONIN QUANT: CPT

## 2022-02-04 PROCEDURE — 73522 X-RAY EXAM HIPS BI 3-4 VIEWS: CPT

## 2022-02-04 PROCEDURE — 84133 ASSAY OF URINE POTASSIUM: CPT

## 2022-02-04 PROCEDURE — 83615 LACTATE (LD) (LDH) ENZYME: CPT

## 2022-02-04 PROCEDURE — 0225U NFCT DS DNA&RNA 21 SARSCOV2: CPT

## 2022-02-04 PROCEDURE — 80053 COMPREHEN METABOLIC PANEL: CPT

## 2022-02-04 PROCEDURE — 82533 TOTAL CORTISOL: CPT

## 2022-02-04 PROCEDURE — 97530 THERAPEUTIC ACTIVITIES: CPT

## 2022-02-04 PROCEDURE — 96374 THER/PROPH/DIAG INJ IV PUSH: CPT

## 2022-02-04 PROCEDURE — 84480 ASSAY TRIIODOTHYRONINE (T3): CPT

## 2022-02-04 PROCEDURE — 94640 AIRWAY INHALATION TREATMENT: CPT

## 2022-02-04 PROCEDURE — 83935 ASSAY OF URINE OSMOLALITY: CPT

## 2022-02-04 PROCEDURE — 74177 CT ABD & PELVIS W/CONTRAST: CPT

## 2022-02-04 PROCEDURE — U0005: CPT

## 2022-02-04 PROCEDURE — 99285 EMERGENCY DEPT VISIT HI MDM: CPT

## 2022-02-04 PROCEDURE — 83605 ASSAY OF LACTIC ACID: CPT

## 2022-02-04 PROCEDURE — 99232 SBSQ HOSP IP/OBS MODERATE 35: CPT

## 2022-02-04 PROCEDURE — 83735 ASSAY OF MAGNESIUM: CPT

## 2022-02-04 PROCEDURE — 92610 EVALUATE SWALLOWING FUNCTION: CPT

## 2022-02-04 PROCEDURE — 97116 GAIT TRAINING THERAPY: CPT

## 2022-02-04 PROCEDURE — 36415 COLL VENOUS BLD VENIPUNCTURE: CPT

## 2022-02-04 RX ORDER — ALIROCUMAB 75 MG/ML
1 INJECTION, SOLUTION SUBCUTANEOUS
Qty: 0 | Refills: 0 | DISCHARGE

## 2022-02-04 RX ORDER — METOPROLOL TARTRATE 50 MG
25 TABLET ORAL
Refills: 0 | Status: DISCONTINUED | OUTPATIENT
Start: 2022-02-04 | End: 2022-02-04

## 2022-02-04 RX ORDER — CLOPIDOGREL BISULFATE 75 MG/1
75 TABLET, FILM COATED ORAL DAILY
Refills: 0 | Status: DISCONTINUED | OUTPATIENT
Start: 2022-02-04 | End: 2022-02-04

## 2022-02-04 RX ORDER — DULOXETINE HYDROCHLORIDE 30 MG/1
1 CAPSULE, DELAYED RELEASE ORAL
Qty: 0 | Refills: 0 | DISCHARGE

## 2022-02-04 RX ORDER — POTASSIUM CHLORIDE 20 MEQ
40 PACKET (EA) ORAL ONCE
Refills: 0 | Status: COMPLETED | OUTPATIENT
Start: 2022-02-04 | End: 2022-02-04

## 2022-02-04 RX ADMIN — ENOXAPARIN SODIUM 40 MILLIGRAM(S): 100 INJECTION SUBCUTANEOUS at 12:18

## 2022-02-04 RX ADMIN — PANTOPRAZOLE SODIUM 40 MILLIGRAM(S): 20 TABLET, DELAYED RELEASE ORAL at 12:19

## 2022-02-04 RX ADMIN — Medication 40 MILLIEQUIVALENT(S): at 12:29

## 2022-02-04 RX ADMIN — DEXTROSE MONOHYDRATE, SODIUM CHLORIDE, AND POTASSIUM CHLORIDE 75 MILLILITER(S): 50; .745; 4.5 INJECTION, SOLUTION INTRAVENOUS at 08:51

## 2022-02-04 RX ADMIN — CLOPIDOGREL BISULFATE 75 MILLIGRAM(S): 75 TABLET, FILM COATED ORAL at 12:21

## 2022-02-04 NOTE — PROGRESS NOTE ADULT - ASSESSMENT
76y Male with multiple medical issues now admitted with fevers and diffuse weakness.     Altered mental status   Hypoactive delirium, consistently improving  Likely delirium secondary to prolonged hospital confinement.   No focality or imaging finding to suggest stroke.   video EEG did not show seizure.  Non specific slowing    Fevers   Subsided.   Off antibiotics at present.     will follow with you    Cali Reyna MD PhD   [id number]

## 2022-02-04 NOTE — CHART NOTE - NSCHARTNOTESELECT_GEN_ALL_CORE
Event Note
Nutrition Services
Nutrition Services
Swallow eval/Event Note

## 2022-02-04 NOTE — PROGRESS NOTE ADULT - SUBJECTIVE AND OBJECTIVE BOX
[facility]                                        Ne[facility] at [place]                                 Maribell Baptiste, & Brent                                  [address]                                        [place], [place], [address]                                             [phone]        CC: altered mental status     HPI:   The patient is a 76 year old man who had been seen by Dr Reyna in the office in December of 2019 for tremors.   His work up was negative at that time.   He has been receiving immunotherapy for renal cell cancer and in early January of 2022 developed diffuse weakness and decreased PO intake.   He was admitted 1/21/22 after he presented late 1/19 (initially he was kept in the ER observation unit)  with increased weakness and recurrent falls.   He was initially able to converse with staff/family.   On 1/27 he was noted to be more confused and was not recognizing his son.     Neurology was called to evaluate on 1/28/22. (JW)    Interval: he is more alert today, answers questions    ROS: unable to obtain    MEDICATIONS  (STANDING):  clopidogrel Tablet 75 milliGRAM(s) Oral daily  dextrose 40% Gel 15 Gram(s) Oral once  dextrose 5% + sodium chloride 0.45% with potassium chloride 20 mEq/L 1000 milliLiter(s) (75 mL/Hr) IV Continuous <Continuous>  dextrose 50% Injectable 25 Gram(s) IV Push once  dextrose 50% Injectable 12.5 Gram(s) IV Push once  dextrose 50% Injectable 25 Gram(s) IV Push once  enoxaparin Injectable 40 milliGRAM(s) SubCutaneous daily  glucagon  Injectable 1 milliGRAM(s) IntraMuscular once  levothyroxine Injectable 6.25 MICROGram(s) IV Push at bedtime  metoprolol tartrate 25 milliGRAM(s) Oral two times a day  mometasone 220 MICROgram(s) Inhaler 1 Puff(s) Inhalation daily  pantoprazole  Injectable 40 milliGRAM(s) IV Push daily    MEDICATIONS  (PRN):  ALBUTerol    90 MICROgram(s) HFA Inhaler 2 Puff(s) Inhalation every 6 hours PRN Bronchospasm  labetalol Injectable 10 milliGRAM(s) IV Push every 6 hours PRN SBP > 160      Vital Signs Last 24 Hrs  T(C): 36.7 (04 Feb 2022 11:24), Max: 36.7 (04 Feb 2022 11:24)  T(F): 98 (04 Feb 2022 11:24), Max: 98 (04 Feb 2022 11:24)  HR: 81 (04 Feb 2022 11:24) (62 - 81)  BP: 151/97 (04 Feb 2022 11:24) (151/97 - 153/86)  BP(mean): --  RR: 18 (04 Feb 2022 11:24) (18 - 18)  SpO2: 98% (04 Feb 2022 11:24) (98% - 99%)    Detailed Neurologic Exam:    Mental status: More alert today. The patient opens eyes to voice. He is has very quiet voice.  He is oriented to self, 2022 and hospital only, not month/date. He follows instructions, but slowly, names objects and repeats sentences    Cranial nerves: Pupils equal and react symmetrically to light. He blinks to threat to confrontation. He tracks with gaze. Facial sensation is intact. Facial musculature is symmetric. Palate elevates symmetrically. Tongue is midline.    Motor/Sensory: There is normal bulk and tone.  There is no tremor.  No focal weakness    Reflexes: 1+ throughout and plantar responses are flexor.    Cerebellar: Cannot be tested.     Labs:                                         11.2   3.80  )-----------( 200      ( 03 Feb 2022 05:41 )             33.6     02-04    141  |  106  |  11.6  ----------------------------<  113<H>  3.2<L>   |  23.0  |  0.87    Ca    9.0      04 Feb 2022 05:45            Rad:   CT head 1/27/22- no stroke, mass or blood, (+) SVID      EEG Summary: 1/31-2/1  Abnormal EEG in an altered patient.  - Intermittent polymorphic delta slowing in the right hemisphere.  - Mild to moderate generalized slowing.    Impression/Clinical Correlate:  1/31 – 2/1: no event or seizure    No events or seizures were recorded. Interictal findings suggest functional abnormality in the right hemisphere and mild to moderate nonspecific diffuse or multifocal cerebral dysfunction.     ________________________________________

## 2022-02-04 NOTE — CHART NOTE - NSCHARTNOTEFT_GEN_A_CORE
Source: Patient [ ]  Family [ ]   other [x ]    Current Diet:   Diet, Pureed:   Moderately Thick Liquids (MODTHICKLIQS) (02-03-22 @ 09:48)    Patient reports [ ] nausea  [ ] vomiting [ ] diarrhea [ ] constipation  [ x]chewing problems [x ] swallowing issues  [ ] other:     PO intake:  < 50% [x ]   50-75%  [ ]   %  [ ]  other :    Source for PO intake [ ] Patient [ ] family [ x] chart [ x] staff [ ] other    Current Weight:   (2/3)    152 lbs RD bedscale weight  (1/27)  133 lbs   (1/19) 140 lbs     % Weight Change: Unclear accuracy of weight 2/2 inconsistency, will continue to monitor     Pertinent Medications: MEDICATIONS  (STANDING):  dextrose 40% Gel 15 Gram(s) Oral once  dextrose 5% + sodium chloride 0.45% with potassium chloride 20 mEq/L 1000 milliLiter(s) (75 mL/Hr) IV Continuous <Continuous>  dextrose 50% Injectable 25 Gram(s) IV Push once  dextrose 50% Injectable 12.5 Gram(s) IV Push once  dextrose 50% Injectable 25 Gram(s) IV Push once  enoxaparin Injectable 40 milliGRAM(s) SubCutaneous daily  glucagon  Injectable 1 milliGRAM(s) IntraMuscular once  levothyroxine Injectable 6.25 MICROGram(s) IV Push at bedtime  mometasone 220 MICROgram(s) Inhaler 1 Puff(s) Inhalation daily  pantoprazole  Injectable 40 milliGRAM(s) IV Push daily    MEDICATIONS  (PRN):  ALBUTerol    90 MICROgram(s) HFA Inhaler 2 Puff(s) Inhalation every 6 hours PRN Bronchospasm  labetalol Injectable 10 milliGRAM(s) IV Push every 6 hours PRN SBP > 160    Pertinent Labs: CBC Full  -  ( 03 Feb 2022 05:41 )  WBC Count : 3.80 K/uL  RBC Count : 3.63 M/uL  Hemoglobin : 11.2 g/dL  Hematocrit : 33.6 %  Platelet Count - Automated : 200 K/uL  Mean Cell Volume : 92.6 fl  Mean Cell Hemoglobin : 30.9 pg  Mean Cell Hemoglobin Concentration : 33.3 gm/dL  02-04 Na141 mmol/L Glu 113 mg/dL<H> K+ 3.2 mmol/L<L> Cr  0.87 mg/dL BUN 11.6 mg/dL Phos n/a   Alb n/a   PAB n/a       Skin: No skin breakdown/edema noted     Nutrition focused physical exam conducted - found signs of malnutrition [ ]absent [x ]present    Subcutaneous fat loss: [ ] Orbital fat pads region, [x ]Buccal fat region, [ ]Triceps region,  [ ]Ribs region    Muscle wasting: [x ]Temples region, [ x]Clavicle region, [ ]Shoulder region, [ ]Scapula region, [ ]Interosseous region,  [ ]thigh region, [ ]Calf region    Estimated Needs:   [x ] no change since previous assessment  [ ] recalculated:     Current Nutrition Diagnosis: Pt remains at high nutrition risk secondary to malnutrition (moderate acute/chronic) related to inability to meet sufficient protein-energy needs in setting of PNA, 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 Pt s/p SLP eval (2/3) recommending puree/mod thick liquids. Diet advanced, Pt completing <50% of meals despite total assistance. Aware fecal incontinence noted. Pt with decreased K+ today.       Recommendations:   1) Add Ensure Enlive TID, consistency per SLP  2) Encourage HBV protein sources   3) Provide encouragement/assistance as needed during mealtimes to inc PO   4) Monitor weights daily for trend/accuracy     Monitoring and Evaluation:   [x ] PO intake [ x] Tolerance to diet prescription [X] Weights  [X] Follow up per protocol [X] Labs:

## 2022-02-04 NOTE — PROGRESS NOTE ADULT - NUTRITIONAL ASSESSMENT
This patient has been assessed with a concern for Malnutrition and has been determined to have a diagnosis/diagnoses of Moderate protein-calorie malnutrition.    This patient is being managed with:   Diet NPO-  Entered: Jan 29 2022 12:37AM    
This patient has been assessed with a concern for Malnutrition and has been determined to have a diagnosis/diagnoses of Moderate protein-calorie malnutrition.    This patient is being managed with:   Diet NPO-  Entered: Jan 29 2022 12:37AM    
This patient has been assessed with a concern for Malnutrition and has been determined to have a diagnosis/diagnoses of Moderate protein-calorie malnutrition.    This patient is being managed with:   Diet Minced and Moist-  Entered: Jan 28 2022  9:04AM    
This patient has been assessed with a concern for Malnutrition and has been determined to have a diagnosis/diagnoses of Moderate protein-calorie malnutrition.    This patient is being managed with:   Diet NPO with Tube Feed-  Tube Feeding Modality: Nasogastric  Jevity 1.5 Oli (JEVITY1.5)  Total Volume for 24 Hours (mL): 1440  Continuous  Starting Tube Feed Rate {mL per Hour}: 15  Increase Tube Feed Rate by (mL): 15     Every 6 hours  Until Goal Tube Feed Rate (mL per Hour): 60  Tube Feed Duration (in Hours): 24  Tube Feed Start Time: 18:00  Entered: Feb 1 2022  4:51PM    
This patient has been assessed with a concern for Malnutrition and has been determined to have a diagnosis/diagnoses of Moderate protein-calorie malnutrition.    This patient is being managed with:   Diet NPO-  Entered: Jan 29 2022 12:37AM    
This patient has been assessed with a concern for Malnutrition and has been determined to have a diagnosis/diagnoses of Moderate protein-calorie malnutrition.    This patient is being managed with:   Diet NPO-  Entered: Jan 29 2022 12:37AM    
This patient has been assessed with a concern for Malnutrition and has been determined to have a diagnosis/diagnoses of Moderate protein-calorie malnutrition.    This patient is being managed with:   Diet Pureed-  Moderately Thick Liquids (MODTHICKLIQS)  Entered: Feb  3 2022  9:48AM    
This patient has been assessed with a concern for Malnutrition and has been determined to have a diagnosis/diagnoses of Moderate protein-calorie malnutrition.    This patient is being managed with:   Diet Pureed-  Moderately Thick Liquids (MODTHICKLIQS)  Entered: Feb  3 2022  9:48AM    
This patient has been assessed with a concern for Malnutrition and has been determined to have a diagnosis/diagnoses of Moderate protein-calorie malnutrition.    This patient is being managed with:   Diet Regular-  Supplement Feeding Modality:  Oral  Ensure Clear Cans or Servings Per Day:  1       Frequency:  Three Times a day  Entered: Jan 23 2022  9:02AM

## 2022-02-04 NOTE — PROGRESS NOTE ADULT - SUBJECTIVE AND OBJECTIVE BOX
KRISTAN DENNIS  ----------------------------------------  The patient was seen at bedside. Patient with delirium. Offers no complaints. Awake and alert. Speaks softly but more communicative. Offers no complaints.    Vital Signs Last 24 Hrs  T(C): 36.6 (04 Feb 2022 04:48), Max: 36.6 (04 Feb 2022 04:48)  T(F): 97.9 (04 Feb 2022 04:48), Max: 97.9 (04 Feb 2022 04:48)  HR: 62 (04 Feb 2022 04:48) (62 - 62)  BP: 153/86 (04 Feb 2022 04:48) (153/86 - 153/86)  BP(mean): --  RR: 18 (04 Feb 2022 04:48) (18 - 18)  SpO2: 99% (04 Feb 2022 04:48) (99% - 99%)    CAPILLARY BLOOD GLUCOSE  POCT Blood Glucose.: 113 mg/dL (04 Feb 2022 08:49)  POCT Blood Glucose.: 122 mg/dL (04 Feb 2022 05:31)  POCT Blood Glucose.: 102 mg/dL (03 Feb 2022 23:05)  POCT Blood Glucose.: 92 mg/dL (03 Feb 2022 18:57)    PHYSICAL EXAMINATION:  ----------------------------------------  General appearance: No acute distress, Awake, Alert  HEENT: Normocephalic, Atraumatic, Conjunctiva clear, EOMI  Neck: Supple, No JVD, No tenderness  Lungs: Breath sound equal bilaterally, No wheezes, No rales  Cardiovascular: S1S2, Regular rhythm  Abdomen: Soft, Nontender, Nondistended, No guarding/rebound, Positive bowel sounds  Extremities: No clubbing, No cyanosis, No edema, No calf tenderness  Neuro: Strength equal bilaterally, No tremors  Psychiatric: Appropriate mood and affect    LABORATORY STUDIES:  ----------------------------------------             11.2   3.80  )-----------( 200      ( 03 Feb 2022 05:41 )             33.6     02-04    141  |  106  |  11.6  ----------------------------<  113<H>  3.2<L>   |  23.0  |  0.87    Ca    9.0      04 Feb 2022 05:45    MEDICATIONS  (STANDING):  dextrose 40% Gel 15 Gram(s) Oral once  dextrose 5% + sodium chloride 0.45% with potassium chloride 20 mEq/L 1000 milliLiter(s) (75 mL/Hr) IV Continuous <Continuous>  dextrose 50% Injectable 25 Gram(s) IV Push once  dextrose 50% Injectable 12.5 Gram(s) IV Push once  dextrose 50% Injectable 25 Gram(s) IV Push once  enoxaparin Injectable 40 milliGRAM(s) SubCutaneous daily  glucagon  Injectable 1 milliGRAM(s) IntraMuscular once  levothyroxine Injectable 6.25 MICROGram(s) IV Push at bedtime  mometasone 220 MICROgram(s) Inhaler 1 Puff(s) Inhalation daily  pantoprazole  Injectable 40 milliGRAM(s) IV Push daily  potassium chloride   Powder 40 milliEquivalent(s) Oral once    MEDICATIONS  (PRN):  ALBUTerol    90 MICROgram(s) HFA Inhaler 2 Puff(s) Inhalation every 6 hours PRN Bronchospasm  labetalol Injectable 10 milliGRAM(s) IV Push every 6 hours PRN SBP > 160      ASSESSMENT / PLAN:  ----------------------------------------  76 year old male with HTN, HLD, CAD s/p PCI x 4, asthma, s/p left nephrectomy and left RCC on immunotherapy (biweekly infusions per son with [place] [facility] Ca) presenting from home with recurrent falls and profound weakness x 10 days. Placed in observation waiting for dsc to [facility]. Noted to be febrile and hyponatremic while in ER and admitted to Inpatient. Noted fever/ weakness/dehydration/ electrolyte imbalance/ poor PO intake/ gen debility. Monitored off antibx. fever w/u so far negative. deemed likely SE of immunotherpay by Onc. High dose steroids being started today for same. Pt was moved around in CDU and then to MEd surg floor when noted new onset acute delirium.    Delirium - Continues to show improvement. CT of the head was without acute intracranial pathology. EEG was without seizure activity.    Weakness / Poor oral intake - Speech Pathology evaluation noted, on a modified consistency diet with aspiration precautions.    Fever - Resolved. Blood cultures were without growth. COVID-19 negative.    Renal cell carcinoma - Noted to have a history of nephrectomy and immunotherapy. Renal function stable.    Hyponatremia - Sodium level improved on repeat studies.    Hypokalemia - For further supplementation of potassium.    Coronary artery disease / Hypertension - Close blood pressure monitoring. For resumption antiplatelet and statin therapy.    Hypothyroidism - On levothyroxine. KRISTAN DENNIS  ----------------------------------------  The patient was seen at bedside. Patient with delirium. Offers no complaints. Awake and alert. Speaks softly but more communicative. Offers no complaints.    Vital Signs Last 24 Hrs  T(C): 36.6 (04 Feb 2022 04:48), Max: 36.6 (04 Feb 2022 04:48)  T(F): 97.9 (04 Feb 2022 04:48), Max: 97.9 (04 Feb 2022 04:48)  HR: 62 (04 Feb 2022 04:48) (62 - 62)  BP: 153/86 (04 Feb 2022 04:48) (153/86 - 153/86)  BP(mean): --  RR: 18 (04 Feb 2022 04:48) (18 - 18)  SpO2: 99% (04 Feb 2022 04:48) (99% - 99%)    CAPILLARY BLOOD GLUCOSE  POCT Blood Glucose.: 113 mg/dL (04 Feb 2022 08:49)  POCT Blood Glucose.: 122 mg/dL (04 Feb 2022 05:31)  POCT Blood Glucose.: 102 mg/dL (03 Feb 2022 23:05)  POCT Blood Glucose.: 92 mg/dL (03 Feb 2022 18:57)    PHYSICAL EXAMINATION:  ----------------------------------------  General appearance: No acute distress, Awake, Alert  HEENT: Normocephalic, Atraumatic, Conjunctiva clear, EOMI  Neck: Supple, No JVD, No tenderness  Lungs: Breath sound equal bilaterally, No wheezes, No rales  Cardiovascular: S1S2, Regular rhythm  Abdomen: Soft, Nontender, Nondistended, No guarding/rebound, Positive bowel sounds  Extremities: No clubbing, No cyanosis, No edema, No calf tenderness  Neuro: Strength equal bilaterally, No tremors  Psychiatric: Appropriate mood and affect    LABORATORY STUDIES:  ----------------------------------------             11.2   3.80  )-----------( 200      ( 03 Feb 2022 05:41 )             33.6     02-04    141  |  106  |  11.6  ----------------------------<  113<H>  3.2<L>   |  23.0  |  0.87    Ca    9.0      04 Feb 2022 05:45    MEDICATIONS  (STANDING):  dextrose 40% Gel 15 Gram(s) Oral once  dextrose 5% + sodium chloride 0.45% with potassium chloride 20 mEq/L 1000 milliLiter(s) (75 mL/Hr) IV Continuous <Continuous>  dextrose 50% Injectable 25 Gram(s) IV Push once  dextrose 50% Injectable 12.5 Gram(s) IV Push once  dextrose 50% Injectable 25 Gram(s) IV Push once  enoxaparin Injectable 40 milliGRAM(s) SubCutaneous daily  glucagon  Injectable 1 milliGRAM(s) IntraMuscular once  levothyroxine Injectable 6.25 MICROGram(s) IV Push at bedtime  mometasone 220 MICROgram(s) Inhaler 1 Puff(s) Inhalation daily  pantoprazole  Injectable 40 milliGRAM(s) IV Push daily  potassium chloride   Powder 40 milliEquivalent(s) Oral once    MEDICATIONS  (PRN):  ALBUTerol    90 MICROgram(s) HFA Inhaler 2 Puff(s) Inhalation every 6 hours PRN Bronchospasm  labetalol Injectable 10 milliGRAM(s) IV Push every 6 hours PRN SBP > 160      ASSESSMENT / PLAN:  ----------------------------------------  76 year old male with HTN, HLD, CAD s/p PCI x 4, asthma, s/p left nephrectomy and left RCC on immunotherapy (biweekly infusions per son with [place] [facility] Ca) presenting from home with recurrent falls and profound weakness x 10 days. Placed in observation waiting for dsc to [facility]. Noted to be febrile and hyponatremic while in ER and admitted to Inpatient. Noted fever/ weakness/dehydration/ electrolyte imbalance/ poor PO intake/ gen debility. Monitored off antibx. fever w/u so far negative. deemed likely SE of immunotherpay by Onc. High dose steroids being started today for same. Pt was moved around in CDU and then to MEd surg floor when noted new onset acute delirium.    Delirium - Continues to show improvement. CT of the head was without acute intracranial pathology. EEG was without seizure activity.    Weakness / Poor oral intake - Speech Pathology evaluation noted, on a modified consistency diet with aspiration precautions.    Fever - Resolved. Blood cultures were without growth. COVID-19 negative.    Renal cell carcinoma - Noted to have a history of nephrectomy and immunotherapy. Renal function stable.    Hyponatremia - Sodium level improved on repeat studies.    Hypokalemia - For further supplementation of potassium.    Coronary artery disease / Hypertension - Close blood pressure monitoring. For resumption clopidogrel.    Hypothyroidism - On levothyroxine.

## 2022-05-10 ENCOUNTER — APPOINTMENT (OUTPATIENT)
Dept: NEUROLOGY | Facility: CLINIC | Age: 77
End: 2022-05-10
Payer: MEDICARE

## 2022-05-10 VITALS — BODY MASS INDEX: 22.98 KG/M2 | HEIGHT: 66 IN | WEIGHT: 143 LBS

## 2022-05-10 PROCEDURE — 99214 OFFICE O/P EST MOD 30 MIN: CPT

## 2022-05-10 NOTE — HISTORY OF PRESENT ILLNESS
[FreeTextEntry1] : Initial office visit September 23, 2019:\par This is a 74-year-old man who presents today for evaluation of tremor. He states that if he is laying down with his arms outstretched and his hand is gently flexed he'll have uncontrollable tremor. It stops and he underwent his his hand. He recently got a carpal tunnel wrist brace for his left hand which is not allow him to flex the fingers and this is stopped the tremor on the left hand but it still happens on the right hand. He doesn't have the tremor during the day when his car and up and about. He has no clear resting tremor currently. He is here today for neurologic evaluation of this.\par \par Followup December 27, 2019:\par This is a 74-year-old man he presents today for evaluation of tremor. He continues to have tremor when he closes his fist he can be induced. He opens his hand that she stops. What is concerning now is that his wife is noticing it while he is sleeping. He she will see him with a flexed head and shaking arm. It can happen on either arm but appears to have been more the right than the left. The patient states that it can happen during the day and he states he can bring on, but when he tried to do maneuvers that bring on today the office did not occur. He is here today for neurologic followup.\par \par Follow-up May 10, 2022:\par This is a 76-year-old man who presents today for follow-up.  He is experiencing new issues.  Currently he is complaining of a few months of numbness into the left hand.  It particularly affects digits 3 4 and 5.  He notes that it is worse if he touches his elbow.  He is also having some memory issues.  His daughter states that she needs to repeat things several times.  And that his short-term memory is not as good as it used to be.  He is here today for neurologic reevaluation.

## 2022-05-10 NOTE — REVIEW OF SYSTEMS
[As Noted in HPI] : as noted in HPI [Memory Lapses or Loss] : memory loss [Repeating Questions] : repeated questioning about recent events [Numbness] : numbness [Tingling] : tingling [Negative] : Heme/Lymph

## 2022-05-10 NOTE — PHYSICAL EXAM
[Person] : oriented to person [Place] : oriented to place [Time] : oriented to time [Short Term Intact] : short term memory impaired [Remote Intact] : remote memory intact [Registration Intact] : recent registration memory intact [Span Intact] : the attention span was normal [Concentration Intact] : normal concentrating ability [Visual Intact] : visual attention was ~T not ~L decreased [Naming Objects] : no difficulty naming common objects [Repeating Phrases] : no difficulty repeating a phrase [Fluency] : fluency intact [Comprehension] : comprehension intact [Current Events] : inadequate knowledge of current events [Past History] : adequate knowledge of personal past history [Cranial Nerves Optic (II)] : visual acuity intact bilaterally,  visual fields full to confrontation, pupils equal round and reactive to light [Cranial Nerves Oculomotor (III)] : extraocular motion intact [Cranial Nerves Trigeminal (V)] : facial sensation intact symmetrically [Cranial Nerves Facial (VII)] : face symmetrical [Cranial Nerves Vestibulocochlear (VIII)] : hearing was intact bilaterally [Cranial Nerves Glossopharyngeal (IX)] : tongue and palate midline [Cranial Nerves Accessory (XI - Cranial And Spinal)] : head turning and shoulder shrug symmetric [Cranial Nerves Hypoglossal (XII)] : there was no tongue deviation with protrusion [Motor Tone] : muscle tone was normal in all four extremities [Motor Strength] : muscle strength was normal in all four extremities [Involuntary Movements] : no involuntary movements were seen [No Muscle Atrophy] : normal bulk in all four extremities [Paresis Pronator Drift Right-Sided] : no pronator drift on the right [Paresis Pronator Drift Left-Sided] : no pronator drift on the left [Motor Strength Upper Extremities Bilaterally] : strength was normal in both upper extremities [Motor Strength Lower Extremities Bilaterally] : strength was normal in both lower extremities [Sensation Tactile Decrease] : light touch was intact [Sensation Pain / Temperature Decrease] : pain and temperature was intact [Sensation Vibration Decrease] : vibration was intact [Proprioception] : proprioception was intact [Tremor] : no tremor present [Coordination - Dysmetria Impaired Finger-to-Nose Bilateral] : not present [2+] : Patella left 2+ [FreeTextEntry4] : 1 out of 3 recall at 5 minutes to out of 3 with clues [FreeTextEntry7] : Positive Tinel at left elbow [FreeTextEntry8] : In wheelchair but uses walker at subacute rehab [Sclera] : the sclera and conjunctiva were normal [PERRL With Normal Accommodation] : pupils were equal in size, round, reactive to light, with normal accommodation [Extraocular Movements] : extraocular movements were intact [No APD] : no afferent pupillary defect [No LORETO] : no internuclear ophthalmoplegia [Full Visual Field] : full visual field

## 2022-05-10 NOTE — REASON FOR VISIT
[Consultation] : a consultation visit [Other: _____] : [unfilled] [FreeTextEntry1] : Memory loss and numbness in left hand

## 2022-05-10 NOTE — CONSULT LETTER
[Dear  ___] : Dear  [unfilled], [Courtesy Letter:] : I had the pleasure of seeing your patient, [unfilled], in my office today. [Please see my note below.] : Please see my note below. [Consult Closing:] : Thank you very much for allowing me to participate in the care of this patient.  If you have any questions, please do not hesitate to contact me. [Sincerely,] : Sincerely, [FreeTextEntry3] : Cali Reyna M.D., Ph.D. DPN-N\par [facility]\par Neurology at [place]\par Medical Director of Stroke Services\par [facility]\par

## 2022-05-10 NOTE — ASSESSMENT
[FreeTextEntry1] : Follow-up May 10, 2022:\par This is a 76-year-old man who presents today for follow-up.  He is experiencing new issues.  Currently he is complaining of a few months of numbness into the left hand.  It particularly affects digits 3 4 and 5.  He notes that it is worse if he touches his elbow.  He is also having some memory issues.  His daughter states that she needs to repeat things several times.  And that his short-term memory is not as good as it used to be.  He is here today for neurologic reevaluation.

## 2022-05-16 ENCOUNTER — APPOINTMENT (OUTPATIENT)
Dept: DERMATOLOGY | Facility: CLINIC | Age: 77
End: 2022-05-16
Payer: MEDICARE

## 2022-05-16 PROCEDURE — 99203 OFFICE O/P NEW LOW 30 MIN: CPT

## 2022-06-27 ENCOUNTER — APPOINTMENT (OUTPATIENT)
Dept: NEUROLOGY | Facility: CLINIC | Age: 77
End: 2022-06-27

## 2022-06-27 PROCEDURE — 95886 MUSC TEST DONE W/N TEST COMP: CPT

## 2022-06-27 PROCEDURE — 95911 NRV CNDJ TEST 9-10 STUDIES: CPT

## 2022-07-20 ENCOUNTER — APPOINTMENT (OUTPATIENT)
Dept: ORTHOPEDIC SURGERY | Facility: CLINIC | Age: 77
End: 2022-07-20

## 2022-07-20 VITALS
SYSTOLIC BLOOD PRESSURE: 112 MMHG | WEIGHT: 136 LBS | BODY MASS INDEX: 21.86 KG/M2 | HEIGHT: 66 IN | DIASTOLIC BLOOD PRESSURE: 61 MMHG | HEART RATE: 68 BPM

## 2022-07-20 DIAGNOSIS — G56.22 LESION OF ULNAR NERVE, LEFT UPPER LIMB: ICD-10-CM

## 2022-07-20 PROCEDURE — 99204 OFFICE O/P NEW MOD 45 MIN: CPT

## 2022-09-02 ENCOUNTER — APPOINTMENT (OUTPATIENT)
Dept: NEUROLOGY | Facility: CLINIC | Age: 77
End: 2022-09-02

## 2022-09-02 VITALS
HEIGHT: 66 IN | BODY MASS INDEX: 22.82 KG/M2 | WEIGHT: 142 LBS | SYSTOLIC BLOOD PRESSURE: 120 MMHG | DIASTOLIC BLOOD PRESSURE: 68 MMHG

## 2022-09-02 DIAGNOSIS — R41.3 OTHER AMNESIA: ICD-10-CM

## 2022-09-02 DIAGNOSIS — G56.22 LESION OF ULNAR NERVE, LEFT UPPER LIMB: ICD-10-CM

## 2022-09-02 PROCEDURE — 99214 OFFICE O/P EST MOD 30 MIN: CPT

## 2022-09-02 NOTE — PHYSICAL EXAM
[Person] : oriented to person [Place] : oriented to place [Time] : oriented to time [Remote Intact] : remote memory intact [Registration Intact] : recent registration memory intact [Span Intact] : the attention span was normal [Concentration Intact] : normal concentrating ability [Visual Intact] : visual attention was ~T not ~L decreased [Naming Objects] : no difficulty naming common objects [Repeating Phrases] : no difficulty repeating a phrase [Fluency] : fluency intact [Comprehension] : comprehension intact [Past History] : adequate knowledge of personal past history [Cranial Nerves Optic (II)] : visual acuity intact bilaterally,  visual fields full to confrontation, pupils equal round and reactive to light [Cranial Nerves Oculomotor (III)] : extraocular motion intact [Cranial Nerves Trigeminal (V)] : facial sensation intact symmetrically [Cranial Nerves Facial (VII)] : face symmetrical [Cranial Nerves Vestibulocochlear (VIII)] : hearing was intact bilaterally [Cranial Nerves Glossopharyngeal (IX)] : tongue and palate midline [Cranial Nerves Accessory (XI - Cranial And Spinal)] : head turning and shoulder shrug symmetric [Cranial Nerves Hypoglossal (XII)] : there was no tongue deviation with protrusion [Motor Tone] : muscle tone was normal in all four extremities [Motor Strength] : muscle strength was normal in all four extremities [Involuntary Movements] : no involuntary movements were seen [No Muscle Atrophy] : normal bulk in all four extremities [Paresis Pronator Drift Right-Sided] : no pronator drift on the right [Paresis Pronator Drift Left-Sided] : no pronator drift on the left [Motor Strength Upper Extremities Bilaterally] : strength was normal in both upper extremities [Motor Strength Lower Extremities Bilaterally] : strength was normal in both lower extremities [Sensation Tactile Decrease] : light touch was intact [Sensation Pain / Temperature Decrease] : pain and temperature was intact [Sensation Vibration Decrease] : vibration was intact [Proprioception] : proprioception was intact [Tremor] : no tremor present [Coordination - Dysmetria Impaired Finger-to-Nose Bilateral] : not present [2+] : Patella left 2+ [FreeTextEntry7] : Positive Tinel at left elbow [FreeTextEntry8] : In wheelchair but uses walker at subacute rehab [Sclera] : the sclera and conjunctiva were normal [PERRL With Normal Accommodation] : pupils were equal in size, round, reactive to light, with normal accommodation [Extraocular Movements] : extraocular movements were intact [No APD] : no afferent pupillary defect [No LORETO] : no internuclear ophthalmoplegia [Full Visual Field] : full visual field

## 2022-09-02 NOTE — CONSULT LETTER
[Dear  ___] : Dear  [unfilled], [Courtesy Letter:] : I had the pleasure of seeing your patient, [unfilled], in my office today. [Please see my note below.] : Please see my note below. [Consult Closing:] : Thank you very much for allowing me to participate in the care of this patient.  If you have any questions, please do not hesitate to contact me. [Sincerely,] : Sincerely, [FreeTextEntry3] : Cali Reyna M.D., Ph.D. DPN-N\par [facility]\par Neurology at [place]\par Medical Director of Stroke Services\par [facility]\par

## 2022-09-02 NOTE — ASSESSMENT
[FreeTextEntry1] : This is a 77-year-old man with left ulnar neuropathy and mild carpal tunnel syndrome as well.  I will send him for occupational therapy.  If this does not help he may need to visit a hand surgeon.  Regarding the memory it stable if not improved.  I would not prescribe medication.  I will see him back in the office in 3 months for follow-up assessment.

## 2022-09-02 NOTE — HISTORY OF PRESENT ILLNESS
[FreeTextEntry1] : Initial office visit September 23, 2019:\par This is a 74-year-old man who presents today for evaluation of tremor. He states that if he is laying down with his arms outstretched and his hand is gently flexed he'll have uncontrollable tremor. It stops and he underwent his his hand. He recently got a carpal tunnel wrist brace for his left hand which is not allow him to flex the fingers and this is stopped the tremor on the left hand but it still happens on the right hand. He doesn't have the tremor during the day when his car and up and about. He has no clear resting tremor currently. He is here today for neurologic evaluation of this.\par \par Followup December 27, 2019:\par This is a 74-year-old man he presents today for evaluation of tremor. He continues to have tremor when he closes his fist he can be induced. He opens his hand that she stops. What is concerning now is that his wife is noticing it while he is sleeping. He she will see him with a flexed head and shaking arm. It can happen on either arm but appears to have been more the right than the left. The patient states that it can happen during the day and he states he can bring on, but when he tried to do maneuvers that bring on today the office did not occur. He is here today for neurologic followup.\par \par Follow-up May 10, 2022:\par This is a 76-year-old man who presents today for follow-up.  He is experiencing new issues.  Currently he is complaining of a few months of numbness into the left hand.  It particularly affects digits 3 4 and 5.  He notes that it is worse if he touches his elbow.  He is also having some memory issues.  His daughter states that she needs to repeat things several times.  And that his short-term memory is not as good as it used to be.  He is here today for neurologic reevaluation.\par \par Follow-up September 2, 2022:\par This is a 77-year-old man who presents with ulnar neuropathy on the left and memory issues.  At last visit we did an EMG which confirmed ulnar neuropathy and also showed median neuropathy on the left arm.  He was not able to go for rehab for it yet.  He states that the memory loss that he was experiencing at last visit is improving.  He still has the numbness and discomfort into the ulnar aspect of the left hand.  Certain positions make it better.  He is here today for neurologic follow-up.

## 2022-09-09 ENCOUNTER — FORM ENCOUNTER (OUTPATIENT)
Age: 77
End: 2022-09-09

## 2022-09-14 ENCOUNTER — NON-APPOINTMENT (OUTPATIENT)
Age: 77
End: 2022-09-14

## 2022-09-14 ENCOUNTER — APPOINTMENT (OUTPATIENT)
Dept: CARDIOLOGY | Facility: CLINIC | Age: 77
End: 2022-09-14

## 2022-09-14 VITALS
TEMPERATURE: 97.9 F | OXYGEN SATURATION: 99 % | SYSTOLIC BLOOD PRESSURE: 112 MMHG | HEART RATE: 71 BPM | WEIGHT: 138 LBS | DIASTOLIC BLOOD PRESSURE: 60 MMHG | HEIGHT: 66 IN | BODY MASS INDEX: 22.18 KG/M2

## 2022-09-14 PROCEDURE — 99213 OFFICE O/P EST LOW 20 MIN: CPT

## 2022-09-14 PROCEDURE — 93000 ELECTROCARDIOGRAM COMPLETE: CPT

## 2022-09-14 RX ORDER — FAMOTIDINE 40 MG/1
40 TABLET, FILM COATED ORAL
Refills: 0 | Status: DISCONTINUED | COMMUNITY
End: 2022-09-14

## 2022-09-14 RX ORDER — DULOXETINE HYDROCHLORIDE 60 MG/1
60 CAPSULE, DELAYED RELEASE PELLETS ORAL
Qty: 90 | Refills: 2 | Status: DISCONTINUED | COMMUNITY
Start: 2018-10-09 | End: 2022-09-14

## 2022-09-14 RX ORDER — BUDESONIDE 180 UG/1
180 AEROSOL, POWDER RESPIRATORY (INHALATION)
Refills: 0 | Status: DISCONTINUED | COMMUNITY
End: 2022-09-14

## 2022-09-14 RX ORDER — METHYLPREDNISOLONE 4 MG/1
4 TABLET ORAL
Qty: 1 | Refills: 0 | Status: DISCONTINUED | COMMUNITY
Start: 2022-07-20 | End: 2022-09-14

## 2022-09-14 RX ORDER — LANSOPRAZOLE 30 MG/1
30 CAPSULE, DELAYED RELEASE ORAL
Refills: 0 | Status: DISCONTINUED | COMMUNITY
End: 2022-09-14

## 2022-09-14 RX ORDER — ALIROCUMAB 75 MG/ML
75 INJECTION, SOLUTION SUBCUTANEOUS
Qty: 2 | Refills: 5 | Status: DISCONTINUED | COMMUNITY
Start: 2020-01-30 | End: 2022-09-14

## 2022-09-14 RX ORDER — DICLOFENAC SODIUM 10 MG/G
1 GEL TOPICAL TWICE DAILY
Qty: 1 | Refills: 2 | Status: DISCONTINUED | COMMUNITY
Start: 2019-01-15 | End: 2022-09-14

## 2022-09-14 NOTE — DISCUSSION/SUMMARY
[FreeTextEntry1] : Mr. KRISTAN DENNIS is a 77 year male with known CAD. Underwent PCI to the LCx and since then he has been completely asymptomatic.\par Underwent nephrectomy and resection of the ureter for malignancy. Was in rehab for 9 months and developed C. Diff x 2 (was at [facility]). Since then he has recovered very well. Lost 40 Lbs after the surgery.\par Will do routine bl;ood work and decide regarding lipid lowering treatment if needed. Was on PCSK9 inh in the past. \par At the present time He is completely asymptomatic.\par I have recommended to continue the same medications.\par Routine follow up in 6 months\par

## 2022-09-14 NOTE — HISTORY OF PRESENT ILLNESS
[FreeTextEntry1] : This is a 77 year old male, , wife  in 2020 from glioblastoma), father of 6. Has a known history of MI in , coronary artery disease, multiple coronary artery stents, hypertension, asthma, and hypercholesterolemia. Was under the care of Dr. Cartagena. \par Last intervention was performed in  that revealed patent stents in the LAD and LCx with a complete occlusion of the RCA. PCI was done to the OM1 successfully.  LVEF is 50% as per echo in 2018. \par Patient denies any chest pain, SOB, or palpitations. He states he walks almost daily with no exertional chest pain or shortness of breath. He is compliant with his medications. He used to be on Aspirin and Plavix but patient gets frequent nose bleeds so the Aspirin was stopped in the past. Patient was on Crestor, Pravastatin, and Atorvastatin in the past but they were stopped due to complaints of muscle pains. \par Patient now on Zetia for cholesterol because he cannot tolerate statin therapy. However he stopped when he run out of it. \par Patient has a Left renal mass and underwent surgery in 2021 at [facility] with extension to the ureter and bladder. Following the procedure the patient went to Rehab where he was for 9 months and developed C. Diff x 2. Treated with antibiotics. . \par Patient denies chest pain or palpitations. He states he walks the dogs for about 1 mile daily without symptoms. He does admit to mild SOB with walking upstairs. Nuclear stress test done in 2021 was normal with no ischemia. \par Denies HTN or diabetes.\par Doesnt smoke, quit in .\par Denies drinking alcohol\[facility] Denies the use of illicit drugs

## 2022-09-14 NOTE — ASSESSMENT
[FreeTextEntry1] : ECG performed today at the office revealed a NSR 60 bpm, with normal AQRS, VT, QRS and QTc. \par

## 2022-09-15 ENCOUNTER — APPOINTMENT (OUTPATIENT)
Dept: CARE COORDINATION | Facility: HOME HEALTH | Age: 77
End: 2022-09-15

## 2022-09-15 DIAGNOSIS — Z86.39 PERSONAL HISTORY OF OTHER ENDOCRINE, NUTRITIONAL AND METABOLIC DISEASE: ICD-10-CM

## 2022-09-15 DIAGNOSIS — Z86.79 PERSONAL HISTORY OF OTHER DISEASES OF THE CIRCULATORY SYSTEM: ICD-10-CM

## 2022-09-15 DIAGNOSIS — N28.89 OTHER SPECIFIED DISORDERS OF KIDNEY AND URETER: ICD-10-CM

## 2022-09-15 DIAGNOSIS — A04.72 ENTEROCOLITIS DUE TO CLOSTRIDIUM DIFFICILE, NOT SPECIFIED AS RECURRENT: ICD-10-CM

## 2022-09-15 DIAGNOSIS — Z95.5 PRESENCE OF CORONARY ANGIOPLASTY IMPLANT AND GRAFT: ICD-10-CM

## 2022-09-15 PROCEDURE — 99348 HOME/RES VST EST LOW MDM 30: CPT | Mod: 95

## 2022-09-19 PROBLEM — N28.89 RENAL MASS: Status: RESOLVED | Noted: 2022-09-19 | Resolved: 2022-09-19

## 2022-09-19 PROBLEM — Z86.39 HISTORY OF HYPERLIPIDEMIA: Status: RESOLVED | Noted: 2022-09-19 | Resolved: 2022-09-19

## 2022-09-19 PROBLEM — Z86.79 HISTORY OF CORONARY ARTERY DISEASE: Status: RESOLVED | Noted: 2022-09-19 | Resolved: 2022-09-19

## 2022-09-19 PROBLEM — Z95.5 S/P CORONARY ARTERY STENT PLACEMENT: Status: RESOLVED | Noted: 2022-09-19 | Resolved: 2022-09-19

## 2022-09-19 PROBLEM — A04.72 COLITIS DUE TO CLOSTRIDIOIDES DIFFICILE: Status: ACTIVE | Noted: 2022-09-19

## 2022-09-19 PROBLEM — Z86.79 HISTORY OF HYPERTENSION: Status: RESOLVED | Noted: 2022-09-19 | Resolved: 2022-09-19

## 2022-09-19 NOTE — ASSESSMENT
[FreeTextEntry1] : "77 year old Male with history of MI, CAD, S/P Stents, HTN. HLD, pneumonia, persistent history of recurrent falls, left renal mass, s/p surgery at [place] with extension to the ureter and bladder.  Pt was in rehab where he developed Cdiff treated with antibiotics.  The patient was discharged home in stable condition with home care services and follow up care.\par \par Cdiff, colitis: stable\par Continue established treatment plan with follow up\par Monitor for worsening  signs and symptoms of and reviewed when to call medical provider\par Pt verbalized good understanding\par Follow up with Medical providers: PCP\par \par \par CAD, S/P Stents: stable\par Continue established treatment plan with follow up\par Continue medications: Plavix, Metoprolol, \par Follow up with Medical providers: PCP, Cardiology\par \par HTN: stable\par Continue established treatment plan with follow up\par Continue BP meds Metoprolol\par Monitor for worsening  signs and symptoms of and reviewed when to call medical provider\par Pt verbalized good understanding\par Follow up with PCP and Cardiologist\par \par \par Pt will need continued Home Care Services RN\par This patient is Enrolled in the Bridge Follow Up Program through [employer]\par CN reviewed with the pt that he  is under the [facility] Bridge program for home care until he is seen by his PCP.   CN will be assigned to sign Home Care orders post-discharge.\par

## 2022-09-19 NOTE — CURRENT MEDS
[Takes medication as prescribed] : takes [None] : Patient does not have any barriers to medication adherence [Blood Thinners] : blood thinners [Other____] : [unfilled]

## 2022-09-19 NOTE — PHYSICAL EXAM
[No Acute Distress] : no acute distress [Well Nourished] : well nourished [Normal Sclera/Conjunctiva] : normal sclera/conjunctiva [Normal Outer Ear/Nose] : the outer ears and nose were normal in appearance [No JVD] : no jugular venous distention [No Respiratory Distress] : no respiratory distress  [No Accessory Muscle Use] : no accessory muscle use [Non Tender] : non-tender [de-identified] : Limited Visual Physical Exam during TeleHealth Visit [de-identified] : Awake and alert [de-identified] : Calm

## 2022-09-19 NOTE — HISTORY OF PRESENT ILLNESS
[Home] : at home, [unfilled] , at the time of the visit. [Other Location: e.g. Home (Enter Location, City,State)___] : at [unfilled] [Other:____] : [unfilled] [Verbal consent obtained from patient] : the patient, [unfilled] [FreeTextEntry1] : Follow up post discharge\par  [de-identified] : This patient is Enrolled in the Bridge Follow Up Program through [employer]\par Copied As per [facility] Discharge Summary\par \par "77 year old Male with history of MI, CAD, S/P Stents, HTN. HLD, pneumonia, persistent history of recurrent falls, left renal mass, s/p surgery at [place] with extension to the ureter and bladder.  Pt was in rehab where he developed Cdiff treated with antibiotics.  The patient was discharged home in stable condition with home care services and follow up care.\par \par During the TeleHealth the patient was in no acute distress.  Able to speak with complete sentences and no audible wheeze noted.\par \par CN reviewed with the pt that he is under the [facility] program for home care until he is seen by his PCP.   CN will be assigned to sign Home Care orders post-discharge.\par

## 2022-10-10 ENCOUNTER — APPOINTMENT (OUTPATIENT)
Dept: FAMILY MEDICINE | Facility: CLINIC | Age: 77
End: 2022-10-10

## 2022-10-11 ENCOUNTER — APPOINTMENT (OUTPATIENT)
Dept: FAMILY MEDICINE | Facility: CLINIC | Age: 77
End: 2022-10-11

## 2022-10-24 ENCOUNTER — NON-APPOINTMENT (OUTPATIENT)
Age: 77
End: 2022-10-24

## 2022-10-27 ENCOUNTER — APPOINTMENT (OUTPATIENT)
Dept: FAMILY MEDICINE | Facility: CLINIC | Age: 77
End: 2022-10-27

## 2022-10-27 VITALS
HEIGHT: 66 IN | BODY MASS INDEX: 22.5 KG/M2 | OXYGEN SATURATION: 97 % | DIASTOLIC BLOOD PRESSURE: 66 MMHG | RESPIRATION RATE: 16 BRPM | WEIGHT: 140 LBS | TEMPERATURE: 97.1 F | SYSTOLIC BLOOD PRESSURE: 118 MMHG | HEART RATE: 68 BPM

## 2022-10-27 DIAGNOSIS — Z00.00 ENCOUNTER FOR GENERAL ADULT MEDICAL EXAMINATION W/OUT ABNORMAL FINDINGS: ICD-10-CM

## 2022-10-27 DIAGNOSIS — Z91.09 OTHER ALLERGY STATUS, OTHER THAN TO DRUGS AND BIOLOGICAL SUBSTANCES: ICD-10-CM

## 2022-10-27 DIAGNOSIS — M25.50 PAIN IN UNSPECIFIED JOINT: ICD-10-CM

## 2022-10-27 PROCEDURE — 36415 COLL VENOUS BLD VENIPUNCTURE: CPT

## 2022-10-27 PROCEDURE — G0439: CPT

## 2022-10-27 RX ORDER — ESCITALOPRAM OXALATE 10 MG/1
10 TABLET ORAL DAILY
Qty: 7 | Refills: 0 | Status: COMPLETED | COMMUNITY
Start: 2019-02-15 | End: 2022-10-27

## 2022-10-27 RX ORDER — LEVOTHYROXINE SODIUM 0.03 MG/1
25 TABLET ORAL DAILY
Qty: 7 | Refills: 0 | Status: COMPLETED | COMMUNITY
Start: 2022-10-04 | End: 2022-10-27

## 2022-10-27 RX ORDER — VANCOMYCIN HYDROCHLORIDE 1 G/20ML
1 INJECTION, POWDER, LYOPHILIZED, FOR SOLUTION INTRAVENOUS
Qty: 280 | Refills: 0 | Status: COMPLETED | COMMUNITY
Start: 2022-08-04 | End: 2022-10-27

## 2022-10-27 RX ORDER — METOPROLOL TARTRATE 25 MG/1
25 TABLET, FILM COATED ORAL
Qty: 60 | Refills: 0 | Status: COMPLETED | COMMUNITY
Start: 2022-08-24 | End: 2022-10-27

## 2022-10-27 RX ORDER — ZOLPIDEM TARTRATE 5 MG/1
5 TABLET ORAL
Qty: 5 | Refills: 0 | Status: COMPLETED | COMMUNITY
Start: 2022-08-24 | End: 2022-10-27

## 2022-10-28 LAB
25(OH)D3 SERPL-MCNC: 46 NG/ML
ALBUMIN SERPL ELPH-MCNC: 4.1 G/DL
ALP BLD-CCNC: 122 U/L
ALT SERPL-CCNC: 50 U/L
ANION GAP SERPL CALC-SCNC: 13 MMOL/L
AST SERPL-CCNC: 33 U/L
BASOPHILS # BLD AUTO: 0.03 K/UL
BASOPHILS NFR BLD AUTO: 0.6 %
BILIRUB SERPL-MCNC: 0.2 MG/DL
BUN SERPL-MCNC: 18 MG/DL
C PEPTIDE SERPL-MCNC: 4.9 NG/ML
CALCIUM SERPL-MCNC: 9.6 MG/DL
CHLORIDE SERPL-SCNC: 100 MMOL/L
CHOLEST SERPL-MCNC: 180 MG/DL
CO2 SERPL-SCNC: 24 MMOL/L
COVID-19 NUCLEOCAPSID  GAM ANTIBODY INTERPRETATION: POSITIVE
COVID-19 SPIKE DOMAIN ANTIBODY INTERPRETATION: POSITIVE
CREAT SERPL-MCNC: 0.94 MG/DL
EGFR: 83 ML/MIN/1.73M2
EOSINOPHIL # BLD AUTO: 0.39 K/UL
EOSINOPHIL NFR BLD AUTO: 7.3 %
ESTIMATED AVERAGE GLUCOSE: 103 MG/DL
FERRITIN SERPL-MCNC: 309 NG/ML
FOLATE SERPL-MCNC: >20 NG/ML
GLUCOSE SERPL-MCNC: 94 MG/DL
HBA1C MFR BLD HPLC: 5.2 %
HCT VFR BLD CALC: 38.6 %
HDLC SERPL-MCNC: 30 MG/DL
HGB BLD-MCNC: 12.2 G/DL
IMM GRANULOCYTES NFR BLD AUTO: 0.2 %
IRON SATN MFR SERPL: 24 %
IRON SERPL-MCNC: 63 UG/DL
LDLC SERPL CALC-MCNC: 71 MG/DL
LYMPHOCYTES # BLD AUTO: 1.56 K/UL
LYMPHOCYTES NFR BLD AUTO: 29.4 %
MAN DIFF?: NORMAL
MCHC RBC-ENTMCNC: 30 PG
MCHC RBC-ENTMCNC: 31.6 GM/DL
MCV RBC AUTO: 95.1 FL
MONOCYTES # BLD AUTO: 0.56 K/UL
MONOCYTES NFR BLD AUTO: 10.5 %
NEUTROPHILS # BLD AUTO: 2.76 K/UL
NEUTROPHILS NFR BLD AUTO: 52 %
NONHDLC SERPL-MCNC: 150 MG/DL
PLATELET # BLD AUTO: 263 K/UL
POTASSIUM SERPL-SCNC: 4.5 MMOL/L
PROT SERPL-MCNC: 7.4 G/DL
PSA SERPL-MCNC: 1.09 NG/ML
RBC # BLD: 4.06 M/UL
RBC # FLD: 15 %
SARS-COV-2 AB SERPL IA-ACNC: 105 U/ML
SARS-COV-2 AB SERPL QL IA: 70.8 INDEX
SODIUM SERPL-SCNC: 137 MMOL/L
T4 SERPL-MCNC: 6.1 UG/DL
TIBC SERPL-MCNC: 264 UG/DL
TRIGL SERPL-MCNC: 397 MG/DL
TSH SERPL-ACNC: 6.84 UIU/ML
UIBC SERPL-MCNC: 201 UG/DL
VIT B12 SERPL-MCNC: 397 PG/ML
WBC # FLD AUTO: 5.31 K/UL

## 2022-10-31 RX ORDER — LEVOTHYROXINE SODIUM 25 UG/1
25 CAPSULE ORAL DAILY
Qty: 90 | Refills: 2 | Status: DISCONTINUED | COMMUNITY
Start: 1900-01-01 | End: 2022-10-31

## 2022-11-01 LAB
APPEARANCE: CLEAR
BILIRUBIN URINE: NEGATIVE
BLOOD URINE: NEGATIVE
COLOR: YELLOW
CREAT SPEC-SCNC: 72 MG/DL
GLUCOSE QUALITATIVE U: NEGATIVE
HEMOCCULT STL QL IA: NEGATIVE
KETONES URINE: NEGATIVE
LEUKOCYTE ESTERASE URINE: NEGATIVE
MICROALBUMIN 24H UR DL<=1MG/L-MCNC: <1.2 MG/DL
MICROALBUMIN/CREAT 24H UR-RTO: NORMAL MG/G
NITRITE URINE: NEGATIVE
PH URINE: 6
PROTEIN URINE: NEGATIVE
SPECIFIC GRAVITY URINE: 1.02
UROBILINOGEN URINE: NORMAL

## 2022-11-03 LAB — URINE CULTURE <10: ABNORMAL

## 2022-11-08 ENCOUNTER — NON-APPOINTMENT (OUTPATIENT)
Age: 77
End: 2022-11-08

## 2022-11-21 ENCOUNTER — APPOINTMENT (OUTPATIENT)
Dept: FAMILY MEDICINE | Facility: CLINIC | Age: 77
End: 2022-11-21

## 2022-11-21 VITALS
TEMPERATURE: 97.3 F | DIASTOLIC BLOOD PRESSURE: 70 MMHG | RESPIRATION RATE: 12 BRPM | HEART RATE: 57 BPM | SYSTOLIC BLOOD PRESSURE: 120 MMHG | OXYGEN SATURATION: 98 % | HEIGHT: 66 IN

## 2022-11-21 PROCEDURE — 90662 IIV NO PRSV INCREASED AG IM: CPT

## 2022-11-21 PROCEDURE — G0008: CPT

## 2022-11-21 PROCEDURE — 99213 OFFICE O/P EST LOW 20 MIN: CPT | Mod: 25

## 2022-11-21 RX ORDER — VANCOMYCIN HYDROCHLORIDE 250 MG/1
250 CAPSULE ORAL
Qty: 164 | Refills: 0 | Status: COMPLETED | COMMUNITY
Start: 2022-10-17 | End: 2022-11-21

## 2022-11-22 RX ORDER — AZITHROMYCIN 500 MG/1
500 TABLET, FILM COATED ORAL
Qty: 5 | Refills: 0 | Status: COMPLETED | COMMUNITY
Start: 2022-06-23 | End: 2022-11-22

## 2022-12-20 ENCOUNTER — APPOINTMENT (OUTPATIENT)
Dept: NEUROLOGY | Facility: CLINIC | Age: 77
End: 2022-12-20

## 2023-01-09 ENCOUNTER — APPOINTMENT (OUTPATIENT)
Dept: FAMILY MEDICINE | Facility: CLINIC | Age: 78
End: 2023-01-09
Payer: MEDICARE

## 2023-01-09 VITALS
TEMPERATURE: 97.9 F | DIASTOLIC BLOOD PRESSURE: 68 MMHG | HEIGHT: 66 IN | SYSTOLIC BLOOD PRESSURE: 118 MMHG | OXYGEN SATURATION: 98 % | WEIGHT: 147 LBS | BODY MASS INDEX: 23.63 KG/M2 | RESPIRATION RATE: 12 BRPM | HEART RATE: 64 BPM

## 2023-01-09 PROCEDURE — 36415 COLL VENOUS BLD VENIPUNCTURE: CPT

## 2023-01-09 PROCEDURE — 99214 OFFICE O/P EST MOD 30 MIN: CPT | Mod: 25

## 2023-01-10 LAB
25(OH)D3 SERPL-MCNC: 53.6 NG/ML
ALBUMIN SERPL ELPH-MCNC: 4.4 G/DL
ALP BLD-CCNC: 98 U/L
ALT SERPL-CCNC: 26 U/L
ANION GAP SERPL CALC-SCNC: 14 MMOL/L
AST SERPL-CCNC: 25 U/L
BASOPHILS # BLD AUTO: 0.02 K/UL
BASOPHILS NFR BLD AUTO: 0.4 %
BILIRUB SERPL-MCNC: 0.3 MG/DL
BUN SERPL-MCNC: 24 MG/DL
C PEPTIDE SERPL-MCNC: 2.7 NG/ML
CALCIUM SERPL-MCNC: 9.7 MG/DL
CHLORIDE SERPL-SCNC: 103 MMOL/L
CHOLEST SERPL-MCNC: 168 MG/DL
CO2 SERPL-SCNC: 25 MMOL/L
COVID-19 NUCLEOCAPSID  GAM ANTIBODY INTERPRETATION: POSITIVE
COVID-19 SPIKE DOMAIN ANTIBODY INTERPRETATION: POSITIVE
CREAT SERPL-MCNC: 1.11 MG/DL
EGFR: 68 ML/MIN/1.73M2
EOSINOPHIL # BLD AUTO: 0.21 K/UL
EOSINOPHIL NFR BLD AUTO: 4.1 %
ESTIMATED AVERAGE GLUCOSE: 97 MG/DL
FERRITIN SERPL-MCNC: 137 NG/ML
FOLATE SERPL-MCNC: >20 NG/ML
GLUCOSE SERPL-MCNC: 93 MG/DL
HBA1C MFR BLD HPLC: 5 %
HCT VFR BLD CALC: 41.1 %
HDLC SERPL-MCNC: 35 MG/DL
HGB BLD-MCNC: 13.6 G/DL
IMM GRANULOCYTES NFR BLD AUTO: 0.2 %
IRON SATN MFR SERPL: 23 %
IRON SERPL-MCNC: 71 UG/DL
LDLC SERPL CALC-MCNC: 82 MG/DL
LYMPHOCYTES # BLD AUTO: 1.27 K/UL
LYMPHOCYTES NFR BLD AUTO: 24.9 %
MAN DIFF?: NORMAL
MCHC RBC-ENTMCNC: 31 PG
MCHC RBC-ENTMCNC: 33.1 GM/DL
MCV RBC AUTO: 93.6 FL
MONOCYTES # BLD AUTO: 0.43 K/UL
MONOCYTES NFR BLD AUTO: 8.4 %
NEUTROPHILS # BLD AUTO: 3.17 K/UL
NEUTROPHILS NFR BLD AUTO: 62 %
NONHDLC SERPL-MCNC: 133 MG/DL
PLATELET # BLD AUTO: 185 K/UL
POTASSIUM SERPL-SCNC: 5 MMOL/L
PROT SERPL-MCNC: 7.5 G/DL
PSA SERPL-MCNC: 0.87 NG/ML
RBC # BLD: 4.39 M/UL
RBC # FLD: 13.5 %
SARS-COV-2 AB SERPL IA-ACNC: 164 U/ML
SARS-COV-2 AB SERPL QL IA: 86.5 INDEX
SODIUM SERPL-SCNC: 142 MMOL/L
T4 SERPL-MCNC: 7.2 UG/DL
TIBC SERPL-MCNC: 310 UG/DL
TRIGL SERPL-MCNC: 255 MG/DL
TSH SERPL-ACNC: 5.04 UIU/ML
UIBC SERPL-MCNC: 239 UG/DL
VIT B12 SERPL-MCNC: 485 PG/ML
WBC # FLD AUTO: 5.11 K/UL

## 2023-01-23 ENCOUNTER — RX RENEWAL (OUTPATIENT)
Age: 78
End: 2023-01-23

## 2023-01-23 ENCOUNTER — APPOINTMENT (OUTPATIENT)
Dept: FAMILY MEDICINE | Facility: CLINIC | Age: 78
End: 2023-01-23
Payer: MEDICARE

## 2023-01-23 VITALS
RESPIRATION RATE: 14 BRPM | HEIGHT: 66 IN | SYSTOLIC BLOOD PRESSURE: 122 MMHG | HEART RATE: 74 BPM | TEMPERATURE: 97 F | DIASTOLIC BLOOD PRESSURE: 70 MMHG | OXYGEN SATURATION: 95 %

## 2023-01-23 PROCEDURE — 99214 OFFICE O/P EST MOD 30 MIN: CPT

## 2023-01-24 ENCOUNTER — RX RENEWAL (OUTPATIENT)
Age: 78
End: 2023-01-24

## 2023-03-13 ENCOUNTER — APPOINTMENT (OUTPATIENT)
Dept: DERMATOLOGY | Facility: CLINIC | Age: 78
End: 2023-03-13
Payer: MEDICARE

## 2023-03-13 PROCEDURE — 99213 OFFICE O/P EST LOW 20 MIN: CPT

## 2023-03-15 ENCOUNTER — APPOINTMENT (OUTPATIENT)
Dept: CARDIOLOGY | Facility: CLINIC | Age: 78
End: 2023-03-15
Payer: MEDICARE

## 2023-04-05 ENCOUNTER — NON-APPOINTMENT (OUTPATIENT)
Age: 78
End: 2023-04-05

## 2023-04-05 ENCOUNTER — APPOINTMENT (OUTPATIENT)
Dept: CARDIOLOGY | Facility: CLINIC | Age: 78
End: 2023-04-05
Payer: MEDICARE

## 2023-04-05 VITALS
DIASTOLIC BLOOD PRESSURE: 64 MMHG | TEMPERATURE: 98.5 F | OXYGEN SATURATION: 96 % | HEIGHT: 66 IN | BODY MASS INDEX: 22.82 KG/M2 | SYSTOLIC BLOOD PRESSURE: 99 MMHG | HEART RATE: 54 BPM | WEIGHT: 142 LBS

## 2023-04-05 PROCEDURE — 93242 EXT ECG>48HR<7D RECORDING: CPT

## 2023-04-05 PROCEDURE — 99215 OFFICE O/P EST HI 40 MIN: CPT

## 2023-04-05 PROCEDURE — 93000 ELECTROCARDIOGRAM COMPLETE: CPT | Mod: 59

## 2023-04-05 RX ORDER — VANCOMYCIN HYDROCHLORIDE 125 MG/1
125 CAPSULE ORAL
Qty: 132 | Refills: 0 | Status: DISCONTINUED | COMMUNITY
Start: 2022-10-26 | End: 2023-04-05

## 2023-04-05 RX ORDER — METRONIDAZOLE 500 MG/1
500 TABLET ORAL
Qty: 42 | Refills: 0 | Status: DISCONTINUED | COMMUNITY
Start: 2022-07-13 | End: 2023-04-05

## 2023-04-05 RX ORDER — TAMSULOSIN HYDROCHLORIDE 0.4 MG/1
0.4 CAPSULE ORAL
Qty: 7 | Refills: 0 | Status: DISCONTINUED | COMMUNITY
End: 2023-04-05

## 2023-04-05 RX ORDER — DIBASIC SODIUM PHOSPHATE, MONOBASIC POTASSIUM PHOSPHATE AND MONOBASIC SODIUM PHOSPHATE 852; 155; 130 MG/1; MG/1; MG/1
155-852-130 TABLET ORAL
Qty: 8 | Refills: 0 | Status: DISCONTINUED | COMMUNITY
Start: 2022-05-13 | End: 2023-04-05

## 2023-04-05 RX ORDER — FIDAXOMICIN 200 MG/1
200 TABLET, FILM COATED ORAL
Qty: 12 | Refills: 0 | Status: DISCONTINUED | COMMUNITY
Start: 2022-09-08 | End: 2023-04-05

## 2023-04-21 ENCOUNTER — APPOINTMENT (OUTPATIENT)
Dept: FAMILY MEDICINE | Facility: CLINIC | Age: 78
End: 2023-04-21
Payer: MEDICARE

## 2023-04-21 ENCOUNTER — RX CHANGE (OUTPATIENT)
Age: 78
End: 2023-04-21

## 2023-04-21 VITALS
DIASTOLIC BLOOD PRESSURE: 62 MMHG | HEIGHT: 66 IN | WEIGHT: 142 LBS | SYSTOLIC BLOOD PRESSURE: 112 MMHG | RESPIRATION RATE: 15 BRPM | TEMPERATURE: 98.2 F | OXYGEN SATURATION: 98 % | HEART RATE: 71 BPM | BODY MASS INDEX: 22.82 KG/M2

## 2023-04-21 PROCEDURE — 99214 OFFICE O/P EST MOD 30 MIN: CPT

## 2023-04-24 ENCOUNTER — APPOINTMENT (OUTPATIENT)
Dept: DERMATOLOGY | Facility: CLINIC | Age: 78
End: 2023-04-24

## 2023-04-24 ENCOUNTER — NON-APPOINTMENT (OUTPATIENT)
Age: 78
End: 2023-04-24

## 2023-04-24 ENCOUNTER — APPOINTMENT (OUTPATIENT)
Dept: OPHTHALMOLOGY | Facility: CLINIC | Age: 78
End: 2023-04-24
Payer: MEDICARE

## 2023-04-24 LAB
25(OH)D3 SERPL-MCNC: 58.9 NG/ML
ALBUMIN SERPL ELPH-MCNC: 4.2 G/DL
ALP BLD-CCNC: 99 U/L
ALT SERPL-CCNC: 45 U/L
ANION GAP SERPL CALC-SCNC: 10 MMOL/L
AST SERPL-CCNC: 39 U/L
BASOPHILS # BLD AUTO: 0.03 K/UL
BASOPHILS NFR BLD AUTO: 0.6 %
BILIRUB SERPL-MCNC: 0.2 MG/DL
BUN SERPL-MCNC: 18 MG/DL
C PEPTIDE SERPL-MCNC: 11.3 NG/ML
CALCIUM SERPL-MCNC: 9.5 MG/DL
CHLORIDE SERPL-SCNC: 103 MMOL/L
CHOLEST SERPL-MCNC: 148 MG/DL
CO2 SERPL-SCNC: 26 MMOL/L
COVID-19 NUCLEOCAPSID  GAM ANTIBODY INTERPRETATION: POSITIVE
COVID-19 SPIKE DOMAIN ANTIBODY INTERPRETATION: POSITIVE
CREAT SERPL-MCNC: 1.01 MG/DL
EGFR: 76 ML/MIN/1.73M2
EOSINOPHIL # BLD AUTO: 0.22 K/UL
EOSINOPHIL NFR BLD AUTO: 4.7 %
ESTIMATED AVERAGE GLUCOSE: 103 MG/DL
FERRITIN SERPL-MCNC: 132 NG/ML
FOLATE SERPL-MCNC: >20 NG/ML
GLUCOSE SERPL-MCNC: 100 MG/DL
HBA1C MFR BLD HPLC: 5.2 %
HCT VFR BLD CALC: 40.6 %
HDLC SERPL-MCNC: 30 MG/DL
HGB BLD-MCNC: 13.6 G/DL
IMM GRANULOCYTES NFR BLD AUTO: 0.2 %
IRON SATN MFR SERPL: 33 %
IRON SERPL-MCNC: 91 UG/DL
LDLC SERPL CALC-MCNC: NORMAL MG/DL
LYMPHOCYTES # BLD AUTO: 1.22 K/UL
LYMPHOCYTES NFR BLD AUTO: 25.8 %
MAN DIFF?: NORMAL
MCHC RBC-ENTMCNC: 31.5 PG
MCHC RBC-ENTMCNC: 33.5 GM/DL
MCV RBC AUTO: 94 FL
MONOCYTES # BLD AUTO: 0.54 K/UL
MONOCYTES NFR BLD AUTO: 11.4 %
NEUTROPHILS # BLD AUTO: 2.7 K/UL
NEUTROPHILS NFR BLD AUTO: 57.3 %
NONHDLC SERPL-MCNC: 118 MG/DL
PLATELET # BLD AUTO: 186 K/UL
POTASSIUM SERPL-SCNC: 4.6 MMOL/L
PROT SERPL-MCNC: 7 G/DL
PSA SERPL-MCNC: 0.97 NG/ML
RBC # BLD: 4.32 M/UL
RBC # FLD: 13.6 %
SARS-COV-2 AB SERPL IA-ACNC: 215 U/ML
SARS-COV-2 AB SERPL QL IA: 209 INDEX
SODIUM SERPL-SCNC: 139 MMOL/L
T4 SERPL-MCNC: 6.1 UG/DL
TIBC SERPL-MCNC: 277 UG/DL
TRIGL SERPL-MCNC: 625 MG/DL
TSH SERPL-ACNC: 4.77 UIU/ML
UIBC SERPL-MCNC: 186 UG/DL
VIT B12 SERPL-MCNC: 509 PG/ML
WBC # FLD AUTO: 4.72 K/UL

## 2023-04-24 PROCEDURE — 92202 OPSCPY EXTND ON/MAC DRAW: CPT

## 2023-04-24 PROCEDURE — 92004 COMPRE OPH EXAM NEW PT 1/>: CPT

## 2023-04-24 PROCEDURE — 92133 CPTRZD OPH DX IMG PST SGM ON: CPT

## 2023-04-25 ENCOUNTER — RX RENEWAL (OUTPATIENT)
Age: 78
End: 2023-04-25

## 2023-04-26 ENCOUNTER — NON-APPOINTMENT (OUTPATIENT)
Age: 78
End: 2023-04-26

## 2023-04-28 ENCOUNTER — APPOINTMENT (OUTPATIENT)
Dept: CARDIOLOGY | Facility: CLINIC | Age: 78
End: 2023-04-28
Payer: MEDICARE

## 2023-04-28 PROCEDURE — 93880 EXTRACRANIAL BILAT STUDY: CPT

## 2023-05-02 ENCOUNTER — APPOINTMENT (OUTPATIENT)
Dept: OPHTHALMOLOGY | Facility: CLINIC | Age: 78
End: 2023-05-02

## 2023-05-08 NOTE — PHYSICAL THERAPY INITIAL EVALUATION ADULT - STANDING BALANCE: DYNAMIC, REHAB EVAL
No sign of ear infection  Monitor blood pressure when symptoms occur  OTC Tylenol as needed for pain  Follow up with ENT   Follow up with PCP in 3-5 days  Proceed to  ER if symptoms worsen  Tinnitus   AMBULATORY CARE:   Tinnitus  is when you hear ringing, clicking, buzzing, or hissing in one or both ears  You may also hear whistling, chirping, or pulsing  It may be soft or loud, and at a low or high pitch  Tinnitus that lasts for longer than 6 months is considered chronic  Tinnitus may be caused by problems with your hearing system, including the parts of your brain that sort out sounds  Tinnitus may also be caused by a health condition, such as Ménière disease  Call 911 if: You feel like hurting yourself or others because of the constant noise  Contact your healthcare provider if:   You have headaches  You are tired and have trouble concentrating or remembering things  You have more anxiety or stress than usual     You have deep sadness or depression  You have trouble falling asleep or staying asleep  Your symptoms do not go away or they get worse  You have questions or concerns about your condition or care  Treatment for tinnitus  may not be needed  Your symptoms may only appear when you are anxious or stressed  Your healthcare provider may stop certain medicines that may be causing your tinnitus  You may also need medicines to help decrease your symptoms  The following can help treat or manage tinnitus:  Counseling  can help you learn ways to relax, decrease stress, and make your tinnitus less noticeable  Cognitive behavioral therapy  helps you understand your condition  Your therapist will help you learn to cope with tinnitus  You may also learn new ways to relax and retrain your behavior to decrease your symptoms  Sound therapy,  such as white noise machines, may help cover your tinnitus with a pleasant sound   Sound therapy devices can help you fall asleep or help you relax  These devices can be worn in your ear or placed next to your bed at night  Hearing aids or cochlear implants  may help if you have hearing loss  Surgery  may be needed if your tinnitus is caused by abnormal blood vessels or a mass  Do not smoke  Nicotine decreases blood flow to your ear and can make your tinnitus worse  Do not use e-cigarettes or smokeless tobacco in place of cigarettes or to help you quit  They still contain nicotine  Ask your healthcare provider for information if you currently smoke and need help quitting  Decrease how much alcohol and caffeine you drink  Alcohol and caffeine can make your tinnitus worse  Prevent tinnitus:   Avoid exposure to loud noise,  such as loud music or power tools  Occasional exposure can still cause tinnitus  Move away from the noise or turn down the volume  Wear ear protection  when you are exposed to loud noises  Good ear protection includes ear plugs or headphones that reduce noise  Follow up with your doctor as directed:  Write down your questions so you remember to ask them during your visits  © Copyright [employer] 2022 Information is for End User's use only and may not be sold, redistributed or otherwise used for commercial purposes  The above information is an  only  It is not intended as medical advice for individual conditions or treatments  Talk to your doctor, nurse or pharmacist before following any medical regimen to see if it is safe and effective for you  poor balance

## 2023-05-15 ENCOUNTER — NON-APPOINTMENT (OUTPATIENT)
Age: 78
End: 2023-05-15

## 2023-05-15 ENCOUNTER — APPOINTMENT (OUTPATIENT)
Dept: ELECTROPHYSIOLOGY | Facility: CLINIC | Age: 78
End: 2023-05-15
Payer: MEDICARE

## 2023-05-15 VITALS
HEIGHT: 66 IN | HEART RATE: 61 BPM | SYSTOLIC BLOOD PRESSURE: 102 MMHG | DIASTOLIC BLOOD PRESSURE: 62 MMHG | BODY MASS INDEX: 24.11 KG/M2 | WEIGHT: 150 LBS | TEMPERATURE: 97.6 F | OXYGEN SATURATION: 97 %

## 2023-05-15 PROCEDURE — 99205 OFFICE O/P NEW HI 60 MIN: CPT

## 2023-05-15 PROCEDURE — 93246 EXT ECG>7D<15D RECORDING: CPT

## 2023-05-15 PROCEDURE — 93000 ELECTROCARDIOGRAM COMPLETE: CPT | Mod: 59

## 2023-05-15 RX ORDER — TACROLIMUS 1 MG/G
0.1 OINTMENT TOPICAL
Qty: 60 | Refills: 0 | Status: DISCONTINUED | COMMUNITY
Start: 2022-10-28 | End: 2023-05-15

## 2023-05-15 RX ORDER — MULTIVIT-MIN/FOLIC/VIT K/LYCOP 400-300MCG
1000 TABLET ORAL DAILY
Refills: 0 | Status: ACTIVE | COMMUNITY

## 2023-05-15 RX ORDER — INCLISIRAN 284 MG/1.5ML
284 INJECTION, SOLUTION SUBCUTANEOUS
Qty: 1 | Refills: 0 | Status: DISCONTINUED | COMMUNITY
Start: 2023-04-05 | End: 2023-05-15

## 2023-05-15 RX ORDER — ONDANSETRON 2 MG/ML
4 INJECTION INTRAMUSCULAR; INTRAVENOUS
Qty: 2 | Refills: 0 | Status: DISCONTINUED | COMMUNITY
Start: 2022-08-02 | End: 2023-05-15

## 2023-05-15 RX ORDER — MONTELUKAST 10 MG/1
10 TABLET, FILM COATED ORAL
Qty: 90 | Refills: 2 | Status: DISCONTINUED | COMMUNITY
Start: 1900-01-01 | End: 2023-05-15

## 2023-05-15 RX ORDER — TACROLIMUS 0.3 MG/G
0.03 OINTMENT TOPICAL
Qty: 60 | Refills: 0 | Status: DISCONTINUED | COMMUNITY
Start: 2022-08-24 | End: 2023-05-15

## 2023-05-15 NOTE — HISTORY OF PRESENT ILLNESS
[FreeTextEntry1] : This is a 76 y/o man with PMH of CAD with MI s/p PCI, preserved EF, PVCs, HTN, anemia, CKD, HLD, and BPH. \par \par He recently wore a cardiac monitor that revealed 7% PVC burden and he is now referred for EP evaluation. \par \par Reports one syncopal episode 4 months ago. He was in the shower and got out and sat down and then felt very dizzy. He called his aide who came in and then he passed out. The aide was able to to grab him before he hit the ground. He reports being out for 5-6 minutes. He had no loss of bowl or bladder control or seizure like activities. No other associated symptoms or a prodrome. EMS called and checked on him. He felt ok, so did not go to the hospital. He had a fall episode last November that required admission to OSH and then rehab (no details). Started using walker after that incident. He denies any fevers, chills, cough, sweats, chest pain, palpitations, dyspnea on exertion, orthopnea, paroxysmal nocturnal dyspnea, peripheral edema, nausea, vomiting, melena, hematochezia, or hematemesis.\par \par \par Social history:\par Ex smoker. Negative for illicit drugs or current alcohol abuse. Used to drink heavily, stopped 2000. \par \par Family history:\par Negative for premature CAD or SCD. \par \par TESTS:\par EKG 5/15/23 SB @ 58 bpm. Old anterior MI. \par EKG 4/5/23: SR with frequent PVCs. PVC morphology is RBBB-like with right superior axis. \par \par 3 days HM 4/2023: 7.5% PVC burden. No sustained VT or symptoms. \par TTE 2021: Normal EF\par \par TSH 4.7 and normal T4 on 4/2023.

## 2023-05-15 NOTE — PHYSICAL EXAM
[Well Developed] : well developed [Well Nourished] : well nourished [No Acute Distress] : no acute distress [Normal S1, S2] : normal S1, S2 [No Murmur] : no murmur [Clear Lung Fields] : clear lung fields [Good Air Entry] : good air entry [No Respiratory Distress] : no respiratory distress  [No Edema] : no edema [Alert and Oriented] : alert and oriented [Normal memory] : normal memory

## 2023-05-15 NOTE — ASSESSMENT
[FreeTextEntry1] : Patient with asymptomatic frequent (7%) PVCs and preserved EF. Will plan to repeat TTE and NST to r/a EF and r/o SHD/ischemia. Will also perform a one week HM to better evaluate PVC burden. Follow up in 2 months. Will consider CMR based on findings of the above tests. Will consider PVC suppression if symptoms, PVC >10% and evidence of LV dysfunction or LV dilatation, or PVC induced VF. Will need periodic monitoring of PVC burden. To continue routine cardiac care with DR. Dimas. \par \par

## 2023-05-15 NOTE — REASON FOR VISIT
[Arrhythmia/ECG Abnorrmalities] : arrhythmia/ECG abnormalities [Formal Caregiver] : formal caregiver

## 2023-06-14 ENCOUNTER — APPOINTMENT (OUTPATIENT)
Dept: CARDIOLOGY | Facility: CLINIC | Age: 78
End: 2023-06-14

## 2023-06-20 ENCOUNTER — APPOINTMENT (OUTPATIENT)
Dept: CARDIOLOGY | Facility: CLINIC | Age: 78
End: 2023-06-20
Payer: MEDICARE

## 2023-06-20 PROCEDURE — 93306 TTE W/DOPPLER COMPLETE: CPT

## 2023-07-18 ENCOUNTER — RX RENEWAL (OUTPATIENT)
Age: 78
End: 2023-07-18

## 2023-07-24 ENCOUNTER — APPOINTMENT (OUTPATIENT)
Dept: FAMILY MEDICINE | Facility: CLINIC | Age: 78
End: 2023-07-24
Payer: MEDICARE

## 2023-07-24 VITALS
HEIGHT: 66 IN | OXYGEN SATURATION: 98 % | SYSTOLIC BLOOD PRESSURE: 114 MMHG | HEART RATE: 68 BPM | RESPIRATION RATE: 4 BRPM | TEMPERATURE: 98 F | DIASTOLIC BLOOD PRESSURE: 68 MMHG

## 2023-07-24 PROCEDURE — 36415 COLL VENOUS BLD VENIPUNCTURE: CPT

## 2023-07-24 PROCEDURE — 99214 OFFICE O/P EST MOD 30 MIN: CPT | Mod: 25

## 2023-07-24 RX ORDER — ESCITALOPRAM OXALATE 10 MG/1
10 TABLET ORAL
Qty: 90 | Refills: 2 | Status: COMPLETED | COMMUNITY
Start: 2022-10-04 | End: 2023-07-24

## 2023-07-25 LAB
25(OH)D3 SERPL-MCNC: 62.8 NG/ML
ALBUMIN SERPL ELPH-MCNC: 4.4 G/DL
ALP BLD-CCNC: 111 U/L
ALT SERPL-CCNC: 43 U/L
ANION GAP SERPL CALC-SCNC: 11 MMOL/L
APPEARANCE: CLEAR
AST SERPL-CCNC: 35 U/L
BILIRUB SERPL-MCNC: 0.4 MG/DL
BILIRUBIN URINE: NEGATIVE
BLOOD URINE: NEGATIVE
BUN SERPL-MCNC: 20 MG/DL
C PEPTIDE SERPL-MCNC: 11.8 NG/ML
CALCIUM SERPL-MCNC: 9.5 MG/DL
CHLORIDE SERPL-SCNC: 101 MMOL/L
CHOLEST SERPL-MCNC: 134 MG/DL
CO2 SERPL-SCNC: 26 MMOL/L
COLOR: YELLOW
COVID-19 NUCLEOCAPSID  GAM ANTIBODY INTERPRETATION: POSITIVE
COVID-19 SPIKE DOMAIN ANTIBODY INTERPRETATION: POSITIVE
CREAT SERPL-MCNC: 1.07 MG/DL
CREAT SPEC-SCNC: 48 MG/DL
EGFR: 71 ML/MIN/1.73M2
ESTIMATED AVERAGE GLUCOSE: 108 MG/DL
FERRITIN SERPL-MCNC: 156 NG/ML
FOLATE SERPL-MCNC: >20 NG/ML
GLUCOSE QUALITATIVE U: NEGATIVE MG/DL
GLUCOSE SERPL-MCNC: 103 MG/DL
HBA1C MFR BLD HPLC: 5.4 %
HDLC SERPL-MCNC: 28 MG/DL
IRON SATN MFR SERPL: 42 %
IRON SERPL-MCNC: 112 UG/DL
KETONES URINE: NEGATIVE MG/DL
LDLC SERPL CALC-MCNC: 43 MG/DL
LEUKOCYTE ESTERASE URINE: NEGATIVE
MICROALBUMIN 24H UR DL<=1MG/L-MCNC: <1.2 MG/DL
MICROALBUMIN/CREAT 24H UR-RTO: NORMAL MG/G
NITRITE URINE: NEGATIVE
NONHDLC SERPL-MCNC: 106 MG/DL
PH URINE: 6.5
POTASSIUM SERPL-SCNC: 4.8 MMOL/L
PROT SERPL-MCNC: 7.1 G/DL
PROTEIN URINE: NEGATIVE MG/DL
PSA SERPL-MCNC: 1.32 NG/ML
SARS-COV-2 AB SERPL IA-ACNC: 243 U/ML
SARS-COV-2 AB SERPL QL IA: 200 INDEX
SODIUM SERPL-SCNC: 138 MMOL/L
SPECIFIC GRAVITY URINE: 1.01
T4 SERPL-MCNC: 7.1 UG/DL
TIBC SERPL-MCNC: 264 UG/DL
TRIGL SERPL-MCNC: 432 MG/DL
TSH SERPL-ACNC: 4.75 UIU/ML
UIBC SERPL-MCNC: 152 UG/DL
UROBILINOGEN URINE: 0.2 MG/DL
VIT B12 SERPL-MCNC: 603 PG/ML

## 2023-07-31 ENCOUNTER — APPOINTMENT (OUTPATIENT)
Dept: ELECTROPHYSIOLOGY | Facility: CLINIC | Age: 78
End: 2023-07-31
Payer: MEDICARE

## 2023-07-31 VITALS
DIASTOLIC BLOOD PRESSURE: 60 MMHG | SYSTOLIC BLOOD PRESSURE: 126 MMHG | TEMPERATURE: 97.7 F | OXYGEN SATURATION: 98 % | WEIGHT: 152 LBS | HEIGHT: 66 IN | BODY MASS INDEX: 24.43 KG/M2 | HEART RATE: 63 BPM

## 2023-07-31 DIAGNOSIS — R55 SYNCOPE AND COLLAPSE: ICD-10-CM

## 2023-07-31 PROCEDURE — 93000 ELECTROCARDIOGRAM COMPLETE: CPT

## 2023-07-31 PROCEDURE — 99214 OFFICE O/P EST MOD 30 MIN: CPT

## 2023-08-02 ENCOUNTER — APPOINTMENT (OUTPATIENT)
Dept: MRI IMAGING | Facility: CLINIC | Age: 78
End: 2023-08-02

## 2023-08-02 ENCOUNTER — OUTPATIENT (OUTPATIENT)
Dept: OUTPATIENT SERVICES | Facility: HOSPITAL | Age: 78
LOS: 1 days | End: 2023-08-02
Payer: MEDICARE

## 2023-08-02 DIAGNOSIS — I49.3 VENTRICULAR PREMATURE DEPOLARIZATION: ICD-10-CM

## 2023-08-02 PROCEDURE — 75561 CARDIAC MRI FOR MORPH W/DYE: CPT | Mod: 26

## 2023-08-03 NOTE — DISCUSSION/SUMMARY
[EKG obtained to assist in diagnosis and management of assessed problem(s)] : EKG obtained to assist in diagnosis and management of assessed problem(s) [FreeTextEntry1] : This is a 78 y/o man with PMH of CAD with MI s/p PCI, preserved EF, PVCs, HTN, anemia, CKD, HLD, and BPH.   He recently wore a cardiac monitor that revealed 7% PVC burden and he was then referred for EP evaluation.   He was evaluated by Dr. Medina and reported one syncopal episode 4 months ago after he got out of the shower. He called his aide who came in and then he passed out. No other associated symptoms or a prodrome. EMS called and checked on him. He felt ok, so did not go to the hospital. He had a fall episode last November that required admission to OSH and then rehab (no details). Started using walker after that incident.   Repeat echo, holter and NST recommended. Echo performed 6/23/23 revealed EF improved from 50-55% to 55-60%. Holter worn 5/17/23 revealed frequent PVCs with 14.95% burden. ST was not yet completed.   Today, reports he overall has been feeling well. Occasionally feels intermittent VASQUEZ. Denies palpitations, dizziness, near syncope, syncope.  Recommendation:   Mr. Cool has overall been feeling well c/o intermittent mild VASQUEZ only. Will obtain  NST as previously recommended. Frequent PVCs with most recent burden 14.95%. To obtain cardiac MRI to assess for CM. We discussed indications for PVC suppression including if he were to develop bothersome symptoms or any evidence of LV dysfunction in future. To repeat 1 week holter to assess PVC burden. EP follow up in 3 months with Dr. Soliman.  Luz Maria HAIRSTON

## 2023-08-03 NOTE — ADDENDUM
[FreeTextEntry1] : 8/3/23- Cardiac MRI complete revealed LV dysfunction with EF 42%, no LGE. S/w patient and recommended expedited EP follow up to consider ablation. He verbalized understanding. Faviola HAIRSTON

## 2023-08-08 ENCOUNTER — APPOINTMENT (OUTPATIENT)
Dept: FAMILY MEDICINE | Facility: CLINIC | Age: 78
End: 2023-08-08
Payer: MEDICARE

## 2023-08-08 ENCOUNTER — RX RENEWAL (OUTPATIENT)
Age: 78
End: 2023-08-08

## 2023-08-08 ENCOUNTER — APPOINTMENT (OUTPATIENT)
Dept: ELECTROPHYSIOLOGY | Facility: CLINIC | Age: 78
End: 2023-08-08
Payer: MEDICARE

## 2023-08-08 VITALS
BODY MASS INDEX: 24.75 KG/M2 | TEMPERATURE: 98 F | HEIGHT: 66 IN | WEIGHT: 154 LBS | HEART RATE: 73 BPM | RESPIRATION RATE: 16 BRPM | OXYGEN SATURATION: 98 % | SYSTOLIC BLOOD PRESSURE: 126 MMHG | DIASTOLIC BLOOD PRESSURE: 70 MMHG

## 2023-08-08 VITALS
BODY MASS INDEX: 24.75 KG/M2 | TEMPERATURE: 98.8 F | OXYGEN SATURATION: 100 % | WEIGHT: 154 LBS | HEART RATE: 73 BPM | HEIGHT: 66 IN

## 2023-08-08 PROCEDURE — 93000 ELECTROCARDIOGRAM COMPLETE: CPT

## 2023-08-08 PROCEDURE — 99214 OFFICE O/P EST MOD 30 MIN: CPT

## 2023-08-08 PROCEDURE — 99215 OFFICE O/P EST HI 40 MIN: CPT

## 2023-08-08 NOTE — ASSESSMENT
[FreeTextEntry1] : Fatigue - Diet and Exercise.. HLD/CAD - Cardiology Mgt.  Pt. S/P PTCA w Stent x 4 Placement. Hypothyroidism - Controlled with Meds.  TSH Elevated Repeat TSH in 3 Months if Elevated again Increase Synthroid.. PSA Screening - PSA. Colon Cancer Screening - FOBI Test. Insomnia - Controlled with Meds.  F/U 3 Months to Repeat Tests

## 2023-08-08 NOTE — HISTORY OF PRESENT ILLNESS
[FreeTextEntry1] : F/U Apt. to Review Test Results [de-identified] : F/U Apt. to Review Test Results

## 2023-08-08 NOTE — HEALTH RISK ASSESSMENT
[Yes] : Yes [Monthly or less (1 pt)] : Monthly or less (1 point) [1 or 2 (0 pts)] : 1 or 2 (0 points) [Never (0 pts)] : Never (0 points) [No] : In the past 12 months have you used drugs other than those required for medical reasons? No [No falls in past year] : Patient reported no falls in the past year [0] : 2) Feeling down, depressed, or hopeless: Not at all (0) [PHQ-2 Negative - No further assessment needed] : PHQ-2 Negative - No further assessment needed [With Patient/Caregiver] : , with patient/caregiver [Designated Healthcare Proxy] : Designated healthcare proxy [Name: ___] : Health Care Proxy's Name: [unfilled]  [Relationship: ___] : Relationship: [unfilled] [I will adhere to the patient's wishes.] : I will adhere to the patient's wishes. [Time Spent: ___ minutes] : Time Spent: [unfilled] minutes [Former] : Former [Audit-CScore] : 1 [de-identified] : Average [de-identified] : Average [[facility]] : 9 [[id number]] : 0 [AdvancecareDate] : 07/23

## 2023-08-08 NOTE — REVIEW OF SYSTEMS
[Patient Intake Form Reviewed] : Patient intake form was reviewed [Fatigue] : fatigue [Shortness Of Breath] : shortness of breath [Abdominal Pain] : abdominal pain [Memory Loss] : memory loss [Negative] : Heme/Lymph [FreeTextEntry5] : CAD, MI x 4 [FreeTextEntry8] : BPH w LUTS, Left Nephrectomy [FreeTextEntry1] : Low Vit. D, Hypothyroidism

## 2023-08-08 NOTE — PHYSICAL EXAM
[No Acute Distress] : no acute distress [Well Nourished] : well nourished [Well Developed] : well developed [Well-Appearing] : well-appearing [Normal Sclera/Conjunctiva] : normal sclera/conjunctiva [PERRL] : pupils equal round and reactive to light [EOMI] : extraocular movements intact [Normal Outer Ear/Nose] : the outer ears and nose were normal in appearance [Normal Oropharynx] : the oropharynx was normal [No JVD] : no jugular venous distention [No Lymphadenopathy] : no lymphadenopathy [Supple] : supple [Thyroid Normal, No Nodules] : the thyroid was normal and there were no nodules present [No Respiratory Distress] : no respiratory distress  [No Accessory Muscle Use] : no accessory muscle use [Clear to Auscultation] : lungs were clear to auscultation bilaterally [Normal Rate] : normal rate  [Regular Rhythm] : with a regular rhythm [Normal S1, S2] : normal S1 and S2 [No Murmur] : no murmur heard [No Carotid Bruits] : no carotid bruits [No Abdominal Bruit] : a ~M bruit was not heard ~T in the abdomen [No Varicosities] : no varicosities [Pedal Pulses Present] : the pedal pulses are present [No Edema] : there was no peripheral edema [No Palpable Aorta] : no palpable aorta [No Extremity Clubbing/Cyanosis] : no extremity clubbing/cyanosis [Soft] : abdomen soft [Non Tender] : non-tender [Non-distended] : non-distended [No Masses] : no abdominal mass palpated [No HSM] : no HSM [Normal Bowel Sounds] : normal bowel sounds [No CVA Tenderness] : no CVA  tenderness [No Spinal Tenderness] : no spinal tenderness [No Joint Swelling] : no joint swelling [Grossly Normal Strength/Tone] : grossly normal strength/tone [No Rash] : no rash [Coordination Grossly Intact] : coordination grossly intact [No Focal Deficits] : no focal deficits [Normal Gait] : normal gait [Deep Tendon Reflexes (DTR)] : deep tendon reflexes were 2+ and symmetric [___/1] : [unfilled]/1   [___/3] : [unfilled]/3 [___/5] : [unfilled]/5 [___/4] : [unfilled]/4 [___/2] : [unfilled]/2 [___/8] : [unfilled]/8 [Normal] : Normal [Normal Affect] : the affect was normal [Normal Insight/Judgement] : insight and judgment were intact [Comprehensive Foot Exam Normal] : Right and left foot were examined and both feet are normal. No ulcers in either foot. Toes are normal and with full ROM.  Normal tactile sensation with monofilament testing throughout both feet [TextBox_2] : Yes [TextBox_4] : HS [SlumsTotal] : 30

## 2023-08-15 NOTE — HISTORY OF PRESENT ILLNESS
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  To summarize his history, he had an episode of syncope in early 2023 (January-February 2023). He got out of the shower, sat down and felt very dizzy. He called his aide who came in and then he had syncope. He reportedly had loss of consciousness for 5-6 minutes. EMS was called but he did not go to the hospital. He had a fall in 11/2022 requiring admission and then rehab. He now uses a walker. He recently wore a heart monitor which revealed a 7% PVC burden so he was referred to Dr. Medina for evaluation.  Today, he reports feeling "not right" and not 100%. He mostly complains of feeling "a little weak." He reports that once in a while he feels a "boom" in his chest. He reports having dyspnea on occasion. He denies having any chest pain, orthopnea, PND or peripheral edema.

## 2023-08-15 NOTE — CARDIOLOGY SUMMARY
[de-identified] :      08/08/23: NSR with LAFB and PVC. 07/31/23: NSR with PVC (RBRS axis, likely papillary muscle exit).  [de-identified] :    . a 05/17-05/24/23: 14.9% PVC burden of single morphology. No AT/AF. PSVT, NSVT.  04/2023 Holter: 7.5% PVC burden, asymptomatic.  [de-identified] :  06/23/23 TTE: LVEF: 55-60%. Basal-mid inferolateral wall akinetic. Normal RV size and function. LA normal (LAd 3.6 cm). Mild TR.  [de-identified] :  08/02/23 Cardiac MRI: LVEF: 42%. No LGE to suggest ischemia, scar or fibrosis.  [de-identified] :  2015: Patent stents in LAD & LCx. RCA . ROQUE to OM1.  No

## 2023-08-15 NOTE — DISCUSSION/SUMMARY
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 We discussed that these are generally benign arrhythmias. They can however result in cardiomyopathy if the burden is high, as is in his case.  Since he has a cardiomyopathy with frequent PVCs, I recommended pursuing catheter ablation to help with recovery of EF.  The risks, benefits, and alternatives of catheter ablation were discussed at length with special attention paid to discussing the risk of pain, bleeding, infection, vascular injury and/or thrombosis, pneumothorax, cardiac perforation and/or tamponade, valvular injury, need for permanent pacemaker, death, heart, stroke, or need for open heart surgery. The opportunity to ask questions was provided and all were answered to the patient's satisfaction.  Prior to pursuing ablation, I discussed with Dr. Dimas and he would recommend undergoing a cardiac catheterization to rule out ischemic disease. If unchanged, then would recommend pursuing catheter ablation.  I also discussed the above with his son, Ricky ([phone]).  Recommendations: - PVC ablation - Continue Metoprolol perioperatively - Possible ischemic evaluation (will d/w Dr. Dimas)  Ezio Soliman MD, FAC, RS Clinical Cardiac Electrophysiology

## 2023-08-15 NOTE — PHYSICAL EXAM
[Well Developed] : well developed [Well Nourished] : well nourished [No Acute Distress] : no acute distress [Normal Conjunctiva] : normal conjunctiva [No Respiratory Distress] : no respiratory distress  [No Edema] : no edema [Moves all extremities] : moves all extremities [No Focal Deficits] : no focal deficits

## 2023-08-18 ENCOUNTER — APPOINTMENT (OUTPATIENT)
Dept: CARDIOLOGY | Facility: CLINIC | Age: 78
End: 2023-08-18
Payer: MEDICARE

## 2023-08-18 PROCEDURE — 93015 CV STRESS TEST SUPVJ I&R: CPT

## 2023-08-18 PROCEDURE — 78452 HT MUSCLE IMAGE SPECT MULT: CPT

## 2023-08-18 PROCEDURE — A9500: CPT

## 2023-08-22 ENCOUNTER — OFFICE (OUTPATIENT)
Dept: URBAN - METROPOLITAN AREA CLINIC 113 | Facility: CLINIC | Age: 78
Setting detail: OPHTHALMOLOGY
End: 2023-08-22
Payer: MEDICARE

## 2023-08-22 DIAGNOSIS — H25.13: ICD-10-CM

## 2023-08-22 DIAGNOSIS — H01.004: ICD-10-CM

## 2023-08-22 DIAGNOSIS — H40.013: ICD-10-CM

## 2023-08-22 DIAGNOSIS — H43.811: ICD-10-CM

## 2023-08-22 DIAGNOSIS — H01.001: ICD-10-CM

## 2023-08-22 PROCEDURE — 92004 COMPRE OPH EXAM NEW PT 1/>: CPT | Performed by: STUDENT IN AN ORGANIZED HEALTH CARE EDUCATION/TRAINING PROGRAM

## 2023-08-22 ASSESSMENT — REFRACTION_AUTOREFRACTION
OD_SPHERE: +0.50
OS_CYLINDER: -3.25
OS_AXIS: 075
OD_CYLINDER: -2.00
OD_AXIS: 108
OS_SPHERE: +2.00

## 2023-08-22 ASSESSMENT — REFRACTION_CURRENTRX
OD_CYLINDER: -1.50
OD_OVR_VA: 20/
OS_OVR_VA: 20/
OS_CYLINDER: -2.75
OD_VPRISM_DIRECTION: PROGS
OS_SPHERE: +0.75
OS_ADD: +1.75
OD_AXIS: 122
OS_VPRISM_DIRECTION: PROGS
OD_SPHERE: PLANO
OD_ADD: +1.75
OS_AXIS: 069

## 2023-08-22 ASSESSMENT — TONOMETRY
OD_IOP_MMHG: 13
OS_IOP_MMHG: 15

## 2023-08-22 ASSESSMENT — VISUAL ACUITY
OS_BCVA: 20/20-1
OD_BCVA: 20/20

## 2023-08-22 ASSESSMENT — CONFRONTATIONAL VISUAL FIELD TEST (CVF)
OD_FINDINGS: FULL
OS_FINDINGS: FULL

## 2023-08-22 ASSESSMENT — KERATOMETRY
OD_K1POWER_DIOPTERS: 43.50
OS_AXISANGLE_DEGREES: 160
OD_K2POWER_DIOPTERS: 44.75
OS_K1POWER_DIOPTERS: 42.50
OD_AXISANGLE_DEGREES: 015
OS_K2POWER_DIOPTERS: 44.50

## 2023-08-22 ASSESSMENT — LID EXAM ASSESSMENTS
OS_BLEPHARITIS: LUL 1+ 2+
OD_BLEPHARITIS: RUL 1+ 2+

## 2023-08-22 ASSESSMENT — AXIALLENGTH_DERIVED
OS_AL: 23.4467
OD_AL: 23.5572

## 2023-08-22 ASSESSMENT — SPHEQUIV_DERIVED
OS_SPHEQUIV: 0.375
OD_SPHEQUIV: -0.5

## 2023-08-24 ENCOUNTER — INPATIENT (INPATIENT)
Facility: HOSPITAL | Age: 78
LOS: 0 days | Discharge: HOME | DRG: 247 | End: 2023-08-25
Attending: INTERNAL MEDICINE | Admitting: INTERNAL MEDICINE
Payer: COMMERCIAL

## 2023-08-24 ENCOUNTER — TRANSCRIPTION ENCOUNTER (OUTPATIENT)
Age: 78
End: 2023-08-24

## 2023-08-24 ENCOUNTER — NON-APPOINTMENT (OUTPATIENT)
Age: 78
End: 2023-08-24

## 2023-08-24 VITALS
OXYGEN SATURATION: 99 % | HEART RATE: 78 BPM | TEMPERATURE: 98 F | SYSTOLIC BLOOD PRESSURE: 150 MMHG | DIASTOLIC BLOOD PRESSURE: 79 MMHG | RESPIRATION RATE: 16 BRPM | HEIGHT: 66 IN | WEIGHT: 149.91 LBS

## 2023-08-24 DIAGNOSIS — I42.8 OTHER CARDIOMYOPATHIES: ICD-10-CM

## 2023-08-24 LAB
ANION GAP SERPL CALC-SCNC: 14 MMOL/L — SIGNIFICANT CHANGE UP (ref 5–17)
BASOPHILS # BLD AUTO: 0.03 K/UL — SIGNIFICANT CHANGE UP (ref 0–0.2)
BASOPHILS NFR BLD AUTO: 0.5 % — SIGNIFICANT CHANGE UP (ref 0–2)
BUN SERPL-MCNC: 16.8 MG/DL — SIGNIFICANT CHANGE UP (ref 8–20)
CALCIUM SERPL-MCNC: 9.8 MG/DL — SIGNIFICANT CHANGE UP (ref 8.4–10.5)
CHLORIDE SERPL-SCNC: 102 MMOL/L — SIGNIFICANT CHANGE UP (ref 96–108)
CO2 SERPL-SCNC: 24 MMOL/L — SIGNIFICANT CHANGE UP (ref 22–29)
CREAT SERPL-MCNC: 1.06 MG/DL — SIGNIFICANT CHANGE UP (ref 0.5–1.3)
EGFR: 71 ML/MIN/1.73M2 — SIGNIFICANT CHANGE UP
EOSINOPHIL # BLD AUTO: 0.18 K/UL — SIGNIFICANT CHANGE UP (ref 0–0.5)
EOSINOPHIL NFR BLD AUTO: 3.3 % — SIGNIFICANT CHANGE UP (ref 0–6)
GLUCOSE SERPL-MCNC: 92 MG/DL — SIGNIFICANT CHANGE UP (ref 70–99)
HCT VFR BLD CALC: 41.9 % — SIGNIFICANT CHANGE UP (ref 39–50)
HGB BLD-MCNC: 14.7 G/DL — SIGNIFICANT CHANGE UP (ref 13–17)
IMM GRANULOCYTES NFR BLD AUTO: 0.4 % — SIGNIFICANT CHANGE UP (ref 0–0.9)
LYMPHOCYTES # BLD AUTO: 1.46 K/UL — SIGNIFICANT CHANGE UP (ref 1–3.3)
LYMPHOCYTES # BLD AUTO: 26.7 % — SIGNIFICANT CHANGE UP (ref 13–44)
MAGNESIUM SERPL-MCNC: 1.9 MG/DL — SIGNIFICANT CHANGE UP (ref 1.8–2.6)
MCHC RBC-ENTMCNC: 31.1 PG — SIGNIFICANT CHANGE UP (ref 27–34)
MCHC RBC-ENTMCNC: 35.1 GM/DL — SIGNIFICANT CHANGE UP (ref 32–36)
MCV RBC AUTO: 88.8 FL — SIGNIFICANT CHANGE UP (ref 80–100)
MONOCYTES # BLD AUTO: 0.57 K/UL — SIGNIFICANT CHANGE UP (ref 0–0.9)
MONOCYTES NFR BLD AUTO: 10.4 % — SIGNIFICANT CHANGE UP (ref 2–14)
NEUTROPHILS # BLD AUTO: 3.2 K/UL — SIGNIFICANT CHANGE UP (ref 1.8–7.4)
NEUTROPHILS NFR BLD AUTO: 58.7 % — SIGNIFICANT CHANGE UP (ref 43–77)
PLATELET # BLD AUTO: 212 K/UL — SIGNIFICANT CHANGE UP (ref 150–400)
POTASSIUM SERPL-MCNC: 4.3 MMOL/L — SIGNIFICANT CHANGE UP (ref 3.5–5.3)
POTASSIUM SERPL-SCNC: 4.3 MMOL/L — SIGNIFICANT CHANGE UP (ref 3.5–5.3)
RBC # BLD: 4.72 M/UL — SIGNIFICANT CHANGE UP (ref 4.2–5.8)
RBC # FLD: 12.8 % — SIGNIFICANT CHANGE UP (ref 10.3–14.5)
SODIUM SERPL-SCNC: 140 MMOL/L — SIGNIFICANT CHANGE UP (ref 135–145)
WBC # BLD: 5.46 K/UL — SIGNIFICANT CHANGE UP (ref 3.8–10.5)
WBC # FLD AUTO: 5.46 K/UL — SIGNIFICANT CHANGE UP (ref 3.8–10.5)

## 2023-08-24 PROCEDURE — 93458 L HRT ARTERY/VENTRICLE ANGIO: CPT | Mod: 26,59

## 2023-08-24 PROCEDURE — 99152 MOD SED SAME PHYS/QHP 5/>YRS: CPT

## 2023-08-24 PROCEDURE — 92978 ENDOLUMINL IVUS OCT C 1ST: CPT | Mod: 26,LD

## 2023-08-24 PROCEDURE — 92928 PRQ TCAT PLMT NTRAC ST 1 LES: CPT | Mod: LD

## 2023-08-24 RX ORDER — SODIUM CHLORIDE 9 MG/ML
250 INJECTION INTRAMUSCULAR; INTRAVENOUS; SUBCUTANEOUS ONCE
Refills: 0 | Status: COMPLETED | OUTPATIENT
Start: 2023-08-24 | End: 2023-08-24

## 2023-08-24 RX ORDER — ASPIRIN/CALCIUM CARB/MAGNESIUM 324 MG
81 TABLET ORAL DAILY
Refills: 0 | Status: DISCONTINUED | OUTPATIENT
Start: 2023-08-24 | End: 2023-08-25

## 2023-08-24 RX ORDER — CHLORHEXIDINE GLUCONATE 213 G/1000ML
1 SOLUTION TOPICAL ONCE
Refills: 0 | Status: DISCONTINUED | OUTPATIENT
Start: 2023-08-24 | End: 2023-08-25

## 2023-08-24 RX ORDER — METOPROLOL TARTRATE 50 MG
25 TABLET ORAL
Refills: 0 | Status: DISCONTINUED | OUTPATIENT
Start: 2023-08-24 | End: 2023-08-25

## 2023-08-24 RX ORDER — BUDESONIDE, MICRONIZED 100 %
1 POWDER (GRAM) MISCELLANEOUS
Qty: 0 | Refills: 0 | DISCHARGE

## 2023-08-24 RX ORDER — CLOPIDOGREL BISULFATE 75 MG/1
75 TABLET, FILM COATED ORAL DAILY
Refills: 0 | Status: DISCONTINUED | OUTPATIENT
Start: 2023-08-24 | End: 2023-08-25

## 2023-08-24 RX ORDER — ESCITALOPRAM OXALATE 10 MG/1
10 TABLET, FILM COATED ORAL DAILY
Refills: 0 | Status: DISCONTINUED | OUTPATIENT
Start: 2023-08-24 | End: 2023-08-25

## 2023-08-24 RX ORDER — FAMOTIDINE 10 MG/ML
1 INJECTION INTRAVENOUS
Qty: 0 | Refills: 0 | DISCHARGE

## 2023-08-24 RX ORDER — ATORVASTATIN CALCIUM 80 MG/1
40 TABLET, FILM COATED ORAL AT BEDTIME
Refills: 0 | Status: DISCONTINUED | OUTPATIENT
Start: 2023-08-24 | End: 2023-08-25

## 2023-08-24 RX ORDER — LEVOTHYROXINE SODIUM 125 MCG
25 TABLET ORAL DAILY
Refills: 0 | Status: DISCONTINUED | OUTPATIENT
Start: 2023-08-24 | End: 2023-08-25

## 2023-08-24 RX ORDER — TAMSULOSIN HYDROCHLORIDE 0.4 MG/1
0.4 CAPSULE ORAL AT BEDTIME
Refills: 0 | Status: DISCONTINUED | OUTPATIENT
Start: 2023-08-24 | End: 2023-08-25

## 2023-08-24 RX ORDER — ZOLPIDEM TARTRATE 10 MG/1
5 TABLET ORAL AT BEDTIME
Refills: 0 | Status: DISCONTINUED | OUTPATIENT
Start: 2023-08-24 | End: 2023-08-25

## 2023-08-24 RX ADMIN — Medication 25 MILLIGRAM(S): at 21:31

## 2023-08-24 RX ADMIN — Medication 81 MILLIGRAM(S): at 17:51

## 2023-08-24 RX ADMIN — SODIUM CHLORIDE 250 MILLILITER(S): 9 INJECTION INTRAMUSCULAR; INTRAVENOUS; SUBCUTANEOUS at 18:22

## 2023-08-24 RX ADMIN — ATORVASTATIN CALCIUM 40 MILLIGRAM(S): 80 TABLET, FILM COATED ORAL at 21:31

## 2023-08-24 RX ADMIN — SODIUM CHLORIDE 500 MILLILITER(S): 9 INJECTION INTRAMUSCULAR; INTRAVENOUS; SUBCUTANEOUS at 17:51

## 2023-08-24 NOTE — DISCHARGE NOTE PROVIDER - CARE PROVIDER_API CALL
Aung Dimas  Interventional Cardiology  [address], [address]  [place] [address]  Phone: [phone]  Fax: [phone]  Follow Up Time: 1 week

## 2023-08-24 NOTE — DISCHARGE NOTE PROVIDER - NSDCCPCAREPLAN_GEN_ALL_CORE_FT
PRINCIPAL DISCHARGE DIAGNOSIS  Diagnosis: CAD (coronary artery disease)  Assessment and Plan of Treatment: Coronary artery disease is the buildup of plaque in the arteries that supply oxygen-rich blood to your heart. Plaque causes a narrowing or blockage that could result in a heart attack. Symptoms include chest pain or discomfort, shortness of breath, dizziness, palpitations, fatigue or reduced exercise tolerance. .  Go to the ED with any acute onset of chest pain, palpitations, shortness of breath or dizziness. Do NOT miss a dose or stop taking your Aspirin and Plavix, these medications keep your stent open and prevent a heart attack. If anyone tells you to stop these medications, speak to your cardiologist immediately.  Managing risk factors will help keep your stent open and prevent future blockages, risk factors may include: high blood pressure, high cholesterol, diabetes, obesity, sedentary life style and smoking.    Your diet should be low in fat, cholesterol, salt and carbohydrates, increase fruits (caution if diabetic), vegetables and whole grains/fiber rich foods.   Take all your cardiac  medications as prescribed.    Exercise is a very important factor in heart health. Once your post procedure restrictions have passed, you should engage in heart healthy, aerobic exercise. Be sure to have clearance from your cardiologist. Cardiac rehab programs could be extremely beneficial and your cardiologist could help set this up.   Follow up with your cardiologist within 1-2 weeks after your procedure.   Call your cardiologist or our unit ([phone]) with any questions or concerns that may arise.     PRINCIPAL DISCHARGE DIAGNOSIS  Diagnosis: CAD (coronary artery disease)  Assessment and Plan of Treatment: You had a cardiac catheterization which revealed blockage of Proximal LAD which was treated with a drug eluding stent   It is IMPERATIVE to continue Dual Antiplatelet therapy with Asprin 81mg and [id number] daily  without interrruption to prevent clot formation inside stent.  -per Dr. Dimas may d/c aspirin AFTER 30 DAYS IF HAS NOSE BLEEDS   -START LIPITOR 40MG DAILY   -continue metoprolol   - cardiac rehab info provided/referral and communication to cardiac rehab completed   -follow up with cardiologist Dr. Dimas in 1week  -follow up with Dr. Soliman

## 2023-08-24 NOTE — PROGRESS NOTE ADULT - SUBJECTIVE AND OBJECTIVE BOX
Nurse Practitioner Progress note:   s/p [facility] with successful PCI and ROQUE to the pLAD with Dr Dimas via the RRA      Patient feels well.  Denies chest pain, shortness of breath, dizziness or palpitations at this time    Pt A&O x 3  Lungs CTA  S1S2 np MRG  VSS  Right radial hemoband in place.  Procedure site CDI, no bleeding, no hematoma, able to move all digits with capillary refill < 3 seconds, fingers warm      T(C): 36.5 (08-24-23 @ 14:32), Max: 36.5 (08-24-23 @ 14:32)  HR: 74 (08-24-23 @ 14:37) (74 - 78)  BP: 150/79 (08-24-23 @ 14:37) (150/79 - 150/79)  RR: 30 (08-24-23 @ 14:37) (16 - 30)  SpO2: 100% (08-24-23 @ 14:37) (99% - 100%)    CBC Full  -  ( 24 Aug 2023 14:15 )  WBC Count : 5.46 K/uL  RBC Count : 4.72 M/uL  Hemoglobin : 14.7 g/dL  Hematocrit : 41.9 %  Platelet Count - Automated : 212 K/uL  Mean Cell Volume : 88.8 fl  Mean Cell Hemoglobin : 31.1 pg  Mean Cell Hemoglobin Concentration : 35.1 gm/dL  Auto Neutrophil # : 3.20 K/uL  Auto Lymphocyte # : 1.46 K/uL  Auto Monocyte # : 0.57 K/uL  Auto Eosinophil # : 0.18 K/uL  Auto Basophil # : 0.03 K/uL  Auto Neutrophil % : 58.7 %  Auto Lymphocyte % : 26.7 %  Auto Monocyte % : 10.4 %  Auto Eosinophil % : 3.3 %  Auto Basophil % : 0.5 %    08-24    140  |  102  |  16.8  ----------------------------<  92  4.3   |  24.0  |  1.06    Ca    9.8      24 Aug 2023 14:15  Mg     1.9     08-24      MEDICATIONS  (STANDING):  aspirin  chewable 81 milliGRAM(s) Oral daily  chlorhexidine 4% Liquid 1 Application(s) Topical once  sodium chloride 0.9% Bolus 250 milliLiter(s) IV Bolus once        EKG S:inus rhythm with occasional PVCs  No events on telemetry    HPI:  Narrative: This is a 76 yo male with a PMHx of HTN, HLD, hypothyroism, CKD, BPH, 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 He was hospitalized in 11/22 for a svncopal event requiring rehab post hospitalization, He had a syncopal episode in Jan/Feb 2023 when he got out of the shower, sat down and felt very dizzy then syncopized with LOC for approx 5-6 mins. He has been seen by Dr Walker and then Dr Soliman for frequent PVCs.  Remote ambulatory monitoring in May 2023 revealed a 14.9% PCCC burden of single morphology.  No AT/AF, or NSVT.  Last Catheterization in 2015 pt received a ROQUE to OM1 and identified a  of the RCA.  Since he has CM and a high PVC burden a catheter ablation has been recommended by EP.  Prior to undergoing an ablation however the patient is being brought to the cardiac cath lab to rule out ischemic disease.      (24 Aug 2023 14:37)    now s/p C with successful PCI and ROQUE to the pLAD    ASSESSMENT/PLAN:    Coronary artery disease  -Admit to telemetry overnight for observation  -VS, labs, diet, activity as per PCI orders  -IV hydration  -Encourage PO fluids  -Aspirin 81 mg PO daily  -Plavix 75mg PO daily  -Metoprolol tartrate 25 mg PO daily  -atorvastatin 40 mg PO QHS   -Plan of care discussed with patient, family and MD  -likely d/c in AM if patient remains stable overnight  -NP to see in AM to evaluate labs, EKG and procedure site check  -Follow-up with attending MD Dr Dimas within 1 week  -Discussed therapeutic lifestyle changes to reduce risk factors such as following a cardiac diet, weight loss, maintaining a healthy weight, exercise, smoking cessation, medication compliance, and regular follow-up  with MD to know your numbers (BP, cholesterol, weight, and glucose) Nurse Practitioner Progress note:   s/p [facility] with successful PCI and ROQUE to the pLAD with Dr Dimas via the RRA      Patient feels well.  Denies chest pain, shortness of breath, dizziness or palpitations at this time    Pt A&O x 3  Lungs CTA  S1S2 np MRG  VSS  Right radial hemoband in place.  Procedure site CDI, no bleeding, no hematoma, able to move all digits with capillary refill < 3 seconds, fingers warm      T(C): 36.5 (08-24-23 @ 14:32), Max: 36.5 (08-24-23 @ 14:32)  HR: 74 (08-24-23 @ 14:37) (74 - 78)  BP: 150/79 (08-24-23 @ 14:37) (150/79 - 150/79)  RR: 30 (08-24-23 @ 14:37) (16 - 30)  SpO2: 100% (08-24-23 @ 14:37) (99% - 100%)    CBC Full  -  ( 24 Aug 2023 14:15 )  WBC Count : 5.46 K/uL  RBC Count : 4.72 M/uL  Hemoglobin : 14.7 g/dL  Hematocrit : 41.9 %  Platelet Count - Automated : 212 K/uL  Mean Cell Volume : 88.8 fl  Mean Cell Hemoglobin : 31.1 pg  Mean Cell Hemoglobin Concentration : 35.1 gm/dL  Auto Neutrophil # : 3.20 K/uL  Auto Lymphocyte # : 1.46 K/uL  Auto Monocyte # : 0.57 K/uL  Auto Eosinophil # : 0.18 K/uL  Auto Basophil # : 0.03 K/uL  Auto Neutrophil % : 58.7 %  Auto Lymphocyte % : 26.7 %  Auto Monocyte % : 10.4 %  Auto Eosinophil % : 3.3 %  Auto Basophil % : 0.5 %    08-24    140  |  102  |  16.8  ----------------------------<  92  4.3   |  24.0  |  1.06    Ca    9.8      24 Aug 2023 14:15  Mg     1.9     08-24      MEDICATIONS  (STANDING):  aspirin  chewable 81 milliGRAM(s) Oral daily  chlorhexidine 4% Liquid 1 Application(s) Topical once  sodium chloride 0.9% Bolus 250 milliLiter(s) IV Bolus once        EKG S:inus rhythm with occasional PVCs  No events on telemetry    HPI:  Narrative: This is a 76 yo male with a PMHx of HTN, HLD, hypothyroism, CKD, BPH, 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 He was hospitalized in 11/22 for a svncopal event requiring rehab post hospitalization, He had a syncopal episode in Jan/Feb 2023 when he got out of the shower, sat down and felt very dizzy then syncopized with LOC for approx 5-6 mins. He has been seen by Dr Walker and then Dr Soliman for frequent PVCs.  Remote ambulatory monitoring in May 2023 revealed a 14.9% PCCC burden of single morphology.  No AT/AF, or NSVT.  Last Catheterization in 2015 pt received a ROQUE to OM1 and identified a  of the RCA.  Since he has CM and a high PVC burden a catheter ablation has been recommended by EP.  Prior to undergoing an ablation however the patient is being brought to the cardiac cath lab to rule out ischemic disease.      (24 Aug 2023 14:37)    now s/p C with successful PCI and ROQUE to the pLAD    ASSESSMENT/PLAN:    Coronary artery disease  -Admit to telemetry overnight for observation  -VS, labs, diet, activity as per PCI orders  -IV hydration  -Encourage PO fluids  -Aspirin 81 mg PO daily x one month  -Plavix 75mg PO daily  -Metoprolol tartrate 25 mg PO daily  -atorvastatin 40 mg PO QHS   -Plan of care discussed with patient, family and MD  -likely d/c in AM if patient remains stable overnight  -NP to see in AM to evaluate labs, EKG and procedure site check  -Follow-up with attending MD Dr Dimas within 1 week  -Discussed therapeutic lifestyle changes to reduce risk factors such as following a cardiac diet, weight loss, maintaining a healthy weight, exercise, smoking cessation, medication compliance, and regular follow-up  with MD to know your numbers (BP, cholesterol, weight, and glucose)

## 2023-08-24 NOTE — H&P PST ADULT - ASSESSMENT
77 yo male for [facility] to assess for ischemic HD.    ASA 3  Mallampati 2  GFR 71  Creat 1.06  Bleeding Risk 0.9%     Plan: PRE-PROCEDURE ASSESSMENT    -Patient seen and examined  PRE-PROCEDURE ASSESSMENT  -NPO after midnight confirmed  -IV insert  -Patient seen and examined  -Labs reviewed  -Pre-procedure teaching completed with patient   -consent obtained   -Questions answered

## 2023-08-24 NOTE — ASU PATIENT PROFILE, ADULT - FALL HARM RISK - RISK INTERVENTIONS
Assistance OOB with selected safe patient handling equipment/Assistance with ambulation/Communicate Fall Risk and Risk Factors to all staff, patient, and family/Discuss with provider need for PT consult/Monitor gait and stability/Provide patient with walking aids - walker, cane, crutches/Reinforce activity limits and safety measures with patient and family/Sit up slowly, dangle for a short time, stand at bedside before walking/Visual Cue: Yellow wristband/Bed in lowest position, wheels locked, appropriate side rails in place/Call bell, personal items and telephone in reach/Instruct patient to call for assistance before getting out of bed or chair/Non-slip footwear when patient is out of bed/[place] to call system/Physically safe environment - no spills, clutter or unnecessary equipment/Purposeful Proactive Rounding/Room/bathroom lighting operational, light cord in reach

## 2023-08-24 NOTE — DISCHARGE NOTE PROVIDER - HOSPITAL COURSE
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 He was hospitalized in 11/22 for a svncopal event requiring rehab post hospitalization, He had a syncopal episode in Jan/Feb 2023 when he got out of the shower, sat down and felt very dizzy then syncopized with LOC for approx 5-6 mins. He has been seen by Dr Walker and then Dr Soliman for frequent PVCs.  Remote ambulatory monitoring in May 2023 revealed a 14.9% PCCC burden of single morphology.  No AT/AF, or NSVT.  Last Catheterization in 2015 pt received a ROQUE to OM1 and identified a  of the RCA.  Since he has CM and a high PVC burden a catheter ablation has been recommended by EP.  Prior to undergoing an ablation however the patient is being brought to the cardiac cath lab to rule out ischemic disease.      (24 Aug 2023 14:37)    now s/p C with successful PCI and ROQUE to the pLAD    ASSESSMENT/PLAN:    Coronary artery disease  -Admit to telemetry overnight for observation  -VS, labs, diet, activity as per PCI orders  -IV hydration  -Encourage PO fluids  -Aspirin 81 mg PO daily x one month (pt suffered from nose bleeds in the past from ASA with plavix)  -Plavix 75mg PO daily  -Metoprolol tartrate 25 mg PO daily  -atorvastatin 40 mg PO QHS   -Plan of care discussed with patient, family and MD  -likely d/c in AM if patient remains stable overnight  -NP to see in AM to evaluate labs, EKG and procedure site check  -Follow-up with attending MD Dr Dimas within 1 week  -Discussed therapeutic lifestyle changes to reduce risk factors such as following a cardiac diet, weight loss, maintaining a healthy weight, exercise, smoking cessation, medication compliance, and regular follow-up  with MD to know your numbers (BP, cholesterol, weight, and glucose)     HPI:  Narrative: This is a 78 yo male with a PMHx of HTN, HLD, hypothyroism, CKD, BPH, 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 He was hospitalized in 11/22 for a svncopal event requiring rehab post hospitalization, He had a syncopal episode in Jan/Feb 2023 when he got out of the shower, sat down and felt very dizzy then syncopized with LOC for approx 5-6 mins. He has been seen by Dr Walker and then Dr Soliman for frequent PVCs.  Remote ambulatory monitoring in May 2023 revealed a 14.9% PCCC burden of single morphology.  No AT/AF, or NSVT.  Last Catheterization in 2015 pt received a ROQUE to OM1 and identified a  of the RCA.  Since he has CM and a high PVC burden a catheter ablation has been recommended by EP.  Prior to undergoing an ablation however the patient is being brought to the cardiac cath lab to rule out ischemic disease.      (24 Aug 2023 14:37)    now s/p [facility] with successful PCI and ROQUE to the pLAD    ASSESSMENT/PLAN:      -Aspirin 81 mg PO daily x one month (pt suffered from nose bleeds in the past from ASA with plavix) PER DR. 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 He was hospitalized in 11/22 for a svncopal event requiring rehab post hospitalization, He had a syncopal episode in Jan/Feb 2023 when he got out of the shower, sat down and felt very dizzy then syncopized with LOC for approx 5-6 mins. He has been seen by Dr Walker and then Dr Soliman for frequent PVCs.  Remote ambulatory monitoring in May 2023 revealed a 14.9% PCCC burden of single morphology.  No AT/AF, or NSVT.  Last Catheterization in 2015 pt received a ROQUE to OM1 and identified a  of the RCA.  Since he has CM and a high PVC burden a catheter ablation has been recommended by EP.  Prior to undergoing an ablation however the patient is being brought to the cardiac cath lab to rule out ischemic disease.      (24 Aug 2023 14:37)    now s/p C with successful PCI and ROQUE to the pLAD    ASSESSMENT/PLAN:    -8/25/2023 Labs and EKG reviewed, stable no changes. Tele with 2 PVC's and one pause<3 seconds  -Right radial site soft palpable no hematoma or ecchymosis   -Aspirin 81 mg PO daily x one month (pt suffered from nose bleeds in the past from ASA with plavix) PER DR. 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 He was hospitalized in 11/22 for a svncopal event requiring rehab post hospitalization, He had a syncopal episode in Jan/Feb 2023 when he got out of the shower, sat down and felt very dizzy then syncopized with LOC for approx 5-6 mins. He has been seen by Dr Walker and then Dr Soliman for frequent PVCs.  Remote ambulatory monitoring in May 2023 revealed a 14.9% PCCC burden of single morphology.  No AT/AF, or NSVT.  Last Catheterization in 2015 pt received a ROQUE to OM1 and identified a  of the RCA.  Since he has CM and a high PVC burden a catheter ablation has been recommended by EP.  Prior to undergoing an ablation however the patient is being brought to the cardiac cath lab to rule out ischemic disease.      (24 Aug 2023 14:37)    now s/p C with successful PCI and ROQUE to the pLAD    ASSESSMENT/PLAN:    -8/25/2023 Labs and EKG reviewed, stable no changes. Tele with 2 PVC's and one pause<3 seconds  -Right radial site soft palpable no hematoma or ecchymosis   -Aspirin 81 mg PO daily x one month (pt suffered from nose bleeds in the past from ASA with plavix) PER DR. LYNN  -Plavix 75mg PO daily  -Metoprolol tartrate 25 mg PO twice daily   -atorvastatin 40 mg PO QHS   -Follow-up with attending MD Dr Dimas within 1 week  -Discussed therapeutic lifestyle changes to reduce risk factors such as following a cardiac diet, weight loss, maintaining a healthy weight, exercise, smoking cessation, medication compliance, and regular follow-up  with MD to know your numbers (BP, cholesterol, weight, and glucose)  - cardiac rehab info provided/referral and communication to cardiac rehab completed

## 2023-08-24 NOTE — H&P PST ADULT - HISTORY OF PRESENT ILLNESS
Narrative:     Symptoms:        Angina (Class):        Ischemic Symptoms:     Heart Failure:        Systolic/Diastolic/Combined:        NYHA Class (within 2 weeks):     Assessment of LVEF:       EF:        Assessed by:        Date:     Prior Cardiac Interventions:       PCI's:        CABG:     Noninvasive Testing:   Stress Test: Date:        Protocol:        Duration of Exercise:        Symptoms:        EKG Changes:        DTS:        Myocardial Imaging:        Risk Assessment:     Echo:     Antianginal Therapies:        Beta Blockers:         Calcium Channel Blockers:        Long Acting Nitrates:        Ranexa:     Associated Risk Factors:        Cerebrovascular Disease: N/A       Chronic Lung Disease: N/A       Peripheral Arterial Disease: N/A       Chronic Kidney Disease (if yes, what is GFR): N/A       Uncontrolled Diabetes (if yes, what is HgbA1C or FBS): N/A       Poorly Controlled Hypertension (if yes, what is SBP): N/A       Morbid Obesity (if yes, what is BMI): N/A       History of Recent Ventricular Arrhythmia: N/A       Inability to Ambulate Safely: N/A       Need for Therapeutic Anticoagulation: N/A       Antiplatelet or Contrast Allergy: N/A Narrative: This is a 78 yo male with a PMHx of HTN, HLD, hypothyroism, CKD, BPH, 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 He was hospitalized in 11/22 for a svncopal event requiring rehab post hospitalization, He had a syncopal episode in Jan/Feb 2023 when he got out of the shower, sat down and felt very dizzy then syncopized with LOC for approx 5-6 mins. He has been seen by Dr Walker and then Dr Soliman for frequent PVCs.  Remote ambulatory monitoring in May 2023 revealed a 14.9% PCCC burden of single morphology.  No AT/AF, or NSVT.  Last Catheterization in 2015 pt received a ROQUE to OM1 and identified a  of the RCA.  Since he has CM and a high PVC burden a catheter ablation has been recommended by EP.  Prior to undergoing an ablation however the patient is being brought to the cardiac cath lab to rule out ischemic disease.      Symptoms:        Angina (Class): 2       Ischemic Symptoms: fatigue     Heart Failure:        Systolic/Diastolic/Combined: diastolic       NYHA Class (within 2 weeks):     Assessment of LVEF:       EF: 55-60%       Assessed by: TTE       Date: 6/20/23    Prior Cardiac Interventions: Several cardiac catheterizations dating back to 1988       PCI's:        CABG:     Noninvasive Testing:   Stress Test: Date:       Procedure:The patient was given 7.3 mCi of TC-99M Sestamibi intravenously at rest on 8/18/2023 at 1:45:00 PM. Approximately 60 minutes later, SPECT images were obtained, with patient in supine position. 21.5 mCi of TC-99M Sestamibi was given intravenously at stress on 8/18/2023 at 3:25:00 PM. After 60 minutes, gated SPECT images were obtained and assessed for myocardial perfusion and function, with patient in supine position and prone position. Images were reacquired with the patient in a prone position.---------------------------------------------------------------------------------------------------------------------------------------------------------Perfusion:Qualitative Findings:Normal myocardial perfusion scan, with no evidence of infarction or inducible ischemia. Transient ischemic dilation of the left ventricle was noted with a ratio of 1.12.Left Ventricular Motion: The left ventricular EF% is 58 % with stress.---------------------------------------------------------------------------------------------------------------------------------------------------------Ventricular Function: The left ventricle is normal in function and normal in size. The stress left ventricular EF% is 58 %. The stress end diastolic volume is 46 ml and systolic volume is 19 ml.Interpreting Provider: Electronically signed on 8/22/2023 at 3:21:35 PM by King HALE    Echo:   1. The left ventricular systolic function is normal with an ejection fraction of 58 % by López's method of disks with an ejection fraction visually estimated at 55 to 60 %.2. Basal and mid inferolateral wall is abnormal.3. Normal right ventricular cavity size and normal systolic function.4. Trace mitral regurgitation.5. Mild tricuspid regurgitation.6. Mild calcification of the aortic valve leaflets.7. Trace pulmonic regurgitation.8. No pericardial effusion seen.9. Compared to the transthoracic echocardiogram performed on 1/28/2021 improvement in LVEF 50-55 ->55-60%.    Antianginal Therapies:        Beta Blockers:  Metoprolol       Calcium Channel Blockers:        Long Acting Nitrates:        Ranexa:     Associated Risk Factors:        Cerebrovascular Disease: N/A       Chronic Lung Disease: N/A       Peripheral Arterial Disease: N/A       Chronic Kidney Disease (if yes, what is GFR): N/A       Uncontrolled Diabetes (if yes, what is HgbA1C or FBS): N/A       Poorly Controlled Hypertension (if yes, what is SBP): N/A       Morbid Obesity (if yes, what is BMI): N/A       History of Recent Ventricular Arrhythmia: N/A       Inability to Ambulate Safely: N/A       Need for Therapeutic Anticoagulation: N/A       Antiplatelet or Contrast Allergy: N/A

## 2023-08-24 NOTE — DISCHARGE NOTE PROVIDER - NSDCMRMEDTOKEN_GEN_ALL_CORE_FT
escitalopram 10 mg oral tablet: 1 tab(s) orally once a day  Flomax 0.4 mg oral capsule: 1 cap(s) orally once a day   metoprolol 25 mg oral tablet: 1 tab(s) orally 2 times a day  Plavix 75 mg oral tablet: 1 tab(s) orally once a day  Probiotic Formula oral capsule: 1 orally once a day  Synthroid 25 mcg (0.025 mg) oral tablet: 1 orally once a day  Vitamin C 500 mg oral tablet: 2 tab(s) orally once a day  Vitamin D3 5000 intl units oral capsule: 1 cap(s) orally once a day  zolpidem 10 mg oral tablet: 1 orally once a day (at bedtime)   aspirin 81 mg oral tablet, chewable: 1 tab(s) orally once a day  atorvastatin 40 mg oral tablet: 1 tab(s) orally once a day (at bedtime)  escitalopram 10 mg oral tablet: 1 tab(s) orally once a day  Flomax 0.4 mg oral capsule: 1 cap(s) orally once a day   metoprolol 25 mg oral tablet: 1 tab(s) orally 2 times a day  Plavix 75 mg oral tablet: 1 tab(s) orally once a day  Probiotic Formula oral capsule: 1 orally once a day  Synthroid 25 mcg (0.025 mg) oral tablet: 1 orally once a day  Vitamin C 500 mg oral tablet: 2 tab(s) orally once a day  Vitamin D3 5000 intl units oral capsule: 1 cap(s) orally once a day  zolpidem 10 mg oral tablet: 1 orally once a day (at bedtime)

## 2023-08-24 NOTE — ASU PATIENT PROFILE, ADULT - AS SC BRADEN FRICTION
Patient is requesting his xrays. Patient will come to the office to  when available   (3) no apparent problem

## 2023-08-24 NOTE — DISCHARGE NOTE PROVIDER - NSDCCPTREATMENT_GEN_ALL_CORE_FT
PRINCIPAL PROCEDURE  Procedure: Coronary stent placement  Findings and Treatment:      PRINCIPAL PROCEDURE  Procedure: Coronary stent placement  Findings and Treatment: Restricted use with no heavy lifting of affected arm for 48 hours.  No submerging the arm in water for 48 hours.  You may start showering today.  Call your doctor for any bleeding, swelling, loss of sensation in the hand or fingers, or fingers turning blue.  If heavy bleeding or large lumps form, hold pressure at the spot and come to the Emergency Room.

## 2023-08-24 NOTE — DISCHARGE NOTE PROVIDER - CARE PROVIDERS DIRECT ADDRESSES
[email or web address]

## 2023-08-24 NOTE — H&P PST ADULT - MUSCULOSKELETAL COMMENTS
Attempted to return call VM not set up  
PATIENT'S WIFE CALLED STATING SHE WOULD LIKE TO KNOW HOW LONG IT WOULD TAKE TO RECEIVE HER 'S CT SCAN RESULTS AND WOULD LIKE  TO  A CALL BACK ABOUT HIS CT APPOINTMENT                                 PLEASE CALL [phone] AND ADVISE AT  
Wife returned call results given  
R leg has a 'cracked' patella and his L was a TKR; ambulates with a walker

## 2023-08-24 NOTE — DISCHARGE NOTE PROVIDER - NSDCFUSCHEDAPPT_GEN_ALL_CORE_FT
Aung Dimas  [facility] Physician Formerly [facility], later [facility]  CARDIOLOGY [address]  Scheduled Appointment: 10/18/2023    [facility]  FAMILYMED [address]  Scheduled Appointment: 10/23/2023    Ezio Soliman  [facility] Physician [facility], later [facility]  ELECTROPH [address]  Scheduled Appointment: 10/24/2023

## 2023-08-25 ENCOUNTER — TRANSCRIPTION ENCOUNTER (OUTPATIENT)
Age: 78
End: 2023-08-25

## 2023-08-25 VITALS — DIASTOLIC BLOOD PRESSURE: 64 MMHG | SYSTOLIC BLOOD PRESSURE: 120 MMHG | RESPIRATION RATE: 17 BRPM | HEART RATE: 74 BPM

## 2023-08-25 LAB
ANION GAP SERPL CALC-SCNC: 10 MMOL/L — SIGNIFICANT CHANGE UP (ref 5–17)
BASOPHILS # BLD AUTO: 0.02 K/UL — SIGNIFICANT CHANGE UP (ref 0–0.2)
BASOPHILS NFR BLD AUTO: 0.4 % — SIGNIFICANT CHANGE UP (ref 0–2)
BUN SERPL-MCNC: 14.3 MG/DL — SIGNIFICANT CHANGE UP (ref 8–20)
CALCIUM SERPL-MCNC: 8.9 MG/DL — SIGNIFICANT CHANGE UP (ref 8.4–10.5)
CHLORIDE SERPL-SCNC: 103 MMOL/L — SIGNIFICANT CHANGE UP (ref 96–108)
CO2 SERPL-SCNC: 24 MMOL/L — SIGNIFICANT CHANGE UP (ref 22–29)
CREAT SERPL-MCNC: 1.13 MG/DL — SIGNIFICANT CHANGE UP (ref 0.5–1.3)
EGFR: 66 ML/MIN/1.73M2 — SIGNIFICANT CHANGE UP
EOSINOPHIL # BLD AUTO: 0.15 K/UL — SIGNIFICANT CHANGE UP (ref 0–0.5)
EOSINOPHIL NFR BLD AUTO: 3.1 % — SIGNIFICANT CHANGE UP (ref 0–6)
GLUCOSE SERPL-MCNC: 95 MG/DL — SIGNIFICANT CHANGE UP (ref 70–99)
HCT VFR BLD CALC: 38.3 % — LOW (ref 39–50)
HGB BLD-MCNC: 13.7 G/DL — SIGNIFICANT CHANGE UP (ref 13–17)
IMM GRANULOCYTES NFR BLD AUTO: 0.2 % — SIGNIFICANT CHANGE UP (ref 0–0.9)
LYMPHOCYTES # BLD AUTO: 0.88 K/UL — LOW (ref 1–3.3)
LYMPHOCYTES # BLD AUTO: 18.4 % — SIGNIFICANT CHANGE UP (ref 13–44)
MCHC RBC-ENTMCNC: 31.6 PG — SIGNIFICANT CHANGE UP (ref 27–34)
MCHC RBC-ENTMCNC: 35.8 GM/DL — SIGNIFICANT CHANGE UP (ref 32–36)
MCV RBC AUTO: 88.2 FL — SIGNIFICANT CHANGE UP (ref 80–100)
MONOCYTES # BLD AUTO: 0.57 K/UL — SIGNIFICANT CHANGE UP (ref 0–0.9)
MONOCYTES NFR BLD AUTO: 11.9 % — SIGNIFICANT CHANGE UP (ref 2–14)
NEUTROPHILS # BLD AUTO: 3.16 K/UL — SIGNIFICANT CHANGE UP (ref 1.8–7.4)
NEUTROPHILS NFR BLD AUTO: 66 % — SIGNIFICANT CHANGE UP (ref 43–77)
PLATELET # BLD AUTO: 160 K/UL — SIGNIFICANT CHANGE UP (ref 150–400)
POTASSIUM SERPL-MCNC: 4.6 MMOL/L — SIGNIFICANT CHANGE UP (ref 3.5–5.3)
POTASSIUM SERPL-SCNC: 4.6 MMOL/L — SIGNIFICANT CHANGE UP (ref 3.5–5.3)
RBC # BLD: 4.34 M/UL — SIGNIFICANT CHANGE UP (ref 4.2–5.8)
RBC # FLD: 12.7 % — SIGNIFICANT CHANGE UP (ref 10.3–14.5)
SODIUM SERPL-SCNC: 137 MMOL/L — SIGNIFICANT CHANGE UP (ref 135–145)
WBC # BLD: 4.79 K/UL — SIGNIFICANT CHANGE UP (ref 3.8–10.5)
WBC # FLD AUTO: 4.79 K/UL — SIGNIFICANT CHANGE UP (ref 3.8–10.5)

## 2023-08-25 PROCEDURE — 92978 ENDOLUMINL IVUS OCT C 1ST: CPT

## 2023-08-25 PROCEDURE — 85025 COMPLETE CBC W/AUTO DIFF WBC: CPT

## 2023-08-25 PROCEDURE — C9600: CPT | Mod: LD

## 2023-08-25 PROCEDURE — 92929: CPT

## 2023-08-25 PROCEDURE — C1874: CPT

## 2023-08-25 PROCEDURE — 92928 PRQ TCAT PLMT NTRAC ST 1 LES: CPT

## 2023-08-25 PROCEDURE — 83735 ASSAY OF MAGNESIUM: CPT

## 2023-08-25 PROCEDURE — C1725: CPT

## 2023-08-25 PROCEDURE — C1894: CPT

## 2023-08-25 PROCEDURE — 93010 ELECTROCARDIOGRAM REPORT: CPT

## 2023-08-25 PROCEDURE — 80048 BASIC METABOLIC PNL TOTAL CA: CPT

## 2023-08-25 PROCEDURE — 36415 COLL VENOUS BLD VENIPUNCTURE: CPT

## 2023-08-25 PROCEDURE — C1769: CPT

## 2023-08-25 PROCEDURE — C1753: CPT

## 2023-08-25 PROCEDURE — C1887: CPT

## 2023-08-25 PROCEDURE — 93458 L HRT ARTERY/VENTRICLE ANGIO: CPT | Mod: 59

## 2023-08-25 PROCEDURE — 93005 ELECTROCARDIOGRAM TRACING: CPT

## 2023-08-25 RX ORDER — CLOPIDOGREL BISULFATE 75 MG/1
1 TABLET, FILM COATED ORAL
Qty: 90 | Refills: 0
Start: 2023-08-25

## 2023-08-25 RX ORDER — ASPIRIN/CALCIUM CARB/MAGNESIUM 324 MG
1 TABLET ORAL
Qty: 90 | Refills: 0
Start: 2023-08-25

## 2023-08-25 RX ORDER — ATORVASTATIN CALCIUM 80 MG/1
1 TABLET, FILM COATED ORAL
Qty: 90 | Refills: 0
Start: 2023-08-25

## 2023-08-25 RX ADMIN — Medication 25 MICROGRAM(S): at 06:42

## 2023-08-25 RX ADMIN — TAMSULOSIN HYDROCHLORIDE 0.4 MILLIGRAM(S): 0.4 CAPSULE ORAL at 06:43

## 2023-08-25 RX ADMIN — Medication 25 MILLIGRAM(S): at 06:42

## 2023-08-25 RX ADMIN — CLOPIDOGREL BISULFATE 75 MILLIGRAM(S): 75 TABLET, FILM COATED ORAL at 09:54

## 2023-08-25 RX ADMIN — Medication 81 MILLIGRAM(S): at 09:54

## 2023-08-25 RX ADMIN — ESCITALOPRAM OXALATE 10 MILLIGRAM(S): 10 TABLET, FILM COATED ORAL at 06:43

## 2023-08-25 NOTE — DISCHARGE NOTE NURSING/CASE MANAGEMENT/SOCIAL WORK - PATIENT PORTAL LINK FT
You can access the FollowMyHealth Patient Portal offered by [facility] by registering at the following website: [email or web address]. By joining [facility]’s FollowMyHealth portal, you will also be able to view your health information using other applications (apps) compatible with our system.

## 2023-08-28 ENCOUNTER — NON-APPOINTMENT (OUTPATIENT)
Age: 78
End: 2023-08-28

## 2023-08-30 ENCOUNTER — NON-APPOINTMENT (OUTPATIENT)
Age: 78
End: 2023-08-30

## 2023-08-30 ENCOUNTER — APPOINTMENT (OUTPATIENT)
Dept: CARDIOLOGY | Facility: CLINIC | Age: 78
End: 2023-08-30
Payer: MEDICARE

## 2023-08-30 VITALS
WEIGHT: 149 LBS | DIASTOLIC BLOOD PRESSURE: 62 MMHG | BODY MASS INDEX: 23.95 KG/M2 | SYSTOLIC BLOOD PRESSURE: 110 MMHG | OXYGEN SATURATION: 97 % | HEART RATE: 81 BPM | HEIGHT: 66 IN

## 2023-08-30 PROCEDURE — 93000 ELECTROCARDIOGRAM COMPLETE: CPT

## 2023-08-30 PROCEDURE — 99214 OFFICE O/P EST MOD 30 MIN: CPT

## 2023-08-30 NOTE — PHYSICAL EXAM
[Well Developed] : well developed [No Acute Distress] : no acute distress [No Carotid Bruit] : no carotid bruit [Normal S1, S2] : normal S1, S2 [No Murmur] : no murmur [Premature Beats] : regular with premature beats [Clear Lung Fields] : clear lung fields [No Respiratory Distress] : no respiratory distress  [No Edema] : no edema [Normal Radial B/L] : normal radial B/L [Moves all extremities] : moves all extremities [Normal Speech] : normal speech [Alert and Oriented] : alert and oriented [Normal memory] : normal memory [de-identified] : using walker [de-identified] : right radial access site scabbed with soft ecchymosis, no edema, erythema or exudate noted.

## 2023-08-30 NOTE — HISTORY OF PRESENT ILLNESS
[FreeTextEntry1] : This is a 77 year old male, , wife  in 2020 from glioblastoma), father of 6. Has a known history of MI in , coronary artery disease, multiple coronary artery stents, hypertension, asthma, and hypercholesterolemia. Was under the care of Dr. Cartagena.  Last intervention was performed in  that revealed patent stents in the LAD and LCx with a complete occlusion of the RCA. PCI was done to the OM1 successfully.  LVEF is 50% as per echo in 2018.  Patient denies any chest pain, SOB, or palpitations. He states he walks almost daily with no exertional chest pain or shortness of breath. He is compliant with his medications. He used to be on Aspirin and Plavix but patient gets frequent nose bleeds so the Aspirin was stopped in the past. Patient was on Crestor, Pravastatin, and Atorvastatin in the past but they were stopped due to complaints of muscle pains.  Patient now on Zetia for cholesterol because he cannot tolerate statin therapy. However he stopped when he run out of it.  Patient has a Left renal mass and underwent surgery in 2021 at [facility] with extension to the ureter and bladder. Following the procedure the patient went to Rehab where he was for 9 months and developed C. Diff x 2. Treated with antibiotics. .  Patient denies chest pain or palpitations. He states he walks the dogs for about 1 mile daily without symptoms. He does admit to mild SOB with walking upstairs. Nuclear stress test done in 2021 was normal with no ischemia.  Denies HTN or diabetes. Doesnt smoke, quit in . Denies drinking alcohol Denies the use of illicit drugs  2023: Patient presents to the office for follow up of CAD. He is accompanied by his aid, Eileen, and brother in law, Colten. Since last visit he has started Leqvio for HLD. He has his next injection scheduled for 2023. Reports feeling well. His left hip started hurting him while walking yesterday, otherwise, he has been walking daily for exercise with no symptoms. States months ago he was getting out of the shower and fainted. Denies chest pain, SOB at rest, VASQUEZ, palpitations, lightheadedness, dizziness, fatigue, and LE edema. Denies smoking (quit 22 years ago), excessive alcohol. State he thinks he had a stroke years ago when he was in rehab or just before; unclear of the events at that time, he follows with neurology.   2023: Patient presents to the office for follow up after [facility] on 2023 revealing significant 2 vessel CAD, pLAD 90% and p RCA ( fills via collaterals from the LCx), mild LV dysfunction LVEF 40-45%, successful PCI of pLAD with DESx1, successful IVUS of LAD. He is accompanied by his aide, Eileen. Reports feeling well. Denies chest pain, SOB at rest, VASQUEZ, palpitations, lightheadedness, dizziness, fatigue, syncope, near syncope and LE edema. Compliant with meds.

## 2023-08-30 NOTE — DISCUSSION/SUMMARY
[FreeTextEntry1] : Mr. KRISTAN DENNIS is a 77 year male with known CAD. Underwent PCI to the LCx in the past. Underwent nephrectomy and resection of the ureter for malignancy. Was in rehab for 9 months and developed C. Diff x 2 (was at [facility]). Since then he has recovered very well. Lost 40 Lbs after the surgery. Patient presents to the office for follow up after [facility] on 8/24/2023 revealing significant 2 vessel CAD, pLAD 90% and p RCA ( fills via collaterals from the LCx), mild LV dysfunction LVEF 40-45%, successful PCI of pLAD with DESx1, successful IVUS of LAD.  Assessment/ Plan: 1. CAD s/p PCI pLAD with DESx1, successful IVUS of LAD 8/24/2023: Asymptomatic today. Aspirin added for 1 month only due to history of bleeding on DAPT. Continue Leqvio, plavix and Metoprolol.   2. HLD: statin intolerance, on Leqvio.  3. PVCs: was planned for ablation which is now deferred in light of recent PCI. Will reassess with EP team in October as planned. Reinforced plan for TTE and Holter in early October 2023, then f/u with Dr. Soliman as scheduled. Continue Toprol 25mg daily.  4. Syncope: no recurrent episodes recently. Not sure if real syncope or near syncope/ fall. Carotid duplex with mild plaque BL with no stenosis in May 2023. Previously advised to follow up with Neurology and discuss his suspicion that he had a stroke in the past.  5. Follow up with Dr. Dimas in October 2023 as scheduled or sooner if needed. Patient was advised to contact the office for any new or concerning symptoms. Patient verbalized understanding and is in agreement with the above plan.  Tomasa Dimas was present in office at the time of visit. [EKG obtained to assist in diagnosis and management of assessed problem(s)] : EKG obtained to assist in diagnosis and management of assessed problem(s)

## 2023-09-21 ENCOUNTER — RX RENEWAL (OUTPATIENT)
Age: 78
End: 2023-09-21

## 2023-09-25 ENCOUNTER — APPOINTMENT (OUTPATIENT)
Dept: CARDIOLOGY | Facility: CLINIC | Age: 78
End: 2023-09-25
Payer: MEDICARE

## 2023-09-25 PROCEDURE — 93306 TTE W/DOPPLER COMPLETE: CPT

## 2023-10-09 ENCOUNTER — APPOINTMENT (OUTPATIENT)
Dept: FAMILY MEDICINE | Facility: CLINIC | Age: 78
End: 2023-10-09
Payer: MEDICARE

## 2023-10-09 ENCOUNTER — EMERGENCY (EMERGENCY)
Facility: HOSPITAL | Age: 78
LOS: 1 days | Discharge: HOME | End: 2023-10-09
Attending: STUDENT IN AN ORGANIZED HEALTH CARE EDUCATION/TRAINING PROGRAM
Payer: MEDICARE

## 2023-10-09 VITALS
HEIGHT: 66 IN | SYSTOLIC BLOOD PRESSURE: 138 MMHG | HEART RATE: 108 BPM | TEMPERATURE: 98 F | OXYGEN SATURATION: 96 % | RESPIRATION RATE: 20 BRPM | WEIGHT: 149.91 LBS | DIASTOLIC BLOOD PRESSURE: 77 MMHG

## 2023-10-09 VITALS
DIASTOLIC BLOOD PRESSURE: 82 MMHG | WEIGHT: 149 LBS | SYSTOLIC BLOOD PRESSURE: 124 MMHG | RESPIRATION RATE: 15 BRPM | HEART RATE: 84 BPM | BODY MASS INDEX: 23.95 KG/M2 | TEMPERATURE: 98.6 F | OXYGEN SATURATION: 96 % | HEIGHT: 66 IN

## 2023-10-09 DIAGNOSIS — G25.2 OTHER SPECIFIED FORMS OF TREMOR: ICD-10-CM

## 2023-10-09 DIAGNOSIS — R25.1 TREMOR, UNSPECIFIED: ICD-10-CM

## 2023-10-09 DIAGNOSIS — Z87.09 PERSONAL HISTORY OF OTHER DISEASES OF THE RESPIRATORY SYSTEM: ICD-10-CM

## 2023-10-09 DIAGNOSIS — M25.569 PAIN IN UNSPECIFIED KNEE: ICD-10-CM

## 2023-10-09 DIAGNOSIS — N39.0 URINARY TRACT INFECTION, SITE NOT SPECIFIED: ICD-10-CM

## 2023-10-09 DIAGNOSIS — Z79.899 OTHER LONG TERM (CURRENT) DRUG THERAPY: ICD-10-CM

## 2023-10-09 DIAGNOSIS — Z95.5 PRESENCE OF CORONARY ANGIOPLASTY IMPLANT AND GRAFT: ICD-10-CM

## 2023-10-09 LAB
ALBUMIN SERPL ELPH-MCNC: 3.9 G/DL — SIGNIFICANT CHANGE UP (ref 3.3–5.2)
ALP SERPL-CCNC: 106 U/L — SIGNIFICANT CHANGE UP (ref 40–120)
ALT FLD-CCNC: 23 U/L — SIGNIFICANT CHANGE UP
ANION GAP SERPL CALC-SCNC: 15 MMOL/L — SIGNIFICANT CHANGE UP (ref 5–17)
APPEARANCE UR: ABNORMAL
APTT BLD: 32.6 SEC — SIGNIFICANT CHANGE UP (ref 24.5–35.6)
AST SERPL-CCNC: 19 U/L — SIGNIFICANT CHANGE UP
BACTERIA # UR AUTO: ABNORMAL
BASOPHILS # BLD AUTO: 0.02 K/UL — SIGNIFICANT CHANGE UP (ref 0–0.2)
BASOPHILS NFR BLD AUTO: 0.2 % — SIGNIFICANT CHANGE UP (ref 0–2)
BILIRUB SERPL-MCNC: 0.9 MG/DL — SIGNIFICANT CHANGE UP (ref 0.4–2)
BILIRUB UR-MCNC: NEGATIVE — SIGNIFICANT CHANGE UP
BUN SERPL-MCNC: 21.5 MG/DL — HIGH (ref 8–20)
CALCIUM SERPL-MCNC: 9.5 MG/DL — SIGNIFICANT CHANGE UP (ref 8.4–10.5)
CHLORIDE SERPL-SCNC: 97 MMOL/L — SIGNIFICANT CHANGE UP (ref 96–108)
CO2 SERPL-SCNC: 22 MMOL/L — SIGNIFICANT CHANGE UP (ref 22–29)
COLOR SPEC: YELLOW — SIGNIFICANT CHANGE UP
CREAT SERPL-MCNC: 1.11 MG/DL — SIGNIFICANT CHANGE UP (ref 0.5–1.3)
DIFF PNL FLD: ABNORMAL
EGFR: 68 ML/MIN/1.73M2 — SIGNIFICANT CHANGE UP
EOSINOPHIL # BLD AUTO: 0.07 K/UL — SIGNIFICANT CHANGE UP (ref 0–0.5)
EOSINOPHIL NFR BLD AUTO: 0.8 % — SIGNIFICANT CHANGE UP (ref 0–6)
EPI CELLS # UR: SIGNIFICANT CHANGE UP
GLUCOSE SERPL-MCNC: 104 MG/DL — HIGH (ref 70–99)
GLUCOSE UR QL: NEGATIVE MG/DL — SIGNIFICANT CHANGE UP
HCT VFR BLD CALC: 38.9 % — LOW (ref 39–50)
HGB BLD-MCNC: 14 G/DL — SIGNIFICANT CHANGE UP (ref 13–17)
IMM GRANULOCYTES NFR BLD AUTO: 0.5 % — SIGNIFICANT CHANGE UP (ref 0–0.9)
INR BLD: 1.24 RATIO — HIGH (ref 0.85–1.18)
KETONES UR-MCNC: ABNORMAL
LACTATE BLDV-MCNC: 1.6 MMOL/L — SIGNIFICANT CHANGE UP (ref 0.5–2)
LEUKOCYTE ESTERASE UR-ACNC: ABNORMAL
LYMPHOCYTES # BLD AUTO: 1.08 K/UL — SIGNIFICANT CHANGE UP (ref 1–3.3)
LYMPHOCYTES # BLD AUTO: 12.9 % — LOW (ref 13–44)
MCHC RBC-ENTMCNC: 31.3 PG — SIGNIFICANT CHANGE UP (ref 27–34)
MCHC RBC-ENTMCNC: 36 GM/DL — SIGNIFICANT CHANGE UP (ref 32–36)
MCV RBC AUTO: 87 FL — SIGNIFICANT CHANGE UP (ref 80–100)
MONOCYTES # BLD AUTO: 0.8 K/UL — SIGNIFICANT CHANGE UP (ref 0–0.9)
MONOCYTES NFR BLD AUTO: 9.5 % — SIGNIFICANT CHANGE UP (ref 2–14)
NEUTROPHILS # BLD AUTO: 6.39 K/UL — SIGNIFICANT CHANGE UP (ref 1.8–7.4)
NEUTROPHILS NFR BLD AUTO: 76.1 % — SIGNIFICANT CHANGE UP (ref 43–77)
NITRITE UR-MCNC: NEGATIVE — SIGNIFICANT CHANGE UP
PH UR: 8 — SIGNIFICANT CHANGE UP (ref 5–8)
PLATELET # BLD AUTO: 195 K/UL — SIGNIFICANT CHANGE UP (ref 150–400)
POTASSIUM SERPL-MCNC: 3.9 MMOL/L — SIGNIFICANT CHANGE UP (ref 3.5–5.3)
POTASSIUM SERPL-SCNC: 3.9 MMOL/L — SIGNIFICANT CHANGE UP (ref 3.5–5.3)
PROT SERPL-MCNC: 7.5 G/DL — SIGNIFICANT CHANGE UP (ref 6.6–8.7)
PROT UR-MCNC: 30 MG/DL
PROTHROM AB SERPL-ACNC: 13.7 SEC — HIGH (ref 9.5–13)
RBC # BLD: 4.47 M/UL — SIGNIFICANT CHANGE UP (ref 4.2–5.8)
RBC # FLD: 12.2 % — SIGNIFICANT CHANGE UP (ref 10.3–14.5)
RBC CASTS # UR COMP ASSIST: ABNORMAL /HPF (ref 0–4)
SODIUM SERPL-SCNC: 134 MMOL/L — LOW (ref 135–145)
SP GR SPEC: 1.01 — SIGNIFICANT CHANGE UP (ref 1.01–1.02)
UROBILINOGEN FLD QL: NEGATIVE MG/DL — SIGNIFICANT CHANGE UP
WBC # BLD: 8.4 K/UL — SIGNIFICANT CHANGE UP (ref 3.8–10.5)
WBC # FLD AUTO: 8.4 K/UL — SIGNIFICANT CHANGE UP (ref 3.8–10.5)
WBC UR QL: >50 /HPF (ref 0–5)

## 2023-10-09 PROCEDURE — 87086 URINE CULTURE/COLONY COUNT: CPT

## 2023-10-09 PROCEDURE — 87040 BLOOD CULTURE FOR BACTERIA: CPT

## 2023-10-09 PROCEDURE — 81003 URINALYSIS AUTO W/O SCOPE: CPT | Mod: QW

## 2023-10-09 PROCEDURE — 81001 URINALYSIS AUTO W/SCOPE: CPT

## 2023-10-09 PROCEDURE — 93010 ELECTROCARDIOGRAM REPORT: CPT

## 2023-10-09 PROCEDURE — 99285 EMERGENCY DEPT VISIT HI MDM: CPT

## 2023-10-09 PROCEDURE — 85025 COMPLETE CBC W/AUTO DIFF WBC: CPT

## 2023-10-09 PROCEDURE — 96374 THER/PROPH/DIAG INJ IV PUSH: CPT

## 2023-10-09 PROCEDURE — 93005 ELECTROCARDIOGRAM TRACING: CPT

## 2023-10-09 PROCEDURE — 83605 ASSAY OF LACTIC ACID: CPT

## 2023-10-09 PROCEDURE — 96361 HYDRATE IV INFUSION ADD-ON: CPT

## 2023-10-09 PROCEDURE — 71045 X-RAY EXAM CHEST 1 VIEW: CPT | Mod: 26

## 2023-10-09 PROCEDURE — 85610 PROTHROMBIN TIME: CPT

## 2023-10-09 PROCEDURE — 99214 OFFICE O/P EST MOD 30 MIN: CPT | Mod: 25

## 2023-10-09 PROCEDURE — 36415 COLL VENOUS BLD VENIPUNCTURE: CPT

## 2023-10-09 PROCEDURE — 85730 THROMBOPLASTIN TIME PARTIAL: CPT

## 2023-10-09 PROCEDURE — 74176 CT ABD & PELVIS W/O CONTRAST: CPT | Mod: MA

## 2023-10-09 PROCEDURE — 87077 CULTURE AEROBIC IDENTIFY: CPT

## 2023-10-09 PROCEDURE — 71045 X-RAY EXAM CHEST 1 VIEW: CPT

## 2023-10-09 PROCEDURE — 80053 COMPREHEN METABOLIC PANEL: CPT

## 2023-10-09 PROCEDURE — 87186 SC STD MICRODIL/AGAR DIL: CPT

## 2023-10-09 PROCEDURE — 99285 EMERGENCY DEPT VISIT HI MDM: CPT | Mod: 25

## 2023-10-09 RX ORDER — ACETAMINOPHEN 500 MG
975 TABLET ORAL ONCE
Refills: 0 | Status: COMPLETED | OUTPATIENT
Start: 2023-10-09 | End: 2023-10-09

## 2023-10-09 RX ORDER — CEFTRIAXONE 500 MG/1
1000 INJECTION, POWDER, FOR SOLUTION INTRAMUSCULAR; INTRAVENOUS ONCE
Refills: 0 | Status: COMPLETED | OUTPATIENT
Start: 2023-10-09 | End: 2023-10-09

## 2023-10-09 RX ORDER — SODIUM CHLORIDE 9 MG/ML
2100 INJECTION, SOLUTION INTRAVENOUS ONCE
Refills: 0 | Status: COMPLETED | OUTPATIENT
Start: 2023-10-09 | End: 2023-10-09

## 2023-10-09 RX ORDER — CEFTRIAXONE 500 MG/1
1000 INJECTION, POWDER, FOR SOLUTION INTRAMUSCULAR; INTRAVENOUS ONCE
Refills: 0 | Status: DISCONTINUED | OUTPATIENT
Start: 2023-10-09 | End: 2023-10-09

## 2023-10-09 RX ADMIN — SODIUM CHLORIDE 2100 MILLILITER(S): 9 INJECTION, SOLUTION INTRAVENOUS at 18:30

## 2023-10-09 RX ADMIN — SODIUM CHLORIDE 2100 MILLILITER(S): 9 INJECTION, SOLUTION INTRAVENOUS at 17:29

## 2023-10-09 RX ADMIN — Medication 975 MILLIGRAM(S): at 18:30

## 2023-10-09 RX ADMIN — Medication 975 MILLIGRAM(S): at 17:28

## 2023-10-09 RX ADMIN — CEFTRIAXONE 1000 MILLIGRAM(S): 500 INJECTION, POWDER, FOR SOLUTION INTRAMUSCULAR; INTRAVENOUS at 17:28

## 2023-10-09 NOTE — ED ADULT NURSE REASSESSMENT NOTE - NS ED NURSE REASSESS COMMENT FT1
Assumed care of pt at 19:15 as stated in report from OSVALDO Davies. Charting as noted. Patient A&O x4, pt denies any pain/discomfort, denies CP/SOB. Updated on the plan of care. Call bell within reach, bed locked in lowest position. IV site flushed w/ NS. No redness, swelling or pain noted to site. No signs of acute distress noted, safety maintained. Pt remains on CM in NSR. Pending UA.

## 2023-10-09 NOTE — ED PROVIDER NOTE - NSCAREINITIATED _GEN_ER
FUTURE VISIT INFORMATION      FUTURE VISIT INFORMATION:    Date: 11/26/2019    Time: 130pm    Location: [facility]  REFERRAL INFORMATION:    Referring provider:  Dr. Dunlap     Referring providers clinic: [facility]      Reason for visit/diagnosis  Syncope     RECORDS REQUESTED FROM:       Clinic name Comments Records Status Imaging Status   Critical access hospital   Care Everywhere N/A   [facility] * All records from [facility] 2013 and before*                                  
Olman Sutton(Attending)

## 2023-10-09 NOTE — ED PROVIDER NOTE - CLINICAL SUMMARY MEDICAL DECISION MAKING FREE TEXT BOX
pt arrived with urinary Sx, subjective fevers. Found to have UTI, no systemic signs of illness, well appearing, given 1 dose IV Abx, stable for dc with urology follow up and Abx

## 2023-10-09 NOTE — ED PROVIDER NOTE - CHIEF COMPLAINT
Addended by: SHAORN LUO on: 12/7/2018 10:19 AM     Modules accepted: Orders    
The patient is a 78y Male complaining of urinary symptoms.

## 2023-10-09 NOTE — ED PROVIDER NOTE - IV ALTEPLASE INCLUSION HIDDEN
Martha Melvin has met all discharge criteria from Phase II. Vital Signs are stable, ambulating  without difficulty. Discharge instructions given, patient verbalized understanding. Discharged from facility via wheelchair in stable condition.        show

## 2023-10-09 NOTE — ED ADULT NURSE NOTE - OBJECTIVE STATEMENT
Pt A&O x 4 presents to ED c/o dysuria and abdominal pain x 4 days. +BS all 4 quadrants. Abdomen tender to touch. No N/V. IV access obtained; pt medicated as prescribed. Specimens sent to lab. Bed locked and in lowest position. Call bell within reach. Able to make needs known.

## 2023-10-09 NOTE — ED PROVIDER NOTE - PROGRESS NOTE DETAILS
Iván: Pt received in signout from Dr. Sutton. Pt feeling improved, in no distress. No acute findings on labs/CT. Treated for UTI. Medically stable for discharge with return precautions.

## 2023-10-09 NOTE — ED PROVIDER NOTE - NSFOLLOWUPINSTRUCTIONS_ED_ALL_ED_FT
-Please follow-up with your primary care doctor in the next 2 days.  Please call tomorrow for an appointment.  If you cannot follow-up with your primary care doctor please return to the ED for any urgent issues.  - You were given a copy of the tests performed today.  Please bring the results with you and review them with your primary care doctor.  - If you have any worsening of symptoms or any other concerns please return to the ED immediately.  - Please continue taking your home medications as directed.    Urinary Tract Infection    A urinary tract infection (UTI) is an infection of any part of the urinary tract, which includes the kidneys, ureters, bladder, and urethra. Risk factors include ignoring your need to urinate, wiping back to front if female, being an uncircumcised male, and having diabetes or a weak immune system. Symptoms include frequent urination, pain or burning with urination, foul smelling urine, cloudy urine, pain in the lower abdomen, blood in the urine, and fever. If you were prescribed an antibiotic medicine, take it as told by your health care provider. Do not stop taking the antibiotic even if you start to feel better.    SEEK IMMEDIATE MEDICAL CARE IF YOU HAVE ANY OF THE FOLLOWING SYMPTOMS: severe back or abdominal pain, fever, inability to keep fluids or medicine down, dizziness/lightheadedness, or a change in mental status.

## 2023-10-09 NOTE — ED PROVIDER NOTE - OBJECTIVE STATEMENT
77 y/o male with PMHx of renal carcinoma s/p left nephrectomy, CAD, presents to the ED c/o urinary symptoms. Pt reports 4 days or urinary urgency, dysuria, and difficulty with starting stream. Pt saw his urologist and need to be straight cathed twice due to urinary retention and sent to ED for possible urinary infection. Pt reports chills and cramping lower abdominal pain. No N/V/D, no hematuria.

## 2023-10-10 VITALS
OXYGEN SATURATION: 100 % | SYSTOLIC BLOOD PRESSURE: 135 MMHG | DIASTOLIC BLOOD PRESSURE: 82 MMHG | HEART RATE: 80 BPM | RESPIRATION RATE: 18 BRPM

## 2023-10-10 PROCEDURE — 74176 CT ABD & PELVIS W/O CONTRAST: CPT | Mod: 26,MA

## 2023-10-10 RX ORDER — CEPHALEXIN 500 MG
1 CAPSULE ORAL
Qty: 21 | Refills: 0
Start: 2023-10-10 | End: 2023-10-16

## 2023-10-12 LAB

## 2023-10-14 LAB
CULTURE RESULTS: SIGNIFICANT CHANGE UP
CULTURE RESULTS: SIGNIFICANT CHANGE UP
SPECIMEN SOURCE: SIGNIFICANT CHANGE UP
SPECIMEN SOURCE: SIGNIFICANT CHANGE UP

## 2023-10-18 ENCOUNTER — APPOINTMENT (OUTPATIENT)
Dept: CARDIOLOGY | Facility: CLINIC | Age: 78
End: 2023-10-18
Payer: MEDICARE

## 2023-10-18 VITALS
SYSTOLIC BLOOD PRESSURE: 126 MMHG | HEIGHT: 66 IN | DIASTOLIC BLOOD PRESSURE: 68 MMHG | BODY MASS INDEX: 23.14 KG/M2 | TEMPERATURE: 97.7 F | HEART RATE: 76 BPM | OXYGEN SATURATION: 99 % | WEIGHT: 144 LBS

## 2023-10-18 PROCEDURE — 93000 ELECTROCARDIOGRAM COMPLETE: CPT

## 2023-10-18 PROCEDURE — 99214 OFFICE O/P EST MOD 30 MIN: CPT

## 2023-10-23 ENCOUNTER — LABORATORY RESULT (OUTPATIENT)
Age: 78
End: 2023-10-23

## 2023-10-23 ENCOUNTER — APPOINTMENT (OUTPATIENT)
Dept: FAMILY MEDICINE | Facility: CLINIC | Age: 78
End: 2023-10-23
Payer: MEDICARE

## 2023-10-23 ENCOUNTER — MED ADMIN CHARGE (OUTPATIENT)
Age: 78
End: 2023-10-23

## 2023-10-23 VITALS
TEMPERATURE: 97.7 F | OXYGEN SATURATION: 98 % | RESPIRATION RATE: 16 BRPM | HEART RATE: 95 BPM | HEIGHT: 66 IN | SYSTOLIC BLOOD PRESSURE: 108 MMHG | WEIGHT: 143 LBS | BODY MASS INDEX: 22.98 KG/M2 | DIASTOLIC BLOOD PRESSURE: 66 MMHG

## 2023-10-23 DIAGNOSIS — Z23 ENCOUNTER FOR IMMUNIZATION: ICD-10-CM

## 2023-10-23 PROCEDURE — 99214 OFFICE O/P EST MOD 30 MIN: CPT | Mod: 25

## 2023-10-23 PROCEDURE — G0008: CPT

## 2023-10-23 PROCEDURE — 36415 COLL VENOUS BLD VENIPUNCTURE: CPT

## 2023-10-23 PROCEDURE — 90662 IIV NO PRSV INCREASED AG IM: CPT

## 2023-10-24 ENCOUNTER — APPOINTMENT (OUTPATIENT)
Dept: ELECTROPHYSIOLOGY | Facility: CLINIC | Age: 78
End: 2023-10-24

## 2023-10-24 LAB
25(OH)D3 SERPL-MCNC: 52.4 NG/ML
ALBUMIN SERPL ELPH-MCNC: 4.4 G/DL
ALP BLD-CCNC: 116 U/L
ALT SERPL-CCNC: 52 U/L
APPEARANCE: CLEAR
AST SERPL-CCNC: 38 U/L
BASOPHILS # BLD AUTO: 0.03 K/UL
BASOPHILS NFR BLD AUTO: 0.6 %
BILIRUB DIRECT SERPL-MCNC: 0.1 MG/DL
BILIRUB INDIRECT SERPL-MCNC: 0.2 MG/DL
BILIRUB SERPL-MCNC: 0.3 MG/DL
BILIRUBIN URINE: NEGATIVE
BLOOD URINE: NEGATIVE
C PEPTIDE SERPL-MCNC: 5.9 NG/ML
CHOLEST SERPL-MCNC: 116 MG/DL
CK SERPL-CCNC: 41 U/L
COLOR: YELLOW
CREAT SPEC-SCNC: 110 MG/DL
EOSINOPHIL # BLD AUTO: 0.09 K/UL
EOSINOPHIL NFR BLD AUTO: 1.8 %
FERRITIN SERPL-MCNC: 221 NG/ML
FOLATE SERPL-MCNC: >20 NG/ML
GLUCOSE QUALITATIVE U: NEGATIVE MG/DL
HBA1C MFR BLD HPLC: 5.4 %
HCT VFR BLD CALC: 44.5 %
HDLC SERPL-MCNC: 31 MG/DL
HGB BLD-MCNC: 14.4 G/DL
IMM GRANULOCYTES NFR BLD AUTO: 0.2 %
IRON SATN MFR SERPL: 40 %
IRON SERPL-MCNC: 106 UG/DL
KETONES URINE: NEGATIVE MG/DL
LDLC SERPL CALC-MCNC: 37 MG/DL
LEUKOCYTE ESTERASE URINE: ABNORMAL
LYMPHOCYTES # BLD AUTO: 1.17 K/UL
LYMPHOCYTES NFR BLD AUTO: 23.4 %
MAN DIFF?: NORMAL
MCHC RBC-ENTMCNC: 30.5 PG
MCHC RBC-ENTMCNC: 32.4 GM/DL
MCV RBC AUTO: 94.3 FL
MICROALBUMIN 24H UR DL<=1MG/L-MCNC: 1.4 MG/DL
MICROALBUMIN/CREAT 24H UR-RTO: 12 MG/G
MONOCYTES # BLD AUTO: 0.41 K/UL
MONOCYTES NFR BLD AUTO: 8.2 %
NEUTROPHILS # BLD AUTO: 3.3 K/UL
NEUTROPHILS NFR BLD AUTO: 65.8 %
NITRITE URINE: NEGATIVE
NONHDLC SERPL-MCNC: 86 MG/DL
PH URINE: 6
PLATELET # BLD AUTO: 252 K/UL
PROT SERPL-MCNC: 7.4 G/DL
PROTEIN URINE: NEGATIVE MG/DL
PSA SERPL-MCNC: 4.61 NG/ML
RBC # BLD: 4.72 M/UL
RBC # FLD: 13.6 %
SPECIFIC GRAVITY URINE: 1.02
T4 SERPL-MCNC: 8.7 UG/DL
TIBC SERPL-MCNC: 268 UG/DL
TRIGL SERPL-MCNC: 330 MG/DL
TSH SERPL-ACNC: 4.69 UIU/ML
UIBC SERPL-MCNC: 162 UG/DL
UROBILINOGEN URINE: 0.2 MG/DL
VIT B12 SERPL-MCNC: 1129 PG/ML
WBC # FLD AUTO: 5.01 K/UL

## 2023-10-26 ENCOUNTER — NON-APPOINTMENT (OUTPATIENT)
Age: 78
End: 2023-10-26

## 2023-10-26 LAB
ALBUMIN MFR SERPL ELPH: 54.5 %
ALBUMIN SERPL-MCNC: 4 G/DL
ALBUMIN/GLOB SERPL: 1.2 RATIO
ALPHA1 GLOB MFR SERPL ELPH: 4 %
ALPHA1 GLOB SERPL ELPH-MCNC: 0.3 G/DL
ALPHA2 GLOB MFR SERPL ELPH: 11.5 %
ALPHA2 GLOB SERPL ELPH-MCNC: 0.9 G/DL
B-GLOBULIN MFR SERPL ELPH: 14 %
B-GLOBULIN SERPL ELPH-MCNC: 1 G/DL
GAMMA GLOB FLD ELPH-MCNC: 1.2 G/DL
GAMMA GLOB MFR SERPL ELPH: 16 %
INTERPRETATION SERPL IEP-IMP: NORMAL
PROT SERPL-MCNC: 7.4 G/DL
PROT SERPL-MCNC: 7.4 G/DL

## 2023-10-30 LAB — URINE CULTURE <10: NORMAL

## 2023-11-08 ENCOUNTER — OFFICE (OUTPATIENT)
Dept: URBAN - METROPOLITAN AREA CLINIC 113 | Facility: CLINIC | Age: 78
Setting detail: OPHTHALMOLOGY
End: 2023-11-08
Payer: MEDICARE

## 2023-11-08 ENCOUNTER — RX ONLY (RX ONLY)
Age: 78
End: 2023-11-08

## 2023-11-08 DIAGNOSIS — H40.013: ICD-10-CM

## 2023-11-08 DIAGNOSIS — H01.001: ICD-10-CM

## 2023-11-08 DIAGNOSIS — H01.004: ICD-10-CM

## 2023-11-08 DIAGNOSIS — H16.223: ICD-10-CM

## 2023-11-08 DIAGNOSIS — H43.811: ICD-10-CM

## 2023-11-08 DIAGNOSIS — H25.13: ICD-10-CM

## 2023-11-08 PROCEDURE — 92133 CPTRZD OPH DX IMG PST SGM ON: CPT | Performed by: STUDENT IN AN ORGANIZED HEALTH CARE EDUCATION/TRAINING PROGRAM

## 2023-11-08 PROCEDURE — 92083 EXTENDED VISUAL FIELD XM: CPT | Performed by: STUDENT IN AN ORGANIZED HEALTH CARE EDUCATION/TRAINING PROGRAM

## 2023-11-08 PROCEDURE — 99212 OFFICE O/P EST SF 10 MIN: CPT | Performed by: STUDENT IN AN ORGANIZED HEALTH CARE EDUCATION/TRAINING PROGRAM

## 2023-11-08 ASSESSMENT — REFRACTION_CURRENTRX
OD_AXIS: 122
OS_AXIS: 069
OD_ADD: +1.75
OS_CYLINDER: -2.75
OS_OVR_VA: 20/
OS_VPRISM_DIRECTION: PROGS
OD_CYLINDER: -1.50
OD_OVR_VA: 20/
OS_SPHERE: +0.75
OS_ADD: +1.75
OD_VPRISM_DIRECTION: PROGS
OD_SPHERE: PLANO

## 2023-11-08 ASSESSMENT — SPHEQUIV_DERIVED
OS_SPHEQUIV: 0.5
OD_SPHEQUIV: -0.25

## 2023-11-08 ASSESSMENT — REFRACTION_AUTOREFRACTION
OS_SPHERE: +2.25
OS_CYLINDER: -3.50
OD_AXIS: 109
OD_CYLINDER: -2.00
OD_SPHERE: +0.75
OS_AXIS: 077

## 2023-11-08 ASSESSMENT — LID EXAM ASSESSMENTS
OS_BLEPHARITIS: LUL 1+ 2+
OD_BLEPHARITIS: RUL 1+ 2+

## 2023-11-08 ASSESSMENT — CONFRONTATIONAL VISUAL FIELD TEST (CVF)
OD_FINDINGS: FULL
OS_FINDINGS: FULL

## 2023-11-08 ASSESSMENT — SUPERFICIAL PUNCTATE KERATITIS (SPK)
OS_SPK: T
OD_SPK: T

## 2023-11-13 ENCOUNTER — APPOINTMENT (OUTPATIENT)
Dept: FAMILY MEDICINE | Facility: CLINIC | Age: 78
End: 2023-11-13
Payer: MEDICARE

## 2023-11-13 VITALS
BODY MASS INDEX: 22.98 KG/M2 | HEART RATE: 74 BPM | SYSTOLIC BLOOD PRESSURE: 122 MMHG | OXYGEN SATURATION: 98 % | TEMPERATURE: 97.2 F | DIASTOLIC BLOOD PRESSURE: 70 MMHG | HEIGHT: 66 IN | WEIGHT: 143 LBS | RESPIRATION RATE: 15 BRPM

## 2023-11-13 DIAGNOSIS — Z87.09 PERSONAL HISTORY OF OTHER DISEASES OF THE RESPIRATORY SYSTEM: ICD-10-CM

## 2023-11-13 PROCEDURE — 99214 OFFICE O/P EST MOD 30 MIN: CPT

## 2023-11-13 RX ORDER — LEVOTHYROXINE SODIUM 0.05 MG/1
50 TABLET ORAL
Qty: 90 | Refills: 3 | Status: ACTIVE | COMMUNITY
Start: 2022-10-31 | End: 1900-01-01

## 2023-11-28 ENCOUNTER — RX RENEWAL (OUTPATIENT)
Age: 78
End: 2023-11-28

## 2023-12-20 ENCOUNTER — OFFICE (OUTPATIENT)
Dept: URBAN - METROPOLITAN AREA CLINIC 113 | Facility: CLINIC | Age: 78
Setting detail: OPHTHALMOLOGY
End: 2023-12-20
Payer: MEDICARE

## 2023-12-20 ENCOUNTER — RX ONLY (RX ONLY)
Age: 78
End: 2023-12-20

## 2023-12-20 DIAGNOSIS — H01.004: ICD-10-CM

## 2023-12-20 DIAGNOSIS — H40.013: ICD-10-CM

## 2023-12-20 DIAGNOSIS — H01.001: ICD-10-CM

## 2023-12-20 DIAGNOSIS — H16.223: ICD-10-CM

## 2023-12-20 PROCEDURE — 99212 OFFICE O/P EST SF 10 MIN: CPT | Performed by: STUDENT IN AN ORGANIZED HEALTH CARE EDUCATION/TRAINING PROGRAM

## 2023-12-20 ASSESSMENT — REFRACTION_CURRENTRX
OS_SPHERE: +0.75
OD_ADD: +1.75
OS_CYLINDER: -2.75
OS_VPRISM_DIRECTION: PROGS
OD_CYLINDER: -1.50
OS_AXIS: 069
OD_AXIS: 122
OD_SPHERE: PLANO
OS_ADD: +1.75
OD_OVR_VA: 20/
OS_OVR_VA: 20/
OD_VPRISM_DIRECTION: PROGS

## 2023-12-20 ASSESSMENT — LID EXAM ASSESSMENTS
OS_BLEPHARITIS: LUL T
OD_BLEPHARITIS: RUL T

## 2023-12-20 ASSESSMENT — CONFRONTATIONAL VISUAL FIELD TEST (CVF)
OS_FINDINGS: FULL
OD_FINDINGS: FULL

## 2023-12-20 ASSESSMENT — REFRACTION_AUTOREFRACTION
OS_SPHERE: +2.25
OD_SPHERE: +0.75
OS_CYLINDER: -2.75
OS_AXIS: 075
OD_CYLINDER: -2.00
OD_AXIS: 110

## 2023-12-20 ASSESSMENT — SUPERFICIAL PUNCTATE KERATITIS (SPK)
OS_SPK: T
OD_SPK: T

## 2023-12-20 ASSESSMENT — SPHEQUIV_DERIVED
OS_SPHEQUIV: 0.875
OD_SPHEQUIV: -0.25

## 2024-01-02 ENCOUNTER — RX RENEWAL (OUTPATIENT)
Age: 79
End: 2024-01-02

## 2024-01-10 ENCOUNTER — NON-APPOINTMENT (OUTPATIENT)
Age: 79
End: 2024-01-10

## 2024-01-10 ENCOUNTER — APPOINTMENT (OUTPATIENT)
Dept: CARDIOLOGY | Facility: CLINIC | Age: 79
End: 2024-01-10
Payer: MEDICARE

## 2024-01-10 VITALS
TEMPERATURE: 97.4 F | BODY MASS INDEX: 23.46 KG/M2 | SYSTOLIC BLOOD PRESSURE: 112 MMHG | DIASTOLIC BLOOD PRESSURE: 66 MMHG | HEIGHT: 66 IN | WEIGHT: 146 LBS | OXYGEN SATURATION: 96 % | HEART RATE: 67 BPM

## 2024-01-10 DIAGNOSIS — I42.9 CARDIOMYOPATHY, UNSPECIFIED: ICD-10-CM

## 2024-01-10 PROCEDURE — 93000 ELECTROCARDIOGRAM COMPLETE: CPT

## 2024-01-10 PROCEDURE — 99214 OFFICE O/P EST MOD 30 MIN: CPT

## 2024-01-10 RX ORDER — BUDESONIDE 180 UG/1
180 AEROSOL, POWDER RESPIRATORY (INHALATION)
Qty: 60 | Refills: 5 | Status: DISCONTINUED | COMMUNITY
Start: 2022-11-22 | End: 2024-01-10

## 2024-01-10 RX ORDER — UBIDECARENONE/VIT E ACET 100MG-5
CAPSULE ORAL
Refills: 0 | Status: ACTIVE | COMMUNITY

## 2024-01-10 RX ORDER — INCLISIRAN 284 MG/1.5ML
284 INJECTION, SOLUTION SUBCUTANEOUS
Refills: 0 | Status: ACTIVE | COMMUNITY

## 2024-01-10 RX ORDER — MONTELUKAST 10 MG/1
10 TABLET, FILM COATED ORAL DAILY
Qty: 90 | Refills: 3 | Status: DISCONTINUED | COMMUNITY
End: 2024-01-10

## 2024-01-31 ENCOUNTER — NON-APPOINTMENT (OUTPATIENT)
Age: 79
End: 2024-01-31

## 2024-01-31 DIAGNOSIS — I47.10 SUPRAVENTRICULAR TACHYCARDIA, UNSPECIFIED: ICD-10-CM

## 2024-02-07 ENCOUNTER — INPATIENT (INPATIENT)
Facility: HOSPITAL | Age: 79
LOS: 2 days | Discharge: HOME | DRG: 204 | End: 2024-02-10
Attending: HOSPITALIST | Admitting: STUDENT IN AN ORGANIZED HEALTH CARE EDUCATION/TRAINING PROGRAM
Payer: MEDICARE

## 2024-02-07 VITALS
WEIGHT: 145.28 LBS | TEMPERATURE: 98 F | SYSTOLIC BLOOD PRESSURE: 131 MMHG | DIASTOLIC BLOOD PRESSURE: 70 MMHG | OXYGEN SATURATION: 100 % | HEART RATE: 73 BPM | RESPIRATION RATE: 38 BRPM

## 2024-02-07 LAB
ALBUMIN SERPL ELPH-MCNC: 3.9 G/DL — SIGNIFICANT CHANGE UP (ref 3.3–5.2)
ALP SERPL-CCNC: 82 U/L — SIGNIFICANT CHANGE UP (ref 40–120)
ALT FLD-CCNC: 26 U/L — SIGNIFICANT CHANGE UP
ANION GAP SERPL CALC-SCNC: 13 MMOL/L — SIGNIFICANT CHANGE UP (ref 5–17)
APTT BLD: 32.5 SEC — SIGNIFICANT CHANGE UP (ref 24.5–35.6)
AST SERPL-CCNC: 20 U/L — SIGNIFICANT CHANGE UP
BASOPHILS # BLD AUTO: 0.03 K/UL — SIGNIFICANT CHANGE UP (ref 0–0.2)
BASOPHILS NFR BLD AUTO: 0.6 % — SIGNIFICANT CHANGE UP (ref 0–2)
BILIRUB SERPL-MCNC: 0.2 MG/DL — LOW (ref 0.4–2)
BUN SERPL-MCNC: 21 MG/DL — HIGH (ref 8–20)
CALCIUM SERPL-MCNC: 9 MG/DL — SIGNIFICANT CHANGE UP (ref 8.4–10.5)
CHLORIDE SERPL-SCNC: 100 MMOL/L — SIGNIFICANT CHANGE UP (ref 96–108)
CO2 SERPL-SCNC: 21 MMOL/L — LOW (ref 22–29)
CREAT SERPL-MCNC: 0.92 MG/DL — SIGNIFICANT CHANGE UP (ref 0.5–1.3)
EGFR: 85 ML/MIN/1.73M2 — SIGNIFICANT CHANGE UP
EOSINOPHIL # BLD AUTO: 0.3 K/UL — SIGNIFICANT CHANGE UP (ref 0–0.5)
EOSINOPHIL NFR BLD AUTO: 5.5 % — SIGNIFICANT CHANGE UP (ref 0–6)
FLUAV AG NPH QL: SIGNIFICANT CHANGE UP
FLUBV AG NPH QL: SIGNIFICANT CHANGE UP
GLUCOSE SERPL-MCNC: 90 MG/DL — SIGNIFICANT CHANGE UP (ref 70–99)
HCT VFR BLD CALC: 37.8 % — LOW (ref 39–50)
HGB BLD-MCNC: 12.9 G/DL — LOW (ref 13–17)
IMM GRANULOCYTES NFR BLD AUTO: 0.6 % — SIGNIFICANT CHANGE UP (ref 0–0.9)
INR BLD: 1.07 RATIO — SIGNIFICANT CHANGE UP (ref 0.85–1.18)
LYMPHOCYTES # BLD AUTO: 1.24 K/UL — SIGNIFICANT CHANGE UP (ref 1–3.3)
LYMPHOCYTES # BLD AUTO: 22.9 % — SIGNIFICANT CHANGE UP (ref 13–44)
MCHC RBC-ENTMCNC: 30.1 PG — SIGNIFICANT CHANGE UP (ref 27–34)
MCHC RBC-ENTMCNC: 34.1 GM/DL — SIGNIFICANT CHANGE UP (ref 32–36)
MCV RBC AUTO: 88.3 FL — SIGNIFICANT CHANGE UP (ref 80–100)
MONOCYTES # BLD AUTO: 0.49 K/UL — SIGNIFICANT CHANGE UP (ref 0–0.9)
MONOCYTES NFR BLD AUTO: 9 % — SIGNIFICANT CHANGE UP (ref 2–14)
NEUTROPHILS # BLD AUTO: 3.33 K/UL — SIGNIFICANT CHANGE UP (ref 1.8–7.4)
NEUTROPHILS NFR BLD AUTO: 61.4 % — SIGNIFICANT CHANGE UP (ref 43–77)
NT-PROBNP SERPL-SCNC: 118 PG/ML — SIGNIFICANT CHANGE UP (ref 0–300)
PLATELET # BLD AUTO: 282 K/UL — SIGNIFICANT CHANGE UP (ref 150–400)
POTASSIUM SERPL-MCNC: 4.2 MMOL/L — SIGNIFICANT CHANGE UP (ref 3.5–5.3)
POTASSIUM SERPL-SCNC: 4.2 MMOL/L — SIGNIFICANT CHANGE UP (ref 3.5–5.3)
PROT SERPL-MCNC: 7.2 G/DL — SIGNIFICANT CHANGE UP (ref 6.6–8.7)
PROTHROM AB SERPL-ACNC: 11.8 SEC — SIGNIFICANT CHANGE UP (ref 9.5–13)
RBC # BLD: 4.28 M/UL — SIGNIFICANT CHANGE UP (ref 4.2–5.8)
RBC # FLD: 12.5 % — SIGNIFICANT CHANGE UP (ref 10.3–14.5)
RSV RNA NPH QL NAA+NON-PROBE: SIGNIFICANT CHANGE UP
SARS-COV-2 RNA SPEC QL NAA+PROBE: SIGNIFICANT CHANGE UP
SODIUM SERPL-SCNC: 134 MMOL/L — LOW (ref 135–145)
TROPONIN T, HIGH SENSITIVITY RESULT: 13 NG/L — SIGNIFICANT CHANGE UP (ref 0–51)
WBC # BLD: 5.42 K/UL — SIGNIFICANT CHANGE UP (ref 3.8–10.5)
WBC # FLD AUTO: 5.42 K/UL — SIGNIFICANT CHANGE UP (ref 3.8–10.5)

## 2024-02-07 PROCEDURE — 99285 EMERGENCY DEPT VISIT HI MDM: CPT | Mod: GC

## 2024-02-07 PROCEDURE — 93010 ELECTROCARDIOGRAM REPORT: CPT

## 2024-02-07 NOTE — ED PROVIDER NOTE - PHYSICAL EXAMINATION
Gen: no acute distress  Head: normocephalic, atraumatic  Lung: CTAB, no wheezing, rales, rhonchi. Tachypneic, in mild distress, satting well on RA   CV: normal s1/s2, rrr,   Abd: soft, non-tender, non-distended  MSK: No edema, no visible deformities, full range of motion in all 4 extremities  Neuro: No focal neurologic deficits  Skin: No rash

## 2024-02-07 NOTE — ED ADULT TRIAGE NOTE - CHIEF COMPLAINT QUOTE
Pt c/o SOB, tachypneic and anxious in triage.  Denies pain.  Last week started having chills on/off, recently stopped and restarted his Ambien on his own, has not been sleeping well.  Hx of MI's, 4 stents, HTN, HLD, bladder Ca left kidney removed, anxiety.  EKG in triage.  Son Ricky [phone] for any questions.

## 2024-02-07 NOTE — ED ADULT NURSE NOTE - NSFALLRISKINTERV_ED_ALL_ED

## 2024-02-07 NOTE — ED PROVIDER NOTE - CLINICAL SUMMARY MEDICAL DECISION MAKING FREE TEXT BOX
Patient is a 77 yo male with PMHx CAD s/p stents on asa/plavix (followed by Dr. Dimas), renal carcinoma s/p L nephrectomy presenting with 2-3 weeks of VASQUEZ. VSS    Will check labs, r.o electrolyte abnormalities, troponin ekg to assess for acs, cxr to r.o pna, viral swab to r.o URI, BNP to r.o HF, reassess. Patient is a 79 yo male with PMHx CAD s/p stents on asa/plavix (followed by Dr. Dimas), renal carcinoma s/p L nephrectomy presenting with 2-3 weeks of VASQUEZ. VSS.  patient tachypneic with minimal exertion on exam, but lungs are clear to auscultation.  Concern for possible ACS with shortness of breath being cardiac equivalent. Will check labs, r.o electrolyte abnormalities, troponin ekg to assess for acs, cxr to r.o pna, viral swab to r.o URI, BNP to r.o HF, reassess.      Patient's delta high-sensitivity troponin was positive at a change from 13-21.  Cardiology consult placed and patient admitted to medicine on telemetry.

## 2024-02-07 NOTE — ED ADULT NURSE NOTE - OBJECTIVE STATEMENT
Pt is a 77 yo M who p/w c/o SOB x3 weeks. A&Ox4, amb w/ rollator. States his SOB has been worsening. Denies cigarette smoking/sick contacts/CP. Pt. having difficulty speaking due to SOB. Resp. equal and labored b/l. O2 sat WNL. MD at bedside for eval.

## 2024-02-07 NOTE — ED PROVIDER NOTE - OBJECTIVE STATEMENT
Patient is a 77 yo male with PMHx CAD s/p stents on asa/plavix (followed by Dr. Dimas), renal carcinoma s/p L nephrectomy presenting with 2-3 weeks of VASQUEZ. Patient states he has had 2-3 weeks of VASQUEZ and sob while laying flat. Patient walks with a walker and has been ambulating as per baseline. Denies fevers, chills, dizziness, lightheadedness, dysphagia, dysarthria, diplopia, photophobia, syncope, cough, congestion, CP, abdominal pain, neck pain, back pain, diarrhea, dysuria, hematuria, hematochezia, hematemesis, n/v, recent travel, sick contacts, leg swelling.

## 2024-02-07 NOTE — ED PROVIDER NOTE - ATTENDING CONTRIBUTION TO CARE
oJhnny: I performed a face to face bedside interview with patient regarding history of present illness, review of symptoms and past medical history. I completed an independent physical exam.  I have discussed patient's plan of care with resident.   I agree with note as stated above including HISTORY OF PRESENT ILLNESS, HIV, PAST MEDICAL/SURGICAL/FAMILY/SOCIAL HISTORY, ALLERGIES AND HOME MEDICATIONS, REVIEW OF SYSTEMS, PHYSICAL EXAM, MEDICAL DECISION MAKING and any PROGRESS NOTES during the time I functioned as the attending physician for this patient unless otherwise noted. My brief assessment is as follows: Patient is a 79 yo male with PMHx CAD s/p stents on asa/plavix (followed by Dr. Dimas), renal carcinoma s/p L nephrectomy presenting with 2-3 weeks of VASQUEZ. VSS.  patient tachypneic with minimal exertion on exam, but lungs are clear to auscultation.  Concern for possible ACS with shortness of breath being cardiac equivalent. Will check labs, r.o electrolyte abnormalities, troponin ekg to assess for acs, cxr to r.o pna, viral swab to r.o URI, BNP to r.o HF, reassess.      Patient's delta high-sensitivity troponin was positive at a change from 13-21.  Cardiology consult placed and patient admitted to medicine on telemetry.

## 2024-02-07 NOTE — ED PROVIDER NOTE - PROGRESS NOTE DETAILS
JK - labs WNL; troponin initially 13 however increasing to 21. Will admit to medicine for unstable angina. Cardiology consulted. VSS.

## 2024-02-07 NOTE — ED ADULT NURSE NOTE - CHIEF COMPLAINT QUOTE
Pt c/o SOB, tachypneic and anxious in triage.  Denies pain.  Last week started having chills on/off, recently stopped and restarted his Ambien on his own, has not been sleeping well.  Hx of MI's, 4 stents, HTN, HLD, bladder Ca left kidney removed, anxiety.  EKG in triage.  Son Ricky [phone] for any questions.

## 2024-02-08 DIAGNOSIS — E78.5 HYPERLIPIDEMIA, UNSPECIFIED: ICD-10-CM

## 2024-02-08 DIAGNOSIS — Z90.5 ACQUIRED ABSENCE OF KIDNEY: Chronic | ICD-10-CM

## 2024-02-08 DIAGNOSIS — R06.02 SHORTNESS OF BREATH: ICD-10-CM

## 2024-02-08 DIAGNOSIS — I20.0 UNSTABLE ANGINA: ICD-10-CM

## 2024-02-08 LAB
A1C WITH ESTIMATED AVERAGE GLUCOSE RESULT: 5.2 % — SIGNIFICANT CHANGE UP (ref 4–5.6)
A1C WITH ESTIMATED AVERAGE GLUCOSE RESULT: 5.3 % — SIGNIFICANT CHANGE UP (ref 4–5.6)
ANION GAP SERPL CALC-SCNC: 11 MMOL/L — SIGNIFICANT CHANGE UP (ref 5–17)
ANION GAP SERPL CALC-SCNC: 14 MMOL/L — SIGNIFICANT CHANGE UP (ref 5–17)
BASOPHILS # BLD AUTO: 0.03 K/UL — SIGNIFICANT CHANGE UP (ref 0–0.2)
BASOPHILS NFR BLD AUTO: 0.6 % — SIGNIFICANT CHANGE UP (ref 0–2)
BUN SERPL-MCNC: 17.9 MG/DL — SIGNIFICANT CHANGE UP (ref 8–20)
BUN SERPL-MCNC: 18.6 MG/DL — SIGNIFICANT CHANGE UP (ref 8–20)
CALCIUM SERPL-MCNC: 8.9 MG/DL — SIGNIFICANT CHANGE UP (ref 8.4–10.5)
CALCIUM SERPL-MCNC: 8.9 MG/DL — SIGNIFICANT CHANGE UP (ref 8.4–10.5)
CHLORIDE SERPL-SCNC: 101 MMOL/L — SIGNIFICANT CHANGE UP (ref 96–108)
CHLORIDE SERPL-SCNC: 103 MMOL/L — SIGNIFICANT CHANGE UP (ref 96–108)
CHOLEST SERPL-MCNC: 103 MG/DL — SIGNIFICANT CHANGE UP
CHOLEST SERPL-MCNC: 106 MG/DL — SIGNIFICANT CHANGE UP
CK SERPL-CCNC: 39 U/L — SIGNIFICANT CHANGE UP (ref 30–200)
CO2 SERPL-SCNC: 20 MMOL/L — LOW (ref 22–29)
CO2 SERPL-SCNC: 22 MMOL/L — SIGNIFICANT CHANGE UP (ref 22–29)
CREAT SERPL-MCNC: 0.81 MG/DL — SIGNIFICANT CHANGE UP (ref 0.5–1.3)
CREAT SERPL-MCNC: 0.91 MG/DL — SIGNIFICANT CHANGE UP (ref 0.5–1.3)
EGFR: 86 ML/MIN/1.73M2 — SIGNIFICANT CHANGE UP
EGFR: 90 ML/MIN/1.73M2 — SIGNIFICANT CHANGE UP
EOSINOPHIL # BLD AUTO: 0.28 K/UL — SIGNIFICANT CHANGE UP (ref 0–0.5)
EOSINOPHIL NFR BLD AUTO: 6 % — SIGNIFICANT CHANGE UP (ref 0–6)
ERYTHROCYTE [SEDIMENTATION RATE] IN BLOOD: 44 MM/HR — HIGH (ref 0–15)
ESTIMATED AVERAGE GLUCOSE: 103 MG/DL — SIGNIFICANT CHANGE UP (ref 68–114)
ESTIMATED AVERAGE GLUCOSE: 105 MG/DL — SIGNIFICANT CHANGE UP (ref 68–114)
GLUCOSE SERPL-MCNC: 93 MG/DL — SIGNIFICANT CHANGE UP (ref 70–99)
GLUCOSE SERPL-MCNC: 94 MG/DL — SIGNIFICANT CHANGE UP (ref 70–99)
HCT VFR BLD CALC: 37.6 % — LOW (ref 39–50)
HDLC SERPL-MCNC: 25 MG/DL — LOW
HDLC SERPL-MCNC: 27 MG/DL — LOW
HGB BLD-MCNC: 13.2 G/DL — SIGNIFICANT CHANGE UP (ref 13–17)
IMM GRANULOCYTES NFR BLD AUTO: 0.4 % — SIGNIFICANT CHANGE UP (ref 0–0.9)
LIPID PNL WITH DIRECT LDL SERPL: 33 MG/DL — SIGNIFICANT CHANGE UP
LIPID PNL WITH DIRECT LDL SERPL: 38 MG/DL — SIGNIFICANT CHANGE UP
LYMPHOCYTES # BLD AUTO: 0.99 K/UL — LOW (ref 1–3.3)
LYMPHOCYTES # BLD AUTO: 21.3 % — SIGNIFICANT CHANGE UP (ref 13–44)
MAGNESIUM SERPL-MCNC: 2.1 MG/DL — SIGNIFICANT CHANGE UP (ref 1.6–2.6)
MAGNESIUM SERPL-MCNC: 2.1 MG/DL — SIGNIFICANT CHANGE UP (ref 1.6–2.6)
MCHC RBC-ENTMCNC: 31.3 PG — SIGNIFICANT CHANGE UP (ref 27–34)
MCHC RBC-ENTMCNC: 35.1 GM/DL — SIGNIFICANT CHANGE UP (ref 32–36)
MCV RBC AUTO: 89.1 FL — SIGNIFICANT CHANGE UP (ref 80–100)
MONOCYTES # BLD AUTO: 0.43 K/UL — SIGNIFICANT CHANGE UP (ref 0–0.9)
MONOCYTES NFR BLD AUTO: 9.3 % — SIGNIFICANT CHANGE UP (ref 2–14)
NEUTROPHILS # BLD AUTO: 2.89 K/UL — SIGNIFICANT CHANGE UP (ref 1.8–7.4)
NEUTROPHILS NFR BLD AUTO: 62.4 % — SIGNIFICANT CHANGE UP (ref 43–77)
NON HDL CHOLESTEROL: 78 MG/DL — SIGNIFICANT CHANGE UP
NON HDL CHOLESTEROL: 79 MG/DL — SIGNIFICANT CHANGE UP
PLATELET # BLD AUTO: 226 K/UL — SIGNIFICANT CHANGE UP (ref 150–400)
POTASSIUM SERPL-MCNC: 4.6 MMOL/L — SIGNIFICANT CHANGE UP (ref 3.5–5.3)
POTASSIUM SERPL-MCNC: 4.8 MMOL/L — SIGNIFICANT CHANGE UP (ref 3.5–5.3)
POTASSIUM SERPL-SCNC: 4.6 MMOL/L — SIGNIFICANT CHANGE UP (ref 3.5–5.3)
POTASSIUM SERPL-SCNC: 4.8 MMOL/L — SIGNIFICANT CHANGE UP (ref 3.5–5.3)
RBC # BLD: 4.22 M/UL — SIGNIFICANT CHANGE UP (ref 4.2–5.8)
RBC # FLD: 12.6 % — SIGNIFICANT CHANGE UP (ref 10.3–14.5)
SODIUM SERPL-SCNC: 134 MMOL/L — LOW (ref 135–145)
SODIUM SERPL-SCNC: 137 MMOL/L — SIGNIFICANT CHANGE UP (ref 135–145)
T3 SERPL-MCNC: 93 NG/DL — SIGNIFICANT CHANGE UP (ref 80–200)
T4 AB SER-ACNC: 7.8 UG/DL — SIGNIFICANT CHANGE UP (ref 4.5–12)
T4 FREE SERPL-MCNC: 1.5 NG/DL — SIGNIFICANT CHANGE UP (ref 0.9–1.8)
TRIGL SERPL-MCNC: 201 MG/DL — HIGH
TRIGL SERPL-MCNC: 228 MG/DL — HIGH
TROPONIN T, HIGH SENSITIVITY RESULT: 13 NG/L — SIGNIFICANT CHANGE UP (ref 0–51)
TROPONIN T, HIGH SENSITIVITY RESULT: 21 NG/L — SIGNIFICANT CHANGE UP (ref 0–51)
TSH SERPL-MCNC: 3.29 UIU/ML — SIGNIFICANT CHANGE UP (ref 0.27–4.2)
TSH SERPL-MCNC: 4.42 UIU/ML — HIGH (ref 0.27–4.2)
WBC # BLD: 4.64 K/UL — SIGNIFICANT CHANGE UP (ref 3.8–10.5)
WBC # FLD AUTO: 4.64 K/UL — SIGNIFICANT CHANGE UP (ref 3.8–10.5)

## 2024-02-08 PROCEDURE — 93010 ELECTROCARDIOGRAM REPORT: CPT

## 2024-02-08 PROCEDURE — 93306 TTE W/DOPPLER COMPLETE: CPT | Mod: 26

## 2024-02-08 PROCEDURE — 99221 1ST HOSP IP/OBS SF/LOW 40: CPT

## 2024-02-08 PROCEDURE — 93016 CV STRESS TEST SUPVJ ONLY: CPT

## 2024-02-08 PROCEDURE — 93018 CV STRESS TEST I&R ONLY: CPT

## 2024-02-08 PROCEDURE — 99223 1ST HOSP IP/OBS HIGH 75: CPT

## 2024-02-08 PROCEDURE — 78452 HT MUSCLE IMAGE SPECT MULT: CPT | Mod: 26

## 2024-02-08 PROCEDURE — 71045 X-RAY EXAM CHEST 1 VIEW: CPT | Mod: 26

## 2024-02-08 RX ORDER — ZOLPIDEM TARTRATE 10 MG/1
5 TABLET ORAL AT BEDTIME
Refills: 0 | Status: DISCONTINUED | OUTPATIENT
Start: 2024-02-08 | End: 2024-02-10

## 2024-02-08 RX ORDER — L.ACIDOPH/B.ANIMALIS/B.LONGUM 15B CELL
1 CAPSULE ORAL
Refills: 0 | DISCHARGE

## 2024-02-08 RX ORDER — SACUBITRIL AND VALSARTAN 24; 26 MG/1; MG/1
1 TABLET, FILM COATED ORAL
Refills: 0 | Status: DISCONTINUED | OUTPATIENT
Start: 2024-02-08 | End: 2024-02-10

## 2024-02-08 RX ORDER — LANOLIN ALCOHOL/MO/W.PET/CERES
3 CREAM (GRAM) TOPICAL AT BEDTIME
Refills: 0 | Status: DISCONTINUED | OUTPATIENT
Start: 2024-02-08 | End: 2024-02-10

## 2024-02-08 RX ORDER — METOPROLOL TARTRATE 50 MG
1 TABLET ORAL
Refills: 0 | DISCHARGE

## 2024-02-08 RX ORDER — ACETAMINOPHEN 500 MG
650 TABLET ORAL EVERY 6 HOURS
Refills: 0 | Status: DISCONTINUED | OUTPATIENT
Start: 2024-02-08 | End: 2024-02-10

## 2024-02-08 RX ORDER — MONTELUKAST 4 MG/1
1 TABLET, CHEWABLE ORAL
Refills: 0 | DISCHARGE

## 2024-02-08 RX ORDER — ZOLPIDEM TARTRATE 10 MG/1
1 TABLET ORAL
Refills: 0 | DISCHARGE

## 2024-02-08 RX ORDER — ASPIRIN/CALCIUM CARB/MAGNESIUM 324 MG
81 TABLET ORAL DAILY
Refills: 0 | Status: DISCONTINUED | OUTPATIENT
Start: 2024-02-08 | End: 2024-02-08

## 2024-02-08 RX ORDER — LEVOTHYROXINE SODIUM 125 MCG
1 TABLET ORAL
Refills: 0 | DISCHARGE

## 2024-02-08 RX ORDER — LEVOTHYROXINE SODIUM 125 MCG
50 TABLET ORAL DAILY
Refills: 0 | Status: DISCONTINUED | OUTPATIENT
Start: 2024-02-08 | End: 2024-02-10

## 2024-02-08 RX ORDER — MONTELUKAST 4 MG/1
10 TABLET, CHEWABLE ORAL AT BEDTIME
Refills: 0 | Status: DISCONTINUED | OUTPATIENT
Start: 2024-02-08 | End: 2024-02-10

## 2024-02-08 RX ORDER — TAMSULOSIN HYDROCHLORIDE 0.4 MG/1
1 CAPSULE ORAL
Refills: 0 | DISCHARGE

## 2024-02-08 RX ORDER — ATORVASTATIN CALCIUM 80 MG/1
80 TABLET, FILM COATED ORAL AT BEDTIME
Refills: 0 | Status: DISCONTINUED | OUTPATIENT
Start: 2024-02-08 | End: 2024-02-08

## 2024-02-08 RX ORDER — ENOXAPARIN SODIUM 100 MG/ML
40 INJECTION SUBCUTANEOUS EVERY 24 HOURS
Refills: 0 | Status: DISCONTINUED | OUTPATIENT
Start: 2024-02-08 | End: 2024-02-08

## 2024-02-08 RX ORDER — ONDANSETRON 8 MG/1
4 TABLET, FILM COATED ORAL EVERY 8 HOURS
Refills: 0 | Status: DISCONTINUED | OUTPATIENT
Start: 2024-02-08 | End: 2024-02-10

## 2024-02-08 RX ORDER — ESCITALOPRAM OXALATE 10 MG/1
1 TABLET, FILM COATED ORAL
Qty: 0 | Refills: 0 | DISCHARGE

## 2024-02-08 RX ORDER — TAMSULOSIN HYDROCHLORIDE 0.4 MG/1
0.4 CAPSULE ORAL AT BEDTIME
Refills: 0 | Status: DISCONTINUED | OUTPATIENT
Start: 2024-02-08 | End: 2024-02-10

## 2024-02-08 RX ORDER — CLOPIDOGREL BISULFATE 75 MG/1
75 TABLET, FILM COATED ORAL DAILY
Refills: 0 | Status: DISCONTINUED | OUTPATIENT
Start: 2024-02-08 | End: 2024-02-10

## 2024-02-08 RX ORDER — SACUBITRIL AND VALSARTAN 24; 26 MG/1; MG/1
1 TABLET, FILM COATED ORAL
Refills: 0 | DISCHARGE

## 2024-02-08 RX ORDER — METOPROLOL TARTRATE 50 MG
25 TABLET ORAL DAILY
Refills: 0 | Status: DISCONTINUED | OUTPATIENT
Start: 2024-02-08 | End: 2024-02-10

## 2024-02-08 RX ORDER — CLOPIDOGREL BISULFATE 75 MG/1
1 TABLET, FILM COATED ORAL
Refills: 0 | DISCHARGE

## 2024-02-08 RX ADMIN — ZOLPIDEM TARTRATE 5 MILLIGRAM(S): 10 TABLET ORAL at 21:37

## 2024-02-08 RX ADMIN — Medication 25 MILLIGRAM(S): at 05:27

## 2024-02-08 RX ADMIN — TAMSULOSIN HYDROCHLORIDE 0.4 MILLIGRAM(S): 0.4 CAPSULE ORAL at 21:38

## 2024-02-08 RX ADMIN — Medication 50 MICROGRAM(S): at 05:27

## 2024-02-08 RX ADMIN — CLOPIDOGREL BISULFATE 75 MILLIGRAM(S): 75 TABLET, FILM COATED ORAL at 14:02

## 2024-02-08 RX ADMIN — MONTELUKAST 10 MILLIGRAM(S): 4 TABLET, CHEWABLE ORAL at 21:37

## 2024-02-08 RX ADMIN — SACUBITRIL AND VALSARTAN 1 TABLET(S): 24; 26 TABLET, FILM COATED ORAL at 05:27

## 2024-02-08 RX ADMIN — SACUBITRIL AND VALSARTAN 1 TABLET(S): 24; 26 TABLET, FILM COATED ORAL at 18:03

## 2024-02-08 NOTE — CONSULT NOTE ADULT - ASSESSMENT
Patient is a 78y old  Male who presents with a chief complaint of SOB.    HPI: Patient is a 77 yo male with PMHx CAD (s/p stent x 2 OM1 and pLAD on Plavix only), ICM ( On Entresto 24/26 mg BID and Toprol) HLD (on Zetia, pt has intolerance to statin), renal carcinoma (s/p L nephrectomy) presenting with 2-3 weeks of VASQUEZ. Patient states he has had 2-3 weeks of VASQUEZ and sob while laying flat. Patient walks with a walker and has been ambulating as per baseline. Denies fevers, chills, dizziness, lightheadedness, dysphagia, dysarthria, diplopia, photophobia, syncope, cough, congestion, CP, abdominal pain, neck pain, back pain, diarrhea, dysuria, hematuria, hematochezia, hematemesis, n/v, recent travel, sick contacts, leg swelling.     Patient is a 78y old  Male who presents with a chief complaint of SOB.

## 2024-02-08 NOTE — CONSULT NOTE ADULT - PROBLEM SELECTOR RECOMMENDATION 2
Lipid panel fasting  Continue Zetia (pt unable to tolerate statins).   DASH diet    Further recommendations base on above findings

## 2024-02-08 NOTE — H&P ADULT - HISTORY OF PRESENT ILLNESS
79 y/o M w/ PMH of CAD w/ 3 prior MIs (s/p PCI in 2023), RCC (s/p L nephrectomy), and hypothyroidism who presented to the ED w/ complaints of SOB and progressive VASQUEZ x3 weeks. Pt states that he is finding it more difficult to walk long distances as of late and has become more short of breath. However, denies paroxysmal nocturnal dyspnea, orthopnea, CP, and palpitations. Denies recent illnesses, sick contacts, travel, f/c/n/v/d/c, coughing, abd pain, and dysuria. Offers no other acute complaints & ROS otherwise negative.     In ED, labs were generally unremarkable. BNP WNL, RVP negative. However, high sensitivity troponin originally 13 and 21 on repeat, so delta of 8. Thus, pt to be admitted for r/o ACS.

## 2024-02-08 NOTE — H&P ADULT - NSHPLABSRESULTS_GEN_ALL_CORE
12.9   5.42  )-----------( 282      ( 07 Feb 2024 22:23 )             37.8     02-07    134<L>  |  100  |  21.0<H>  ----------------------------<  90  4.2   |  21.0<L>  |  0.92    Ca    9.0      07 Feb 2024 22:23    TPro  7.2  /  Alb  3.9  /  TBili  0.2<L>  /  DBili  x   /  AST  20  /  ALT  26  /  AlkPhos  82  02-07

## 2024-02-08 NOTE — CONSULT NOTE ADULT - SUBJECTIVE AND OBJECTIVE BOX
[facility]                                              CARDIOLOGY AT [address], [place] [address]                                             Telephone: [phone]. Fax:[phone]      CARDIOLOGY CONSULTATION NOTE                                                                                             History obtained by: Patient and medical record  Community Cardiologist: Dr Dimas    obtained:  No   Reason for Consultation: SOB     Chief complaint:   Patient is a 78y old  Male who presents with a chief complaint of SOB.    HPI: Patient is a 79 yo male with PMHx CAD (s/p stent x 2 OM1 and pLAD on Plavix only), ICM ( On Entresto 24/26 mg BID and Toprol) HLD (on Zetia, pt has intolerance to statin), PVCs, loop recorder,  renal carcinoma (s/p L nephrectomy) presenting with 2-3 weeks of VASQUEZ. Patient states he has had 2-3 weeks of VASQUEZ and sob while laying flat. Patient walks with a walker and has been ambulating as per baseline. Denies fevers, chills, dizziness, lightheadedness, dysphagia, dysarthria, diplopia, photophobia, syncope, cough, congestion, CP, abdominal pain, neck pain, back pain, diarrhea, dysuria, hematuria, hematochezia, hematemesis, n/v, recent travel, sick contacts, leg swelling.    CARDIAC TESTING   Cardiac Catheterization (08.24.23 @ 16:10) >  Conclusions:   Significant Two Vessel CAD   Prox LAD=90%   Prox YGD=994% ()(Fills vial collaterals from the LCx)   Mild LV Dysfunction (LVEF=40-45%)   Successful PCI of Prox LAD with ROQUE x 1 ([place] 3.5x22mm @ 20  ANYA c NC 3.5mm)  Successful IVUS of LAD   Recommendations:   Aggressive medical therapy; Exercise program; LDL cholesterol goal of  less than 70 mg/dL; Weight reduction  DAPT for 1 month and then Clopidogrel 75 mg indefinetely   < end of copied text >    Echo: 9/2023 TTE CONCLUSIONS:  1. Left ventricular systolic function is normal with an ejection fraction of 55 % by López's method of disks with an ejection fraction  visually estimated at 55 to 60 %.  2. Basal and mid inferior wall is abnormal.  3. There is mild (grade 1) left ventricular diastolic dysfunction.  4. Right ventricular cavity is normal in size and normal systolic function.  5. Trace aortic regurgitation.  6. Mild calcification of the aortic valve leaflets.  7. Mild tricuspid regurgitation.  8. No pericardial effusion seen.  9. Compared to the transthoracic echocardiogram performed on 6/20/2023 there have been no significant interval changes.    PAST MEDICAL HISTORY  CAD (coronary artery disease)  Myocardial infarction  Dyslipidemia  Asthma  GERD (gastroesophageal reflux disease)  Rheumatoid arthritis  Tinnitus    PAST SURGICAL HISTORY  CAD S/P percutaneous coronary angioplasty  Hx of cholecystectomy  H/O arthroscopy of left knee  H/O inguinal hernia repair  H/O foot surgery    SOCIAL HISTORY: + former smoker (started at 7 yo and quit at 54 yo, used to smoke 2 PPD). Denies alcohol/drugs    FAMILY HISTORY:  Family History of Cardiovascular Disease: No   Coronary Artery Disease in first degree relative: No   Sudden Cardiac Death in First degree relative: No     HOME MEDICATIONS:    Outpatient Medication Status not yet specified    ALLERGIES:   No Known Allergies  Statin intolerance     REVIEW OF SYMPTOMS:   CONSTITUTIONAL: No fever, + chills, no weight loss, no weight gain, no fatigue   ENMT:  No vertigo; No sinus or throat pain  NECK: No pain or stiffness  CARDIOVASCULAR: No chest pain, + SOB/VASQUEZ, no syncope/presyncope, no fatigue, no palpitations, no dizziness, no Orthopnea, no Paroxsymal nocturnal dyspnea  RESPIRATORY:+ SOB/VASQUEZ, no cough  : No dysuria, no hematuria   GI: no nausea, no diarrhea, no constipation, no abdominal pain  NEURO: No headache, no slurred speech   MUSCULOSKELETAL: No joint pain or swelling; No muscle, back, or extremity pain  PSYCH: No agitation, no anxiety.    ALL OTHER REVIEW OF SYSTEMS ARE NEGATIVE.    VITAL SIGNS:   T(C): 36.3 (02-08-24 @ 03:30), Max: 36.4 (02-07-24 @ 21:29)  T(F): 97.4 (02-08-24 @ 03:30), Max: 97.6 (02-08-24 @ 03:19)  HR: 72 (02-08-24 @ 03:30) (67 - 73)  BP: 118/72 (02-08-24 @ 03:30) (117/66 - 131/70)  RR: 22 (02-08-24 @ 03:30) (20 - 38)  SpO2: 98% (02-08-24 @ 03:30) (98% - 100%)     PHYSICAL EXAM:   Constitutional: Comfortable . No acute distress.   HEENT: Atraumatic and normocephalic , neck is supple . no JVD.   CNS: A&Ox3. No focal deficits.   Respiratory:  No wheezing, No rhonchi. No crackles   Cardiovascular: RRR normal s1 s2. No murmur. No rubs or gallop.  Gastrointestinal: Soft, non-tender. +Bowel sounds.   Extremities: 2+ Peripheral Pulses, No edema  Psychiatric: Calm . no agitation.   Skin: Warm and dry    LABS:                         12.9   5.42  )-----------( 282      ( 07 Feb 2024 22:23 )             37.8     02-07    134<L>  |  100  |  21.0<H>  ----------------------------<  90  4.2   |  21.0<L>  |  0.92    Ca    9.0      07 Feb 2024 22:23    TPro  7.2  /  Alb  3.9  /  TBili  0.2<L>  /  DBili  x   /  AST  20  /  ALT  26  /  AlkPhos  82  02-07    PT/INR - ( 07 Feb 2024 22:23 )   PT: 11.8 sec;   INR: 1.07 ratio      PTT - ( 07 Feb 2024 22:23 )  PTT:32.5 sec    ECG:   Prior ECG: Yes     RADIOLOGY & ADDITIONAL STUDIES:     Chest x-ray: Unremarkable. Final reading/report pending.      Preliminary evaluation, please await official recommendations     Assessment and recommendations are final when note is signed by the attending.                                      [facility]                                              CARDIOLOGY AT [address], [place] [address]                                             Telephone: [phone]. Fax:[phone]      CARDIOLOGY CONSULTATION NOTE                                                                                             History obtained by: Patient and medical record  Community Cardiologist: Dr Dimas    obtained:  No   Reason for Consultation: SOB     Chief complaint:   Patient is a 78y old  Male who presents with a chief complaint of SOB.    HPI: Patient is a 77 yo male with PMHx CAD (s/p stent x 2 OM1 and pLAD on Plavix only), ICM ( On Entresto 24/26 mg BID and Toprol) HLD (on Zetia, pt has intolerance to statin), PVCs, loop recorder, and renal carcinoma (s/p L nephrectomy)  presented to ED co 2-3 weeks of VASQUEZ that used to improve at rest however in the last 2 days, pt is having SOB at rest as well.  Patient walks with a walker and has been ambulating as per baseline. Denies fevers, chills, dizziness, lightheadedness, dysphagia, dysarthria, diplopia, photophobia, syncope, cough, congestion, CP, abdominal pain, neck pain, back pain, diarrhea, dysuria, hematuria, hematochezia, hematemesis, n/v, recent travel, sick contacts, leg swelling.    CARDIAC TESTING   Cardiac Catheterization (08.24.23 @ 16:10) >  Conclusions:   Significant Two Vessel CAD   Prox LAD=90%   Prox CVX=351% ()(Fills vial collaterals from the LCx)   Mild LV Dysfunction (LVEF=40-45%)   Successful PCI of Prox LAD with ROQUE x 1 ([place] 3.5x22mm @ 20  ANYA c NC 3.5mm)  Successful IVUS of LAD   Recommendations:   Aggressive medical therapy; Exercise program; LDL cholesterol goal of  less than 70 mg/dL; Weight reduction  DAPT for 1 month and then Clopidogrel 75 mg indefinetely   < end of copied text >    Echo: 9/2023 TTE CONCLUSIONS:  1. Left ventricular systolic function is normal with an ejection fraction of 55 % by López's method of disks with an ejection fraction  visually estimated at 55 to 60 %.  2. Basal and mid inferior wall is abnormal.  3. There is mild (grade 1) left ventricular diastolic dysfunction.  4. Right ventricular cavity is normal in size and normal systolic function.  5. Trace aortic regurgitation.  6. Mild calcification of the aortic valve leaflets.  7. Mild tricuspid regurgitation.  8. No pericardial effusion seen.  9. Compared to the transthoracic echocardiogram performed on 6/20/2023 there have been no significant interval changes.    PAST MEDICAL HISTORY  CAD (coronary artery disease)  Myocardial infarction  Dyslipidemia  Asthma  GERD (gastroesophageal reflux disease)  Rheumatoid arthritis  Tinnitus    PAST SURGICAL HISTORY  CAD S/P percutaneous coronary angioplasty  Hx of cholecystectomy  H/O arthroscopy of left knee  H/O inguinal hernia repair  H/O foot surgery    SOCIAL HISTORY: + former smoker (started at 7 yo and quit at 52 yo, used to smoke 2 PPD). Denies alcohol/drugs    FAMILY HISTORY:  Family History of Cardiovascular Disease: No   Coronary Artery Disease in first degree relative: No   Sudden Cardiac Death in First degree relative: No     HOME MEDICATIONS:    Outpatient Medication Status not yet specified    ALLERGIES:   No Known Allergies  Statin intolerance     REVIEW OF SYMPTOMS:   CONSTITUTIONAL: No fever, + chills, no weight loss, no weight gain, no fatigue   ENMT:  No vertigo; No sinus or throat pain  NECK: No pain or stiffness  CARDIOVASCULAR: No chest pain, + SOB/VASQUEZ, no syncope/presyncope, no fatigue, no palpitations, no dizziness, no Orthopnea, no Paroxsymal nocturnal dyspnea  RESPIRATORY:+ SOB/VASQUEZ, no cough  : No dysuria, no hematuria   GI: no nausea, no diarrhea, no constipation, no abdominal pain  NEURO: No headache, no slurred speech   MUSCULOSKELETAL: No joint pain or swelling; No muscle, back, or extremity pain  PSYCH: No agitation, no anxiety.    ALL OTHER REVIEW OF SYSTEMS ARE NEGATIVE.    VITAL SIGNS:   T(C): 36.3 (02-08-24 @ 03:30), Max: 36.4 (02-07-24 @ 21:29)  T(F): 97.4 (02-08-24 @ 03:30), Max: 97.6 (02-08-24 @ 03:19)  HR: 72 (02-08-24 @ 03:30) (67 - 73)  BP: 118/72 (02-08-24 @ 03:30) (117/66 - 131/70)  RR: 22 (02-08-24 @ 03:30) (20 - 38)  SpO2: 98% (02-08-24 @ 03:30) (98% - 100%)     PHYSICAL EXAM:   Constitutional: Comfortable . No acute distress.   HEENT: Atraumatic and normocephalic , neck is supple . no JVD.   CNS: A&Ox3. No focal deficits.   Respiratory:  No wheezing, No rhonchi. No crackles   Cardiovascular: RRR normal s1 s2. No murmur. No rubs or gallop.  Gastrointestinal: Soft, non-tender. +Bowel sounds.   Extremities: 2+ Peripheral Pulses, No edema  Psychiatric: Calm . no agitation.   Skin: Warm and dry    LABS:                         12.9   5.42  )-----------( 282      ( 07 Feb 2024 22:23 )             37.8     02-07    134<L>  |  100  |  21.0<H>  ----------------------------<  90  4.2   |  21.0<L>  |  0.92    Ca    9.0      07 Feb 2024 22:23    TPro  7.2  /  Alb  3.9  /  TBili  0.2<L>  /  DBili  x   /  AST  20  /  ALT  26  /  AlkPhos  82  02-07    PT/INR - ( 07 Feb 2024 22:23 )   PT: 11.8 sec;   INR: 1.07 ratio      PTT - ( 07 Feb 2024 22:23 )  PTT:32.5 sec    ECG: NSR. No acute changes   Prior ECG: Yes     RADIOLOGY & ADDITIONAL STUDIES:     Chest x-ray: Unremarkable. Final reading/report pending.      Preliminary evaluation, please await official recommendations     Assessment and recommendations are final when note is signed by the attending.

## 2024-02-08 NOTE — H&P ADULT - ATTENDING COMMENTS
79 y/o M w/ PMH of CAD w/ 3 prior MIs (s/p PCI in 2023), RCC (s/p L nephrectomy), and hypothyroidism who presented to the ED w/ complaints of SOB and progressive VASQUEZ x3 weeks. Pt was referred from cardiology for cardiac work up. Troponin delta change of 8. No chest pain. Cardiology consulted.

## 2024-02-08 NOTE — CONSULT NOTE ADULT - PROBLEM SELECTOR RECOMMENDATION 9
79 yo male with PMHx CAD (s/p stent x 2 OM1 and pLAD on Plavix only), ICM ( On Entresto 24/26 mg BID and Toprol) HLD (on Zetia, pt has intolerance to statin), PVCs, loop recorder, and renal carcinoma (s/p L nephrectomy) presented to ED co 2-3 weeks of VASQUEZ that used to improve at rest however in the last 2 days, pt is having SOB at rest as well.   Trop x 3 + delta (13-21-13)  ECG: NSR. No acute changes   Pt unable to take ASA due to nose bleeding   Keep NPO (except meds) for possible cardiac cath today. Will discuss with attending in AM  Telemonitor  O2 NC PRN  Trend CE. Trend trops x 3. Serial EKGs  Obtain CPK, CKMB, A1C, lipid panel fasting, TFTs, sed rate to ro comorbidities   Obtain Echo to assess structural and functional status  Maintain K+~4 and mag~2  DASH diet 77 yo male with PMHx CAD (s/p stent x 2 OM1 and pLAD on Plavix only), ICM ( On Entresto 24/26 mg BID and Toprol) HLD (on Zetia, pt has intolerance to statin), PVCs, loop recorder, and renal carcinoma (s/p L nephrectomy) presented to ED co 2-3 weeks of VASQUEZ that used to improve at rest however in the last 2 days, pt is having SOB at rest as well.   Trop x 3 + delta (13-21-13)  ECG: NSR. No acute changes   Pt unable to take ASA due to nose bleeding   Keep NPO (except meds) for possible nuclear stress test  Telemonitor  O2 NC PRN  Trend CE. Trend trops x 3. Serial EKGs  Obtain CPK, CKMB, A1C, lipid panel fasting, TFTs, sed rate to ro comorbidities   Obtain Echo to assess structural and functional status  Maintain K+~4 and mag~2  DASH diet

## 2024-02-08 NOTE — CHART NOTE - NSCHARTNOTEFT_GEN_A_CORE
Pt seen and examined in CDU   c/o SOB    Appreciate Cardio consult, TTE ECHO reviewed  await NST results

## 2024-02-08 NOTE — H&P ADULT - NSICDXPASTSURGICALHX_GEN_ALL_CORE_FT
PAST SURGICAL HISTORY:  CAD S/P percutaneous coronary angioplasty Stent to Proximal LAD and Proximal CX    H/O arthroscopy of left knee     S/p nephrectomy

## 2024-02-08 NOTE — PATIENT PROFILE ADULT - FUNCTIONAL ASSESSMENT - BASIC MOBILITY 6.
4-calculated by average/Not able to assess (calculate score using [facility] averaging method)

## 2024-02-08 NOTE — PATIENT PROFILE ADULT - WHEN WAS YOUR LAST VACCINATION? YEAR
Returned call to the patient, and used 2 identifiers, and informed patient the [facility]. Will work on prior [facility] for [employer]. 2022

## 2024-02-08 NOTE — CONSULT NOTE ADULT - NS ATTEND AMEND GEN_ALL_CORE FT
as above, patient with dyspnea, no acute chest pain, ecg non ischemic.   trops under cut off for single measurement rule-in yet with > 6 ng/dl increase from 1st to 2nd assay.  Clinically stable.   BNP is normal and so is CXR  Will get echo and NST. Further recommendations based on results to follow.

## 2024-02-08 NOTE — H&P ADULT - NSICDXPASTMEDICALHX_GEN_ALL_CORE_FT
PAST MEDICAL HISTORY:  Asthma     CAD (coronary artery disease)     Dyslipidemia     GERD (gastroesophageal reflux disease)     Myocardial infarction x 3    Renal cell cancer

## 2024-02-08 NOTE — PATIENT PROFILE ADULT - FALL HARM RISK - HARM RISK INTERVENTIONS

## 2024-02-08 NOTE — H&P ADULT - ASSESSMENT
78y Male w/ PMH of CAD w/ 3 prior MIs (s/p PCI in 2023), RCC (s/p L nephrectomy), and hypothyroidism admitted for shortness of breath r/o ACS.    Plan  #Shortness of breath  -Not requiring any O2  -RVP negative  -  -EKG non-ischemic  -Troponin delta of 8  -    DVT PPx:  Dispo: 78y Male w/ PMH of CAD w/ 3 prior MIs (s/p PCI in 2023), RCC (s/p L nephrectomy), and hypothyroidism admitted for shortness of breath r/o ACS.    Plan  #Shortness of breath  #CAD  -Not requiring any O2  -RVP negative  -  -EKG non-ischemic  -Troponin delta of 8  -Continue statin  -Continue Plavix  -Additional labs ordered to r/o comorbidities per cardiology recs  -Monitor and trend BMP w/ Mg  -Cardiology consulted - will keep NPO for possible cath today    #Hypothyroidism  -Continue home synthroid    DVT PPx: Held for procedure, SCDs only  Dispo: Admit to telemetry 78y Male w/ PMH of CAD w/ 3 prior MIs (s/p PCI in 2023), RCC (s/p L nephrectomy), and hypothyroidism admitted for shortness of breath r/o ACS.    Plan  #Shortness of breath, r/o ACS  #CAD  -Not requiring any O2 + no CP  -RVP negative  -  -EKG non-ischemic  -Troponin delta of 8  -Continue statin  -Continue Plavix  -Additional labs ordered to r/o comorbidities per cardiology recs  -Monitor and trend BMP w/ Mg  -Cardiology consulted - will keep NPO for possible cath today    #Hypothyroidism  -Continue home synthroid    DVT PPx: Held for procedure, SCDs only  Dispo: Admit to telemetry

## 2024-02-09 LAB
ANION GAP SERPL CALC-SCNC: 18 MMOL/L — HIGH (ref 5–17)
BUN SERPL-MCNC: 20.4 MG/DL — HIGH (ref 8–20)
CALCIUM SERPL-MCNC: 8.7 MG/DL — SIGNIFICANT CHANGE UP (ref 8.4–10.5)
CHLORIDE SERPL-SCNC: 100 MMOL/L — SIGNIFICANT CHANGE UP (ref 96–108)
CO2 SERPL-SCNC: 21 MMOL/L — LOW (ref 22–29)
CREAT SERPL-MCNC: 1 MG/DL — SIGNIFICANT CHANGE UP (ref 0.5–1.3)
EGFR: 77 ML/MIN/1.73M2 — SIGNIFICANT CHANGE UP
GLUCOSE SERPL-MCNC: 95 MG/DL — SIGNIFICANT CHANGE UP (ref 70–99)
HCT VFR BLD CALC: 39.1 % — SIGNIFICANT CHANGE UP (ref 39–50)
HGB BLD-MCNC: 13.2 G/DL — SIGNIFICANT CHANGE UP (ref 13–17)
MAGNESIUM SERPL-MCNC: 2.2 MG/DL — SIGNIFICANT CHANGE UP (ref 1.8–2.6)
MCHC RBC-ENTMCNC: 30.3 PG — SIGNIFICANT CHANGE UP (ref 27–34)
MCHC RBC-ENTMCNC: 33.8 GM/DL — SIGNIFICANT CHANGE UP (ref 32–36)
MCV RBC AUTO: 89.9 FL — SIGNIFICANT CHANGE UP (ref 80–100)
PLATELET # BLD AUTO: 236 K/UL — SIGNIFICANT CHANGE UP (ref 150–400)
POTASSIUM SERPL-MCNC: 5 MMOL/L — SIGNIFICANT CHANGE UP (ref 3.5–5.3)
POTASSIUM SERPL-SCNC: 5 MMOL/L — SIGNIFICANT CHANGE UP (ref 3.5–5.3)
RBC # BLD: 4.35 M/UL — SIGNIFICANT CHANGE UP (ref 4.2–5.8)
RBC # FLD: 12.8 % — SIGNIFICANT CHANGE UP (ref 10.3–14.5)
SODIUM SERPL-SCNC: 139 MMOL/L — SIGNIFICANT CHANGE UP (ref 135–145)
WBC # BLD: 5.15 K/UL — SIGNIFICANT CHANGE UP (ref 3.8–10.5)
WBC # FLD AUTO: 5.15 K/UL — SIGNIFICANT CHANGE UP (ref 3.8–10.5)

## 2024-02-09 PROCEDURE — 99233 SBSQ HOSP IP/OBS HIGH 50: CPT

## 2024-02-09 RX ORDER — SODIUM CHLORIDE 0.65 %
1 AEROSOL, SPRAY (ML) NASAL
Refills: 0 | Status: DISCONTINUED | OUTPATIENT
Start: 2024-02-09 | End: 2024-02-10

## 2024-02-09 RX ORDER — ALPRAZOLAM 0.25 MG
0.25 TABLET ORAL
Refills: 0 | Status: DISCONTINUED | OUTPATIENT
Start: 2024-02-09 | End: 2024-02-10

## 2024-02-09 RX ORDER — SODIUM CHLORIDE 9 MG/ML
500 INJECTION INTRAMUSCULAR; INTRAVENOUS; SUBCUTANEOUS ONCE
Refills: 0 | Status: COMPLETED | OUTPATIENT
Start: 2024-02-09 | End: 2024-02-09

## 2024-02-09 RX ADMIN — SACUBITRIL AND VALSARTAN 1 TABLET(S): 24; 26 TABLET, FILM COATED ORAL at 17:18

## 2024-02-09 RX ADMIN — MONTELUKAST 10 MILLIGRAM(S): 4 TABLET, CHEWABLE ORAL at 21:05

## 2024-02-09 RX ADMIN — Medication 25 MILLIGRAM(S): at 09:28

## 2024-02-09 RX ADMIN — TAMSULOSIN HYDROCHLORIDE 0.4 MILLIGRAM(S): 0.4 CAPSULE ORAL at 21:05

## 2024-02-09 RX ADMIN — Medication 50 MICROGRAM(S): at 05:57

## 2024-02-09 RX ADMIN — CLOPIDOGREL BISULFATE 75 MILLIGRAM(S): 75 TABLET, FILM COATED ORAL at 09:28

## 2024-02-09 RX ADMIN — SODIUM CHLORIDE 1000 MILLILITER(S): 9 INJECTION INTRAMUSCULAR; INTRAVENOUS; SUBCUTANEOUS at 06:00

## 2024-02-09 RX ADMIN — ZOLPIDEM TARTRATE 5 MILLIGRAM(S): 10 TABLET ORAL at 21:05

## 2024-02-09 RX ADMIN — Medication 0.25 MILLIGRAM(S): at 14:53

## 2024-02-09 NOTE — CHART NOTE - NSCHARTNOTEFT_GEN_A_CORE
Notified by RN pt hypotensive this AM BP 88/50 (MAP 63mmHg), P 67  Patient asymptomatic. Notified by RN pt hypotensive this AM BP 88/50 (MAP 63mmHg), P 67.  Patient asymptomatic, denies complaints.  Hold AM dose Toprol XL and Entresto.   Ordered Sodium chloride 500mL IVF bolus X1 dose, repeat BP s/p administration.  RN instructed to continue to monitor pt and notify provider of any changes in pt status.  PMD will be notified in AM.

## 2024-02-09 NOTE — PROGRESS NOTE ADULT - SUBJECTIVE AND OBJECTIVE BOX
KRISTAN DENNIS    [id number]    78y      Male    INTERVAL HPI/OVERNIGHT EVENTS:  patient being seen for sob. patient seen at bedside with rn, and is in nad, but complains of "not feeling well"  '  all vitals stable       REVIEW OF SYSTEMS:    CONSTITUTIONAL: No fever, weight loss, or fatigue  RESPIRATORY:  shortness of breath  CARDIOVASCULAR: No chest pain, palpitations  GASTROINTESTINAL: No abdominal or epigastric pain. No nausea, vomiting  NEUROLOGICAL: No headaches, memory loss, loss of strength.  MISCELLANEOUS:      Vital Signs Last 24 Hrs  T(C): 36.4 (09 Feb 2024 10:45), Max: 36.7 (08 Feb 2024 16:01)  T(F): 97.6 (09 Feb 2024 10:45), Max: 98.1 (08 Feb 2024 16:01)  HR: 74 (09 Feb 2024 10:45) (65 - 90)  BP: 111/70 (09 Feb 2024 10:45) (88/50 - 120/73)  BP(mean): --  RR: 28 (09 Feb 2024 10:45) (16 - 28)  SpO2: 99% (09 Feb 2024 10:45) (94% - 99%)    Parameters below as of 09 Feb 2024 10:45  Patient On (Oxygen Delivery Method): room air        PHYSICAL EXAM:    GENERAL: no acute distress, comfortably in bed  HEENT: Atraumatic, normocephalic, non-icteric, no JVD  NEURO:  A&Ox3, no focal deficits, moving all extremities spontaneously, no dysarthria, CN II-XII grossly intact  PSYCH: Normal affect, calm, appropriate insight and judgment, fluent speech  LUNGS: CTAB, no wrr, non-labored breathing  HEART: RRR, no murmur appreciated  ABD: Soft, non-tender, non-distended, no organomegaly, no appreciable masses, +bs all 4 quadrants  EXTREMITIES: Nontender, no clubbing, cyanosis, or edema  SKIN: No rashes or lesions      LABS:                        13.2   5.15  )-----------( 236      ( 09 Feb 2024 05:05 )             39.1     02-09    139  |  100  |  20.4<H>  ----------------------------<  95  5.0   |  21.0<L>  |  1.00    Ca    8.7      09 Feb 2024 05:05  Mg     2.2     02-09    TPro  7.2  /  Alb  3.9  /  TBili  0.2<L>  /  DBili  x   /  AST  20  /  ALT  26  /  AlkPhos  82  02-07    PT/INR - ( 07 Feb 2024 22:23 )   PT: 11.8 sec;   INR: 1.07 ratio         PTT - ( 07 Feb 2024 22:23 )  PTT:32.5 sec  Urinalysis Basic - ( 09 Feb 2024 05:05 )    Color: x / Appearance: x / SG: x / pH: x  Gluc: 95 mg/dL / Ketone: x  / Bili: x / Urobili: x   Blood: x / Protein: x / Nitrite: x   Leuk Esterase: x / RBC: x / WBC x   Sq Epi: x / Non Sq Epi: x / Bacteria: x          MEDICATIONS  (STANDING):  clopidogrel Tablet 75 milliGRAM(s) Oral daily  levothyroxine 50 MICROGram(s) Oral daily  metoprolol succinate ER 25 milliGRAM(s) Oral daily  montelukast 10 milliGRAM(s) Oral at bedtime  sacubitril 24 mG/valsartan 26 mG 1 Tablet(s) Oral two times a day  tamsulosin 0.4 milliGRAM(s) Oral at bedtime    MEDICATIONS  (PRN):  acetaminophen     Tablet .. 650 milliGRAM(s) Oral every 6 hours PRN Temp greater or equal to 38C (100.4F), Mild Pain (1 - 3)  ALPRAZolam 0.25 milliGRAM(s) Oral two times a day PRN anxiety  aluminum hydroxide/magnesium hydroxide/simethicone Suspension 30 milliLiter(s) Oral every 4 hours PRN Dyspepsia  melatonin 3 milliGRAM(s) Oral at bedtime PRN Insomnia  ondansetron Injectable 4 milliGRAM(s) IV Push every 8 hours PRN Nausea and/or Vomiting  sodium chloride 0.65% Nasal 1 Spray(s) Both Nostrils two times a day PRN Nasal Congestion  zolpidem 5 milliGRAM(s) Oral at bedtime PRN Insomnia  zolpidem 5 milliGRAM(s) Oral at bedtime PRN Insomnia      RADIOLOGY & ADDITIONAL TESTS:

## 2024-02-09 NOTE — CONSULT NOTE ADULT - ASSESSMENT
72 yo M known patient of Dr Bailey at [facility] of CAD w/ 3 prior MIs (s/p PCI in 2023), RCC (s/p L nephrectomy), and hypothyroidism who presented to the ED w/ complaints of SOB and progressive VASQUEZ x3 weeks.    1) RCC  s/p nephrectomy and adjuvant Opdivo  now on surveillance with scans as outpatient  recent CT C/A/P negative for recurrence , showed some cystits and GGO in lung likely inflammatory  f/u with Dr Bailey as outpatient    2) SOB  elevated troponins  cardiology evaluation  ACS w/u in progress  on Plavix    3) DVT ppx    further mx as per primary team

## 2024-02-09 NOTE — PROGRESS NOTE ADULT - ASSESSMENT
78y Male w/ PMH of CAD w/ 3 prior MIs (s/p PCI in 2023), RCC (s/p L nephrectomy), and hypothyroidism admitted for shortness of breath r/o ACS.    Plan  #Shortness of breath, r/o ACS  #CAD  -Not requiring any O2 + no CP  -Continue statin  -Continue Plavix  -cardio followng  - stress and tte negative  - patient has an odd affect, patient complains of shortness of  breath is not hypoxic    #Hypothyroidism  -Continue home synthroid    dispo - xanax prn   - likely dc in 1-2 days

## 2024-02-09 NOTE — CONSULT NOTE ADULT - SUBJECTIVE AND OBJECTIVE BOX
74 yo M known patient of Dr Bailey at [facility], [facility] of CAD w/ 3 prior MIs (s/p PCI in 2023), RCC (s/p L nephrectomy), and hypothyroidism who presented to the ED w/ complaints of SOB and progressive VASQUEZ x3 weeks. Pt states that he is finding it more difficult to walk long distances as of late and has become more short of breath. However, denies paroxysmal nocturnal dyspnea, orthopnea, CP, and palpitations. Denies recent illnesses, sick contacts, travel, f/c/n/v/d/c, coughing, abd pain, and dysuria. Offers no other acute complaints & ROS otherwise negative.     In ED, labs were generally unremarkable. BNP WNL, RVP negative. However, high sensitivity troponin originally 13 and 21 on repeat, so delta of 8. Thus, pt to be admitted for r/o ACS.    Hypotensive ON, b blocker dose held    ROS  as per HPI    PAST MEDICAL & SURGICAL HISTORY:  CAD (coronary artery disease)      Myocardial infarction  x 3      Dyslipidemia      Asthma      GERD (gastroesophageal reflux disease)      Renal cell cancer      CAD S/P percutaneous coronary angioplasty  Stent to Proximal LAD and Proximal CX      H/O arthroscopy of left knee      S/p nephrectomy      Social History:  Lives at home with his brother in law. Denies tobacco, alcohol, and drug use. (08 Feb 2024 04:43)    FAMILY HISTORY:  FH: myocardial infarction (Father)    MEDICATIONS  (STANDING):  clopidogrel Tablet 75 milliGRAM(s) Oral daily  levothyroxine 50 MICROGram(s) Oral daily  metoprolol succinate ER 25 milliGRAM(s) Oral daily  montelukast 10 milliGRAM(s) Oral at bedtime  sacubitril 24 mG/valsartan 26 mG 1 Tablet(s) Oral two times a day  tamsulosin 0.4 milliGRAM(s) Oral at bedtime    MEDICATIONS  (PRN):  acetaminophen     Tablet .. 650 milliGRAM(s) Oral every 6 hours PRN Temp greater or equal to 38C (100.4F), Mild Pain (1 - 3)  aluminum hydroxide/magnesium hydroxide/simethicone Suspension 30 milliLiter(s) Oral every 4 hours PRN Dyspepsia  melatonin 3 milliGRAM(s) Oral at bedtime PRN Insomnia  ondansetron Injectable 4 milliGRAM(s) IV Push every 8 hours PRN Nausea and/or Vomiting  zolpidem 5 milliGRAM(s) Oral at bedtime PRN Insomnia  zolpidem 5 milliGRAM(s) Oral at bedtime PRN Insomnia      ICU Vital Signs Last 24 Hrs  T(C): 36.5 (09 Feb 2024 05:41), Max: 36.8 (08 Feb 2024 08:22)  T(F): 97.7 (09 Feb 2024 05:41), Max: 98.2 (08 Feb 2024 08:22)  HR: 90 (09 Feb 2024 06:42) (65 - 98)  BP: 94/58 (09 Feb 2024 06:42) (88/50 - 120/73)  BP(mean): --  ABP: --  ABP(mean): --  RR: 16 (09 Feb 2024 05:41) (16 - 98)  SpO2: 94% (09 Feb 2024 05:41) (94% - 99%)    O2 Parameters below as of 09 Feb 2024 05:41  Patient On (Oxygen Delivery Method): room air    GENERAL: no acute distress, comfortably in bed  HEENT: Atraumatic, normocephalic, non-icteric, no JVD  NEURO:  A&Ox3, no focal deficits, moving all extremities spontaneously, no dysarthria, CN II-XII grossly intact  PSYCH: Normal affect, calm, appropriate insight and judgment, fluent speech  LUNGS: CTAB, no wrr, non-labored breathing  HEART: RRR, no murmur appreciated  ABD: Soft, non-tender, non-distended, no organomegaly, no appreciable masses, +bs all 4 quadrants  EXTREMITIES: Nontender, no clubbing, cyanosis, or edema  SKIN: No rashes or lesions                          13.2   5.15  )-----------( 236      ( 09 Feb 2024 05:05 )             39.1       02-09    139  |  100  |  20.4<H>  ----------------------------<  95  5.0   |  21.0<L>  |  1.00    Ca    8.7      09 Feb 2024 05:05  Mg     2.2     02-09    TPro  7.2  /  Alb  3.9  /  TBili  0.2<L>  /  DBili  x   /  AST  20  /  ALT  26  /  AlkPhos  82  02-07       74 yo M known patient of Dr Bailey at [facility], [facility] of CAD w/ 3 prior MIs (s/p PCI in 2023), RCC (s/p L nephrectomy), and hypothyroidism who presented to the ED w/ complaints of SOB and progressive VASQUEZ x3 weeks. Pt states that he is finding it more difficult to walk long distances as of late and has become more short of breath. However, denies paroxysmal nocturnal dyspnea, orthopnea, CP, and palpitations. Denies recent illnesses, sick contacts, travel, f/c/n/v/d/c, coughing, abd pain, and dysuria. Offers no other acute complaints & ROS otherwise negative.     In ED, labs were generally unremarkable. BNP WNL, RVP negative. However, high sensitivity troponin originally 13 and 21 on repeat, so delta of 8. Thus, pt to be admitted for r/o ACS.    Hypotensive ON, b blocker dose held  breathing stable    ROS  as per HPI    PAST MEDICAL & SURGICAL HISTORY:  CAD (coronary artery disease)      Myocardial infarction  x 3      Dyslipidemia      Asthma      GERD (gastroesophageal reflux disease)      Renal cell cancer      CAD S/P percutaneous coronary angioplasty  Stent to Proximal LAD and Proximal CX      H/O arthroscopy of left knee      S/p nephrectomy      Social History:  Lives at home with his brother in law. Denies tobacco, alcohol, and drug use. (08 Feb 2024 04:43)    FAMILY HISTORY:  FH: myocardial infarction (Father)    MEDICATIONS  (STANDING):  clopidogrel Tablet 75 milliGRAM(s) Oral daily  levothyroxine 50 MICROGram(s) Oral daily  metoprolol succinate ER 25 milliGRAM(s) Oral daily  montelukast 10 milliGRAM(s) Oral at bedtime  sacubitril 24 mG/valsartan 26 mG 1 Tablet(s) Oral two times a day  tamsulosin 0.4 milliGRAM(s) Oral at bedtime    MEDICATIONS  (PRN):  acetaminophen     Tablet .. 650 milliGRAM(s) Oral every 6 hours PRN Temp greater or equal to 38C (100.4F), Mild Pain (1 - 3)  aluminum hydroxide/magnesium hydroxide/simethicone Suspension 30 milliLiter(s) Oral every 4 hours PRN Dyspepsia  melatonin 3 milliGRAM(s) Oral at bedtime PRN Insomnia  ondansetron Injectable 4 milliGRAM(s) IV Push every 8 hours PRN Nausea and/or Vomiting  zolpidem 5 milliGRAM(s) Oral at bedtime PRN Insomnia  zolpidem 5 milliGRAM(s) Oral at bedtime PRN Insomnia      ICU Vital Signs Last 24 Hrs  T(C): 36.5 (09 Feb 2024 05:41), Max: 36.8 (08 Feb 2024 08:22)  T(F): 97.7 (09 Feb 2024 05:41), Max: 98.2 (08 Feb 2024 08:22)  HR: 90 (09 Feb 2024 06:42) (65 - 98)  BP: 94/58 (09 Feb 2024 06:42) (88/50 - 120/73)  BP(mean): --  ABP: --  ABP(mean): --  RR: 16 (09 Feb 2024 05:41) (16 - 98)  SpO2: 94% (09 Feb 2024 05:41) (94% - 99%)    O2 Parameters below as of 09 Feb 2024 05:41  Patient On (Oxygen Delivery Method): room air    GENERAL: no acute distress, comfortably in bed  HEENT: Atraumatic, normocephalic, non-icteric, no JVD  NEURO:  A&Ox3, no focal deficits, moving all extremities spontaneously, no dysarthria, CN II-XII grossly intact  PSYCH: Normal affect, calm, appropriate insight and judgment, fluent speech  LUNGS: CTAB, no wrr, non-labored breathing  HEART: RRR, no murmur appreciated  ABD: Soft, non-tender, non-distended, no organomegaly, no appreciable masses, +bs all 4 quadrants  EXTREMITIES: Nontender, no clubbing, cyanosis, or edema  SKIN: No rashes or lesions                          13.2   5.15  )-----------( 236      ( 09 Feb 2024 05:05 )             39.1       02-09    139  |  100  |  20.4<H>  ----------------------------<  95  5.0   |  21.0<L>  |  1.00    Ca    8.7      09 Feb 2024 05:05  Mg     2.2     02-09    TPro  7.2  /  Alb  3.9  /  TBili  0.2<L>  /  DBili  x   /  AST  20  /  ALT  26  /  AlkPhos  82  02-07

## 2024-02-09 NOTE — CHART NOTE - NSCHARTNOTESELECT_GEN_ALL_CORE
Oncology/Hematology Visit Note  Sep 30, 2021    Reason for Visit: follow up of multiple myeloma    Patient is currently taking Revlimid 10 mg 2 weeks on 1 week off  Dexamethasone 4 mg once a week  and Zometa every 3 months    Interval History:  Patient reports overall she is feeling well.  Complains of left leg swelling, patient denies left leg pain, denies redness, denies injury.  Denies cough shortness of breath chest pain denies nausea vomiting  Reports she is tolerating Revlimid well           Review of Systems:  14 point ROS of systems including Constitutional, Eyes, Respiratory, Cardiovascular, Gastroenterology, Genitourinary, Integumentary, Muscularskeletal, Psychiatric were all negative except for pertinent positives noted in my HPI.    Physical Examination:  /63   Pulse 69   Temp 98.8  F (37.1  C) (Oral)   Resp 18   Wt 64.9 kg (143 lb)   SpO2 95%   BMI 26.16 kg/m      General: The patient is a pleasant female in no acute distress.  HEENT: EOMI, PERRL. Sclerae are anicteric. Oral mucosa is pink and moist with no lesions or thrush.   Lymph: Neck is supple with no lymphadenopathy in the cervical or supraclavicular areas.   Heart: Regular rate and rhythm.   Lungs: Clear to auscultation bilaterally.   GI: Bowel sounds present, soft, nontender with no palpable hepatosplenomegaly or masses.   Extremities: Mild edema left leg.  Nontender no redness.  No calf pain Skin: No rashes, petechiae, or bruising noted on exposed skin.    Laboratory Data:  CBC CMP results reviewed  Myeloma labs are pending      Assessment and Plan:      This is a 84-year-old female with    multiple myeloma  Currently getting treatment with   Revlimid 10 mg p.o. 14 days on 7 days off dexamethasone 4 mg a week  Light chains are slowly rising up  Per Dr. Parry continue with the same treatment for now  -Myeloma labs are pending from today  Patient has follow-up appointment with Dr. Parry next month    Bone health  Patient has been 
Event Note
Event Note
tolerating Zometa well continue with Zometa every 3 months  Labs reviewed okay to proceed with Zometa    Hypokalemia  Give potassium today per pharmacy protocol    Left leg edema  Order LLE ultrasound to rule out DVT      Anemia  Overall stable hemoglobin  Continue to monitor      Clinic with any changes in health condition or questions      MARIUSZ Mckeon CNP  [facility]     Chart documentation with Dragon Voice recognition Software. Although reviewed after completion, some words and grammatical errors may remain.

## 2024-02-10 ENCOUNTER — TRANSCRIPTION ENCOUNTER (OUTPATIENT)
Age: 79
End: 2024-02-10

## 2024-02-10 VITALS
DIASTOLIC BLOOD PRESSURE: 58 MMHG | TEMPERATURE: 98 F | OXYGEN SATURATION: 100 % | SYSTOLIC BLOOD PRESSURE: 107 MMHG | RESPIRATION RATE: 16 BRPM | HEART RATE: 77 BPM

## 2024-02-10 LAB
ANION GAP SERPL CALC-SCNC: 12 MMOL/L — SIGNIFICANT CHANGE UP (ref 5–17)
BUN SERPL-MCNC: 16.9 MG/DL — SIGNIFICANT CHANGE UP (ref 8–20)
CALCIUM SERPL-MCNC: 8.5 MG/DL — SIGNIFICANT CHANGE UP (ref 8.4–10.5)
CHLORIDE SERPL-SCNC: 107 MMOL/L — SIGNIFICANT CHANGE UP (ref 96–108)
CO2 SERPL-SCNC: 20 MMOL/L — LOW (ref 22–29)
CREAT SERPL-MCNC: 1.02 MG/DL — SIGNIFICANT CHANGE UP (ref 0.5–1.3)
EGFR: 75 ML/MIN/1.73M2 — SIGNIFICANT CHANGE UP
GLUCOSE SERPL-MCNC: 101 MG/DL — HIGH (ref 70–99)
MAGNESIUM SERPL-MCNC: 2.3 MG/DL — SIGNIFICANT CHANGE UP (ref 1.6–2.6)
POTASSIUM SERPL-MCNC: 4.8 MMOL/L — SIGNIFICANT CHANGE UP (ref 3.5–5.3)
POTASSIUM SERPL-SCNC: 4.8 MMOL/L — SIGNIFICANT CHANGE UP (ref 3.5–5.3)
SODIUM SERPL-SCNC: 139 MMOL/L — SIGNIFICANT CHANGE UP (ref 135–145)

## 2024-02-10 PROCEDURE — 71045 X-RAY EXAM CHEST 1 VIEW: CPT

## 2024-02-10 PROCEDURE — 83735 ASSAY OF MAGNESIUM: CPT

## 2024-02-10 PROCEDURE — 85730 THROMBOPLASTIN TIME PARTIAL: CPT

## 2024-02-10 PROCEDURE — A9500: CPT

## 2024-02-10 PROCEDURE — 85652 RBC SED RATE AUTOMATED: CPT

## 2024-02-10 PROCEDURE — 83880 ASSAY OF NATRIURETIC PEPTIDE: CPT

## 2024-02-10 PROCEDURE — 93017 CV STRESS TEST TRACING ONLY: CPT

## 2024-02-10 PROCEDURE — 83036 HEMOGLOBIN GLYCOSYLATED A1C: CPT

## 2024-02-10 PROCEDURE — 99285 EMERGENCY DEPT VISIT HI MDM: CPT | Mod: 25

## 2024-02-10 PROCEDURE — 80048 BASIC METABOLIC PNL TOTAL CA: CPT

## 2024-02-10 PROCEDURE — 93005 ELECTROCARDIOGRAM TRACING: CPT

## 2024-02-10 PROCEDURE — 84436 ASSAY OF TOTAL THYROXINE: CPT

## 2024-02-10 PROCEDURE — 84439 ASSAY OF FREE THYROXINE: CPT

## 2024-02-10 PROCEDURE — 84484 ASSAY OF TROPONIN QUANT: CPT

## 2024-02-10 PROCEDURE — 82550 ASSAY OF CK (CPK): CPT

## 2024-02-10 PROCEDURE — 87637 SARSCOV2&INF A&B&RSV AMP PRB: CPT

## 2024-02-10 PROCEDURE — 93306 TTE W/DOPPLER COMPLETE: CPT

## 2024-02-10 PROCEDURE — 85610 PROTHROMBIN TIME: CPT

## 2024-02-10 PROCEDURE — 99239 HOSP IP/OBS DSCHRG MGMT >30: CPT

## 2024-02-10 PROCEDURE — 85025 COMPLETE CBC W/AUTO DIFF WBC: CPT

## 2024-02-10 PROCEDURE — 84480 ASSAY TRIIODOTHYRONINE (T3): CPT

## 2024-02-10 PROCEDURE — 80053 COMPREHEN METABOLIC PANEL: CPT

## 2024-02-10 PROCEDURE — 85027 COMPLETE CBC AUTOMATED: CPT

## 2024-02-10 PROCEDURE — 36415 COLL VENOUS BLD VENIPUNCTURE: CPT

## 2024-02-10 PROCEDURE — 78452 HT MUSCLE IMAGE SPECT MULT: CPT

## 2024-02-10 PROCEDURE — 84443 ASSAY THYROID STIM HORMONE: CPT

## 2024-02-10 PROCEDURE — 80061 LIPID PANEL: CPT

## 2024-02-10 RX ORDER — ALPRAZOLAM 0.25 MG
1 TABLET ORAL
Qty: 7 | Refills: 0
Start: 2024-02-10 | End: 2024-02-16

## 2024-02-10 RX ORDER — ALPRAZOLAM 0.25 MG
1 TABLET ORAL
Qty: 0 | Refills: 0 | DISCHARGE
Start: 2024-02-10

## 2024-02-10 RX ADMIN — Medication 0.25 MILLIGRAM(S): at 12:24

## 2024-02-10 RX ADMIN — SACUBITRIL AND VALSARTAN 1 TABLET(S): 24; 26 TABLET, FILM COATED ORAL at 06:18

## 2024-02-10 RX ADMIN — Medication 50 MICROGRAM(S): at 06:18

## 2024-02-10 RX ADMIN — Medication 25 MILLIGRAM(S): at 06:18

## 2024-02-10 NOTE — DISCHARGE NOTE PROVIDER - ATTENDING DISCHARGE PHYSICAL EXAMINATION:
GENERAL: no acute distress, comfortably in bed  HEENT: Atraumatic, normocephalic, non-icteric, no JVD  NEURO:  A&Ox3, no focal deficits, moving all extremities spontaneously, no dysarthria, CN II-XII grossly intact  PSYCH: Normal affect, calm, appropriate insight and judgment, fluent speech  LUNGS: CTAB, no wrr, non-labored breathing  HEART: RRR, no murmur appreciated  ABD: Soft, non-tender, non-distended, no organomegaly, no appreciable masses, +bs all 4 quadrants  EXTREMITIES: Nontender, no clubbing, cyanosis, or edema  SKIN: No rashes or lesions

## 2024-02-10 NOTE — DISCHARGE NOTE PROVIDER - NSDCCPCAREPLAN_GEN_ALL_CORE_FT
PRINCIPAL DISCHARGE DIAGNOSIS  Diagnosis: SOB (shortness of breath)  Assessment and Plan of Treatment:       SECONDARY DISCHARGE DIAGNOSES  Diagnosis: HLD (hyperlipidemia)  Assessment and Plan of Treatment:     Diagnosis: History of anxiety  Assessment and Plan of Treatment:

## 2024-02-10 NOTE — DISCHARGE NOTE PROVIDER - NSDCMRMEDTOKEN_GEN_ALL_CORE_FT
Entresto 24 mg-26 mg oral tablet: 1 tab(s) orally 2 times a day  levothyroxine 50 mcg (0.05 mg) oral tablet: 1 tab(s) orally once a day  metoprolol succinate 25 mg oral capsule, extended release: 1 cap(s) orally once a day  montelukast 10 mg oral tablet: 1 tab(s) orally once a day (at bedtime)  Plavix 75 mg oral tablet: 1 tab(s) orally once a day  tamsulosin 0.4 mg oral capsule: 1 cap(s) orally once a day (at bedtime)  Xanax 0.25 mg oral tablet: 1 tab(s) orally 2 times a day As needed anxiety  zolpidem 10 mg oral tablet: 1 tab(s) orally once a day

## 2024-02-10 NOTE — DISCHARGE NOTE PROVIDER - HOSPITAL COURSE
78y Male w/ PMH of CAD w/ 3 prior MIs (s/p PCI in 2023), RCC (s/p L nephrectomy), and hypothyroidism admitted for shortness of breath r/o ACS. patient seen by cardio and stress test performed negative. tte within normal limits    patient has mild anxiety and spoke to son and he states its a chronic problem, patient had no hi or si, cleared for dc home with aides    Plan  #Shortness of breath, r/o ACS  #CAD  -Not requiring any O2 + no CP  -Continue statin  -Continue Plavix  -cardio followng  - stress and tte negative  - patient has an odd affect, patient complains of shortness of  breath is not hypoxic    #Hypothyroidism  -Continue home synthroid    dispo - xanax prn     advsied to see a tadn on dc to deal with chronic problems of insomnia and anxiety

## 2024-02-10 NOTE — DISCHARGE NOTE PROVIDER - CARE PROVIDER_API CALL
ANA ROMANO  [address] [place], [place] [address]  Phone: [phone]  Fax: [phone]  Follow Up Time:

## 2024-02-10 NOTE — DISCHARGE NOTE PROVIDER - NSDCFUSCHEDAPPT_GEN_ALL_CORE_FT
[facility] [address]  Scheduled Appointment: 02/13/2024    [facility] [address]  Scheduled Appointment: 04/11/2024    Magda Upton  [facility]  CARDIOLOGY [address]  Scheduled Appointment: 05/09/2024

## 2024-02-10 NOTE — DISCHARGE NOTE NURSING/CASE MANAGEMENT/SOCIAL WORK - PATIENT PORTAL LINK FT
You can access the FollowMyHealth Patient Portal offered by [facility] by registering at the following website: [email or web address]. By joining [employer]’s FollowMyHealth portal, you will also be able to view your health information using other applications (apps) compatible with our system.

## 2024-02-12 ENCOUNTER — EMERGENCY (EMERGENCY)
Facility: HOSPITAL | Age: 79
LOS: 1 days | Discharge: HOME | End: 2024-02-12
Attending: STUDENT IN AN ORGANIZED HEALTH CARE EDUCATION/TRAINING PROGRAM
Payer: MEDICARE

## 2024-02-12 ENCOUNTER — NON-APPOINTMENT (OUTPATIENT)
Age: 79
End: 2024-02-12

## 2024-02-12 VITALS
SYSTOLIC BLOOD PRESSURE: 113 MMHG | DIASTOLIC BLOOD PRESSURE: 86 MMHG | WEIGHT: 145.06 LBS | OXYGEN SATURATION: 98 % | RESPIRATION RATE: 18 BRPM | TEMPERATURE: 97 F | HEART RATE: 75 BPM

## 2024-02-12 VITALS
HEART RATE: 77 BPM | SYSTOLIC BLOOD PRESSURE: 110 MMHG | OXYGEN SATURATION: 99 % | TEMPERATURE: 98 F | DIASTOLIC BLOOD PRESSURE: 69 MMHG | RESPIRATION RATE: 17 BRPM

## 2024-02-12 DIAGNOSIS — Z90.5 ACQUIRED ABSENCE OF KIDNEY: Chronic | ICD-10-CM

## 2024-02-12 LAB
ALBUMIN SERPL ELPH-MCNC: 3.9 G/DL — SIGNIFICANT CHANGE UP (ref 3.3–5.2)
ALP SERPL-CCNC: 104 U/L — SIGNIFICANT CHANGE UP (ref 40–120)
ALT FLD-CCNC: 29 U/L — SIGNIFICANT CHANGE UP
ANION GAP SERPL CALC-SCNC: 11 MMOL/L — SIGNIFICANT CHANGE UP (ref 5–17)
AST SERPL-CCNC: 23 U/L — SIGNIFICANT CHANGE UP
BASOPHILS # BLD AUTO: 0.03 K/UL — SIGNIFICANT CHANGE UP (ref 0–0.2)
BASOPHILS NFR BLD AUTO: 0.5 % — SIGNIFICANT CHANGE UP (ref 0–2)
BILIRUB SERPL-MCNC: 0.4 MG/DL — SIGNIFICANT CHANGE UP (ref 0.4–2)
BUN SERPL-MCNC: 19.2 MG/DL — SIGNIFICANT CHANGE UP (ref 8–20)
CALCIUM SERPL-MCNC: 9.3 MG/DL — SIGNIFICANT CHANGE UP (ref 8.4–10.5)
CHLORIDE SERPL-SCNC: 104 MMOL/L — SIGNIFICANT CHANGE UP (ref 96–108)
CO2 SERPL-SCNC: 24 MMOL/L — SIGNIFICANT CHANGE UP (ref 22–29)
CREAT SERPL-MCNC: 0.99 MG/DL — SIGNIFICANT CHANGE UP (ref 0.5–1.3)
D DIMER BLD IA.RAPID-MCNC: 386 NG/ML DDU — HIGH
EGFR: 77 ML/MIN/1.73M2 — SIGNIFICANT CHANGE UP
EOSINOPHIL # BLD AUTO: 0.19 K/UL — SIGNIFICANT CHANGE UP (ref 0–0.5)
EOSINOPHIL NFR BLD AUTO: 2.9 % — SIGNIFICANT CHANGE UP (ref 0–6)
ETHANOL SERPL-MCNC: <10 MG/DL — SIGNIFICANT CHANGE UP (ref 0–9)
GLUCOSE SERPL-MCNC: 77 MG/DL — SIGNIFICANT CHANGE UP (ref 70–99)
HCT VFR BLD CALC: 40.1 % — SIGNIFICANT CHANGE UP (ref 39–50)
HGB BLD-MCNC: 14.2 G/DL — SIGNIFICANT CHANGE UP (ref 13–17)
IMM GRANULOCYTES NFR BLD AUTO: 0.3 % — SIGNIFICANT CHANGE UP (ref 0–0.9)
LYMPHOCYTES # BLD AUTO: 1.1 K/UL — SIGNIFICANT CHANGE UP (ref 1–3.3)
LYMPHOCYTES # BLD AUTO: 17 % — SIGNIFICANT CHANGE UP (ref 13–44)
MCHC RBC-ENTMCNC: 31.6 PG — SIGNIFICANT CHANGE UP (ref 27–34)
MCHC RBC-ENTMCNC: 35.4 GM/DL — SIGNIFICANT CHANGE UP (ref 32–36)
MCV RBC AUTO: 89.3 FL — SIGNIFICANT CHANGE UP (ref 80–100)
MONOCYTES # BLD AUTO: 0.49 K/UL — SIGNIFICANT CHANGE UP (ref 0–0.9)
MONOCYTES NFR BLD AUTO: 7.6 % — SIGNIFICANT CHANGE UP (ref 2–14)
NEUTROPHILS # BLD AUTO: 4.64 K/UL — SIGNIFICANT CHANGE UP (ref 1.8–7.4)
NEUTROPHILS NFR BLD AUTO: 71.7 % — SIGNIFICANT CHANGE UP (ref 43–77)
PLATELET # BLD AUTO: 227 K/UL — SIGNIFICANT CHANGE UP (ref 150–400)
POTASSIUM SERPL-MCNC: 4.2 MMOL/L — SIGNIFICANT CHANGE UP (ref 3.5–5.3)
POTASSIUM SERPL-SCNC: 4.2 MMOL/L — SIGNIFICANT CHANGE UP (ref 3.5–5.3)
PROT SERPL-MCNC: 7.1 G/DL — SIGNIFICANT CHANGE UP (ref 6.6–8.7)
RBC # BLD: 4.49 M/UL — SIGNIFICANT CHANGE UP (ref 4.2–5.8)
RBC # FLD: 12.6 % — SIGNIFICANT CHANGE UP (ref 10.3–14.5)
SODIUM SERPL-SCNC: 139 MMOL/L — SIGNIFICANT CHANGE UP (ref 135–145)
TROPONIN T, HIGH SENSITIVITY RESULT: 16 NG/L — SIGNIFICANT CHANGE UP (ref 0–51)
TROPONIN T, HIGH SENSITIVITY RESULT: 17 NG/L — SIGNIFICANT CHANGE UP (ref 0–51)
WBC # BLD: 6.47 K/UL — SIGNIFICANT CHANGE UP (ref 3.8–10.5)
WBC # FLD AUTO: 6.47 K/UL — SIGNIFICANT CHANGE UP (ref 3.8–10.5)

## 2024-02-12 PROCEDURE — 93010 ELECTROCARDIOGRAM REPORT: CPT

## 2024-02-12 PROCEDURE — 71045 X-RAY EXAM CHEST 1 VIEW: CPT | Mod: 26

## 2024-02-12 PROCEDURE — 99285 EMERGENCY DEPT VISIT HI MDM: CPT | Mod: GC

## 2024-02-12 NOTE — ED PROVIDER NOTE - PHYSICAL EXAMINATION
Const: Awake, alert and oriented to person, place, & time. In no acute distress. O2 saturation 98% on room air  HEENT: NC/AT. Moist mucous membranes.  Eyes: PERRLA. No scleral icterus. EOMI.  Neck:. Soft and supple. Full ROM without pain. No cervical midline tenderness.   Cardiac: Regular rate and regular rhythm. +S1/S2. Peripheral pulses 2+ and symmetric. No LE edema.  Resp: Speaking in full sentences, breath sounds equal and clear bilaterally. No wheezes, rales or rhonchi.  Abd: Soft, non-tender, non-distended. Normal bowel sounds in all 4 quadrants. No guarding or rebound.  MSK: Spine midline and non-tender. No CVAT.  Skin: No rashes, abrasions or lacerations.  Neuro: Moves all extremities symmetrically. No motor or sensory deficits

## 2024-02-12 NOTE — HEALTH RISK ASSESSMENT
[Yes] : Yes [Monthly or less (1 pt)] : Monthly or less (1 point) [1 or 2 (0 pts)] : 1 or 2 (0 points) [Never (0 pts)] : Never (0 points) [No] : In the past 12 months have you used drugs other than those required for medical reasons? No [No falls in past year] : Patient reported no falls in the past year [0] : 2) Feeling down, depressed, or hopeless: Not at all (0) [PHQ-2 Negative - No further assessment needed] : PHQ-2 Negative - No further assessment needed [With Patient/Caregiver] : , with patient/caregiver [Designated Healthcare Proxy] : Designated healthcare proxy [Name: ___] : Health Care Proxy's Name: [unfilled]  [Relationship: ___] : Relationship: [unfilled] [I will adhere to the patient's wishes.] : I will adhere to the patient's wishes. [Time Spent: ___ minutes] : Time Spent: [unfilled] minutes [Former] : Former [Audit-CScore] : 1 [de-identified] : Average [de-identified] : Average [[facility]] : 9 [[id number]] : 0 [AdvancecareDate] : 07/23

## 2024-02-12 NOTE — ED PROVIDER NOTE - ATTENDING CONTRIBUTION TO CARE
78-year-old male patient with history of cardiac stents, coronary artery disease presents the ED complaining of unresolved shortness of breath. Patient states symptoms are unchanged despite recent evaluation here at [facility] with no acute findings.     I, Caroline Guevara, personally saw the patient with the resident, and completed the key components of the history and physical exam. I then discussed the management plan with the resident.

## 2024-02-12 NOTE — ED ADULT NURSE NOTE - NS_SISCREENINGSR_GEN_ALL_ED
MEDICATIONS  (STANDING):  artificial tears (preservative free) Ophthalmic Solution 1 Drop(s) Both EYES daily  atorvastatin 10 milliGRAM(s) Oral at bedtime  azithromycin  IVPB 500 milliGRAM(s) IV Intermittent every 24 hours  calcitonin Injectable 360 International Unit(s) SubCutaneous every 12 hours  cefepime  Injectable.      cefepime  Injectable. 2000 milliGRAM(s) IV Push once  clopidogrel Tablet 75 milliGRAM(s) Oral daily  enoxaparin Injectable 40 milliGRAM(s) SubCutaneous every 24 hours  hydroxychloroquine 200 milliGRAM(s) Oral two times a day  losartan 25 milliGRAM(s) Oral daily  metoclopramide 5 milliGRAM(s) Oral Before meals and at bedtime  prednisoLONE acetate 1% Suspension 1 Drop(s) Both EYES four times a day  senna 2 Tablet(s) Oral at bedtime  sodium chloride 0.9% 1000 milliLiter(s) (125 mL/Hr) IV Continuous <Continuous>  tamsulosin 0.4 milliGRAM(s) Oral at bedtime    MEDICATIONS  (PRN):  albuterol    90 MICROgram(s) HFA Inhaler 1 Puff(s) Inhalation every 6 hours PRN for shortness of breath and/or wheezing  aluminum hydroxide/magnesium hydroxide/simethicone Suspension 30 milliLiter(s) Oral every 4 hours PRN Dyspepsia  ibuprofen  Tablet. 600 milliGRAM(s) Oral every 6 hours PRN Temp greater or equal to 38C (100.4F), Mild Pain (1 - 3)  magnesium hydroxide Suspension 30 milliLiter(s) Oral daily PRN Constipation  melatonin 3 milliGRAM(s) Oral at bedtime PRN Insomnia  ondansetron Injectable 4 milliGRAM(s) IV Push every 8 hours PRN Nausea and/or Vomiting  polyethylene glycol 3350 17 Gram(s) Oral daily PRN Constipation   Negative

## 2024-02-12 NOTE — ED PROVIDER NOTE - OBJECTIVE STATEMENT
A 78-year-old male patient with history of cardiac stents, coronary artery disease presents the ED complaining of shortness of breath.  Patient has been experiencing dyspnea on exertion and shortness of breath for the past few weeks, came to the ED 1 week ago secondary to his symptoms,  was admitted secondary to a positive delta troponin, had a negative echocardiogram and stress test and was discharged home.  Patient states he was feeling well yesterday after being discharged but came back today because he  began to develop shortness of breath again this morning.  Patient states that shortness of breath is always there, worse with walking.  He denies any fevers, history of lung issues in the past.  Patient was told that his symptoms may be secondary to anxiety and was discharged with Xanax during his last visit.  Patient is a former smoker.  No recent chest pain.  No fever or chills.  No abdominal pain, nausea, vomiting or diarrhea.  No leg pain.  No further complaints at this time

## 2024-02-12 NOTE — ASSESSMENT
[FreeTextEntry1] : Fatigue - Diet and Exercise.  HLD/CAD - meds  Hypothyroidism - Controlled with Meds. TSH Elevated.= Levothyroxine increased form 24 Mcg top 50 Mcg  PSA Screening - PSA level = 4.61 Colon Cancer Screening - FOBI Test. Insomnia - Controlled with Meds. Flu vaccine given 10/23, ANALIA.

## 2024-02-12 NOTE — HISTORY OF PRESENT ILLNESS
[Formal Caregiver] : formal caregiver [FreeTextEntry1] : F/U Apt. for lab testing  [de-identified] : F/U Apt. for lab testing

## 2024-02-12 NOTE — ED ADULT NURSE NOTE - OBJECTIVE STATEMENT
pt is a 78y male AOX4, here for shortness of breath.  pt is on room air.  denies CP, headache, nausea, vomiting, abd pain.  no s/s acute distress noted.  IV established.  plan of care discussed with patient.

## 2024-02-12 NOTE — ED PROVIDER NOTE - CARE PROVIDER_API CALL
Ronny Mckee  Pulmonary Disease  [address], [address]  [place] [address]  Phone: [phone]  Fax: [phone]  Follow Up Time: 1-3 Days

## 2024-02-12 NOTE — ED PROVIDER NOTE - CLINICAL SUMMARY MEDICAL DECISION MAKING FREE TEXT BOX
a 78-year-old male patient presents the ED complaining of shortness of breath.  Patient with recent admission with negative cardiac workup, presenting again for similar complaints.  Patient has had normal oxygen saturations on room air throughout his visits, negative physical exam findings, no obvious signs of respiratory distress.  Given patient's history and symptoms, will recheck blood work, chest x-ray, D-dimer, if negative will have discussion with patient about findings, assess patient's ability to take care of himself at home a 78-year-old male patient presents the ED complaining of shortness of breath.  Patient with recent admission with negative cardiac workup, presenting again for similar complaints.  Patient has had normal oxygen saturations on room air throughout his visits, negative physical exam findings, no obvious signs of respiratory distress.  Given patient's history and symptoms, will recheck blood work, chest x-ray, D-dimer, if negative will have discussion with patient about findings, assess patient's ability to take care of himself at home normal in the left    delta troponin negative for any signs of NSTEMI.  CTA shows no pulmonary embolism, pneumonia, or scarring of the lungs.  Vital signs have been stable throughout the emergency visit, with no hypoxia or tachycardia.  Had at length discussion with patient and son about symptoms, including extensive cardiac and pulmonary workup that shows no evidence of pulmonary or cardiac disease contributing to his shortness of breath and dyspnea.  Discussed with patient differentials, including stress, anxiety that may be leading to his shortness of breath.  Discussed with patient and son use of Xanax for anxiety control, importance of follow-up with psychiatry/primary care for further medication management, as this medication may cause increased drowsiness.  Referral for pulmonologist given.  Patient safe for discharge home with son, who will take patient home.  Patient lives with his brother whom will be at the house and able to take care of patient until aide arrives. Pt agrees to plan of care

## 2024-02-12 NOTE — ED PROVIDER NOTE - NSFOLLOWUPINSTRUCTIONS_ED_ALL_ED_FT
Please follow-up with the referred pulmonologist in 2 to 3 days.  Please call the referral specialist at the phone number above to help schedule an appointment with psychiatrist.  Your lab work today and CAT scan showed no evidence of blood clot in your lung, pneumonia, scarring of your lung tissue, or heart issues.  You may continue taking the prescribed Xanax when you feel short of breath or anxious about the symptoms, however, please be aware that this medication may make you drowsy or lethargic, and to be cautious when taking this medication.  It is important to follow-up with the aforementioned doctors to have proper medication management and further evaluation of your symptoms    Shortness of Breath, Adult  Shortness of breath is when a person has trouble breathing or when a person feels like she or he is having trouble breathing in enough air. Shortness of breath could be a sign of a medical problem.    Follow these instructions at home:  A sign showing that a person should not smoke.  Pollutants    Do not use any products that contain nicotine or tobacco. These products include cigarettes, chewing tobacco, and vaping devices, such as e-cigarettes. This also includes cigars and pipes. If you need help quitting, ask your health care provider.  Avoid things that can irritate your airways, including:  Smoke. This includes campfire smoke, forest fire smoke, and secondhand smoke from tobacco products. Do not smoke or allow others to smoke in your home.  Mold.  Dust.  Air pollution.  Chemical fumes.  Things that can give you an allergic reaction (allergens) if you have allergies. Common allergens include pollen from grasses or trees and animal dander.  Keep your living space clean and free of mold and dust.  General instructions    Pay attention to any changes in your symptoms.  Take over-the-counter and prescription medicines only as told by your health care provider. This includes oxygen therapy and inhaled medicines.  Rest as needed.  Return to your normal activities as told by your health care provider. Ask your health care provider what activities are safe for you.  Keep all follow-up visits. This is important.  Contact a health care provider if:  Your condition does not improve as soon as expected.  You have a hard time doing your normal activities, even after you rest.  You have new symptoms.  You cannot walk up stairs or exercise the way that you normally do.  Get help right away if:  Your shortness of breath gets worse.  You have shortness of breath when you are resting.  You feel light-headed or you faint.  You have a cough that is not controlled with medicines.  You cough up blood.  You have pain with breathing.  You have pain in your chest, arms, shoulders, or abdomen.  You have a fever.  These symptoms may be an emergency. Get help right away. Call 911.  Do not wait to see if the symptoms will go away.  Do not drive yourself to the hospital.  Summary  Shortness of breath is when a person has trouble breathing enough air. It can be a sign of a medical problem.  Avoid things that irritate your lungs, such as smoking, pollution, mold, and dust.  Pay attention to changes in your symptoms and contact your health care provider if you have a hard time completing daily activities because of shortness of breath.  This information is not intended to replace advice given to you by your health care provider. Make sure you discuss any questions you have with your health care provider.

## 2024-02-12 NOTE — ED PROVIDER NOTE - PATIENT PORTAL LINK FT
You can access the FollowMyHealth Patient Portal offered by  by registering at the following website: [email or web address]. By joining [employer]’s FollowMyHealth portal, you will also be able to view your health information using other applications (apps) compatible with our system.

## 2024-02-13 ENCOUNTER — APPOINTMENT (OUTPATIENT)
Dept: FAMILY MEDICINE | Facility: CLINIC | Age: 79
End: 2024-02-13

## 2024-02-13 PROBLEM — C64.9 MALIGNANT NEOPLASM OF UNSPECIFIED KIDNEY, EXCEPT RENAL PELVIS: Chronic | Status: ACTIVE | Noted: 2024-02-08

## 2024-02-13 PROCEDURE — 84484 ASSAY OF TROPONIN QUANT: CPT

## 2024-02-13 PROCEDURE — 80053 COMPREHEN METABOLIC PANEL: CPT

## 2024-02-13 PROCEDURE — 85379 FIBRIN DEGRADATION QUANT: CPT

## 2024-02-13 PROCEDURE — 71045 X-RAY EXAM CHEST 1 VIEW: CPT

## 2024-02-13 PROCEDURE — 99285 EMERGENCY DEPT VISIT HI MDM: CPT | Mod: 25

## 2024-02-13 PROCEDURE — 71275 CT ANGIOGRAPHY CHEST: CPT | Mod: 26,MA

## 2024-02-13 PROCEDURE — 71275 CT ANGIOGRAPHY CHEST: CPT | Mod: MA

## 2024-02-13 PROCEDURE — 36415 COLL VENOUS BLD VENIPUNCTURE: CPT

## 2024-02-13 PROCEDURE — 80307 DRUG TEST PRSMV CHEM ANLYZR: CPT

## 2024-02-13 PROCEDURE — 93005 ELECTROCARDIOGRAM TRACING: CPT

## 2024-02-13 PROCEDURE — 85025 COMPLETE CBC W/AUTO DIFF WBC: CPT

## 2024-02-15 ENCOUNTER — APPOINTMENT (OUTPATIENT)
Dept: FAMILY MEDICINE | Facility: CLINIC | Age: 79
End: 2024-02-15
Payer: MEDICARE

## 2024-02-15 VITALS
BODY MASS INDEX: 22.34 KG/M2 | RESPIRATION RATE: 16 BRPM | OXYGEN SATURATION: 97 % | WEIGHT: 139 LBS | DIASTOLIC BLOOD PRESSURE: 68 MMHG | HEART RATE: 69 BPM | SYSTOLIC BLOOD PRESSURE: 122 MMHG | HEIGHT: 66 IN

## 2024-02-15 DIAGNOSIS — R06.02 SHORTNESS OF BREATH: ICD-10-CM

## 2024-02-15 PROCEDURE — 36415 COLL VENOUS BLD VENIPUNCTURE: CPT

## 2024-02-15 PROCEDURE — G2211 COMPLEX E/M VISIT ADD ON: CPT

## 2024-02-15 PROCEDURE — 99214 OFFICE O/P EST MOD 30 MIN: CPT

## 2024-02-15 NOTE — HISTORY OF PRESENT ILLNESS
[Family Member] : family member [FreeTextEntry1] : F/U Apt. for Lab Testing [de-identified] : F/U Apt. for Lab Testing

## 2024-02-15 NOTE — HEALTH RISK ASSESSMENT
[Yes] : Yes [Monthly or less (1 pt)] : Monthly or less (1 point) [1 or 2 (0 pts)] : 1 or 2 (0 points) [Never (0 pts)] : Never (0 points) [No] : In the past 12 months have you used drugs other than those required for medical reasons? No [No falls in past year] : Patient reported no falls in the past year [0] : 2) Feeling down, depressed, or hopeless: Not at all (0) [PHQ-2 Negative - No further assessment needed] : PHQ-2 Negative - No further assessment needed [With Patient/Caregiver] : , with patient/caregiver [Designated Healthcare Proxy] : Designated healthcare proxy [Name: ___] : Health Care Proxy's Name: [unfilled]  [Relationship: ___] : Relationship: [unfilled] [I will adhere to the patient's wishes.] : I will adhere to the patient's wishes. [Time Spent: ___ minutes] : Time Spent: [unfilled] minutes [Former] : Former [Audit-CScore] : 1 [de-identified] : Average [de-identified] : Average [facility] : [facility] [[id number]] : 0 [AdvancecareDate] : 07/23

## 2024-02-15 NOTE — ASSESSMENT
[FreeTextEntry1] : Fatigue - Diet and Exercise. Labs for Fatigue and Dysmetabolism. HLD/CAD - Lipid Panel. Hypothyroidism - Controlled with Meds. TSH Elevated Repeat TSH today. if Elevated again Increase Synthroid.. PSA Screening - PSA level Colon Cancer Screening - FOBI Test. Insomnia - Controlled with Meds.   F/U 2 Weeks to Review Test Results.

## 2024-02-15 NOTE — PHYSICAL EXAM
No changes in medication [No Acute Distress] : no acute distress [Well Nourished] : well nourished [Well Developed] : well developed [Well-Appearing] : well-appearing [Normal Sclera/Conjunctiva] : normal sclera/conjunctiva [PERRL] : pupils equal round and reactive to light [EOMI] : extraocular movements intact [Normal Outer Ear/Nose] : the outer ears and nose were normal in appearance [Normal Oropharynx] : the oropharynx was normal [No JVD] : no jugular venous distention [No Lymphadenopathy] : no lymphadenopathy [Supple] : supple [Thyroid Normal, No Nodules] : the thyroid was normal and there were no nodules present [No Respiratory Distress] : no respiratory distress  [No Accessory Muscle Use] : no accessory muscle use [Clear to Auscultation] : lungs were clear to auscultation bilaterally [Normal Rate] : normal rate  [Regular Rhythm] : with a regular rhythm [Normal S1, S2] : normal S1 and S2 [No Murmur] : no murmur heard [No Carotid Bruits] : no carotid bruits [No Abdominal Bruit] : a ~M bruit was not heard ~T in the abdomen [No Varicosities] : no varicosities [Pedal Pulses Present] : the pedal pulses are present [No Edema] : there was no peripheral edema [No Palpable Aorta] : no palpable aorta [No Extremity Clubbing/Cyanosis] : no extremity clubbing/cyanosis [Soft] : abdomen soft [Non Tender] : non-tender [Non-distended] : non-distended [No Masses] : no abdominal mass palpated [No HSM] : no HSM [Normal Bowel Sounds] : normal bowel sounds [No CVA Tenderness] : no CVA  tenderness [No Spinal Tenderness] : no spinal tenderness [No Joint Swelling] : no joint swelling [Grossly Normal Strength/Tone] : grossly normal strength/tone [No Rash] : no rash [Coordination Grossly Intact] : coordination grossly intact [No Focal Deficits] : no focal deficits [Normal Gait] : normal gait [Deep Tendon Reflexes (DTR)] : deep tendon reflexes were 2+ and symmetric [___/1] : [unfilled]/1   [___/3] : [unfilled]/3 [___/5] : [unfilled]/5 [___/4] : [unfilled]/4 [___/2] : [unfilled]/2 [___/8] : [unfilled]/8 [Normal] : Normal [Normal Affect] : the affect was normal [Normal Insight/Judgement] : insight and judgment were intact [Comprehensive Foot Exam Normal] : Right and left foot were examined and both feet are normal. No ulcers in either foot. Toes are normal and with full ROM.  Normal tactile sensation with monofilament testing throughout both feet [TextBox_2] : Yes [TextBox_4] : HS [SlumsTotal] : 30

## 2024-02-16 LAB
25(OH)D3 SERPL-MCNC: 48.7 NG/ML
ALBUMIN SERPL ELPH-MCNC: 4.3 G/DL
ALP BLD-CCNC: 103 U/L
ALT SERPL-CCNC: 28 U/L
ANION GAP SERPL CALC-SCNC: 16 MMOL/L
AST SERPL-CCNC: 24 U/L
BASOPHILS # BLD AUTO: 0.02 K/UL
BASOPHILS NFR BLD AUTO: 0.3 %
BILIRUB SERPL-MCNC: 0.2 MG/DL
BUN SERPL-MCNC: 18 MG/DL
CALCIUM SERPL-MCNC: 9.8 MG/DL
CHLORIDE SERPL-SCNC: 104 MMOL/L
CHOLEST SERPL-MCNC: 118 MG/DL
CO2 SERPL-SCNC: 22 MMOL/L
CREAT SERPL-MCNC: 1.04 MG/DL
EGFR: 73 ML/MIN/1.73M2
EOSINOPHIL # BLD AUTO: 0.16 K/UL
EOSINOPHIL NFR BLD AUTO: 2.1 %
ESTIMATED AVERAGE GLUCOSE: 103 MG/DL
FERRITIN SERPL-MCNC: 279 NG/ML
FOLATE SERPL-MCNC: 12.4 NG/ML
GLUCOSE SERPL-MCNC: 86 MG/DL
HBA1C MFR BLD HPLC: 5.2 %
HCT VFR BLD CALC: 39.4 %
HDLC SERPL-MCNC: 33 MG/DL
HGB BLD-MCNC: 13.5 G/DL
IMM GRANULOCYTES NFR BLD AUTO: 0.4 %
IRON SATN MFR SERPL: 26 %
IRON SERPL-MCNC: 69 UG/DL
LDLC SERPL CALC-MCNC: 48 MG/DL
LYMPHOCYTES # BLD AUTO: 1.1 K/UL
LYMPHOCYTES NFR BLD AUTO: 14.4 %
MAN DIFF?: NORMAL
MCHC RBC-ENTMCNC: 31 PG
MCHC RBC-ENTMCNC: 34.3 GM/DL
MCV RBC AUTO: 90.6 FL
MONOCYTES # BLD AUTO: 0.59 K/UL
MONOCYTES NFR BLD AUTO: 7.7 %
NEUTROPHILS # BLD AUTO: 5.72 K/UL
NEUTROPHILS NFR BLD AUTO: 75.1 %
NONHDLC SERPL-MCNC: 84 MG/DL
PLATELET # BLD AUTO: 236 K/UL
POTASSIUM SERPL-SCNC: 4.4 MMOL/L
PROT SERPL-MCNC: 7.5 G/DL
PSA SERPL-MCNC: 3.05 NG/ML
RBC # BLD: 4.35 M/UL
RBC # FLD: 13.1 %
SODIUM SERPL-SCNC: 143 MMOL/L
T4 SERPL-MCNC: 9.8 UG/DL
TIBC SERPL-MCNC: 269 UG/DL
TRIGL SERPL-MCNC: 224 MG/DL
TSH SERPL-ACNC: 4.65 UIU/ML
UIBC SERPL-MCNC: 200 UG/DL
VIT B12 SERPL-MCNC: 682 PG/ML
WBC # FLD AUTO: 7.62 K/UL

## 2024-02-20 LAB — C PEPTIDE SERPL-MCNC: 18.2 NG/ML

## 2024-02-23 ENCOUNTER — NON-APPOINTMENT (OUTPATIENT)
Age: 79
End: 2024-02-23

## 2024-02-27 ENCOUNTER — APPOINTMENT (OUTPATIENT)
Dept: ORTHOPEDIC SURGERY | Facility: CLINIC | Age: 79
End: 2024-02-27
Payer: MEDICARE

## 2024-02-27 VITALS
HEART RATE: 77 BPM | DIASTOLIC BLOOD PRESSURE: 76 MMHG | WEIGHT: 150 LBS | HEIGHT: 66 IN | SYSTOLIC BLOOD PRESSURE: 129 MMHG | BODY MASS INDEX: 24.11 KG/M2 | TEMPERATURE: 97.8 F

## 2024-02-27 DIAGNOSIS — G56.20 LESION OF ULNAR NERVE, UNSPECIFIED UPPER LIMB: ICD-10-CM

## 2024-02-27 DIAGNOSIS — M25.522 PAIN IN LEFT ELBOW: ICD-10-CM

## 2024-02-27 PROCEDURE — 99214 OFFICE O/P EST MOD 30 MIN: CPT | Mod: 25

## 2024-02-27 PROCEDURE — 20612 ASPIRATE/INJ GANGLION CYST: CPT | Mod: 59,LT

## 2024-02-27 PROCEDURE — 20526 THER INJECTION CARP TUNNEL: CPT | Mod: 59,LT

## 2024-02-27 PROCEDURE — 73080 X-RAY EXAM OF ELBOW: CPT | Mod: LT

## 2024-02-27 PROCEDURE — 20605 DRAIN/INJ JOINT/BURSA W/O US: CPT | Mod: LT

## 2024-02-27 NOTE — ASSESSMENT
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  He also has a volar wrist mass.  We have discussed treatment modalities for the above.  He has severe heart disease and difficulty with shortness of breath.  He is not a candidate for surgical management.  He would like injections for the above We have reviewed a prior nerve study from 2022 showing peripheral nerve impingement in multiple sites.  He verbalized understanding   The patient was adequately and thoroughly informed of my assessment of their current condition(s).  - This may diminish bodily function for the extremity. We discussed prognosis, tx modalities including operative and nonoperative options for the above diagnostic assessment. As always, 2nd opinion is always provided as an option.  When accessible, I was able to review other physicians note(s) including reviewing other tests, imaging results as well as personally view these results for my own interpretation.   Injection:   The risks and benefits of a steroid injection were discussed in detail. The risks include but are not limited to: pain, infection, swelling, flare response, bleeding, subcutaneous fat atrophy, skin depigmentation and/or elevation of blood sugar. The risk of incomplete resolution of symptoms, recurrence and additional intervention was reviewed and considered by the patient. The patient agreed to proceed and under a sterile prep, I injected 1 unit 6mg into 1 cc of a combination of Celestone and Lidocaine into the left elbow joint, left cubital tunnel, left carpal tunnel, left glute and tunnel. The patient tolerated the injection well.  Aspiration Consent [Left wrist volar ganglion]  The risks and benefits of a [ganglion cyst aspiration] were discussed in detail.  The risks include but are not limited to: pain, infection, swelling, flare response, bleeding.  The risk of incomplete resolution of symptoms, recurrence and additional intervention was reviewed and considered by the patient.  The patient agreed to proceed and under a sterile prep, I aspirated several cc of mucinous fluid.  The patient tolerated the injection well. The patient was adequately and thoroughly informed of my assessment of their current condition(s).  DISCUSSION: 1.  Injections as above.  Aspiration as above.  Follow-up 2 months. 2.  Prior nerve study thoroughly reviewed 3. [x]

## 2024-02-27 NOTE — PHYSICAL EXAM
[de-identified] : Examination of the left elbow reveals tenderness with compression of the joint.  Mild crepitus with range of motion.  No signs of infection Examination of the [left] cubital tunnel reveals discomfort with compression with associated numbness and tingling at the fingertips. There is a positive tinel. Examination of the [left] wrist reveals discomfort with compression at the level of the volar carpal tunnel and Guyon's tunnel eliciting numbness/tingling throughout the fingertips Examination of the left wrist volarly reveals a palpable mass 1 to 2 cm ballotable   [de-identified] : 3 views of [left] elbow were obtained today in my office and were seen by me and discussed with the patient.  These are showing elbow osteoarthritis

## 2024-02-27 NOTE — HISTORY OF PRESENT ILLNESS
[FreeTextEntry1] : Edil is a pleasant 78-year-old male with multiple comorbidities including heart disease and respiratory difficulty.  He presents today with tremendous discomfort of his left elbow with numbness and tingling into the entire hand difficulty with activities daily living.  He does present with a prior study performed in mid 2022

## 2024-02-29 ENCOUNTER — APPOINTMENT (OUTPATIENT)
Dept: FAMILY MEDICINE | Facility: CLINIC | Age: 79
End: 2024-02-29
Payer: MEDICARE

## 2024-02-29 DIAGNOSIS — Z87.891 PERSONAL HISTORY OF NICOTINE DEPENDENCE: ICD-10-CM

## 2024-02-29 DIAGNOSIS — E03.9 HYPOTHYROIDISM, UNSPECIFIED: ICD-10-CM

## 2024-02-29 DIAGNOSIS — E55.9 VITAMIN D DEFICIENCY, UNSPECIFIED: ICD-10-CM

## 2024-02-29 DIAGNOSIS — N40.0 BENIGN PROSTATIC HYPERPLASIA WITHOUT LOWER URINARY TRACT SYMPMS: ICD-10-CM

## 2024-02-29 DIAGNOSIS — R79.89 OTHER SPECIFIED ABNORMAL FINDINGS OF BLOOD CHEMISTRY: ICD-10-CM

## 2024-02-29 DIAGNOSIS — Z78.9 OTHER SPECIFIED HEALTH STATUS: ICD-10-CM

## 2024-02-29 DIAGNOSIS — G47.00 INSOMNIA, UNSPECIFIED: ICD-10-CM

## 2024-02-29 DIAGNOSIS — Z80.0 FAMILY HISTORY OF MALIGNANT NEOPLASM OF DIGESTIVE ORGANS: ICD-10-CM

## 2024-02-29 DIAGNOSIS — Z12.5 ENCOUNTER FOR SCREENING FOR MALIGNANT NEOPLASM OF PROSTATE: ICD-10-CM

## 2024-02-29 DIAGNOSIS — D64.9 ANEMIA, UNSPECIFIED: ICD-10-CM

## 2024-02-29 PROCEDURE — 99442: CPT | Mod: 93

## 2024-04-09 ENCOUNTER — APPOINTMENT (OUTPATIENT)
Dept: ORTHOPEDIC SURGERY | Facility: CLINIC | Age: 79
End: 2024-04-09

## 2024-04-11 ENCOUNTER — APPOINTMENT (OUTPATIENT)
Dept: FAMILY MEDICINE | Facility: CLINIC | Age: 79
End: 2024-04-11
Payer: MEDICARE

## 2024-04-11 VITALS
HEART RATE: 88 BPM | HEIGHT: 66 IN | SYSTOLIC BLOOD PRESSURE: 118 MMHG | OXYGEN SATURATION: 98 % | WEIGHT: 138 LBS | BODY MASS INDEX: 22.18 KG/M2 | RESPIRATION RATE: 16 BRPM | TEMPERATURE: 97.8 F | DIASTOLIC BLOOD PRESSURE: 74 MMHG

## 2024-04-11 PROCEDURE — 99214 OFFICE O/P EST MOD 30 MIN: CPT

## 2024-04-11 PROCEDURE — G2211 COMPLEX E/M VISIT ADD ON: CPT

## 2024-04-11 RX ORDER — ZOLPIDEM TARTRATE 10 MG/1
10 TABLET ORAL
Qty: 30 | Refills: 2 | Status: ACTIVE | COMMUNITY
Start: 2022-10-27 | End: 1900-01-01

## 2024-04-11 RX ORDER — SACUBITRIL AND VALSARTAN 24; 26 MG/1; MG/1
24-26 TABLET, FILM COATED ORAL TWICE DAILY
Qty: 180 | Refills: 1 | Status: ACTIVE | COMMUNITY
Start: 2023-10-18 | End: 1900-01-01

## 2024-04-11 NOTE — PHYSICAL EXAM
[No Acute Distress] : no acute distress [Well Nourished] : well nourished [Well Developed] : well developed [Well-Appearing] : well-appearing [Normal Sclera/Conjunctiva] : normal sclera/conjunctiva [PERRL] : pupils equal round and reactive to light [EOMI] : extraocular movements intact [Normal Outer Ear/Nose] : the outer ears and nose were normal in appearance [Normal Oropharynx] : the oropharynx was normal [No JVD] : no jugular venous distention [No Lymphadenopathy] : no lymphadenopathy [Supple] : supple [Thyroid Normal, No Nodules] : the thyroid was normal and there were no nodules present [No Respiratory Distress] : no respiratory distress  [No Accessory Muscle Use] : no accessory muscle use [Clear to Auscultation] : lungs were clear to auscultation bilaterally [Normal Rate] : normal rate  [Regular Rhythm] : with a regular rhythm [No Murmur] : no murmur heard [Normal S1, S2] : normal S1 and S2 [No Carotid Bruits] : no carotid bruits [No Abdominal Bruit] : a ~M bruit was not heard ~T in the abdomen [No Varicosities] : no varicosities [Pedal Pulses Present] : the pedal pulses are present [No Edema] : there was no peripheral edema [No Palpable Aorta] : no palpable aorta [No Extremity Clubbing/Cyanosis] : no extremity clubbing/cyanosis [Soft] : abdomen soft [Non Tender] : non-tender [Non-distended] : non-distended [No Masses] : no abdominal mass palpated [No HSM] : no HSM [Normal Bowel Sounds] : normal bowel sounds [No CVA Tenderness] : no CVA  tenderness [No Spinal Tenderness] : no spinal tenderness [No Joint Swelling] : no joint swelling [Grossly Normal Strength/Tone] : grossly normal strength/tone [No Rash] : no rash [Coordination Grossly Intact] : coordination grossly intact [No Focal Deficits] : no focal deficits [Normal Gait] : normal gait [Deep Tendon Reflexes (DTR)] : deep tendon reflexes were 2+ and symmetric [___/1] : [unfilled]/1   [___/3] : [unfilled]/3 [___/5] : [unfilled]/5 [___/4] : [unfilled]/4 [___/2] : [unfilled]/2 [___/8] : [unfilled]/8 [Normal] : Normal [Normal Affect] : the affect was normal [Normal Insight/Judgement] : insight and judgment were intact [Comprehensive Foot Exam Normal] : Right and left foot were examined and both feet are normal. No ulcers in either foot. Toes are normal and with full ROM.  Normal tactile sensation with monofilament testing throughout both feet [TextBox_2] : Yes [TextBox_4] : HS [SlumsTotal] : 30

## 2024-04-11 NOTE — HEALTH RISK ASSESSMENT
[Yes] : Yes [Monthly or less (1 pt)] : Monthly or less (1 point) [1 or 2 (0 pts)] : 1 or 2 (0 points) [Never (0 pts)] : Never (0 points) [No] : In the past 12 months have you used drugs other than those required for medical reasons? No [No falls in past year] : Patient reported no falls in the past year [0] : 2) Feeling down, depressed, or hopeless: Not at all (0) [PHQ-2 Negative - No further assessment needed] : PHQ-2 Negative - No further assessment needed [With Patient/Caregiver] : , with patient/caregiver [Designated Healthcare Proxy] : Designated healthcare proxy [Name: ___] : Health Care Proxy's Name: [unfilled]  [Relationship: ___] : Relationship: [unfilled] [I will adhere to the patient's wishes.] : I will adhere to the patient's wishes. [Time Spent: ___ minutes] : Time Spent: [unfilled] minutes [Former] : Former [Audit-CScore] : 1 [de-identified] : Average [de-identified] : Average [facility] : [facility] [[id number]] : 0 [AdvancecareDate] : 07/23

## 2024-04-11 NOTE — HISTORY OF PRESENT ILLNESS
[Formal Caregiver] : formal caregiver [FreeTextEntry1] : F/U Apt. to review lab results  [de-identified] : 78-year-old F/U Apt. to review lab results

## 2024-04-11 NOTE — ASSESSMENT
[FreeTextEntry1] : Fatigue - Diet and Exercise. Diet and Exercise. HLD/CAD - Controlled with Meds. Hypothyroidism - Controlled with Meds. TSH Elevated.= Levothyroxine increased form 24 Mcg top 50 Mcg  PSA Screening - PSA level = 3.05. Colon Cancer Screening - FOBI Test. Insomnia - Controlled with Meds.   F/U in Fall for Lab Testing

## 2024-04-16 DIAGNOSIS — J30.2 OTHER SEASONAL ALLERGIC RHINITIS: ICD-10-CM

## 2024-04-19 RX ORDER — MONTELUKAST 10 MG/1
10 TABLET, FILM COATED ORAL
Qty: 90 | Refills: 2 | Status: ACTIVE | COMMUNITY
Start: 2024-04-16 | End: 1900-01-01

## 2024-04-22 ENCOUNTER — RX RENEWAL (OUTPATIENT)
Age: 79
End: 2024-04-22

## 2024-05-09 ENCOUNTER — NON-APPOINTMENT (OUTPATIENT)
Age: 79
End: 2024-05-09

## 2024-05-09 ENCOUNTER — APPOINTMENT (OUTPATIENT)
Dept: CARDIOLOGY | Facility: CLINIC | Age: 79
End: 2024-05-09
Payer: MEDICARE

## 2024-05-09 VITALS — WEIGHT: 140 LBS | BODY MASS INDEX: 22.5 KG/M2 | HEIGHT: 66 IN

## 2024-05-09 VITALS
TEMPERATURE: 97.6 F | SYSTOLIC BLOOD PRESSURE: 123 MMHG | OXYGEN SATURATION: 99 % | DIASTOLIC BLOOD PRESSURE: 62 MMHG | HEART RATE: 66 BPM

## 2024-05-09 DIAGNOSIS — I42.8 OTHER CARDIOMYOPATHIES: ICD-10-CM

## 2024-05-09 DIAGNOSIS — E78.00 PURE HYPERCHOLESTEROLEMIA, UNSPECIFIED: ICD-10-CM

## 2024-05-09 DIAGNOSIS — I49.3 VENTRICULAR PREMATURE DEPOLARIZATION: ICD-10-CM

## 2024-05-09 DIAGNOSIS — Z78.9 OTHER SPECIFIED HEALTH STATUS: ICD-10-CM

## 2024-05-09 DIAGNOSIS — I25.10 ATHEROSCLEROTIC HEART DISEASE OF NATIVE CORONARY ARTERY W/OUT ANGINA PECTORIS: ICD-10-CM

## 2024-05-09 PROCEDURE — 93000 ELECTROCARDIOGRAM COMPLETE: CPT

## 2024-05-09 PROCEDURE — 99214 OFFICE O/P EST MOD 30 MIN: CPT

## 2024-05-09 RX ORDER — TAMSULOSIN HYDROCHLORIDE 0.4 MG/1
0.4 CAPSULE ORAL
Qty: 90 | Refills: 2 | Status: DISCONTINUED | COMMUNITY
Start: 2022-10-04 | End: 2024-05-09

## 2024-05-14 ENCOUNTER — APPOINTMENT (OUTPATIENT)
Dept: ELECTROPHYSIOLOGY | Facility: CLINIC | Age: 79
End: 2024-05-14
Payer: MEDICARE

## 2024-05-14 ENCOUNTER — APPOINTMENT (OUTPATIENT)
Dept: DERMATOLOGY | Facility: CLINIC | Age: 79
End: 2024-05-14
Payer: MEDICARE

## 2024-05-14 VITALS
BODY MASS INDEX: 22.18 KG/M2 | HEART RATE: 78 BPM | OXYGEN SATURATION: 99 % | WEIGHT: 138 LBS | HEIGHT: 66 IN | DIASTOLIC BLOOD PRESSURE: 79 MMHG | SYSTOLIC BLOOD PRESSURE: 159 MMHG

## 2024-05-14 PROCEDURE — 99214 OFFICE O/P EST MOD 30 MIN: CPT

## 2024-05-14 PROCEDURE — 93000 ELECTROCARDIOGRAM COMPLETE: CPT | Mod: 59

## 2024-05-14 PROCEDURE — 99215 OFFICE O/P EST HI 40 MIN: CPT

## 2024-05-14 NOTE — ED ADULT NURSE NOTE - NS PRO AD NO ADVANCE DIRECTIVE
PCP referral for care management. Attempted to call patient, her [employer] assistant service did not have voicemail option, unable to leave a message. Will call again in a few days.    Future Appointments   Date Time Provider Department Center   5/16/2024 10:30 AM Mariam Marquez APRN - CNP [facility]   5/20/2024  2:00 PM [facility]   8/5/2024  9:30 AM Jayjay Burris MD [facility]   8/13/2024  1:00 PM Mariam Marquez APRN - CNP [facility]   9/10/2024  1:00 PM Armaan Pineda MD Neuro [facility] Neurology -       
No deformity or limitation of movement
No

## 2024-05-14 NOTE — ASSESSMENT
[FreeTextEntry1] : Fatigue/Anemia - Diet and Exercise. HLD/CAD - Controlled with Meds. Hypothyroidism - Controlled with Meds. TSH Elevated Repeat TSH today. if Elevated again Increase Synthroid.. PSA Screening - PSA = 3.05. Colon Cancer Screening - Awaiting FOBI Test. Insomnia - Controlled with Meds. Elevated TSH - Repeat T4 and TSH if Elevated increase Synthroid.   Spoke with Pt. on Phone for 15 Min.

## 2024-05-14 NOTE — COUNSELING
[Fall prevention counseling provided] : Fall prevention counseling provided [Adequate lighting] : Adequate lighting [No throw rugs] : No throw rugs [Use proper foot wear] : Use proper foot wear [Behavioral health counseling provided] : Behavioral health counseling provided [Sleep ___ hours/day] : Sleep [unfilled] hours/day [Engage in a relaxing activity] : Engage in a relaxing activity [Plan in advance] : Plan in advance [FreeTextEntry2] : Quit Smoking [AUDIT-C Screening administered and reviewed] : AUDIT-C Screening administered and reviewed [Potential consequences of obesity discussed] : Potential consequences of obesity discussed [Benefits of weight loss discussed] : Benefits of weight loss discussed [Encouraged to increase physical activity] : Encouraged to increase physical activity [Weigh Self Weekly] : weigh self weekly [Decrease Portions] : decrease portions [None] : None [Good understanding] : Patient has a good understanding of lifestyle changes and steps needed to achieve self management goal

## 2024-05-14 NOTE — HEALTH RISK ASSESSMENT
[Yes] : Yes [Monthly or less (1 pt)] : Monthly or less (1 point) [1 or 2 (0 pts)] : 1 or 2 (0 points) [Never (0 pts)] : Never (0 points) [No] : In the past 12 months have you used drugs other than those required for medical reasons? No [No falls in past year] : Patient reported no falls in the past year [0] : 2) Feeling down, depressed, or hopeless: Not at all (0) [PHQ-2 Negative - No further assessment needed] : PHQ-2 Negative - No further assessment needed [Audit-CScore] : 1 [de-identified] : Average [de-identified] : Average [[facility] [place]go] : 9 [[id number]] : 0 [Former] : Former [With Patient/Caregiver] : , with patient/caregiver [Designated Healthcare Proxy] : Designated healthcare proxy [Name: ___] : Health Care Proxy's Name: [unfilled]  [Relationship: ___] : Relationship: [unfilled] [I will adhere to the patient's wishes.] : I will adhere to the patient's wishes. [Time Spent: ___ minutes] : Time Spent: [unfilled] minutes [AdvancecareDate] : 07/23

## 2024-05-14 NOTE — PHYSICAL EXAM
[Well Developed] : well developed [Well Nourished] : well nourished [No Acute Distress] : no acute distress [Normal Conjunctiva] : normal conjunctiva [No Respiratory Distress] : no respiratory distress  [No Edema] : no edema [Moves all extremities] : moves all extremities [No Focal Deficits] : no focal deficits [Normal Venous Pressure] : normal venous pressure [Normal S1, S2] : normal S1, S2 [Clear Lung Fields] : clear lung fields

## 2024-05-15 ENCOUNTER — APPOINTMENT (OUTPATIENT)
Dept: FAMILY MEDICINE | Facility: CLINIC | Age: 79
End: 2024-05-15

## 2024-05-16 NOTE — END OF VISIT
[Time Spent: ___ minutes] : I have spent [unfilled] minutes of time on the encounter. [FreeTextEntry3] : I have personally seen, examined, and participated in the care of this patient. I have reviewed all pertinent clinical information, including history, physical exam, plan, and the PA/NP's note and agree except as noted below.  Patient doing well after PCI with dramatic reduction in PVC burden. EF has also normalized. No indication for PVC ablation. Patient to follow up with cardiology and see EP as needed.  Ezio Soliman MD, FACC, [facility] Clinical Cardiac Electrophysiology

## 2024-05-16 NOTE — DISCUSSION/SUMMARY
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 We discussed that these are generally benign arrhythmias. They can however result in cardiomyopathy if the burden is high, as is in his case. Pt underwent cardiac catheterization 8/24/23 which revealed pLAD disease now s/p PCI, repeat TTE with LVEF recovery. Holter 1/10-1/13/24 with PVC burden reduced to 0.39% (previously 14%). Presents today and denies arrhythmia symptomatology, ECG SR without ectopy.   Recommendations: -Continue Toprol 25mfg po daily -Continue to follow with cardiologist Dr Dimas -EP will sign-off, re-consult with any new or recurrent EP concerns/events

## 2024-05-16 NOTE — HISTORY OF PRESENT ILLNESS
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  To summarize his history, he had an episode of syncope in early 2023 (January-February 2023). He got out of the shower, sat down and felt very dizzy. He called his aide who came in and then he had syncope. He reportedly had loss of consciousness for 5-6 minutes. EMS was called but he did not go to the hospital. He had a fall in 11/2022 requiring admission and then rehab. He now uses a walker. He wore a heart monitor May 2023 which revealed a 14% PVC burden so he was referred to Dr. Medina for evaluation.  Pt underwent cardiac catheterization 8/24/23 which revealed pLAD disease now s/p PCI. I spoke with Ricky (patient's son) and recommended that in light of revascularization, we wait on ablation to see if EF improves.   Repeat TTE 2/8/24 with LVEF recovered 55-60%, mild diastolic dysfunction  Holter 1/10-1/13/24: Primary rhythm sinus Average HR 74bpm (range ) SVE burden 0.12% SVT longest 5beats 161bpm PVC burden 0.39%  Presents today for follow-up and denies palpitations, dizziness, SOB or chest pain. Recent Holter with reduced PVC burden to 0.39% (previously 14%). ECG on 5/9/14 SR 67bpm.   He has a rash on his torso and LE for which he will be seeing dermatology.   Currently taking Toprol 25mg po daily and well tolerated

## 2024-05-16 NOTE — REVIEW OF SYSTEMS
[Feeling Fatigued] : not feeling fatigued [SOB] : no shortness of breath [Dyspnea on exertion] : not dyspnea during exertion [Chest Discomfort] : no chest discomfort [Lower Ext Edema] : no extremity edema [Palpitations] : no palpitations [Orthopnea] : no orthopnea [PND] : no PND [Syncope] : no syncope [Dizziness] : no dizziness [Easy Bleeding] : no tendency for easy bleeding

## 2024-05-22 ENCOUNTER — APPOINTMENT (OUTPATIENT)
Dept: ORTHOPEDIC SURGERY | Facility: CLINIC | Age: 79
End: 2024-05-22

## 2024-05-22 DIAGNOSIS — G56.20 LESION OF ULNAR NERVE, UNSPECIFIED UPPER LIMB: ICD-10-CM

## 2024-05-22 DIAGNOSIS — R22.32 LOCALIZED SWELLING, MASS AND LUMP, LEFT UPPER LIMB: ICD-10-CM

## 2024-05-22 DIAGNOSIS — M25.522 PAIN IN LEFT ELBOW: ICD-10-CM

## 2024-05-22 DIAGNOSIS — M19.029 PRIMARY OSTEOARTHRITIS, UNSPECIFIED ELBOW: ICD-10-CM

## 2024-05-22 DIAGNOSIS — G56.00 CARPAL TUNNEL SYNDROME, UNSPECIFIED UPPER LIMB: ICD-10-CM

## 2024-05-22 PROCEDURE — 20605 DRAIN/INJ JOINT/BURSA W/O US: CPT | Mod: 59,LT

## 2024-05-22 PROCEDURE — 20526 THER INJECTION CARP TUNNEL: CPT | Mod: 59,LT

## 2024-05-22 PROCEDURE — 99214 OFFICE O/P EST MOD 30 MIN: CPT | Mod: 25

## 2024-05-22 PROCEDURE — 20612 ASPIRATE/INJ GANGLION CYST: CPT | Mod: 59,LT

## 2024-05-22 NOTE — HISTORY OF PRESENT ILLNESS
[FreeTextEntry1] : Edil is a pleasant 78-year-old male with multiple comorbidities including heart disease and respiratory difficulty.  He presents today with tremendous discomfort of his left elbow with numbness and tingling into the entire hand difficulty with activities daily living.  He does present with a prior study performed in mid 2022.  Injections have been helpful.  He would like to repeat this

## 2024-05-22 NOTE — PHYSICAL EXAM
[de-identified] : Examination of the left elbow reveals tenderness with compression of the joint.  Mild crepitus with range of motion.  No signs of infection Examination of the [left] cubital tunnel reveals discomfort with compression with associated numbness and tingling at the fingertips. There is a positive tinel. Examination of the [left] wrist reveals discomfort with compression at the level of the volar carpal tunnel and Guyon's tunnel eliciting numbness/tingling throughout the fingertips Examination of the left wrist volarly reveals a palpable mass 1 to 2 cm ballotable   [de-identified] : 3 views of [left] elbow were reviewed today in my office and were seen by me and discussed with the patient.  These are showing elbow osteoarthritis

## 2024-05-22 NOTE — ASSESSMENT
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  He also has a volar wrist mass.  We have discussed treatment modalities for the above.  He has severe heart disease and difficulty with shortness of breath.  He is not a candidate for surgical management.  He would like injections for the above We have reviewed a prior nerve study from 2022 showing peripheral nerve impingement in multiple sites.  He verbalized understanding   The patient was adequately and thoroughly informed of my assessment of their current condition(s).  - This may diminish bodily function for the extremity. We discussed prognosis, tx modalities including operative and nonoperative options for the above diagnostic assessment. As always, 2nd opinion is always provided as an option.  When accessible, I was able to review other physicians note(s) including reviewing other tests, imaging results as well as personally view these results for my own interpretation.   Injection:   The risks and benefits of a steroid injection were discussed in detail. The risks include but are not limited to: pain, infection, swelling, flare response, bleeding, subcutaneous fat atrophy, skin depigmentation and/or elevation of blood sugar. The risk of incomplete resolution of symptoms, recurrence and additional intervention was reviewed and considered by the patient. The patient agreed to proceed and under a sterile prep, I injected 1 unit 6mg into 1 cc of a combination of Celestone and Lidocaine into the left elbow joint, left cubital tunnel, left carpal tunnel. The patient tolerated the injection well.  Aspiration Consent [Left wrist volar ganglion]  The risks and benefits of a [ganglion cyst aspiration] were discussed in detail.  The risks include but are not limited to: pain, infection, swelling, flare response, bleeding.  The risk of incomplete resolution of symptoms, recurrence and additional intervention was reviewed and considered by the patient.  The patient agreed to proceed and under a sterile prep, I aspirated several cc of mucinous fluid.  The patient tolerated the injection well. The patient was adequately and thoroughly informed of my assessment of their current condition(s).  DISCUSSION: 1.  Injections as above.  Aspiration as above.  Follow-up 3 months 2.  Prior nerve study thoroughly reviewed 3. [x]

## 2024-07-12 ENCOUNTER — APPOINTMENT (OUTPATIENT)
Dept: FAMILY MEDICINE | Facility: CLINIC | Age: 79
End: 2024-07-12
Payer: MEDICARE

## 2024-07-12 VITALS
RESPIRATION RATE: 16 BRPM | DIASTOLIC BLOOD PRESSURE: 60 MMHG | WEIGHT: 138.01 LBS | HEART RATE: 77 BPM | SYSTOLIC BLOOD PRESSURE: 134 MMHG | HEIGHT: 66 IN | BODY MASS INDEX: 22.18 KG/M2 | OXYGEN SATURATION: 98 % | TEMPERATURE: 98 F

## 2024-07-12 DIAGNOSIS — I25.2 OLD MYOCARDIAL INFARCTION: ICD-10-CM

## 2024-07-12 DIAGNOSIS — Z87.891 PERSONAL HISTORY OF NICOTINE DEPENDENCE: ICD-10-CM

## 2024-07-12 DIAGNOSIS — Z87.39 PERSONAL HISTORY OF OTHER DISEASES OF THE MUSCULOSKELETAL SYSTEM AND CONNECTIVE TISSUE: ICD-10-CM

## 2024-07-12 DIAGNOSIS — Z86.39 PERSONAL HISTORY OF OTHER ENDOCRINE, NUTRITIONAL AND METABOLIC DISEASE: ICD-10-CM

## 2024-07-12 DIAGNOSIS — N28.89 OTHER SPECIFIED DISORDERS OF KIDNEY AND URETER: ICD-10-CM

## 2024-07-12 DIAGNOSIS — Z78.9 OTHER SPECIFIED HEALTH STATUS: ICD-10-CM

## 2024-07-12 DIAGNOSIS — Z80.0 FAMILY HISTORY OF MALIGNANT NEOPLASM OF DIGESTIVE ORGANS: ICD-10-CM

## 2024-07-12 DIAGNOSIS — Z95.5 PRESENCE OF CORONARY ANGIOPLASTY IMPLANT AND GRAFT: ICD-10-CM

## 2024-07-12 DIAGNOSIS — G47.00 INSOMNIA, UNSPECIFIED: ICD-10-CM

## 2024-07-12 DIAGNOSIS — Z86.010 PERSONAL HISTORY OF COLONIC POLYPS: ICD-10-CM

## 2024-07-12 DIAGNOSIS — Z86.79 PERSONAL HISTORY OF OTHER DISEASES OF THE CIRCULATORY SYSTEM: ICD-10-CM

## 2024-07-12 DIAGNOSIS — N39.0 URINARY TRACT INFECTION, SITE NOT SPECIFIED: ICD-10-CM

## 2024-07-12 DIAGNOSIS — M25.522 PAIN IN LEFT ELBOW: ICD-10-CM

## 2024-07-12 DIAGNOSIS — I25.10 ATHEROSCLEROTIC HEART DISEASE OF NATIVE CORONARY ARTERY W/OUT ANGINA PECTORIS: ICD-10-CM

## 2024-07-12 DIAGNOSIS — E78.00 PURE HYPERCHOLESTEROLEMIA, UNSPECIFIED: ICD-10-CM

## 2024-07-12 DIAGNOSIS — R05.9 COUGH, UNSPECIFIED: ICD-10-CM

## 2024-07-12 DIAGNOSIS — E03.9 HYPOTHYROIDISM, UNSPECIFIED: ICD-10-CM

## 2024-07-12 DIAGNOSIS — D64.9 ANEMIA, UNSPECIFIED: ICD-10-CM

## 2024-07-12 PROCEDURE — G2211 COMPLEX E/M VISIT ADD ON: CPT

## 2024-07-12 PROCEDURE — 99214 OFFICE O/P EST MOD 30 MIN: CPT

## 2024-07-12 RX ORDER — AZITHROMYCIN 250 MG/1
250 TABLET, FILM COATED ORAL
Qty: 1 | Refills: 2 | Status: ACTIVE | COMMUNITY
Start: 2024-07-12 | End: 1900-01-01

## 2024-07-16 ENCOUNTER — APPOINTMENT (OUTPATIENT)
Dept: DERMATOLOGY | Facility: CLINIC | Age: 79
End: 2024-07-16
Payer: MEDICARE

## 2024-07-16 PROCEDURE — 99214 OFFICE O/P EST MOD 30 MIN: CPT

## 2024-08-27 ENCOUNTER — APPOINTMENT (OUTPATIENT)
Dept: ORTHOPEDIC SURGERY | Facility: CLINIC | Age: 79
End: 2024-08-27

## 2024-09-19 NOTE — ED PROVIDER NOTE - PATIENT PORTAL LINK FT
[FreeTextEntry2] : pt is being seen for her f/u to inj 
You can access the FollowMyHealth Patient Portal offered by [facility] by registering at the following website: [email or web address]. By joining [facility]’s FollowMyHealth portal, you will also be able to view your health information using other applications (apps) compatible with our system.
2 seconds or less

## 2024-09-30 ENCOUNTER — APPOINTMENT (OUTPATIENT)
Dept: DERMATOLOGY | Facility: CLINIC | Age: 79
End: 2024-09-30
Payer: MEDICARE

## 2024-09-30 PROCEDURE — 99214 OFFICE O/P EST MOD 30 MIN: CPT

## 2024-10-04 ENCOUNTER — RX RENEWAL (OUTPATIENT)
Age: 79
End: 2024-10-04

## 2024-10-10 ENCOUNTER — LABORATORY RESULT (OUTPATIENT)
Age: 79
End: 2024-10-10

## 2024-10-10 ENCOUNTER — APPOINTMENT (OUTPATIENT)
Dept: FAMILY MEDICINE | Facility: CLINIC | Age: 79
End: 2024-10-10
Payer: MEDICARE

## 2024-10-10 VITALS
HEIGHT: 66 IN | HEART RATE: 68 BPM | RESPIRATION RATE: 14 BRPM | DIASTOLIC BLOOD PRESSURE: 56 MMHG | OXYGEN SATURATION: 97 % | SYSTOLIC BLOOD PRESSURE: 108 MMHG

## 2024-10-10 DIAGNOSIS — N40.0 BENIGN PROSTATIC HYPERPLASIA WITHOUT LOWER URINARY TRACT SYMPMS: ICD-10-CM

## 2024-10-10 DIAGNOSIS — G47.00 INSOMNIA, UNSPECIFIED: ICD-10-CM

## 2024-10-10 DIAGNOSIS — I47.10 SUPRAVENTRICULAR TACHYCARDIA, UNSPECIFIED: ICD-10-CM

## 2024-10-10 DIAGNOSIS — M25.522 PAIN IN LEFT ELBOW: ICD-10-CM

## 2024-10-10 DIAGNOSIS — D64.9 ANEMIA, UNSPECIFIED: ICD-10-CM

## 2024-10-10 DIAGNOSIS — E55.9 VITAMIN D DEFICIENCY, UNSPECIFIED: ICD-10-CM

## 2024-10-10 DIAGNOSIS — R79.89 OTHER SPECIFIED ABNORMAL FINDINGS OF BLOOD CHEMISTRY: ICD-10-CM

## 2024-10-10 DIAGNOSIS — I25.10 ATHEROSCLEROTIC HEART DISEASE OF NATIVE CORONARY ARTERY W/OUT ANGINA PECTORIS: ICD-10-CM

## 2024-10-10 DIAGNOSIS — E78.00 PURE HYPERCHOLESTEROLEMIA, UNSPECIFIED: ICD-10-CM

## 2024-10-10 DIAGNOSIS — Z12.5 ENCOUNTER FOR SCREENING FOR MALIGNANT NEOPLASM OF PROSTATE: ICD-10-CM

## 2024-10-10 DIAGNOSIS — Z80.0 FAMILY HISTORY OF MALIGNANT NEOPLASM OF DIGESTIVE ORGANS: ICD-10-CM

## 2024-10-10 DIAGNOSIS — R25.1 TREMOR, UNSPECIFIED: ICD-10-CM

## 2024-10-10 DIAGNOSIS — Z23 ENCOUNTER FOR IMMUNIZATION: ICD-10-CM

## 2024-10-10 PROCEDURE — G2211 COMPLEX E/M VISIT ADD ON: CPT

## 2024-10-10 PROCEDURE — 99214 OFFICE O/P EST MOD 30 MIN: CPT

## 2024-10-10 PROCEDURE — 90662 IIV NO PRSV INCREASED AG IM: CPT

## 2024-10-10 PROCEDURE — G0008: CPT

## 2024-10-10 PROCEDURE — 36415 COLL VENOUS BLD VENIPUNCTURE: CPT

## 2024-10-11 LAB
25(OH)D3 SERPL-MCNC: 39.5 NG/ML
ALBUMIN SERPL ELPH-MCNC: 4.3 G/DL
ALP BLD-CCNC: 99 U/L
ALT SERPL-CCNC: 23 U/L
ANION GAP SERPL CALC-SCNC: 14 MMOL/L
APPEARANCE: CLEAR
AST SERPL-CCNC: 19 U/L
BASOPHILS # BLD AUTO: 0.03 K/UL
BASOPHILS NFR BLD AUTO: 0.5 %
BILIRUB SERPL-MCNC: 0.3 MG/DL
BILIRUBIN URINE: NEGATIVE
BLOOD URINE: NEGATIVE
BUN SERPL-MCNC: 20 MG/DL
C PEPTIDE SERPL-MCNC: 8.7 NG/ML
CALCIUM SERPL-MCNC: 9.7 MG/DL
CHLORIDE SERPL-SCNC: 98 MMOL/L
CHOLEST SERPL-MCNC: 129 MG/DL
CO2 SERPL-SCNC: 26 MMOL/L
COLOR: YELLOW
CREAT SERPL-MCNC: 1.1 MG/DL
CREAT SPEC-SCNC: 40 MG/DL
EGFR: 68 ML/MIN/1.73M2
EOSINOPHIL # BLD AUTO: 0.16 K/UL
EOSINOPHIL NFR BLD AUTO: 2.6 %
ESTIMATED AVERAGE GLUCOSE: 105 MG/DL
FERRITIN SERPL-MCNC: 284 NG/ML
FOLATE SERPL-MCNC: 7.8 NG/ML
GLUCOSE QUALITATIVE U: NEGATIVE MG/DL
GLUCOSE SERPL-MCNC: 109 MG/DL
HBA1C MFR BLD HPLC: 5.3 %
HCT VFR BLD CALC: 42.8 %
HDLC SERPL-MCNC: 29 MG/DL
HGB BLD-MCNC: 14 G/DL
IMM GRANULOCYTES NFR BLD AUTO: 0.3 %
IRON SATN MFR SERPL: 41 %
IRON SERPL-MCNC: 110 UG/DL
KETONES URINE: NEGATIVE MG/DL
LDLC SERPL CALC-MCNC: 56 MG/DL
LEUKOCYTE ESTERASE URINE: ABNORMAL
LYMPHOCYTES # BLD AUTO: 1.22 K/UL
LYMPHOCYTES NFR BLD AUTO: 19.6 %
MAN DIFF?: NORMAL
MCHC RBC-ENTMCNC: 31 PG
MCHC RBC-ENTMCNC: 32.7 GM/DL
MCV RBC AUTO: 94.7 FL
MICROALBUMIN 24H UR DL<=1MG/L-MCNC: <1.2 MG/DL
MICROALBUMIN/CREAT 24H UR-RTO: NORMAL MG/G
MONOCYTES # BLD AUTO: 0.58 K/UL
MONOCYTES NFR BLD AUTO: 9.3 %
NEUTROPHILS # BLD AUTO: 4.22 K/UL
NEUTROPHILS NFR BLD AUTO: 67.7 %
NITRITE URINE: NEGATIVE
NONHDLC SERPL-MCNC: 100 MG/DL
PH URINE: 7
PLATELET # BLD AUTO: 199 K/UL
POTASSIUM SERPL-SCNC: 4.8 MMOL/L
PROT SERPL-MCNC: 7.1 G/DL
PROTEIN URINE: NEGATIVE MG/DL
PSA SERPL-MCNC: 2.58 NG/ML
RBC # BLD: 4.52 M/UL
RBC # FLD: 13.7 %
SODIUM SERPL-SCNC: 138 MMOL/L
SPECIFIC GRAVITY URINE: 1.01
T4 SERPL-MCNC: 9.4 UG/DL
TIBC SERPL-MCNC: 271 UG/DL
TRIGL SERPL-MCNC: 275 MG/DL
TSH SERPL-ACNC: 2.38 UIU/ML
UIBC SERPL-MCNC: 161 UG/DL
UROBILINOGEN URINE: 0.2 MG/DL
VIT B12 SERPL-MCNC: 506 PG/ML
WBC # FLD AUTO: 6.23 K/UL

## 2024-10-14 LAB — URINE CULTURE <10: ABNORMAL

## 2024-11-06 NOTE — H&P PST ADULT - NEUROLOGICAL SYMPTOMS
In no apparent distress and appears well developed.
done
normal (ped)...
paresthesias/syncope/vertigo/difficulty walking

## 2024-11-13 ENCOUNTER — APPOINTMENT (OUTPATIENT)
Dept: CARDIOLOGY | Facility: CLINIC | Age: 79
End: 2024-11-13
Payer: MEDICARE

## 2024-11-13 ENCOUNTER — NON-APPOINTMENT (OUTPATIENT)
Age: 79
End: 2024-11-13

## 2024-11-13 VITALS
BODY MASS INDEX: 22.18 KG/M2 | WEIGHT: 138 LBS | SYSTOLIC BLOOD PRESSURE: 112 MMHG | HEART RATE: 62 BPM | OXYGEN SATURATION: 99 % | HEIGHT: 66 IN | DIASTOLIC BLOOD PRESSURE: 66 MMHG

## 2024-11-13 DIAGNOSIS — G56.22 LESION OF ULNAR NERVE, LEFT UPPER LIMB: ICD-10-CM

## 2024-11-13 DIAGNOSIS — I25.10 ATHEROSCLEROTIC HEART DISEASE OF NATIVE CORONARY ARTERY W/OUT ANGINA PECTORIS: ICD-10-CM

## 2024-11-13 PROCEDURE — 93000 ELECTROCARDIOGRAM COMPLETE: CPT

## 2024-11-13 PROCEDURE — 99214 OFFICE O/P EST MOD 30 MIN: CPT

## 2024-11-14 ENCOUNTER — APPOINTMENT (OUTPATIENT)
Dept: FAMILY MEDICINE | Facility: CLINIC | Age: 79
End: 2024-11-14
Payer: MEDICARE

## 2024-11-14 VITALS
WEIGHT: 136 LBS | BODY MASS INDEX: 21.86 KG/M2 | HEART RATE: 70 BPM | HEIGHT: 66 IN | OXYGEN SATURATION: 98 % | SYSTOLIC BLOOD PRESSURE: 118 MMHG | TEMPERATURE: 97.2 F | RESPIRATION RATE: 14 BRPM | DIASTOLIC BLOOD PRESSURE: 70 MMHG

## 2024-11-14 DIAGNOSIS — R25.1 TREMOR, UNSPECIFIED: ICD-10-CM

## 2024-11-14 DIAGNOSIS — E55.9 VITAMIN D DEFICIENCY, UNSPECIFIED: ICD-10-CM

## 2024-11-14 DIAGNOSIS — G47.00 INSOMNIA, UNSPECIFIED: ICD-10-CM

## 2024-11-14 DIAGNOSIS — Z87.09 PERSONAL HISTORY OF OTHER DISEASES OF THE RESPIRATORY SYSTEM: ICD-10-CM

## 2024-11-14 DIAGNOSIS — L30.9 DERMATITIS, UNSPECIFIED: ICD-10-CM

## 2024-11-14 DIAGNOSIS — E78.00 PURE HYPERCHOLESTEROLEMIA, UNSPECIFIED: ICD-10-CM

## 2024-11-14 DIAGNOSIS — E03.9 HYPOTHYROIDISM, UNSPECIFIED: ICD-10-CM

## 2024-11-14 DIAGNOSIS — D64.9 ANEMIA, UNSPECIFIED: ICD-10-CM

## 2024-11-14 DIAGNOSIS — N40.0 BENIGN PROSTATIC HYPERPLASIA WITHOUT LOWER URINARY TRACT SYMPMS: ICD-10-CM

## 2024-11-14 DIAGNOSIS — I47.10 SUPRAVENTRICULAR TACHYCARDIA, UNSPECIFIED: ICD-10-CM

## 2024-11-14 PROCEDURE — 99214 OFFICE O/P EST MOD 30 MIN: CPT

## 2024-11-14 PROCEDURE — G2211 COMPLEX E/M VISIT ADD ON: CPT

## 2024-11-14 RX ORDER — TAMSULOSIN HYDROCHLORIDE 0.4 MG/1
0.4 CAPSULE ORAL
Refills: 0 | Status: ACTIVE | COMMUNITY

## 2024-11-14 RX ORDER — HYDROCORTISONE 25 MG/G
2.5 OINTMENT TOPICAL
Qty: 1 | Refills: 0 | Status: ACTIVE | COMMUNITY
Start: 2024-11-14 | End: 1900-01-01

## 2024-11-20 ENCOUNTER — OFFICE (OUTPATIENT)
Dept: URBAN - METROPOLITAN AREA CLINIC 113 | Facility: CLINIC | Age: 79
Setting detail: OPHTHALMOLOGY
End: 2024-11-20
Payer: MEDICARE

## 2024-11-20 DIAGNOSIS — H01.004: ICD-10-CM

## 2024-11-20 DIAGNOSIS — H43.811: ICD-10-CM

## 2024-11-20 DIAGNOSIS — H25.13: ICD-10-CM

## 2024-11-20 DIAGNOSIS — H16.223: ICD-10-CM

## 2024-11-20 DIAGNOSIS — H40.013: ICD-10-CM

## 2024-11-20 DIAGNOSIS — H01.001: ICD-10-CM

## 2024-11-20 PROCEDURE — 92012 INTRM OPH EXAM EST PATIENT: CPT | Performed by: STUDENT IN AN ORGANIZED HEALTH CARE EDUCATION/TRAINING PROGRAM

## 2024-11-20 PROCEDURE — 92083 EXTENDED VISUAL FIELD XM: CPT | Performed by: STUDENT IN AN ORGANIZED HEALTH CARE EDUCATION/TRAINING PROGRAM

## 2024-11-20 PROCEDURE — 92133 CPTRZD OPH DX IMG PST SGM ON: CPT | Performed by: STUDENT IN AN ORGANIZED HEALTH CARE EDUCATION/TRAINING PROGRAM

## 2024-11-20 ASSESSMENT — TONOMETRY
OD_IOP_MMHG: 12
OS_IOP_MMHG: 13

## 2024-11-20 ASSESSMENT — REFRACTION_CURRENTRX
OD_OVR_VA: 20/
OD_AXIS: 122
OS_OVR_VA: 20/
OS_ADD: +1.75
OS_VPRISM_DIRECTION: PROGS
OD_ADD: +1.75
OS_SPHERE: +0.75
OD_CYLINDER: -1.50
OD_VPRISM_DIRECTION: PROGS
OS_CYLINDER: -2.75
OS_AXIS: 069
OD_SPHERE: PLANO

## 2024-11-20 ASSESSMENT — KERATOMETRY
OS_K2POWER_DIOPTERS: 44.50
OS_AXISANGLE_DEGREES: 163
OD_K1POWER_DIOPTERS: 43.50
OD_AXISANGLE_DEGREES: 016
OD_K2POWER_DIOPTERS: 44.75
OS_K1POWER_DIOPTERS: 42.50

## 2024-11-20 ASSESSMENT — LID EXAM ASSESSMENTS
OS_BLEPHARITIS: LUL T
OD_BLEPHARITIS: RUL T

## 2024-11-20 ASSESSMENT — REFRACTION_AUTOREFRACTION
OD_AXIS: 108
OS_AXIS: 076
OS_SPHERE: +2.00
OS_CYLINDER: -3.00
OD_SPHERE: +0.25
OD_CYLINDER: -2.00

## 2024-11-20 ASSESSMENT — SUPERFICIAL PUNCTATE KERATITIS (SPK)
OS_SPK: T
OD_SPK: T

## 2024-11-20 ASSESSMENT — VISUAL ACUITY
OS_BCVA: 20/20
OD_BCVA: 20/30

## 2024-11-22 RX ORDER — ALIROCUMAB 75 MG/ML
75 INJECTION, SOLUTION SUBCUTANEOUS
Qty: 3 | Refills: 1 | Status: DISCONTINUED | COMMUNITY
Start: 2024-11-20 | End: 2024-11-22

## 2024-11-22 RX ORDER — EVOLOCUMAB 140 MG/ML
140 INJECTION, SOLUTION SUBCUTANEOUS
Qty: 6 | Refills: 1 | Status: ACTIVE | COMMUNITY
Start: 2024-11-22 | End: 1900-01-01

## 2024-12-16 ENCOUNTER — RX RENEWAL (OUTPATIENT)
Age: 79
End: 2024-12-16

## 2025-01-16 ENCOUNTER — APPOINTMENT (OUTPATIENT)
Dept: FAMILY MEDICINE | Facility: CLINIC | Age: 80
End: 2025-01-16
Payer: MEDICARE

## 2025-01-16 ENCOUNTER — LABORATORY RESULT (OUTPATIENT)
Age: 80
End: 2025-01-16

## 2025-01-16 VITALS
DIASTOLIC BLOOD PRESSURE: 70 MMHG | HEIGHT: 66 IN | SYSTOLIC BLOOD PRESSURE: 120 MMHG | BODY MASS INDEX: 21.86 KG/M2 | WEIGHT: 136 LBS | RESPIRATION RATE: 14 BRPM | HEART RATE: 65 BPM | OXYGEN SATURATION: 96 %

## 2025-01-16 DIAGNOSIS — I42.8 OTHER CARDIOMYOPATHIES: ICD-10-CM

## 2025-01-16 DIAGNOSIS — E03.9 HYPOTHYROIDISM, UNSPECIFIED: ICD-10-CM

## 2025-01-16 DIAGNOSIS — E78.00 PURE HYPERCHOLESTEROLEMIA, UNSPECIFIED: ICD-10-CM

## 2025-01-16 DIAGNOSIS — M47.812 SPONDYLOSIS W/OUT MYELOPATHY OR RADICULOPATHY, CERVICAL REGION: ICD-10-CM

## 2025-01-16 DIAGNOSIS — I47.10 SUPRAVENTRICULAR TACHYCARDIA, UNSPECIFIED: ICD-10-CM

## 2025-01-16 DIAGNOSIS — I49.3 VENTRICULAR PREMATURE DEPOLARIZATION: ICD-10-CM

## 2025-01-16 DIAGNOSIS — G56.20 LESION OF ULNAR NERVE, UNSPECIFIED UPPER LIMB: ICD-10-CM

## 2025-01-16 DIAGNOSIS — M19.042 PRIMARY OSTEOARTHRITIS, RIGHT HAND: ICD-10-CM

## 2025-01-16 DIAGNOSIS — Z87.09 PERSONAL HISTORY OF OTHER DISEASES OF THE RESPIRATORY SYSTEM: ICD-10-CM

## 2025-01-16 DIAGNOSIS — G47.00 INSOMNIA, UNSPECIFIED: ICD-10-CM

## 2025-01-16 DIAGNOSIS — I25.10 ATHEROSCLEROTIC HEART DISEASE OF NATIVE CORONARY ARTERY W/OUT ANGINA PECTORIS: ICD-10-CM

## 2025-01-16 DIAGNOSIS — M19.041 PRIMARY OSTEOARTHRITIS, RIGHT HAND: ICD-10-CM

## 2025-01-16 PROCEDURE — G2211 COMPLEX E/M VISIT ADD ON: CPT

## 2025-01-16 PROCEDURE — 99214 OFFICE O/P EST MOD 30 MIN: CPT

## 2025-01-16 PROCEDURE — 82962 GLUCOSE BLOOD TEST: CPT

## 2025-01-19 LAB
ALBUMIN SERPL ELPH-MCNC: 4.2 G/DL
ALP BLD-CCNC: 99 U/L
ALT SERPL-CCNC: 28 U/L
ANION GAP SERPL CALC-SCNC: 9 MMOL/L
APPEARANCE: CLEAR
AST SERPL-CCNC: 26 U/L
BASOPHILS # BLD AUTO: 0.03 K/UL
BASOPHILS NFR BLD AUTO: 0.6 %
BILIRUB SERPL-MCNC: 0.2 MG/DL
BILIRUBIN URINE: NEGATIVE
BLOOD URINE: NEGATIVE
BUN SERPL-MCNC: 17 MG/DL
CALCIUM SERPL-MCNC: 9.6 MG/DL
CHLORIDE SERPL-SCNC: 99 MMOL/L
CHOLEST SERPL-MCNC: 88 MG/DL
CO2 SERPL-SCNC: 26 MMOL/L
COLOR: YELLOW
CREAT SERPL-MCNC: 0.98 MG/DL
EGFR: 78 ML/MIN/1.73M2
EOSINOPHIL # BLD AUTO: 0.17 K/UL
EOSINOPHIL NFR BLD AUTO: 3.4 %
GLUCOSE QUALITATIVE U: NEGATIVE MG/DL
GLUCOSE SERPL-MCNC: 104 MG/DL
HCT VFR BLD CALC: 39.1 %
HDLC SERPL-MCNC: 30 MG/DL
HGB BLD-MCNC: 13.4 G/DL
IMM GRANULOCYTES NFR BLD AUTO: 0.2 %
KETONES URINE: NEGATIVE MG/DL
LDLC SERPL CALC-MCNC: 20 MG/DL
LEUKOCYTE ESTERASE URINE: ABNORMAL
LYMPHOCYTES # BLD AUTO: 1.15 K/UL
LYMPHOCYTES NFR BLD AUTO: 23 %
MAN DIFF?: NORMAL
MCHC RBC-ENTMCNC: 31.2 PG
MCHC RBC-ENTMCNC: 34.3 G/DL
MCV RBC AUTO: 90.9 FL
MONOCYTES # BLD AUTO: 0.48 K/UL
MONOCYTES NFR BLD AUTO: 9.6 %
NEUTROPHILS # BLD AUTO: 3.16 K/UL
NEUTROPHILS NFR BLD AUTO: 63.2 %
NITRITE URINE: NEGATIVE
NONHDLC SERPL-MCNC: 58 MG/DL
PH URINE: 7
PLATELET # BLD AUTO: 186 K/UL
POTASSIUM SERPL-SCNC: 5.3 MMOL/L
PROT SERPL-MCNC: 6.9 G/DL
PROTEIN URINE: NEGATIVE MG/DL
RBC # BLD: 4.3 M/UL
RBC # FLD: 13.2 %
SODIUM SERPL-SCNC: 134 MMOL/L
SPECIFIC GRAVITY URINE: 1.01
TRIGL SERPL-MCNC: 256 MG/DL
UROBILINOGEN URINE: 0.2 MG/DL
WBC # FLD AUTO: 5 K/UL

## 2025-01-22 ENCOUNTER — APPOINTMENT (OUTPATIENT)
Dept: FAMILY MEDICINE | Facility: CLINIC | Age: 80
End: 2025-01-22

## 2025-01-24 LAB — TSH SERPL-ACNC: 4.82 UIU/ML

## 2025-03-03 NOTE — ED ADULT NURSE NOTE - NSFALLHARMRISKINTERV_ED_ALL_ED
Inge Borjas  8/6/1933  Date of Office Visit: 03/03/25  Encounter Provider: Mukul Bloom MD  Place of Service: [facility] CARDIOLOGY      CHIEF COMPLAINT:  COLVIN  Chronic atrial fibrillation      HISTORY OF PRESENT ILLNESS:  Inge Dominguez in consultation today.  She is a 89-year-old female with a medical history of now chronic atrial fibrillation, colon cancer, frailty, essential hypertension, who presents to me secondary to dyspnea on exertion.  She underwent evaluation for atrial fibrillation and had a transthoracic echocardiogram with no significant valvular heart disease and a preserved ejection fraction.  She wore a monitor which I reviewed.  Ventricular rate is very well controlled and occasionally bradycardic.  She had a stress test which was performed in 2021 with no perfusion defects but there was report of transient ischemic dilatation.  Since our last visit she has continued to complain of shortness of air that is progressed to just minimal levels of activity.  Patient reports that she has severe dyspnea that requires her to stop after walking around 10 to 15 feet which is worse than it has been.  She denies any orthopnea or PND.  She has no lower extremity edema.  Chest does feel tight  when she is short of air    Review of Systems   Constitutional: Negative for fever and malaise/fatigue.   HENT:  Negative for nosebleeds and sore throat.    Eyes:  Negative for blurred vision and double vision.   Cardiovascular:  Negative for chest pain, claudication, palpitations and syncope.   Respiratory:  Negative for cough, shortness of breath and snoring.    Endocrine: Negative for cold intolerance, heat intolerance and polydipsia.   Skin:  Negative for itching, poor wound healing and rash.   Musculoskeletal:  Negative for joint pain, joint swelling, muscle weakness and myalgias.   Gastrointestinal:  Negative for abdominal pain, melena, nausea and vomiting.    Neurological:  Negative for light-headedness, loss of balance, seizures, vertigo and weakness.   Psychiatric/Behavioral:  Negative for altered mental status and depression.        Past Medical History:   Diagnosis Date    Anemia     Arthritis     Atrial fibrillation     Intermittently    Colon cancer 2012    Stage III    Drug therapy 2012    pill for 6 months    H/O Herpes simplex     virus of the lip    Hypertension     Intervertebral disk disease     Polio     Trigeminal neuralgia        The following portions of the patient's history were reviewed and updated as appropriate: Social history , Family history and Surgical history     Current Outpatient Medications on File Prior to Visit   Medication Sig Dispense Refill    acetaminophen (TYLENOL) 650 MG 8 hr tablet Take 1 tablet by mouth Every 8 (Eight) Hours As Needed for Mild Pain.      aspirin 81 MG EC tablet Take 1 tablet by mouth Daily.      atenolol (TENORMIN) 100 MG tablet Take 1 tablet (100 mg total) by mouth 2 (Two) Times a Day 240 tablet 1    brimonidine (ALPHAGAN) 0.2 % ophthalmic solution Administer 1 drop to both eyes 2 (Two) Times a Day.      calcium carbonate-vitamin d 600-400 MG-UNIT per tablet Take 1 tablet by mouth Daily.      carBAMazepine XR (TEGretol  XR) 100 MG 12 hr tablet Take 1 tablet by mouth 2 (Two) Times a Day. 180 tablet 4    doxazosin (CARDURA) 4 MG tablet Take 1 tablet by mouth every night at bedtime. 90 tablet 4    Synthroid 50 MCG tablet Take 1 tablet by mouth Every Morning.      [DISCONTINUED] Synthroid 25 MCG tablet Take 1 tablet by mouth Every Morning.      fluticasone (FLONASE) 50 MCG/ACT nasal spray 2 sprays into the nostril(s) as directed by provider Daily.      furosemide (LASIX) 20 MG tablet Take 1 tablet by mouth daily. 90 tablet 4    Multiple Vitamins-Minerals (CENTRUM ADULTS PO) Take  by mouth.      promethazine-dextromethorphan (PROMETHAZINE-DM) 6.25-15 MG/5ML syrup Take 5 mL by mouth 4 (Four) Times a Day As Needed for  "Cough. 180 mL 0    spironolactone (ALDACTONE) 25 MG tablet Take 1 tablet by mouth Daily.      traMADol (ULTRAM) 50 MG tablet Take 1 tablet by mouth Every 4 (Four) Hours As Needed for Moderate Pain. 60 tablet 0    traZODone (DESYREL) 50 MG tablet Take 1 tablet by mouth every night at bedtime. 90 tablet 4    [DISCONTINUED] amLODIPine (NORVASC) 5 MG tablet Take 1 tablet by mouth Daily. 90 tablet 4    [DISCONTINUED] losartan (COZAAR) 100 MG tablet Take 1 tablet by mouth Daily.       No current facility-administered medications on file prior to visit.       Allergies   Allergen Reactions    Cardizem [Diltiazem Hcl] Unknown - Low Severity    Amoxicillin Rash       Vitals:    03/03/25 1317   Height: 152.4 cm (60\")     Body mass index is 18.75 kg/m².   Constitutional:       Appearance: Well-developed.   Eyes:      General: No scleral icterus.     Conjunctiva/sclera: Conjunctivae normal.   HENT:      Head: Normocephalic and atraumatic.   Neck:      Thyroid: No thyromegaly.      Vascular: Normal carotid pulses. No carotid bruit, hepatojugular reflux or JVD.      Trachea: No tracheal deviation.   Pulmonary:      Effort: No respiratory distress.      Breath sounds: Normal breath sounds. No decreased breath sounds. No wheezing. No rhonchi. No rales.   Chest:      Chest wall: Not tender to palpatation.   Cardiovascular:      Normal rate. Regularly irregular rhythm.      No gallop.    Pulses:     Carotid: 2+ bilaterally.     Radial: 2+ bilaterally.     Femoral: 2+ bilaterally.     Dorsalis pedis: 2+ bilaterally.     Posterior tibial: 2+ bilaterally.  Edema:     Peripheral edema absent.   Abdominal:      General: Bowel sounds are normal. There is no distension.      Palpations: Abdomen is soft.      Tenderness: There is no abdominal tenderness.   Musculoskeletal:         General: No deformity.      Cervical back: Normal range of motion and neck supple. Skin:     Findings: No erythema or rash.   Neurological:      Mental Status: " "Alert and oriented to person, place, and time.      Sensory: No sensory deficit.   Psychiatric:         Behavior: Behavior normal.            Lab Results   Component Value Date    WBC 5.80 02/03/2025    HGB 12.4 02/03/2025    HCT 39.1 02/03/2025    MCV 97.8 (H) 02/03/2025     02/03/2025       Lab Results   Component Value Date    GLUCOSE 91 02/03/2025    BUN 39 (H) 02/03/2025    CREATININE 1.33 (H) 02/03/2025    EGFRIFNONA 80 06/09/2017    BCR 29.3 (H) 02/03/2025    K 4.8 02/03/2025    CO2 27.9 02/03/2025    CALCIUM 9.6 02/03/2025    ALBUMIN 4.5 02/03/2025    AST 25 02/03/2025    ALT 22 02/03/2025       Lab Results   Component Value Date    GLUCOSE 91 02/03/2025    CALCIUM 9.6 02/03/2025     02/03/2025    K 4.8 02/03/2025    CO2 27.9 02/03/2025    CL 99 02/03/2025    BUN 39 (H) 02/03/2025    CREATININE 1.33 (H) 02/03/2025    EGFRIFNONA 80 06/09/2017    BCR 29.3 (H) 02/03/2025    ANIONGAP 10.1 02/03/2025       No results found for: \"CHOL\", \"CHLPL\", \"TRIG\", \"HDL\", \"LDL\", \"LDLDIRECT\"      ECG 12 Lead    Date/Time: 3/3/2025 2:02 PM  Performed by: Mukul Bloom MD    Authorized by: Mukul Bloom MD  Comparison: compared with previous ECG from 10/1/2024  Similar to previous ECG  Rhythm: atrial fibrillation  Rate: normal  QRS axis: normal              8/31/2021  Left atrial volume is moderately increased.  The right atrial cavity is moderately dilated.  Mild to moderate mitral valve regurgitation is present.  Estimated left ventricular EF = 60% Left ventricular systolic function is normal.  Left ventricular diastolic function was indeterminate.         9/27/2021  ECG evidence of myocardial ischemia. Diffuse 1.5 mm downsloping ST depression is noted at peak stress with 1 mm ST elevation in aVR.  Myocardial perfusion imaging with no perfusion defect however transient ischemic dilatation score is elevated at 1.42. Considering significant EKG changes cannot exclude ischemia on this stress " study.    8/31/21  An abnormal monitor study.  Atrial fibrillation is present throughout  Average heart rate 62 bpm, range  bpm  Maximal R-R interval 2.9 seconds at 8:13 AM          Results for orders placed during the hospital encounter of 08/31/21    Adult Transthoracic Echo Complete W/ Cont if Necessary Per Protocol    Interpretation Summary  · Left atrial volume is moderately increased.  · The right atrial cavity is moderately dilated.  · Mild to moderate mitral valve regurgitation is present.  · Estimated left ventricular EF = 60% Left ventricular systolic function is normal.  · Left ventricular diastolic function was indeterminate.      DISCUSSION/SUMMARY  89-year-old female with a medical history of chronic atrial fibrillation, essential hypertension, frailty, colon cancer, mild to moderate mitral valve regurgitation who presents to me secondary to her atrial fibrillation and COLVIN.  She had a stress test which showed some ST depression and transient ischemic dilatation.  Patient has been more short of breath as of late and has very limited activity secondary to her dyspnea.  She does not appear to be volume overloaded and I think it is unlikely that valvular heart disease is playing a significant role.  No obvious pulmonary etiology.    1.  COLVIN  - Patient has severe dyspnea with minimal exertion that appears to be progressive.  She is also previously underwent a stress test with evidence of transient ischemic dilatation on the stress test.  -She does not appear to be volume overloaded.  Prior chest x-rays have been reviewed with no significant etiology.  -I suggest that we started with a transthoracic echocardiogram to evaluate her mild to moderate mitral valve regurgitation, however her murmur does not sound significant.  -Left and right heart catheterization also recommended in the setting of her progressive symptoms.    2.  Chronic atrial fibrillation  -Finally been her off of anticoagulant therapy  secondary to her frailty and advanced age.  -Continue atenolol at current dose.  I recommend that she decrease her digoxin every other day and plan on moving off of that in the future if her rate maintains good control.   Assistance OOB with selected safe patient handling equipment if applicable/Assistance with ambulation/Communicate risk of Fall with Harm to all staff, patient, and family/Monitor gait and stability/Provide patient with walking aids/Provide visual cue: red socks, yellow wristband, yellow gown, etc/Reinforce activity limits and safety measures with patient and family/Bed in lowest position, wheels locked, appropriate side rails in place/Call bell, personal items and telephone in reach/Instruct patient to call for assistance before getting out of bed/chair/stretcher/Non-slip footwear applied when patient is off stretcher/[place] to call system/Physically safe environment - no spills, clutter or unnecessary equipment/Purposeful Proactive Rounding/Room/bathroom lighting operational, light cord in reach

## 2025-03-12 RX ORDER — MONTELUKAST 10 MG/1
10 TABLET, FILM COATED ORAL
Qty: 90 | Refills: 1 | Status: ACTIVE | COMMUNITY
Start: 2025-03-12 | End: 1900-01-01

## 2025-03-25 ENCOUNTER — APPOINTMENT (OUTPATIENT)
Dept: FAMILY MEDICINE | Facility: CLINIC | Age: 80
End: 2025-03-25
Payer: MEDICARE

## 2025-03-25 VITALS
OXYGEN SATURATION: 99 % | DIASTOLIC BLOOD PRESSURE: 62 MMHG | BODY MASS INDEX: 21.86 KG/M2 | HEART RATE: 67 BPM | RESPIRATION RATE: 14 BRPM | WEIGHT: 136 LBS | TEMPERATURE: 97.9 F | HEIGHT: 66 IN | SYSTOLIC BLOOD PRESSURE: 114 MMHG

## 2025-03-25 DIAGNOSIS — J06.9 ACUTE UPPER RESPIRATORY INFECTION, UNSPECIFIED: ICD-10-CM

## 2025-03-25 DIAGNOSIS — G47.00 INSOMNIA, UNSPECIFIED: ICD-10-CM

## 2025-03-25 DIAGNOSIS — R05.9 COUGH, UNSPECIFIED: ICD-10-CM

## 2025-03-25 DIAGNOSIS — E03.9 HYPOTHYROIDISM, UNSPECIFIED: ICD-10-CM

## 2025-03-25 DIAGNOSIS — E78.00 PURE HYPERCHOLESTEROLEMIA, UNSPECIFIED: ICD-10-CM

## 2025-03-25 DIAGNOSIS — R04.0 EPISTAXIS: ICD-10-CM

## 2025-03-25 DIAGNOSIS — I25.10 ATHEROSCLEROTIC HEART DISEASE OF NATIVE CORONARY ARTERY W/OUT ANGINA PECTORIS: ICD-10-CM

## 2025-03-25 PROCEDURE — 99214 OFFICE O/P EST MOD 30 MIN: CPT

## 2025-03-25 PROCEDURE — G2211 COMPLEX E/M VISIT ADD ON: CPT

## 2025-03-25 PROCEDURE — 36415 COLL VENOUS BLD VENIPUNCTURE: CPT

## 2025-03-25 RX ORDER — DOXYCYCLINE HYCLATE 100 MG/1
100 CAPSULE ORAL
Qty: 14 | Refills: 0 | Status: ACTIVE | COMMUNITY
Start: 2025-03-25 | End: 1900-01-01

## 2025-03-26 LAB
RESP PATH DNA+RNA PNL NPH NAA+NON-PROBE: DETECTED
RV+EV RNA NPH QL NAA+NON-PROBE: DETECTED
SARS-COV-2 RNA RESP QL NAA+PROBE: NOT DETECTED

## 2025-03-27 LAB
BASOPHILS # BLD AUTO: 0.04 K/UL
BASOPHILS NFR BLD AUTO: 0.5 %
EOSINOPHIL # BLD AUTO: 0.26 K/UL
EOSINOPHIL NFR BLD AUTO: 3.3 %
HCT VFR BLD CALC: 37.3 %
HGB BLD-MCNC: 12.6 G/DL
IMM GRANULOCYTES NFR BLD AUTO: 0.3 %
LYMPHOCYTES # BLD AUTO: 1.37 K/UL
LYMPHOCYTES NFR BLD AUTO: 17.2 %
MAN DIFF?: NORMAL
MCHC RBC-ENTMCNC: 31.2 PG
MCHC RBC-ENTMCNC: 33.8 G/DL
MCV RBC AUTO: 92.3 FL
MONOCYTES # BLD AUTO: 0.78 K/UL
MONOCYTES NFR BLD AUTO: 9.8 %
NEUTROPHILS # BLD AUTO: 5.5 K/UL
NEUTROPHILS NFR BLD AUTO: 68.9 %
PLATELET # BLD AUTO: 206 K/UL
RBC # BLD: 4.04 M/UL
RBC # FLD: 13.7 %
WBC # FLD AUTO: 7.97 K/UL

## 2025-04-02 ENCOUNTER — RESULT CHARGE (OUTPATIENT)
Age: 80
End: 2025-04-02

## 2025-04-02 ENCOUNTER — NON-APPOINTMENT (OUTPATIENT)
Age: 80
End: 2025-04-02

## 2025-04-05 ENCOUNTER — INPATIENT (INPATIENT)
Facility: HOSPITAL | Age: 80
LOS: 5 days | Discharge: NURSING, REHAB OR LONG-TERM CARE | DRG: 641 | End: 2025-04-11
Attending: STUDENT IN AN ORGANIZED HEALTH CARE EDUCATION/TRAINING PROGRAM | Admitting: STUDENT IN AN ORGANIZED HEALTH CARE EDUCATION/TRAINING PROGRAM
Payer: MEDICARE

## 2025-04-05 VITALS
TEMPERATURE: 98 F | HEART RATE: 88 BPM | RESPIRATION RATE: 18 BRPM | SYSTOLIC BLOOD PRESSURE: 108 MMHG | DIASTOLIC BLOOD PRESSURE: 59 MMHG | HEIGHT: 66 IN | WEIGHT: 138.01 LBS | OXYGEN SATURATION: 98 %

## 2025-04-05 DIAGNOSIS — Z90.5 ACQUIRED ABSENCE OF KIDNEY: Chronic | ICD-10-CM

## 2025-04-05 LAB
ALBUMIN SERPL ELPH-MCNC: 3.3 G/DL — SIGNIFICANT CHANGE UP (ref 3.3–5.2)
ALP SERPL-CCNC: 69 U/L — SIGNIFICANT CHANGE UP (ref 40–120)
ALT FLD-CCNC: 15 U/L — SIGNIFICANT CHANGE UP
ANION GAP SERPL CALC-SCNC: 13 MMOL/L — SIGNIFICANT CHANGE UP (ref 5–17)
ANISOCYTOSIS BLD QL: SLIGHT — SIGNIFICANT CHANGE UP
APTT BLD: 31.6 SEC — SIGNIFICANT CHANGE UP (ref 24.5–35.6)
AST SERPL-CCNC: 18 U/L — SIGNIFICANT CHANGE UP
BASOPHILS # BLD AUTO: 0.02 K/UL — SIGNIFICANT CHANGE UP (ref 0–0.2)
BASOPHILS # BLD MANUAL: 0.06 K/UL — SIGNIFICANT CHANGE UP (ref 0–0.2)
BASOPHILS NFR BLD AUTO: 0.3 % — SIGNIFICANT CHANGE UP (ref 0–2)
BASOPHILS NFR BLD MANUAL: 0.9 % — SIGNIFICANT CHANGE UP (ref 0–2)
BILIRUB SERPL-MCNC: 0.4 MG/DL — SIGNIFICANT CHANGE UP (ref 0.4–2)
BUN SERPL-MCNC: 14.1 MG/DL — SIGNIFICANT CHANGE UP (ref 8–20)
CALCIUM SERPL-MCNC: 8.2 MG/DL — LOW (ref 8.4–10.5)
CHLORIDE SERPL-SCNC: 98 MMOL/L — SIGNIFICANT CHANGE UP (ref 96–108)
CO2 SERPL-SCNC: 19 MMOL/L — LOW (ref 22–29)
CREAT SERPL-MCNC: 0.91 MG/DL — SIGNIFICANT CHANGE UP (ref 0.5–1.3)
EGFR: 85 ML/MIN/1.73M2 — SIGNIFICANT CHANGE UP
EGFR: 85 ML/MIN/1.73M2 — SIGNIFICANT CHANGE UP
EOSINOPHIL # BLD AUTO: 0.28 K/UL — SIGNIFICANT CHANGE UP (ref 0–0.5)
EOSINOPHIL # BLD MANUAL: 0.38 K/UL — SIGNIFICANT CHANGE UP (ref 0–0.5)
EOSINOPHIL NFR BLD AUTO: 4.5 % — SIGNIFICANT CHANGE UP (ref 0–6)
EOSINOPHIL NFR BLD MANUAL: 6.1 % — HIGH (ref 0–6)
GLUCOSE SERPL-MCNC: 89 MG/DL — SIGNIFICANT CHANGE UP (ref 70–99)
HCT VFR BLD CALC: 35.9 % — LOW (ref 39–50)
HGB BLD-MCNC: 12.2 G/DL — LOW (ref 13–17)
IMM GRANULOCYTES # BLD AUTO: 0.03 K/UL — SIGNIFICANT CHANGE UP (ref 0–0.07)
IMM GRANULOCYTES NFR BLD AUTO: 0.5 % — SIGNIFICANT CHANGE UP (ref 0–0.9)
INR BLD: 1.11 RATIO — SIGNIFICANT CHANGE UP (ref 0.85–1.16)
LYMPHOCYTES # BLD AUTO: 0.37 K/UL — LOW (ref 1–3.3)
LYMPHOCYTES # BLD MANUAL: 0.27 K/UL — LOW (ref 1–3.3)
LYMPHOCYTES NFR BLD AUTO: 5.9 % — LOW (ref 13–44)
LYMPHOCYTES NFR BLD MANUAL: 4.4 % — LOW (ref 13–44)
MCHC RBC-ENTMCNC: 30.4 PG — SIGNIFICANT CHANGE UP (ref 27–34)
MCHC RBC-ENTMCNC: 34 G/DL — SIGNIFICANT CHANGE UP (ref 32–36)
MCV RBC AUTO: 89.5 FL — SIGNIFICANT CHANGE UP (ref 80–100)
MICROCYTES BLD QL: SLIGHT — SIGNIFICANT CHANGE UP
MONOCYTES # BLD AUTO: 0.62 K/UL — SIGNIFICANT CHANGE UP (ref 0–0.9)
MONOCYTES # BLD MANUAL: 0.38 K/UL — SIGNIFICANT CHANGE UP (ref 0–0.9)
MONOCYTES NFR BLD AUTO: 10 % — SIGNIFICANT CHANGE UP (ref 2–14)
MONOCYTES NFR BLD MANUAL: 6.1 % — SIGNIFICANT CHANGE UP (ref 2–14)
NEUTROPHILS # BLD AUTO: 4.9 K/UL — SIGNIFICANT CHANGE UP (ref 1.8–7.4)
NEUTROPHILS # BLD MANUAL: 5.13 K/UL — SIGNIFICANT CHANGE UP (ref 1.8–7.4)
NEUTROPHILS NFR BLD AUTO: 78.8 % — HIGH (ref 43–77)
NEUTROPHILS NFR BLD MANUAL: 82.5 % — HIGH (ref 43–77)
NRBC # BLD AUTO: 0 K/UL — SIGNIFICANT CHANGE UP (ref 0–0)
NRBC # FLD: 0 K/UL — SIGNIFICANT CHANGE UP (ref 0–0)
NRBC BLD AUTO-RTO: 0 /100 WBCS — SIGNIFICANT CHANGE UP (ref 0–0)
PLAT MORPH BLD: NORMAL — SIGNIFICANT CHANGE UP
PLATELET # BLD AUTO: 208 K/UL — SIGNIFICANT CHANGE UP (ref 150–400)
PMV BLD: 8.4 FL — SIGNIFICANT CHANGE UP (ref 7–13)
POLYCHROMASIA BLD QL SMEAR: SLIGHT — SIGNIFICANT CHANGE UP
POTASSIUM SERPL-MCNC: 4.6 MMOL/L — SIGNIFICANT CHANGE UP (ref 3.5–5.3)
POTASSIUM SERPL-SCNC: 4.6 MMOL/L — SIGNIFICANT CHANGE UP (ref 3.5–5.3)
PROT SERPL-MCNC: 6.5 G/DL — LOW (ref 6.6–8.7)
PROTHROM AB SERPL-ACNC: 12.9 SEC — SIGNIFICANT CHANGE UP (ref 9.9–13.4)
RBC # BLD: 4.01 M/UL — LOW (ref 4.2–5.8)
RBC # FLD: 13.1 % — SIGNIFICANT CHANGE UP (ref 10.3–14.5)
RBC BLD AUTO: ABNORMAL
SODIUM SERPL-SCNC: 130 MMOL/L — LOW (ref 135–145)
TROPONIN T, HIGH SENSITIVITY RESULT: 22 NG/L — SIGNIFICANT CHANGE UP (ref 0–51)
WBC # BLD: 6.22 K/UL — SIGNIFICANT CHANGE UP (ref 3.8–10.5)
WBC # FLD AUTO: 6.22 K/UL — SIGNIFICANT CHANGE UP (ref 3.8–10.5)

## 2025-04-05 PROCEDURE — 93010 ELECTROCARDIOGRAM REPORT: CPT

## 2025-04-05 PROCEDURE — 71045 X-RAY EXAM CHEST 1 VIEW: CPT | Mod: 26

## 2025-04-05 PROCEDURE — 99285 EMERGENCY DEPT VISIT HI MDM: CPT

## 2025-04-05 RX ORDER — SODIUM CHLORIDE 9 G/1000ML
1000 INJECTION, SOLUTION INTRAVENOUS ONCE
Refills: 0 | Status: COMPLETED | OUTPATIENT
Start: 2025-04-05 | End: 2025-04-05

## 2025-04-05 RX ADMIN — SODIUM CHLORIDE 1000 MILLILITER(S): 9 INJECTION, SOLUTION INTRAVENOUS at 23:22

## 2025-04-05 NOTE — ED PROVIDER NOTE - OBJECTIVE STATEMENT
79yom w/ CAD, HLD presents with weakness and dizziness. His son at bedside provides most history. For about two weeks now he has been getting progressively more weak and complaining of dizziness, especially when standing. He has fallen several times due to weakness. He was hospitalized at [facility] for the same thing and admitted for several days without any answers per son. Today he had labile blood pressures ranging from 70s to 200s and was reportedly febrile to 101.

## 2025-04-05 NOTE — ED ADULT NURSE NOTE - PAIN RATING/NUMBER SCALE (0-10): ACTIVITY
0 (no pain/absence of nonverbal indicators of pain) Detail Level: Zone Continue Regimen: Spironolactone, spot treat with azelaic acid, use Clindamycin all over face, tretnoin Plan: Refills sent to pharmacy on file Render In Strict Bullet Format?: No

## 2025-04-05 NOTE — ED PROVIDER NOTE - CLINICAL SUMMARY MEDICAL DECISION MAKING FREE TEXT BOX
Generalized weakness, ?orthostasic symptoms, no focal deficits on exam. Hemodynamically stable and afebrile but reportedly very labile vitals throughout the day per home care. Will screen for metabolic or infectious derangements, consider subacute stroke as well. Currently NIH 0. If medical evaluation unrevealing, consider PT consult for dispo planning.

## 2025-04-05 NOTE — ED ADULT NURSE NOTE - CHIEF COMPLAINT QUOTE
no dyspnea/no cough/no wheezing
pt BIBA for dizziness since 11am today, pt denies nvd cp HA syncope/ other complaints. FS 93 in triage BEFAST negative. Per ems pt hypotensive to 70-90's systolic, 1LNS given pta

## 2025-04-05 NOTE — ED ADULT TRIAGE NOTE - CHIEF COMPLAINT QUOTE
pt BIBA for dizziness since 11am today, pt denies nvd cp HA syncope/ other complaints. FS 93 in triage BEFAST negative. Per ems pt hypotensive to 70-90's systolic, 1LNS given pta

## 2025-04-05 NOTE — ED ADULT NURSE NOTE - NSFALLHARMRISKINTERV_ED_ALL_ED
Assistance OOB with selected safe patient handling equipment if applicable/Assistance with ambulation/Communicate risk of Fall with Harm to all staff, patient, and family/Encourage patient to sit up slowly, dangle for a short time, stand at bedside before walking/Monitor gait and stability/Orthostatic vital signs/Provide patient with walking aids/Provide visual cue: red socks, yellow wristband, yellow gown, etc/Reinforce activity limits and safety measures with patient and family/Bed in lowest position, wheels locked, appropriate side rails in place/Call bell, personal items and telephone in reach/Instruct patient to call for assistance before getting out of bed/chair/stretcher/Non-slip footwear applied when patient is off stretcher/[place] to call system/Physically safe environment - no spills, clutter or unnecessary equipment/Purposeful Proactive Rounding/Room/bathroom lighting operational, light cord in reach

## 2025-04-05 NOTE — ED PROVIDER NOTE - CARE PLAN
Principal Discharge DX:	Ataxia  Secondary Diagnosis:	Inability to walk  Secondary Diagnosis:	Hyponatremia   1

## 2025-04-05 NOTE — ED PROVIDER NOTE - PROGRESS NOTE DETAILS
Anais Falcon, DO: Patient reassessed after sign out. States he is having difficulty ambulating due to dizziness while sitting or standing, but not lying down. He also states that he is having difficulty speaking. On neuro testing for me, he has mild ataxia both sides finger to nose, symmetrical smile, mild garbled speech, and no pronator or LE drift. No contraindications for MRI per patient - will order MRI/MRA

## 2025-04-06 DIAGNOSIS — R27.0 ATAXIA, UNSPECIFIED: ICD-10-CM

## 2025-04-06 LAB
APPEARANCE UR: CLEAR — SIGNIFICANT CHANGE UP
BILIRUB UR-MCNC: NEGATIVE — SIGNIFICANT CHANGE UP
COLOR SPEC: YELLOW — SIGNIFICANT CHANGE UP
DIFF PNL FLD: NEGATIVE — SIGNIFICANT CHANGE UP
GAS PNL BLDV: SIGNIFICANT CHANGE UP
GLUCOSE UR QL: NEGATIVE MG/DL — SIGNIFICANT CHANGE UP
KETONES UR-MCNC: NEGATIVE MG/DL — SIGNIFICANT CHANGE UP
LEUKOCYTE ESTERASE UR-ACNC: NEGATIVE — SIGNIFICANT CHANGE UP
NITRITE UR-MCNC: NEGATIVE — SIGNIFICANT CHANGE UP
OSMOLALITY UR: 581 MOSM/KG — SIGNIFICANT CHANGE UP (ref 300–1000)
OSMOLALITY UR: 598 MOSM/KG — SIGNIFICANT CHANGE UP (ref 300–1000)
PH UR: 7.5 — SIGNIFICANT CHANGE UP (ref 5–8)
PROT UR-MCNC: NEGATIVE MG/DL — SIGNIFICANT CHANGE UP
RAPID RVP RESULT: SIGNIFICANT CHANGE UP
SARS-COV-2 RNA SPEC QL NAA+PROBE: SIGNIFICANT CHANGE UP
SODIUM UR-SCNC: 149 MMOL/L — SIGNIFICANT CHANGE UP
SP GR SPEC: 1.02 — SIGNIFICANT CHANGE UP (ref 1–1.03)
TROPONIN T, HIGH SENSITIVITY RESULT: 23 NG/L — SIGNIFICANT CHANGE UP (ref 0–51)
UROBILINOGEN FLD QL: 0.2 MG/DL — SIGNIFICANT CHANGE UP (ref 0.2–1)

## 2025-04-06 PROCEDURE — 70450 CT HEAD/BRAIN W/O DYE: CPT | Mod: 26

## 2025-04-06 PROCEDURE — 99223 1ST HOSP IP/OBS HIGH 75: CPT

## 2025-04-06 RX ORDER — SACUBITRIL AND VALSARTAN 6; 6 MG/1; MG/1
1 PELLET ORAL
Refills: 0 | Status: DISCONTINUED | OUTPATIENT
Start: 2025-04-06 | End: 2025-04-09

## 2025-04-06 RX ORDER — ACETAMINOPHEN 500 MG/5ML
650 LIQUID (ML) ORAL EVERY 6 HOURS
Refills: 0 | Status: DISCONTINUED | OUTPATIENT
Start: 2025-04-06 | End: 2025-04-11

## 2025-04-06 RX ORDER — LEVOTHYROXINE SODIUM 300 MCG
50 TABLET ORAL DAILY
Refills: 0 | Status: DISCONTINUED | OUTPATIENT
Start: 2025-04-06 | End: 2025-04-11

## 2025-04-06 RX ORDER — METOPROLOL SUCCINATE 50 MG/1
25 TABLET, EXTENDED RELEASE ORAL DAILY
Refills: 0 | Status: DISCONTINUED | OUTPATIENT
Start: 2025-04-06 | End: 2025-04-09

## 2025-04-06 RX ORDER — CLOPIDOGREL BISULFATE 75 MG/1
75 TABLET, FILM COATED ORAL DAILY
Refills: 0 | Status: DISCONTINUED | OUTPATIENT
Start: 2025-04-06 | End: 2025-04-11

## 2025-04-06 RX ORDER — MONTELUKAST SODIUM 10 MG/1
10 TABLET ORAL AT BEDTIME
Refills: 0 | Status: DISCONTINUED | OUTPATIENT
Start: 2025-04-06 | End: 2025-04-11

## 2025-04-06 RX ORDER — TAMSULOSIN HYDROCHLORIDE 0.4 MG/1
0.4 CAPSULE ORAL AT BEDTIME
Refills: 0 | Status: DISCONTINUED | OUTPATIENT
Start: 2025-04-06 | End: 2025-04-11

## 2025-04-06 RX ORDER — EVOLOCUMAB 140 MG/ML
140 INJECTION, SOLUTION SUBCUTANEOUS
Refills: 0 | DISCHARGE

## 2025-04-06 RX ADMIN — MONTELUKAST SODIUM 10 MILLIGRAM(S): 10 TABLET ORAL at 21:10

## 2025-04-06 RX ADMIN — Medication 1000 MILLILITER(S): at 06:57

## 2025-04-06 RX ADMIN — METOPROLOL SUCCINATE 25 MILLIGRAM(S): 50 TABLET, EXTENDED RELEASE ORAL at 13:56

## 2025-04-06 RX ADMIN — Medication 50 MICROGRAM(S): at 12:30

## 2025-04-06 RX ADMIN — TAMSULOSIN HYDROCHLORIDE 0.4 MILLIGRAM(S): 0.4 CAPSULE ORAL at 21:10

## 2025-04-06 RX ADMIN — SACUBITRIL AND VALSARTAN 1 TABLET(S): 6; 6 PELLET ORAL at 19:47

## 2025-04-06 RX ADMIN — CLOPIDOGREL BISULFATE 75 MILLIGRAM(S): 75 TABLET, FILM COATED ORAL at 13:56

## 2025-04-06 NOTE — H&P ADULT - HISTORY OF PRESENT ILLNESS
79M presented with weakness and dizziness. The patient reported that the symptoms began about two weeks prior and was associated with episodes of falls. The patient denied any significant injuries and denied any loss of consciousness. He was unaware of any relieving factors. He noted that the symptoms are worsened with standing up and attempts to walk. He does note a history of tinnitus which had not changed in severity. There was no associated nausea or vomiting but he did note decrease in appetite and oral intake. The patient was reported to have had labile blood pressures and a fever. The patient was reported to have been previously hospitalized previously for similar symptoms but was without a definitive diagnosis. In the emergency department, the patient had continued symptoms and was thought to require admission to the hospital for further treatment.

## 2025-04-06 NOTE — ED ADULT NURSE REASSESSMENT NOTE - NS ED NURSE REASSESS COMMENT FT1
pt handed off to RN VM in stable condition. pt in no apparent distress noted at this time. vital signs stable. pt transferred to holding room in stable condition.

## 2025-04-06 NOTE — H&P ADULT - ASSESSMENT
79M with a history of coronary artery disease, renal cell carcinoma with nephrectomy, and hypothyroidism, who presented with complaints of weakness and dizziness found to have hyponatremia.    Hyponatremia  - Intravenous fluids were administered in the emergency department  - To monitor sodium levels  - Suspect secondary to poor oral intake  - Urine studies requested  - TSH requested    Dizziness / Weakness  - Ataxia noted in the emergency department  - CT head was without acute intracranial pathology  - No focal neurological deficits noted  - For MRI to further assess  - Fall precautions    Coronary artery disease  - On clopidogrel  - Continue on metoprolol and sacubitril/valsartan    Hypothyroidism  - On levothyroxine  - TSH requested        Dispo: Anticipate discharge home in 2-3 days pending clinical course and MRI results 79M with a history of coronary artery disease, renal cell carcinoma with nephrectomy, and hypothyroidism, who presented with complaints of weakness and dizziness found to have hyponatremia.    Hyponatremia  - Intravenous fluids were administered in the emergency department  - To monitor sodium levels  - Suspect secondary to poor oral intake  - Urine studies requested  - TSH requested    Dizziness / Weakness  - Ataxia noted in the emergency department  - CT head was without acute intracranial pathology  - No focal neurological deficits noted  - For MRI to further assess  - Fall precautions    Fever  - Reported fever prior to presentation  - Afebrile in the emergency department  - Urinalysis was not indicative of infection  - Chest Xray was without infiltrates  - Blood culture results to be reviewed when available    Coronary artery disease  - On clopidogrel  - Continue on metoprolol and sacubitril/valsartan    Hypothyroidism  - On levothyroxine  - TSH requested        Dispo: Anticipate discharge home in 2-3 days pending clinical course and MRI results

## 2025-04-06 NOTE — H&P ADULT - NSHPLABSRESULTS_GEN_ALL_CORE
CT of the head was reviewed, no acute intracranial pathology  Chest Xray was reviewed, no acute pulmonary disease

## 2025-04-06 NOTE — H&P ADULT - NSHPPHYSICALEXAM_GEN_ALL_CORE
Vital Signs Last 24 Hrs  T(C): 37.1 (06 Apr 2025 08:24), Max: 37.9 (05 Apr 2025 23:52)  T(F): 98.7 (06 Apr 2025 08:24), Max: 100.3 (05 Apr 2025 23:52)  HR: 76 (06 Apr 2025 08:24) (75 - 88)  BP: 120/65 (06 Apr 2025 08:24) (102/60 - 140/65)  BP(mean): 84 (06 Apr 2025 08:24) (74 - 90)  RR: 18 (06 Apr 2025 08:24) (16 - 20)  SpO2: 95% (06 Apr 2025 08:24) (95% - 98%)    Parameters below as of 06 Apr 2025 08:24  Patient On (Oxygen Delivery Method): room air    General appearance: No acute distress, Awake, Alert, Soft spoken  HEENT: Normocephalic, Atraumatic, Conjunctiva clear, EOMI  Neck: Supple, No JVD, No tenderness  Lungs: Breath sound equal bilaterally, No wheezes, No rales  Cardiovascular: S1S2, Regular rhythm  Abdomen: Soft, Nontender, Nondistended, No guarding/rebound, Positive bowel sounds  Extremities: No clubbing, No cyanosis, No edema, No calf tenderness  Neuro: Strength equal bilaterally, No tremors  Psychiatric: Appropriate mood, Normal affect

## 2025-04-07 LAB
ANION GAP SERPL CALC-SCNC: 13 MMOL/L — SIGNIFICANT CHANGE UP (ref 5–17)
BUN SERPL-MCNC: 9.6 MG/DL — SIGNIFICANT CHANGE UP (ref 8–20)
CALCIUM SERPL-MCNC: 8.3 MG/DL — LOW (ref 8.4–10.5)
CHLORIDE SERPL-SCNC: 99 MMOL/L — SIGNIFICANT CHANGE UP (ref 96–108)
CO2 SERPL-SCNC: 20 MMOL/L — LOW (ref 22–29)
CREAT SERPL-MCNC: 0.86 MG/DL — SIGNIFICANT CHANGE UP (ref 0.5–1.3)
EGFR: 88 ML/MIN/1.73M2 — SIGNIFICANT CHANGE UP
EGFR: 88 ML/MIN/1.73M2 — SIGNIFICANT CHANGE UP
GLUCOSE SERPL-MCNC: 95 MG/DL — SIGNIFICANT CHANGE UP (ref 70–99)
HCT VFR BLD CALC: 32.8 % — LOW (ref 39–50)
HGB BLD-MCNC: 11.4 G/DL — LOW (ref 13–17)
MCHC RBC-ENTMCNC: 30.6 PG — SIGNIFICANT CHANGE UP (ref 27–34)
MCHC RBC-ENTMCNC: 34.8 G/DL — SIGNIFICANT CHANGE UP (ref 32–36)
MCV RBC AUTO: 87.9 FL — SIGNIFICANT CHANGE UP (ref 80–100)
NRBC # BLD AUTO: 0 K/UL — SIGNIFICANT CHANGE UP (ref 0–0)
NRBC # FLD: 0 K/UL — SIGNIFICANT CHANGE UP (ref 0–0)
NRBC BLD AUTO-RTO: 0 /100 WBCS — SIGNIFICANT CHANGE UP (ref 0–0)
PLATELET # BLD AUTO: 182 K/UL — SIGNIFICANT CHANGE UP (ref 150–400)
PMV BLD: 7.9 FL — SIGNIFICANT CHANGE UP (ref 7–13)
POTASSIUM SERPL-MCNC: 4.2 MMOL/L — SIGNIFICANT CHANGE UP (ref 3.5–5.3)
POTASSIUM SERPL-SCNC: 4.2 MMOL/L — SIGNIFICANT CHANGE UP (ref 3.5–5.3)
RBC # BLD: 3.73 M/UL — LOW (ref 4.2–5.8)
RBC # FLD: 12.8 % — SIGNIFICANT CHANGE UP (ref 10.3–14.5)
SODIUM SERPL-SCNC: 132 MMOL/L — LOW (ref 135–145)
TSH SERPL-MCNC: 2.54 UIU/ML — SIGNIFICANT CHANGE UP (ref 0.27–4.2)
WBC # BLD: 4.8 K/UL — SIGNIFICANT CHANGE UP (ref 3.8–10.5)
WBC # FLD AUTO: 4.8 K/UL — SIGNIFICANT CHANGE UP (ref 3.8–10.5)

## 2025-04-07 PROCEDURE — 99233 SBSQ HOSP IP/OBS HIGH 50: CPT

## 2025-04-07 RX ADMIN — MONTELUKAST SODIUM 10 MILLIGRAM(S): 10 TABLET ORAL at 21:22

## 2025-04-07 RX ADMIN — METOPROLOL SUCCINATE 25 MILLIGRAM(S): 50 TABLET, EXTENDED RELEASE ORAL at 05:18

## 2025-04-07 RX ADMIN — Medication 50 MICROGRAM(S): at 05:18

## 2025-04-07 RX ADMIN — TAMSULOSIN HYDROCHLORIDE 0.4 MILLIGRAM(S): 0.4 CAPSULE ORAL at 21:22

## 2025-04-07 RX ADMIN — SACUBITRIL AND VALSARTAN 1 TABLET(S): 6; 6 PELLET ORAL at 17:24

## 2025-04-07 RX ADMIN — CLOPIDOGREL BISULFATE 75 MILLIGRAM(S): 75 TABLET, FILM COATED ORAL at 09:16

## 2025-04-07 RX ADMIN — SACUBITRIL AND VALSARTAN 1 TABLET(S): 6; 6 PELLET ORAL at 05:18

## 2025-04-07 NOTE — PHYSICAL THERAPY INITIAL EVALUATION ADULT - PERTINENT HX OF CURRENT PROBLEM, REHAB EVAL
none 79M presented with weakness and dizziness. The patient reported that the symptoms began about two weeks prior and was associated with episodes of falls.

## 2025-04-07 NOTE — SWALLOW BEDSIDE ASSESSMENT ADULT - SWALLOW EVAL: DIAGNOSIS
Oral stage grossly functional for assessed trials. Pharyngeal stage of swallow clinically unremarkable for assessed PO. No overt s/s aspiration noted post intake

## 2025-04-07 NOTE — PHYSICAL THERAPY INITIAL EVALUATION ADULT - ADDITIONAL COMMENTS
Pt AxOX4 states he lives at home with brother with 0STE chairlift upstairs. Pt was independent with amb with rollator at home, states having 24/7 Aides that assist with ADLs

## 2025-04-07 NOTE — SWALLOW BEDSIDE ASSESSMENT ADULT - COMMENTS
Pt reported: "that was difficult" post swallow. When asked what specifically was difficult, pt unable to further elaborate As per MD note: "79M with a history of coronary artery disease, renal cell carcinoma with nephrectomy, and hypothyroidism, who presented with complaints of weakness and dizziness found to have hyponatremia."

## 2025-04-07 NOTE — SWALLOW BEDSIDE ASSESSMENT ADULT - SLP GENERAL OBSERVATIONS
Pt received & seen seated upright in bed, awake/alert, reduced vocal intensity & cognition, 0/10 pain pre/post

## 2025-04-08 LAB
ANION GAP SERPL CALC-SCNC: 15 MMOL/L — SIGNIFICANT CHANGE UP (ref 5–17)
BASOPHILS # BLD AUTO: 0.02 K/UL — SIGNIFICANT CHANGE UP (ref 0–0.2)
BASOPHILS NFR BLD AUTO: 0.4 % — SIGNIFICANT CHANGE UP (ref 0–2)
BUN SERPL-MCNC: 9.6 MG/DL — SIGNIFICANT CHANGE UP (ref 8–20)
CALCIUM SERPL-MCNC: 8.2 MG/DL — LOW (ref 8.4–10.5)
CHLORIDE SERPL-SCNC: 98 MMOL/L — SIGNIFICANT CHANGE UP (ref 96–108)
CO2 SERPL-SCNC: 19 MMOL/L — LOW (ref 22–29)
CREAT SERPL-MCNC: 0.75 MG/DL — SIGNIFICANT CHANGE UP (ref 0.5–1.3)
EGFR: 91 ML/MIN/1.73M2 — SIGNIFICANT CHANGE UP
EGFR: 91 ML/MIN/1.73M2 — SIGNIFICANT CHANGE UP
EOSINOPHIL # BLD AUTO: 0.48 K/UL — SIGNIFICANT CHANGE UP (ref 0–0.5)
EOSINOPHIL NFR BLD AUTO: 10.3 % — HIGH (ref 0–6)
GLUCOSE SERPL-MCNC: 90 MG/DL — SIGNIFICANT CHANGE UP (ref 70–99)
HCT VFR BLD CALC: 34.4 % — LOW (ref 39–50)
HGB BLD-MCNC: 12 G/DL — LOW (ref 13–17)
IMM GRANULOCYTES # BLD AUTO: 0.02 K/UL — SIGNIFICANT CHANGE UP (ref 0–0.07)
IMM GRANULOCYTES NFR BLD AUTO: 0.4 % — SIGNIFICANT CHANGE UP (ref 0–0.9)
LYMPHOCYTES # BLD AUTO: 0.74 K/UL — LOW (ref 1–3.3)
LYMPHOCYTES NFR BLD AUTO: 15.9 % — SIGNIFICANT CHANGE UP (ref 13–44)
MCHC RBC-ENTMCNC: 30.4 PG — SIGNIFICANT CHANGE UP (ref 27–34)
MCHC RBC-ENTMCNC: 34.9 G/DL — SIGNIFICANT CHANGE UP (ref 32–36)
MCV RBC AUTO: 87.1 FL — SIGNIFICANT CHANGE UP (ref 80–100)
MONOCYTES # BLD AUTO: 0.68 K/UL — SIGNIFICANT CHANGE UP (ref 0–0.9)
MONOCYTES NFR BLD AUTO: 14.6 % — HIGH (ref 2–14)
NEUTROPHILS # BLD AUTO: 2.71 K/UL — SIGNIFICANT CHANGE UP (ref 1.8–7.4)
NEUTROPHILS NFR BLD AUTO: 58.4 % — SIGNIFICANT CHANGE UP (ref 43–77)
NRBC # BLD AUTO: 0 K/UL — SIGNIFICANT CHANGE UP (ref 0–0)
NRBC # FLD: 0 K/UL — SIGNIFICANT CHANGE UP (ref 0–0)
NRBC BLD AUTO-RTO: 0 /100 WBCS — SIGNIFICANT CHANGE UP (ref 0–0)
PLATELET # BLD AUTO: 188 K/UL — SIGNIFICANT CHANGE UP (ref 150–400)
PMV BLD: 8.3 FL — SIGNIFICANT CHANGE UP (ref 7–13)
POTASSIUM SERPL-MCNC: 4 MMOL/L — SIGNIFICANT CHANGE UP (ref 3.5–5.3)
POTASSIUM SERPL-SCNC: 4 MMOL/L — SIGNIFICANT CHANGE UP (ref 3.5–5.3)
RBC # BLD: 3.95 M/UL — LOW (ref 4.2–5.8)
RBC # FLD: 12.7 % — SIGNIFICANT CHANGE UP (ref 10.3–14.5)
SODIUM SERPL-SCNC: 131 MMOL/L — LOW (ref 135–145)
WBC # BLD: 4.65 K/UL — SIGNIFICANT CHANGE UP (ref 3.8–10.5)
WBC # FLD AUTO: 4.65 K/UL — SIGNIFICANT CHANGE UP (ref 3.8–10.5)

## 2025-04-08 PROCEDURE — 70551 MRI BRAIN STEM W/O DYE: CPT | Mod: 26

## 2025-04-08 PROCEDURE — 99232 SBSQ HOSP IP/OBS MODERATE 35: CPT

## 2025-04-08 PROCEDURE — 70547 MR ANGIOGRAPHY NECK W/O DYE: CPT | Mod: 26

## 2025-04-08 PROCEDURE — 70544 MR ANGIOGRAPHY HEAD W/O DYE: CPT | Mod: 26,XU

## 2025-04-08 RX ADMIN — CLOPIDOGREL BISULFATE 75 MILLIGRAM(S): 75 TABLET, FILM COATED ORAL at 17:21

## 2025-04-08 RX ADMIN — SACUBITRIL AND VALSARTAN 1 TABLET(S): 6; 6 PELLET ORAL at 17:21

## 2025-04-08 RX ADMIN — SACUBITRIL AND VALSARTAN 1 TABLET(S): 6; 6 PELLET ORAL at 05:12

## 2025-04-08 RX ADMIN — Medication 50 MICROGRAM(S): at 05:12

## 2025-04-08 RX ADMIN — MONTELUKAST SODIUM 10 MILLIGRAM(S): 10 TABLET ORAL at 20:52

## 2025-04-08 RX ADMIN — Medication 1000 MILLILITER(S): at 09:19

## 2025-04-08 RX ADMIN — TAMSULOSIN HYDROCHLORIDE 0.4 MILLIGRAM(S): 0.4 CAPSULE ORAL at 20:52

## 2025-04-08 RX ADMIN — METOPROLOL SUCCINATE 25 MILLIGRAM(S): 50 TABLET, EXTENDED RELEASE ORAL at 05:12

## 2025-04-08 NOTE — PROGRESS NOTE ADULT - ATTENDING COMMENTS
79 year old male with Dyslipidemia, Coronary Artery Disease s/p PCI, Hypothyroidism, GERD and Left Renal Cell Carcinoma s/p Nephrectomy presented with dizziness x 2 weeks.  Hypotensive at presentation and noted to have mild hyponatremia and admitted for further management.    Patient interviewed and examined.  Occasional dizziness. No chest pain, dyspnea, palpitations, fever or chills    BP improved  Elderly male lying in bed, comfortable  regular rate and rhythm S1 S2  Clear to auscultation bilaterally  Soft nondistended nontender BS+  No edema  Able to lift all extremities off bed    Hgb 12.2  Na 131  Blood culture (-)  CT head (-)    # Dizziness, possible related to hypotension (?Medication related - patient on ARNi)  # Hyponatremia, likely hypovolemic. Received 2500ml IVF so far, urine studies affected by 9% NaCl  # Anemia, mild, stable  # CAD, asymptomatic  # Hypothyroidism, normal TSH    - Monitor for arrhythmia  - Orthostatic; may need to discontinue ARNi if persistent hypotension and/or dizziness  - Hydration encourage  - MRI pending  - Check TTE    PT recommendation for home vs [facility]

## 2025-04-09 DIAGNOSIS — R42 DIZZINESS AND GIDDINESS: ICD-10-CM

## 2025-04-09 DIAGNOSIS — I25.10 ATHEROSCLEROTIC HEART DISEASE OF NATIVE CORONARY ARTERY WITHOUT ANGINA PECTORIS: ICD-10-CM

## 2025-04-09 DIAGNOSIS — I47.19 OTHER SUPRAVENTRICULAR TACHYCARDIA: ICD-10-CM

## 2025-04-09 LAB
ALBUMIN SERPL ELPH-MCNC: 3.1 G/DL — LOW (ref 3.3–5.2)
ALP SERPL-CCNC: 76 U/L — SIGNIFICANT CHANGE UP (ref 40–120)
ALT FLD-CCNC: 11 U/L — SIGNIFICANT CHANGE UP
ANION GAP SERPL CALC-SCNC: 14 MMOL/L — SIGNIFICANT CHANGE UP (ref 5–17)
AST SERPL-CCNC: 17 U/L — SIGNIFICANT CHANGE UP
BILIRUB SERPL-MCNC: 0.3 MG/DL — LOW (ref 0.4–2)
BUN SERPL-MCNC: 10.5 MG/DL — SIGNIFICANT CHANGE UP (ref 8–20)
CALCIUM SERPL-MCNC: 8.5 MG/DL — SIGNIFICANT CHANGE UP (ref 8.4–10.5)
CHLORIDE SERPL-SCNC: 100 MMOL/L — SIGNIFICANT CHANGE UP (ref 96–108)
CO2 SERPL-SCNC: 20 MMOL/L — LOW (ref 22–29)
CREAT SERPL-MCNC: 0.74 MG/DL — SIGNIFICANT CHANGE UP (ref 0.5–1.3)
EGFR: 92 ML/MIN/1.73M2 — SIGNIFICANT CHANGE UP
EGFR: 92 ML/MIN/1.73M2 — SIGNIFICANT CHANGE UP
GLUCOSE SERPL-MCNC: 79 MG/DL — SIGNIFICANT CHANGE UP (ref 70–99)
HCT VFR BLD CALC: 33 % — LOW (ref 39–50)
HGB BLD-MCNC: 11.7 G/DL — LOW (ref 13–17)
MAGNESIUM SERPL-MCNC: 1.7 MG/DL — SIGNIFICANT CHANGE UP (ref 1.6–2.6)
MCHC RBC-ENTMCNC: 31.2 PG — SIGNIFICANT CHANGE UP (ref 27–34)
MCHC RBC-ENTMCNC: 35.5 G/DL — SIGNIFICANT CHANGE UP (ref 32–36)
MCV RBC AUTO: 88 FL — SIGNIFICANT CHANGE UP (ref 80–100)
NRBC # BLD AUTO: 0 K/UL — SIGNIFICANT CHANGE UP (ref 0–0)
NRBC # FLD: 0 K/UL — SIGNIFICANT CHANGE UP (ref 0–0)
NRBC BLD AUTO-RTO: 0 /100 WBCS — SIGNIFICANT CHANGE UP (ref 0–0)
OSMOLALITY SERPL: 283 MOSMOL/KG — SIGNIFICANT CHANGE UP (ref 280–301)
OSMOLALITY UR: 482 MOSM/KG — SIGNIFICANT CHANGE UP (ref 300–1000)
PHOSPHATE SERPL-MCNC: 2.6 MG/DL — SIGNIFICANT CHANGE UP (ref 2.4–4.7)
PLATELET # BLD AUTO: 211 K/UL — SIGNIFICANT CHANGE UP (ref 150–400)
PMV BLD: 8.3 FL — SIGNIFICANT CHANGE UP (ref 7–13)
POTASSIUM SERPL-MCNC: 4.3 MMOL/L — SIGNIFICANT CHANGE UP (ref 3.5–5.3)
POTASSIUM SERPL-SCNC: 4.3 MMOL/L — SIGNIFICANT CHANGE UP (ref 3.5–5.3)
PROT SERPL-MCNC: 6.3 G/DL — LOW (ref 6.6–8.7)
RBC # BLD: 3.75 M/UL — LOW (ref 4.2–5.8)
RBC # FLD: 13 % — SIGNIFICANT CHANGE UP (ref 10.3–14.5)
SODIUM SERPL-SCNC: 134 MMOL/L — LOW (ref 135–145)
SODIUM UR-SCNC: 82 MMOL/L — SIGNIFICANT CHANGE UP
WBC # BLD: 4.07 K/UL — SIGNIFICANT CHANGE UP (ref 3.8–10.5)
WBC # FLD AUTO: 4.07 K/UL — SIGNIFICANT CHANGE UP (ref 3.8–10.5)

## 2025-04-09 PROCEDURE — 99221 1ST HOSP IP/OBS SF/LOW 40: CPT

## 2025-04-09 PROCEDURE — 99232 SBSQ HOSP IP/OBS MODERATE 35: CPT

## 2025-04-09 PROCEDURE — 93010 ELECTROCARDIOGRAM REPORT: CPT

## 2025-04-09 RX ORDER — METOPROLOL SUCCINATE 50 MG/1
25 TABLET, EXTENDED RELEASE ORAL DAILY
Refills: 0 | Status: DISCONTINUED | OUTPATIENT
Start: 2025-04-09 | End: 2025-04-11

## 2025-04-09 RX ADMIN — CLOPIDOGREL BISULFATE 75 MILLIGRAM(S): 75 TABLET, FILM COATED ORAL at 17:10

## 2025-04-09 RX ADMIN — METOPROLOL SUCCINATE 25 MILLIGRAM(S): 50 TABLET, EXTENDED RELEASE ORAL at 05:02

## 2025-04-09 RX ADMIN — TAMSULOSIN HYDROCHLORIDE 0.4 MILLIGRAM(S): 0.4 CAPSULE ORAL at 21:48

## 2025-04-09 RX ADMIN — Medication 50 MICROGRAM(S): at 05:02

## 2025-04-09 RX ADMIN — SACUBITRIL AND VALSARTAN 1 TABLET(S): 6; 6 PELLET ORAL at 05:02

## 2025-04-09 RX ADMIN — MONTELUKAST SODIUM 10 MILLIGRAM(S): 10 TABLET ORAL at 21:47

## 2025-04-09 NOTE — CONSULT NOTE ADULT - PROBLEM SELECTOR RECOMMENDATION 2
.  - hx of CAD  - [facility] on 8/24/2023 revealing significant 2 vessel CAD, pLAD 90% and p RCA ( fills via collaterals from the LCx), mild LV dysfunction LVEF 40-45%, successful PCI of pLAD with DESx1, successful IVUS of LAD.  - hsTnT 22/23   - EKG NSR no ischemia noted      No further inpatient cardiac work up at this time.Patient should follow up with Dr. Dimas in 2 weeks for MCOT.  Will sign off.  Please reconsult if needed.

## 2025-04-09 NOTE — CONSULT NOTE ADULT - SUBJECTIVE AND OBJECTIVE BOX
[facility]                                              CARDIOLOGY AT [place] [address]                                             Telephone: [phone]. Fax:[phone]      INCOMPLETE                                                     CARDIOLOGY CONSULTATION NOTE                                                                                             History obtained by: Patient and medical record  Community Cardiologist: Dr. Louis   obtained: Yes [  ] No [ x ]  Reason for Consultation: Arrhythmia  Available out pt records reviewed: Yes [  ] No [  ]    Chief complaint:    Patient is a 79y old  Male who presents with a chief complaint of     HPI:    79m with PMHx CAD, HLD (Praluent and started Leqvio in 2023.), MI (1988), HTN, Asthma, who presented to [facility] ED with weakness and dizziness. The patient reported that the symptoms began about two weeks prior and was associated with episodes of falls. The patient denied any significant injuries and denied any loss of consciousness. He was unaware of any relieving factors. He noted that the symptoms are worsened with standing up and attempts to walk. He does note a history of tinnitus which had not changed in severity. There was no associated nausea or vomiting but he did note decrease in appetite and oral intake. The patient was reported to have had labile blood pressures and a fever. The patient was reported to have been previously hospitalized previously for similar symptoms but was without a definitive diagnosis. In the emergency department, the patient had continued symptoms and was thought to require admission to the hospital for further treatment. Cardiology consulted for new arrythmia and hx of CAD.     CARDIAC TESTING   ECHO: PENDING   Echo: 9/2023 TTE CONCLUSIONS:  ?  1. Left ventricular systolic function is normal with an ejection fraction of 55 % by López's method of disks with an ejection fraction visually estimated at 55 to 60 %.  2. Basal and mid inferior wall is abnormal.  3. There is mild (grade 1) left ventricular diastolic dysfunction.  4. Right ventricular cavity is normal in size and normal systolic function.  5. Trace aortic regurgitation.  6. Mild calcification of the aortic valve leaflets.  7. Mild tricuspid regurgitation.  8. No pericardial effusion seen.  9. Compared to the transthoracic echocardiogram performed on 6/20/2023 there have been no significant interval changes.  STRESS:    CATH: [facility] on 8/24/2023 revealing significant 2 vessel CAD, pLAD 90% and p RCA ( fills via collaterals from the LCx), mild LV dysfunction LVEF 40-45%, successful PCI of pLAD with DESx1, successful IVUS of LAD.    ELECTROPHYSIOLOGY:     Cardiac MRI: revealed a LVEF=42%. No LGE or fibrosis.    PAST MEDICAL HISTORY  CAD (coronary artery disease)    Myocardial infarction    Dyslipidemia    Asthma    GERD (gastroesophageal reflux disease)    Rheumatoid arthritis    Tinnitus    Renal cell cancer        PAST SURGICAL HISTORY  CAD S/P percutaneous coronary angioplasty    Hx of cholecystectomy    H/O arthroscopy of left knee    H/O inguinal hernia repair    H/O foot surgery    S/p nephrectomy        SOCIAL HISTORY:  Denies smoking/alcohol/drugs  CIGARETTES:     ALCOHOL:  DRUGS:    FAMILY HISTORY:  FH: myocardial infarction (Father)      Family History of Cardiovascular Disease:  Yes [ x ] No [  ]  Coronary Artery Disease in first degree relative: Yes [ x ] No [  ]  Sudden Cardiac Death in First degree relative: Yes [ x ] No [  ]    HOME MEDICATIONS:  Entresto 24 mg-26 mg oral tablet: 1 tab(s) orally 2 times a day (06 Apr 2025 09:29)  levothyroxine 50 mcg (0.05 mg) oral tablet: 1 tab(s) orally once a day (06 Apr 2025 09:29)  metoprolol succinate 25 mg oral capsule, extended release: 1 cap(s) orally once a day (06 Apr 2025 09:29)  montelukast 10 mg oral tablet: 1 tab(s) orally once a day (at bedtime) (06 Apr 2025 09:29)  Plavix 75 mg oral tablet: 1 tab(s) orally once a day (06 Apr 2025 09:29)  Repatha 140 mg/mL subcutaneous solution: 140 milligram(s) subcutaneously every 2 weeks (06 Apr 2025 09:29)  tamsulosin 0.4 mg oral capsule: 1 cap(s) orally once a day (at bedtime) (06 Apr 2025 09:29)  zolpidem 10 mg oral tablet: 1 tab(s) orally once a day (06 Apr 2025 09:29)      CURRENT CARDIAC MEDICATIONS:  metoprolol succinate ER 25 milliGRAM(s) Oral daily      CURRENT OTHER MEDICATIONS:  montelukast 10 milliGRAM(s) Oral at bedtime  acetaminophen     Tablet .. 650 milliGRAM(s) Oral every 6 hours PRN Temp greater or equal to 38C (100.4F), Mild Pain (1 - 3)  clopidogrel Tablet 75 milliGRAM(s) Oral daily  levothyroxine 50 MICROGram(s) Oral daily  tamsulosin 0.4 milliGRAM(s) Oral at bedtime      ALLERGIES:   No Known Allergies  statins (Unknown)      REVIEW OF SYMPTOMS:   CONSTITUTIONAL: No fever, no chills, no weight loss, no weight gain, no fatigue   ENMT:  No vertigo; No sinus or throat pain  NECK: No pain or stiffness  CARDIOVASCULAR: No chest pain, no dyspnea, no syncope/presyncope, no palpitations, no dizziness, no Orthopnea, no Paroxsymal nocturnal dyspnea  RESPIRATORY: no Shortness of breath, no cough, no wheezing  : No dysuria, no hematuria   GI: No dark color stool, no nausea, no diarrhea, no constipation, no abdominal pain   NEURO: No headache, no slurred speech   MUSCULOSKELETAL: No joint pain or swelling; No muscle, back, or extremity pain  PSYCH: No agitation, no anxiety.    ALL OTHER REVIEW OF SYSTEMS ARE NEGATIVE.    VITAL SIGNS:  T(C): 36.3 (04-09-25 @ 07:58), Max: 36.7 (04-09-25 @ 04:15)  T(F): 97.4 (04-09-25 @ 07:58), Max: 98.1 (04-09-25 @ 04:15)  HR: 70 (04-09-25 @ 07:58) (69 - 72)  BP: 104/66 (04-09-25 @ 07:58) (104/66 - 125/68)  RR: 18 (04-09-25 @ 04:15) (18 - 18)  SpO2: 93% (04-09-25 @ 07:58) (93% - 100%)    INTAKE AND OUTPUT:       PHYSICAL EXAM:  Constitutional: Comfortable . No acute distress.   HEENT: Atraumatic and normocephalic , neck is supple . no JVD. No carotid bruit.  CNS: A&Ox3. No focal deficits.   Respiratory: CTAB, unlabored   Cardiovascular: RRR normal s1 s2. No murmur. No rubs or gallop.  Gastrointestinal: Soft, non-tender. +Bowel sounds.   Extremities: 2+ Peripheral Pulses, No clubbing, cyanosis, or edema  Psychiatric: Calm . no agitation.   Skin: Warm and dry, no ulcers on extremities     LABS:                            11.7   4.07  )-----------( 211      ( 09 Apr 2025 05:12 )             33.0     04-09    134[L]  |  100  |  10.5  ----------------------------<  79  4.3   |  20.0[L]  |  0.74    Ca    8.5      09 Apr 2025 05:12  Phos  2.6     04-09  Mg     1.7     04-09    TPro  6.3[L]  /  Alb  3.1[L]  /  TBili  0.3[L]  /  DBili  x   /  AST  17  /  ALT  11  /  AlkPhos  76  04-09      Urinalysis Basic - ( 09 Apr 2025 05:12 )    Color: x / Appearance: x / SG: x / pH: x  Gluc: 79 mg/dL / Ketone: x  / Bili: x / Urobili: x   Blood: x / Protein: x / Nitrite: x   Leuk Esterase: x / RBC: x / WBC x   Sq Epi: x / Non Sq Epi: x / Bacteria: x              INTERPRETATION OF TELEMETRY:     ECG:   Prior ECG: Yes [  ] No [  ]    RADIOLOGY & ADDITIONAL STUDIES:    X-ray:    CT scan:   MRI:   US:                                                [facility]                                              CARDIOLOGY AT [address], [place] [address]                                             Telephone: [phone]. Fax:[phone]                                                         CARDIOLOGY CONSULTATION NOTE                                                                                             History obtained by: Patient and medical record  Community Cardiologist: Dr. Louis   obtained: Yes [  ] No [ x ]  Reason for Consultation: Arrhythmia  Available out pt records reviewed: Yes [  ] No [  ]    Chief complaint:    Patient is a 79y old  Male who presents with a chief complaint of     HPI:    79m with PMHx CAD, HLD, MI (1988), HTN, Asthma, who presented to [facility] ED with weakness and dizziness. The patient reported that the symptoms began about two weeks prior and was associated with episodes of falls. The patient denied any significant injuries and denied any loss of consciousness. He was unaware of any relieving factors. He noted that the symptoms are worsened with standing up and attempts to walk. He does note a history of tinnitus which had not changed in severity. There was no associated nausea or vomiting but he did note decrease in appetite and oral intake. The patient was reported to have had labile blood pressures and a fever. The patient was reported to have been previously hospitalized previously for similar symptoms but was without a definitive diagnosis. In the emergency department, the patient had continued symptoms and was thought to require admission to the hospital for further treatment. Cardiology consulted for new arrythmia and hx of CAD. Review of tele with what appears to be Atrial tachycardia. Denies chest pain, back pain, headache, SOB, VASQUEZ, diaphoresis, syncope or N/V.      CARDIAC TESTING   ECHO: PENDING   Echo: 9/2023 TTE CONCLUSIONS:  ?  1. Left ventricular systolic function is normal with an ejection fraction of 55 % by López's method of disks with an ejection fraction visually estimated at 55 to 60 %.  2. Basal and mid inferior wall is abnormal.  3. There is mild (grade 1) left ventricular diastolic dysfunction.  4. Right ventricular cavity is normal in size and normal systolic function.  5. Trace aortic regurgitation.  6. Mild calcification of the aortic valve leaflets.  7. Mild tricuspid regurgitation.  8. No pericardial effusion seen.  9. Compared to the transthoracic echocardiogram performed on 6/20/2023 there have been no significant interval changes.  STRESS:    CATH: [facility] on 8/24/2023 revealing significant 2 vessel CAD, pLAD 90% and p RCA ( fills via collaterals from the LCx), mild LV dysfunction LVEF 40-45%, successful PCI of pLAD with DESx1, successful IVUS of LAD.    ELECTROPHYSIOLOGY:     Cardiac MRI: revealed a LVEF=42%. No LGE or fibrosis.    PAST MEDICAL HISTORY  CAD (coronary artery disease)  Myocardial infarction  Dyslipidemia  Asthma  GERD (gastroesophageal reflux disease)  Rheumatoid arthritis  Tinnitus  Renal cell cancer    PAST SURGICAL HISTORY  CAD S/P percutaneous coronary angioplasty  Hx of cholecystectomy  H/O arthroscopy of left knee  H/O inguinal hernia repair  H/O foot surgery  S/p nephrectomy      SOCIAL HISTORY:  Denies smoking/alcohol/drugs  CIGARETTES:     ALCOHOL:  DRUGS:    FAMILY HISTORY:  FH: myocardial infarction (Father)      Family History of Cardiovascular Disease:  Yes [ x ] No [  ]  Coronary Artery Disease in first degree relative: Yes [ x ] No [  ]  Sudden Cardiac Death in First degree relative: Yes [ x ] No [  ]    HOME MEDICATIONS:  Entresto 24 mg-26 mg oral tablet: 1 tab(s) orally 2 times a day (06 Apr 2025 09:29)  levothyroxine 50 mcg (0.05 mg) oral tablet: 1 tab(s) orally once a day (06 Apr 2025 09:29)  metoprolol succinate 25 mg oral capsule, extended release: 1 cap(s) orally once a day (06 Apr 2025 09:29)  montelukast 10 mg oral tablet: 1 tab(s) orally once a day (at bedtime) (06 Apr 2025 09:29)  Plavix 75 mg oral tablet: 1 tab(s) orally once a day (06 Apr 2025 09:29)  Repatha 140 mg/mL subcutaneous solution: 140 milligram(s) subcutaneously every 2 weeks (06 Apr 2025 09:29)  tamsulosin 0.4 mg oral capsule: 1 cap(s) orally once a day (at bedtime) (06 Apr 2025 09:29)  zolpidem 10 mg oral tablet: 1 tab(s) orally once a day (06 Apr 2025 09:29)      CURRENT CARDIAC MEDICATIONS:  metoprolol succinate ER 25 milliGRAM(s) Oral daily      CURRENT OTHER MEDICATIONS:  montelukast 10 milliGRAM(s) Oral at bedtime  acetaminophen     Tablet .. 650 milliGRAM(s) Oral every 6 hours PRN Temp greater or equal to 38C (100.4F), Mild Pain (1 - 3)  clopidogrel Tablet 75 milliGRAM(s) Oral daily  levothyroxine 50 MICROGram(s) Oral daily  tamsulosin 0.4 milliGRAM(s) Oral at bedtime      ALLERGIES:   No Known Allergies  statins (Unknown)      REVIEW OF SYMPTOMS:   CONSTITUTIONAL: No fever, no chills, no weight loss, no weight gain, no fatigue   ENMT:  No vertigo; No sinus or throat pain  NECK: No pain or stiffness  CARDIOVASCULAR: No chest pain, no dyspnea, no syncope/presyncope, no palpitations, no dizziness, no Orthopnea, no Paroxsymal nocturnal dyspnea  RESPIRATORY: no Shortness of breath, no cough, no wheezing  : No dysuria, no hematuria   GI: No dark color stool, no nausea, no diarrhea, no constipation, no abdominal pain   NEURO: No headache, no slurred speech   MUSCULOSKELETAL: No joint pain or swelling; No muscle, back, or extremity pain  PSYCH: No agitation, no anxiety.    ALL OTHER REVIEW OF SYSTEMS ARE NEGATIVE.    VITAL SIGNS:  T(C): 36.3 (04-09-25 @ 07:58), Max: 36.7 (04-09-25 @ 04:15)  T(F): 97.4 (04-09-25 @ 07:58), Max: 98.1 (04-09-25 @ 04:15)  HR: 70 (04-09-25 @ 07:58) (69 - 72)  BP: 104/66 (04-09-25 @ 07:58) (104/66 - 125/68)  RR: 18 (04-09-25 @ 04:15) (18 - 18)  SpO2: 93% (04-09-25 @ 07:58) (93% - 100%)    INTAKE AND OUTPUT:       PHYSICAL EXAM:  Constitutional: Comfortable . No acute distress.   HEENT: Atraumatic and normocephalic , neck is supple . no JVD. No carotid bruit.  CNS: A&Ox3. No focal deficits.   Respiratory: CTAB, unlabored   Cardiovascular: RRR normal s1 s2. No murmur. No rubs or gallop.  Gastrointestinal: Soft, non-tender. +Bowel sounds.   Extremities: 2+ Peripheral Pulses, No clubbing, cyanosis, or edema  Psychiatric: Calm . no agitation.   Skin: Warm and dry, no ulcers on extremities     LABS:                            11.7   4.07  )-----------( 211      ( 09 Apr 2025 05:12 )             33.0     04-09    134[L]  |  100  |  10.5  ----------------------------<  79  4.3   |  20.0[L]  |  0.74    Ca    8.5      09 Apr 2025 05:12  Phos  2.6     04-09  Mg     1.7     04-09    TPro  6.3[L]  /  Alb  3.1[L]  /  TBili  0.3[L]  /  DBili  x   /  AST  17  /  ALT  11  /  AlkPhos  76  04-09      Urinalysis Basic - ( 09 Apr 2025 05:12 )    Color: x / Appearance: x / SG: x / pH: x  Gluc: 79 mg/dL / Ketone: x  / Bili: x / Urobili: x   Blood: x / Protein: x / Nitrite: x   Leuk Esterase: x / RBC: x / WBC x   Sq Epi: x / Non Sq Epi: x / Bacteria: x              INTERPRETATION OF TELEMETRY:     ECG: NSR no ischemic changes  Prior ECG: Yes [  ] No [  ]    RADIOLOGY & ADDITIONAL STUDIES:    X-ray:    CT scan:   MRI:   US:

## 2025-04-09 NOTE — CONSULT NOTE ADULT - ASSESSMENT
1m with PMHx CAD, HLD (Praluent), MI (1988), HTN, Asthma, who presented to [facility] ED with weakness and dizziness. The patient reported that the symptoms began about two weeks prior and was associated with episodes of falls. Previously hospitalized with similar symptoms and found to be hyponatremic on arrival. Cardiology consulted for new arrythmia and hx of CAD. Review of tele with short run of atrial tachycardia. Also noted with pause, likely vasovagal event. Orthostatic +

## 2025-04-09 NOTE — CONSULT NOTE ADULT - PROBLEM SELECTOR RECOMMENDATION 9
.  - presented with dizziness and fall  - short run of atrial tachycardia non-sustained  - also with vagal episode and pause, none seen since original episode.  - Orthostatics +, fluid resuscitated with 2.5L NS, consider further fluid optimization  - TTE 9/2023 without significant abnormality, per Dr. Mckinney no need to repeat, can follow up as OP for repeat  - will also need MCOT as OP for further evaluation  - continue Metoprolol Succinate 25mg PO daily, continue Plavix 75mg PO daily  - Entresto on hold for dizziness and orthostatic hypotension, will resume as OP

## 2025-04-09 NOTE — PROGRESS NOTE ADULT - ATTENDING COMMENTS
79 year old male with Dyslipidemia, Coronary Artery Disease s/p PCI, Hypothyroidism, GERD and Left Renal Cell Carcinoma s/p Nephrectomy presented with dizziness x 2 weeks.  Hypotensive at presentation and noted to have mild hyponatremia and admitted for further management.    Patient interviewed and examined.  Occasional dizziness. No chest pain, dyspnea, palpitations, fever or chills    BP improved  Orthostatic  (+)  Elderly male lying in bed, comfortable  regular rate and rhythm S1 S2  Clear to auscultation bilaterally  Soft nondistended nontender BS+  No edema  Able to lift all extremities off bed    Hgb 11.7  Na 134  Blood culture (-)  CT head (-)  MRI, MRA brain - No acte changes or LVO  Monitor with pause, AT    # Dizziness, possible related to hypotension (?Medication related - patient on ARNi)  # Hyponatremia, likely hypovolemic. Received 2500ml IVF so far, urine studies affected by 9% NaCl  # Anemia, mild, stable  # CAD, asymptomatic  # Hypothyroidism, normal TSH    - Monitor for arrhythmia  - Discontinue ARNi; monitor Orthostatic  - Hydration encourage  - Check TTE  - Cardiology evaluation    Disposition - Pending work up; PT recommendation for home vs [facility]

## 2025-04-10 ENCOUNTER — TRANSCRIPTION ENCOUNTER (OUTPATIENT)
Age: 80
End: 2025-04-10

## 2025-04-10 LAB
ALBUMIN SERPL ELPH-MCNC: 3.3 G/DL — SIGNIFICANT CHANGE UP (ref 3.3–5.2)
ALP SERPL-CCNC: 81 U/L — SIGNIFICANT CHANGE UP (ref 40–120)
ALT FLD-CCNC: 12 U/L — SIGNIFICANT CHANGE UP
ANION GAP SERPL CALC-SCNC: 12 MMOL/L — SIGNIFICANT CHANGE UP (ref 5–17)
AST SERPL-CCNC: 15 U/L — SIGNIFICANT CHANGE UP
BILIRUB SERPL-MCNC: 0.3 MG/DL — LOW (ref 0.4–2)
BUN SERPL-MCNC: 9.4 MG/DL — SIGNIFICANT CHANGE UP (ref 8–20)
CALCIUM SERPL-MCNC: 8.4 MG/DL — SIGNIFICANT CHANGE UP (ref 8.4–10.5)
CHLORIDE SERPL-SCNC: 98 MMOL/L — SIGNIFICANT CHANGE UP (ref 96–108)
CO2 SERPL-SCNC: 21 MMOL/L — LOW (ref 22–29)
CORTIS AM PEAK SERPL-MCNC: 6.5 UG/DL — SIGNIFICANT CHANGE UP (ref 6–18.4)
CREAT SERPL-MCNC: 0.7 MG/DL — SIGNIFICANT CHANGE UP (ref 0.5–1.3)
EGFR: 93 ML/MIN/1.73M2 — SIGNIFICANT CHANGE UP
EGFR: 93 ML/MIN/1.73M2 — SIGNIFICANT CHANGE UP
GLUCOSE SERPL-MCNC: 85 MG/DL — SIGNIFICANT CHANGE UP (ref 70–99)
HCT VFR BLD CALC: 33.9 % — LOW (ref 39–50)
HGB BLD-MCNC: 11.9 G/DL — LOW (ref 13–17)
MAGNESIUM SERPL-MCNC: 1.7 MG/DL — SIGNIFICANT CHANGE UP (ref 1.6–2.6)
MCHC RBC-ENTMCNC: 30.4 PG — SIGNIFICANT CHANGE UP (ref 27–34)
MCHC RBC-ENTMCNC: 35.1 G/DL — SIGNIFICANT CHANGE UP (ref 32–36)
MCV RBC AUTO: 86.7 FL — SIGNIFICANT CHANGE UP (ref 80–100)
NRBC # BLD AUTO: 0 K/UL — SIGNIFICANT CHANGE UP (ref 0–0)
NRBC # FLD: 0 K/UL — SIGNIFICANT CHANGE UP (ref 0–0)
NRBC BLD AUTO-RTO: 0 /100 WBCS — SIGNIFICANT CHANGE UP (ref 0–0)
PHOSPHATE SERPL-MCNC: 2.6 MG/DL — SIGNIFICANT CHANGE UP (ref 2.4–4.7)
PLATELET # BLD AUTO: 229 K/UL — SIGNIFICANT CHANGE UP (ref 150–400)
PMV BLD: 8.2 FL — SIGNIFICANT CHANGE UP (ref 7–13)
POTASSIUM SERPL-MCNC: 4.2 MMOL/L — SIGNIFICANT CHANGE UP (ref 3.5–5.3)
POTASSIUM SERPL-SCNC: 4.2 MMOL/L — SIGNIFICANT CHANGE UP (ref 3.5–5.3)
PROT SERPL-MCNC: 6.5 G/DL — LOW (ref 6.6–8.7)
RBC # BLD: 3.91 M/UL — LOW (ref 4.2–5.8)
RBC # FLD: 12.7 % — SIGNIFICANT CHANGE UP (ref 10.3–14.5)
SODIUM SERPL-SCNC: 131 MMOL/L — LOW (ref 135–145)
URATE SERPL-MCNC: 5.1 MG/DL — SIGNIFICANT CHANGE UP (ref 3.4–7)
URATE UR-MCNC: 42.4 MG/DL — SIGNIFICANT CHANGE UP
WBC # BLD: 4.12 K/UL — SIGNIFICANT CHANGE UP (ref 3.8–10.5)
WBC # FLD AUTO: 4.12 K/UL — SIGNIFICANT CHANGE UP (ref 3.8–10.5)

## 2025-04-10 PROCEDURE — 99232 SBSQ HOSP IP/OBS MODERATE 35: CPT | Mod: GC

## 2025-04-10 RX ADMIN — METOPROLOL SUCCINATE 25 MILLIGRAM(S): 50 TABLET, EXTENDED RELEASE ORAL at 05:16

## 2025-04-10 RX ADMIN — Medication 50 MICROGRAM(S): at 05:16

## 2025-04-10 RX ADMIN — Medication 1 GRAM(S): at 17:00

## 2025-04-10 RX ADMIN — MONTELUKAST SODIUM 10 MILLIGRAM(S): 10 TABLET ORAL at 20:31

## 2025-04-10 RX ADMIN — Medication 5 MILLIGRAM(S): at 23:40

## 2025-04-10 RX ADMIN — TAMSULOSIN HYDROCHLORIDE 0.4 MILLIGRAM(S): 0.4 CAPSULE ORAL at 20:30

## 2025-04-10 RX ADMIN — CLOPIDOGREL BISULFATE 75 MILLIGRAM(S): 75 TABLET, FILM COATED ORAL at 17:01

## 2025-04-10 NOTE — DISCHARGE NOTE PROVIDER - NSDCMRMEDTOKEN_GEN_ALL_CORE_FT
Entresto 24 mg-26 mg oral tablet: 1 tab(s) orally 2 times a day  levothyroxine 50 mcg (0.05 mg) oral tablet: 1 tab(s) orally once a day  metoprolol succinate 25 mg oral capsule, extended release: 1 cap(s) orally once a day  montelukast 10 mg oral tablet: 1 tab(s) orally once a day (at bedtime)  Plavix 75 mg oral tablet: 1 tab(s) orally once a day  Repatha 140 mg/mL subcutaneous solution: 140 milligram(s) subcutaneously every 2 weeks  tamsulosin 0.4 mg oral capsule: 1 cap(s) orally once a day (at bedtime)  zolpidem 10 mg oral tablet: 1 tab(s) orally once a day   Entresto 24 mg-26 mg oral tablet: 1 tab(s) orally 2 times a day  levothyroxine 50 mcg (0.05 mg) oral tablet: 1 tab(s) orally once a day  metoprolol succinate 25 mg oral capsule, extended release: 1 cap(s) orally once a day  montelukast 10 mg oral tablet: 1 tab(s) orally once a day (at bedtime)  Plavix 75 mg oral tablet: 1 tab(s) orally once a day  Repatha 140 mg/mL subcutaneous solution: 140 milligram(s) subcutaneously every 2 weeks  sodium chloride 1 g oral tablet: 1 tab(s) orally 2 times a day  tamsulosin 0.4 mg oral capsule: 1 cap(s) orally once a day (at bedtime)  zolpidem 10 mg oral tablet: 1 tab(s) orally once a day

## 2025-04-10 NOTE — DISCHARGE NOTE PROVIDER - NSDCFUSCHEDAPPT_GEN_ALL_CORE_FT
Victor Manuel Car  [facility]  FAMILYMED [address]  Scheduled Appointment: 04/16/2025    Aung Dimas  [facility]  CARDIOLOGY [address] [facility]  Scheduled Appointment: 05/28/2025

## 2025-04-10 NOTE — PROGRESS NOTE ADULT - ATTENDING COMMENTS
Agree with above   Patient seen and examined together with medical residents. C/o weakness.   Vital signs,  labs and imaging  reviewed   Assessment and plan discussed with with residents and pt  Stable for discharge to [facility]

## 2025-04-10 NOTE — PROGRESS NOTE ADULT - SUBJECTIVE AND OBJECTIVE BOX
Hospitalist Daily Progress Note    Chief Complaint:  Patient is a 79y old  Male who presents with a chief complaint of     SUBJECTIVE / OVERNIGHT EVENTS:  Patient was seen and examined at bedside. Complains of dizziness  Patient denies chest pain, SOB, abd pain, N/V, fever, chills, dysuria or any other complaints. All remainder ROS negative.     MEDICATIONS  (STANDING):  clopidogrel Tablet 75 milliGRAM(s) Oral daily  levothyroxine 50 MICROGram(s) Oral daily  metoprolol succinate ER 25 milliGRAM(s) Oral daily  montelukast 10 milliGRAM(s) Oral at bedtime  sacubitril 24 mG/valsartan 26 mG 1 Tablet(s) Oral two times a day  tamsulosin 0.4 milliGRAM(s) Oral at bedtime    MEDICATIONS  (PRN):  acetaminophen     Tablet .. 650 milliGRAM(s) Oral every 6 hours PRN Temp greater or equal to 38C (100.4F), Mild Pain (1 - 3)        I&O's Summary    06 Apr 2025 07:01  -  07 Apr 2025 07:00  --------------------------------------------------------  IN: 0 mL / OUT: 125 mL / NET: -125 mL        PHYSICAL EXAM:  Vital Signs Last 24 Hrs  T(C): 36.4 (07 Apr 2025 11:33), Max: 37.2 (06 Apr 2025 19:19)  T(F): 97.6 (07 Apr 2025 11:33), Max: 98.9 (06 Apr 2025 19:19)  HR: 62 (07 Apr 2025 11:33) (62 - 85)  BP: 128/73 (07 Apr 2025 11:33) (114/75 - 157/65)  BP(mean): --  RR: 18 (07 Apr 2025 11:33) (18 - 18)  SpO2: 97% (07 Apr 2025 11:33) (95% - 98%)    Parameters below as of 07 Apr 2025 11:33  Patient On (Oxygen Delivery Method): room air          Constitutional: NAD, Resting  ENT: Supple, No JVD  Lungs: CTA B/L, Non-labored breathing  Cardio: RRR, S1/S2, No murmur  Abdomen: Soft, Nontender, Nondistended; Bowel sounds present  Extremities: No calf tenderness, No pitting edema  Musculoskeletal:   No joint swelling  Psych: Calm, cooperative affect appropriate  Neuro: Awake and alert, oriented x4, 5/5 in B/L UE/LE  Skin: No rashes; no palpable lesions    LABS:                        11.4   4.80  )-----------( 182      ( 07 Apr 2025 03:51 )             32.8     04-07    132[L]  |  99  |  9.6  ----------------------------<  95  4.2   |  20.0[L]  |  0.86    Ca    8.3[L]      07 Apr 2025 03:51    TPro  6.5[L]  /  Alb  3.3  /  TBili  0.4  /  DBili  x   /  AST  18  /  ALT  15  /  AlkPhos  69  04-05    PT/INR - ( 05 Apr 2025 21:06 )   PT: 12.9 sec;   INR: 1.11 ratio         PTT - ( 05 Apr 2025 21:06 )  PTT:31.6 sec      Urinalysis Basic - ( 07 Apr 2025 03:51 )    Color: x / Appearance: x / SG: x / pH: x  Gluc: 95 mg/dL / Ketone: x  / Bili: x / Urobili: x   Blood: x / Protein: x / Nitrite: x   Leuk Esterase: x / RBC: x / WBC x   Sq Epi: x / Non Sq Epi: x / Bacteria: x        Urinalysis with Rflx Culture (collected 06 Apr 2025 07:30)    Culture - Blood (collected 05 Apr 2025 23:24)  Source: Blood Blood-Peripheral  Preliminary Report (07 Apr 2025 09:02):    No growth at 24 hours      CAPILLARY BLOOD GLUCOSE            RADIOLOGY REVIEWED  
Patient is a 79y old  Male who presents with a chief complaint of     INTERVAL HPI/OVERNIGHT EVENTS:  - No acute overnight events    TODAY:  - Patient examined bedside, states still feeling weakness. Dizziness slightly better.   - Patient denies CP, SOB, fever, nausea, vomiting    MEDICATIONS  (STANDING):  clopidogrel Tablet 75 milliGRAM(s) Oral daily  levothyroxine 50 MICROGram(s) Oral daily  metoprolol succinate ER 25 milliGRAM(s) Oral daily  montelukast 10 milliGRAM(s) Oral at bedtime  sodium chloride 1 Gram(s) Oral two times a day  tamsulosin 0.4 milliGRAM(s) Oral at bedtime    MEDICATIONS  (PRN):  acetaminophen     Tablet .. 650 milliGRAM(s) Oral every 6 hours PRN Temp greater or equal to 38C (100.4F), Mild Pain (1 - 3)      Allergies    No Known Allergies    Intolerances    statins (Unknown)      REVIEW OF SYSTEMS:  as above      Vital Signs Last 24 Hrs  T(C): 36.3 (10 Apr 2025 04:35), Max: 36.6 (09 Apr 2025 17:30)  T(F): 97.3 (10 Apr 2025 04:35), Max: 97.8 (09 Apr 2025 17:30)  HR: 72 (10 Apr 2025 04:35) (72 - 80)  BP: 112/71 (10 Apr 2025 04:35) (112/71 - 164/76)  BP(mean): --  RR: 18 (10 Apr 2025 04:35) (18 - 18)  SpO2: 95% (10 Apr 2025 04:35) (95% - 99%)    Parameters below as of 10 Apr 2025 04:35  Patient On (Oxygen Delivery Method): room air      PHYSICAL EXAM:  Constitutional: NAD, Resting  ENT: Supple, No JVD  Lungs: CTA B/L, Non-labored breathing  Cardio: RRR, S1/S2, No murmur  Abdomen: Soft, Nontender, Nondistended; Bowel sounds present  Extremities: No calf tenderness, No pitting edema  Musculoskeletal:   No joint swelling  Psych: Calm, cooperative affect appropriate  Neuro: Awake and alert, oriented x4, 4-5/5 in B/L UE/LE  Skin: No rashes; no palpable lesions      LABS:                        11.9   4.12  )-----------( 229      ( 10 Apr 2025 05:15 )             33.9     04-10    131[L]  |  98  |  9.4  ----------------------------<  85  4.2   |  21.0[L]  |  0.70    Ca    8.4      10 Apr 2025 05:15  Phos  2.6     04-10  Mg     1.7     04-10    TPro  6.5[L]  /  Alb  3.3  /  TBili  0.3[L]  /  DBili  x   /  AST  15  /  ALT  12  /  AlkPhos  81  04-10      Urinalysis Basic - ( 10 Apr 2025 05:15 )    Color: x / Appearance: x / SG: x / pH: x  Gluc: 85 mg/dL / Ketone: x  / Bili: x / Urobili: x   Blood: x / Protein: x / Nitrite: x   Leuk Esterase: x / RBC: x / WBC x   Sq Epi: x / Non Sq Epi: x / Bacteria: x      CAPILLARY BLOOD GLUCOSE          RADIOLOGY & ADDITIONAL TESTS:    Imaging Personally Reviewed:  [X] YES  [ ] NO    Consultant(s) Notes Reviewed:  [X] YES  [ ] NO    Care Discussed with Consultants/Other Providers [X] YES  [ ] NO    Plan of Care discussed with House Staff: [X]YES [ ] NO
Patient is a 79y old  Male who presents with a chief complaint of     INTERVAL HPI/OVERNIGHT EVENTS:  - No acute overnight events    TODAY:  - Patient examined bedside, still c/o muscle weakness (muscle strength 4-5/5), dizziness improved compared to admission   - Patient denies CP, SOB, fever, nausea, vomiting  - MRI reviewed without acute finding. TTE ordered     MEDICATIONS  (STANDING):  clopidogrel Tablet 75 milliGRAM(s) Oral daily  levothyroxine 50 MICROGram(s) Oral daily  metoprolol succinate ER 25 milliGRAM(s) Oral daily  montelukast 10 milliGRAM(s) Oral at bedtime  sacubitril 24 mG/valsartan 26 mG 1 Tablet(s) Oral two times a day  tamsulosin 0.4 milliGRAM(s) Oral at bedtime    MEDICATIONS  (PRN):  acetaminophen     Tablet .. 650 milliGRAM(s) Oral every 6 hours PRN Temp greater or equal to 38C (100.4F), Mild Pain (1 - 3)      Allergies    No Known Allergies    Intolerances    statins (Unknown)      REVIEW OF SYSTEMS:  as above      Vital Signs Last 24 Hrs  T(C): 36.3 (09 Apr 2025 07:58), Max: 36.7 (09 Apr 2025 04:15)  T(F): 97.4 (09 Apr 2025 07:58), Max: 98.1 (09 Apr 2025 04:15)  HR: 70 (09 Apr 2025 07:58) (69 - 72)  BP: 104/66 (09 Apr 2025 07:58) (104/66 - 125/68)  BP(mean): --  RR: 18 (09 Apr 2025 04:15) (18 - 18)  SpO2: 93% (09 Apr 2025 07:58) (93% - 100%)    Parameters below as of 09 Apr 2025 07:58  Patient On (Oxygen Delivery Method): room air        PHYSICAL EXAM:  Constitutional: NAD, Resting  ENT: Supple, No JVD  Lungs: CTA B/L, Non-labored breathing  Cardio: RRR, S1/S2, No murmur  Abdomen: Soft, Nontender, Nondistended; Bowel sounds present  Extremities: No calf tenderness, No pitting edema  Musculoskeletal:   No joint swelling  Psych: Calm, cooperative affect appropriate  Neuro: Awake and alert, oriented x4, 4-5/5 in B/L UE/LE  Skin: No rashes; no palpable lesions      LABS:                        11.7   4.07  )-----------( 211      ( 09 Apr 2025 05:12 )             33.0     04-09    134[L]  |  100  |  10.5  ----------------------------<  79  4.3   |  20.0[L]  |  0.74    Ca    8.5      09 Apr 2025 05:12  Phos  2.6     04-09  Mg     1.7     04-09    TPro  6.3[L]  /  Alb  3.1[L]  /  TBili  0.3[L]  /  DBili  x   /  AST  17  /  ALT  11  /  AlkPhos  76  04-09      Urinalysis Basic - ( 09 Apr 2025 05:12 )    Color: x / Appearance: x / SG: x / pH: x  Gluc: 79 mg/dL / Ketone: x  / Bili: x / Urobili: x   Blood: x / Protein: x / Nitrite: x   Leuk Esterase: x / RBC: x / WBC x   Sq Epi: x / Non Sq Epi: x / Bacteria: x      CAPILLARY BLOOD GLUCOSE          RADIOLOGY & ADDITIONAL TESTS:    Imaging Personally Reviewed:  [X] YES  [ ] NO    Consultant(s) Notes Reviewed:  [X] YES  [ ] NO    Care Discussed with Consultants/Other Providers [X] YES  [ ] NO    Plan of Care discussed with House Staff: [X]YES [ ] NO
Patient is a 79y old  Male who presents with a chief complaint of     INTERVAL HPI/OVERNIGHT EVENTS:  - No acute overnight events    TODAY:  - Patient examined bedside, c/o weakness and dizziness. States feeling tired, not willing to talk   -  Son was at the bedside states pt had 2 admission past 2-3 weeks at [place]. First was s/p fall, no heart strike due to "chronic mild strokes". 2nd time was nose bleeding   - AM sBP was lower 80s. given 500cc NC bolus.  Repeated BP 98/61       MEDICATIONS  (STANDING):  clopidogrel Tablet 75 milliGRAM(s) Oral daily  levothyroxine 50 MICROGram(s) Oral daily  metoprolol succinate ER 25 milliGRAM(s) Oral daily  montelukast 10 milliGRAM(s) Oral at bedtime  sacubitril 24 mG/valsartan 26 mG 1 Tablet(s) Oral two times a day  tamsulosin 0.4 milliGRAM(s) Oral at bedtime    MEDICATIONS  (PRN):  acetaminophen     Tablet .. 650 milliGRAM(s) Oral every 6 hours PRN Temp greater or equal to 38C (100.4F), Mild Pain (1 - 3)      Allergies    No Known Allergies    Intolerances    statins (Unknown)      REVIEW OF SYSTEMS:  as above      Vital Signs Last 24 Hrs  T(C): 36.9 (08 Apr 2025 07:55), Max: 37.1 (08 Apr 2025 05:00)  T(F): 98.5 (08 Apr 2025 07:55), Max: 98.7 (08 Apr 2025 05:00)  HR: 65 (08 Apr 2025 07:55) (65 - 92)  BP: 98/61 (08 Apr 2025 07:55) (98/61 - 148/75)  BP(mean): --  RR: 19 (08 Apr 2025 07:55) (18 - 19)  SpO2: 96% (08 Apr 2025 07:55) (96% - 98%)    Parameters below as of 08 Apr 2025 07:55  Patient On (Oxygen Delivery Method): room air        PHYSICAL EXAM:  Constitutional: NAD, Resting  ENT: Supple, No JVD  Lungs: CTA B/L, Non-labored breathing  Cardio: RRR, S1/S2, No murmur  Abdomen: Soft, Nontender, Nondistended; Bowel sounds present  Extremities: No calf tenderness, No pitting edema  Musculoskeletal:   No joint swelling  Psych: Calm, cooperative affect appropriate  Neuro: Awake and alert, oriented x4, 5/5 in B/L UE/LE  Skin: No rashes; no palpable lesions    LABS:                        12.0   4.65  )-----------( 188      ( 08 Apr 2025 05:25 )             34.4     04-08    131[L]  |  98  |  9.6  ----------------------------<  90  4.0   |  19.0[L]  |  0.75    Ca    8.2[L]      08 Apr 2025 05:25        Urinalysis Basic - ( 08 Apr 2025 05:25 )    Color: x / Appearance: x / SG: x / pH: x  Gluc: 90 mg/dL / Ketone: x  / Bili: x / Urobili: x   Blood: x / Protein: x / Nitrite: x   Leuk Esterase: x / RBC: x / WBC x   Sq Epi: x / Non Sq Epi: x / Bacteria: x      CAPILLARY BLOOD GLUCOSE          RADIOLOGY & ADDITIONAL TESTS:    Imaging Personally Reviewed:  [X] YES  [ ] NO    Consultant(s) Notes Reviewed:  [X] YES  [ ] NO    Care Discussed with Consultants/Other Providers [X] YES  [ ] NO    Plan of Care discussed with House Staff: [X]YES [ ] NO

## 2025-04-10 NOTE — DISCHARGE NOTE PROVIDER - PROVIDER TOKENS
FREE:[LAST:[PCP],PHONE:[(   )    -],FAX:[(   )    -],FOLLOWUP:[1 week]],FREE:[LAST:[Cardiology],PHONE:[(   )    -],FAX:[(   )    -],FOLLOWUP:[1 week]],FREE:[LAST:[Neurology],PHONE:[(   )    -],FAX:[(   )    -],FOLLOWUP:[1 week]]

## 2025-04-10 NOTE — PROGRESS NOTE ADULT - ASSESSMENT
79M with a history of coronary artery disease, renal cell carcinoma with nephrectomy, and hypothyroidism, who presented with complaints of weakness and dizziness found to have hyponatremia.    #Hyponatremia Suspect secondary to poor oral intake  Intravenous fluids were administered in the emergency department  Trend NA   Sodium 131 this morning   Urine studies Reviewed  TSH wnl    #Dizziness / Weakness  Ataxia noted in the emergency department  CT head was without acute intracranial pathology  No focal neurological deficits noted  For MRI to further assess  Fall precautions    #Fever  Reported fever prior to presentation  Afebrile in the emergency department  Urinalysis was not indicative of infection  Chest Xray was without infiltrates  Blood culture results negative up to today       #Coronary artery disease  On clopidogrel  Continue on metoprolol and sacubitril/valsartan    #Hypothyroidism  On levothyroxine  TSH WNL    Dispo: Anticipate discharge home in 2-3 days pending clinical course and MRI results  
79M with a history of coronary artery disease, renal cell carcinoma with nephrectomy, and hypothyroidism, who presented with complaints of weakness and dizziness found to have hyponatremia.    #Hyponatremia Suspect secondary to poor oral intake (improving)   Intravenous fluids were administered in the emergency department  Sodium 134 this morning   Urine studies in ER reviewed. has concerns it's affected by NS infusion  Urine studies and serum osmolality ordered this morning   TSH wnl    #Dizziness / Weakness  Ataxia noted in the emergency department  CT head was without acute intracranial pathology  No focal neurological deficits noted  No acue finding on MRI  Fall precautions  EKG NS on admission, check TTE  Consider ortho static -> tests pending     #Fever (resolved)   Reported fever prior to presentation  Afebrile in the emergency department  Urinalysis was not indicative of infection  Chest Xray was without infiltrates  Blood culture results negative up to today       #Coronary artery disease  On clopidogrel  Continue on metoprolol and sacubitril/valsartan  monitor BP     #Hypothyroidism  On levothyroxine  TSH WNL    Dispo: Anticipate discharge home in 1-2 days pending clinical course     
79M with a history of coronary artery disease, renal cell carcinoma with nephrectomy, and hypothyroidism, who presented with complaints of weakness and dizziness found to have hyponatremia.    #Hyponatremia  Intravenous fluids were administered in the emergency department  Trend NA  Suspect secondary to poor oral intake  Urine studies Reviewed  TSH wnl    #Dizziness / Weakness  Ataxia noted in the emergency department  CT head was without acute intracranial pathology  No focal neurological deficits noted  For MRI to further assess  Fall precautions    #Fever  Reported fever prior to presentation  Afebrile in the emergency department  Urinalysis was not indicative of infection  Chest Xray was without infiltrates  Blood culture results to be reviewed when available    #Coronary artery disease  On clopidogrel  Continue on metoprolol and sacubitril/valsartan    #Hypothyroidism  On levothyroxine  TSH WNL    Dispo: Anticipate discharge home in 2-3 days pending clinical course and MRI results
79M with a history of coronary artery disease, renal cell carcinoma with nephrectomy, and hypothyroidism, who presented with complaints of weakness and dizziness found to have hyponatremia.    #Hyponatremia Suspect secondary to poor oral intake (improving)   Intravenous fluids were administered in the emergency department  Sodium 131 this morning   Urine studies in ER reviewed. has concerns it's affected by NS infusion  Urine studies and serum osmolality re-ordered -> seems SIADH pic    TSH wnl  AM cortisol pending   Started sodium tablet 1g bid   continue monitor sodium level     #Dizziness / Weakness  Ataxia noted in the emergency department  CT head was without acute intracranial pathology  No focal neurological deficits noted  No acue finding on MRI  Fall precautions  EKG NS on admission  Ortho static +  Cardiology consult appreciated   Will have TTE outpt and cardio outpt follow up   Hold Entresto for now   C/w metoprolol 25mg qd       #Fever (resolved)   Reported fever prior to presentation  Afebrile in the emergency department  Urinalysis was not indicative of infection  Chest Xray was without infiltrates  Blood culture results negative      #Coronary artery disease  On clopidogrel  Continue on metoprolol   monitor BP     #Hypothyroidism  On levothyroxine  TSH WNL    Dispo: Anticipate discharge home in 1-2 days pending clinical course

## 2025-04-10 NOTE — DISCHARGE NOTE PROVIDER - HOSPITAL COURSE
79M with a history of coronary artery disease (S/P stents), renal cell carcinoma with nephrectomy, and hypothyroidism, who presented with complaints of weakness and dizziness found to have hyponatremia. After admission, pt had urine workup, given NS and LR IVF, sodium tablets. Sodium was 131 this morning. Neuro workup did not see acute finding. Cardiology following and rec'd outpt follow up, hold Entresto and continue metoprolol. Pt is medically improved and ready to be discharged to [facility]. and rec'd neurology outpt workup for possible dementia.

## 2025-04-10 NOTE — DISCHARGE NOTE PROVIDER - NSDCCPCAREPLAN_GEN_ALL_CORE_FT
PRINCIPAL DISCHARGE DIAGNOSIS  Diagnosis: Hyponatremia  Assessment and Plan of Treatment: #Hyponatremia Suspect secondary to poor oral intake (improving)   S/P IVF. sodium improving.    TSH wnl  AM cortisol ordered.   Started sodium tablet 1g bid   continue monitor sodium level   PCP follow up in 1 week         SECONDARY DISCHARGE DIAGNOSES  Diagnosis: Weakness with dizziness  Assessment and Plan of Treatment: Ataxia noted in the emergency department  CT head was without acute intracranial pathology  No focal neurological deficits noted  No acue finding on MRI  Fall precautions  EKG NS on admission  Ortho static +  Cardiology consult appreciated   Will have TTE outpt and cardio outpt follow up   Hold Entresto for now   C/w metoprolol 25mg qd    Diagnosis: CAD (coronary artery disease)  Assessment and Plan of Treatment: On clopidogrel  Continue on metoprolol   Cardiology outpt follow up in 1 week    Diagnosis: Hypothyroidism  Assessment and Plan of Treatment: On levothyroxine  TSH WNL

## 2025-04-10 NOTE — DISCHARGE NOTE PROVIDER - NSDCFUADDAPPT_GEN_ALL_CORE_FT
APPTS ARE READY TO BE MADE: [X] YES    Best Family or Patient Contact (if needed):    Additional Information about above appointments (if needed):    1: PCP in 1 week  2: Neurology in 1 week   3: Cardiology in 1-2 week     Other comments or requests:    APPTS ARE READY TO BE MADE: [X] YES    Best Family or Patient Contact (if needed):    Additional Information about above appointments (if needed):    1: PCP in 1 week  2: Neurology in 1 week   3: Cardiology in 1-2 week     Other comments or requests:         Patient is being transferred. Caregiver will arrange follow up.

## 2025-04-10 NOTE — DISCHARGE NOTE PROVIDER - CARE PROVIDER_API CALL
PCP,   Phone: (   )    -  Fax: (   )    -  Follow Up Time: 1 week    Cardiology,   Phone: (   )    -  Fax: (   )    -  Follow Up Time: 1 week    Neurology,   Phone: (   )    -  Fax: (   )    -  Follow Up Time: 1 week

## 2025-04-11 ENCOUNTER — TRANSCRIPTION ENCOUNTER (OUTPATIENT)
Age: 80
End: 2025-04-11

## 2025-04-11 VITALS
RESPIRATION RATE: 18 BRPM | DIASTOLIC BLOOD PRESSURE: 74 MMHG | OXYGEN SATURATION: 99 % | SYSTOLIC BLOOD PRESSURE: 148 MMHG | HEART RATE: 74 BPM | TEMPERATURE: 97 F

## 2025-04-11 LAB
ALBUMIN SERPL ELPH-MCNC: 3.3 G/DL — SIGNIFICANT CHANGE UP (ref 3.3–5.2)
ALP SERPL-CCNC: 88 U/L — SIGNIFICANT CHANGE UP (ref 40–120)
ALT FLD-CCNC: 12 U/L — SIGNIFICANT CHANGE UP
ANION GAP SERPL CALC-SCNC: 12 MMOL/L — SIGNIFICANT CHANGE UP (ref 5–17)
AST SERPL-CCNC: 15 U/L — SIGNIFICANT CHANGE UP
BILIRUB SERPL-MCNC: 0.4 MG/DL — SIGNIFICANT CHANGE UP (ref 0.4–2)
BUN SERPL-MCNC: 8.9 MG/DL — SIGNIFICANT CHANGE UP (ref 8–20)
CALCIUM SERPL-MCNC: 8.6 MG/DL — SIGNIFICANT CHANGE UP (ref 8.4–10.5)
CHLORIDE SERPL-SCNC: 98 MMOL/L — SIGNIFICANT CHANGE UP (ref 96–108)
CO2 SERPL-SCNC: 24 MMOL/L — SIGNIFICANT CHANGE UP (ref 22–29)
CREAT SERPL-MCNC: 0.83 MG/DL — SIGNIFICANT CHANGE UP (ref 0.5–1.3)
CULTURE RESULTS: SIGNIFICANT CHANGE UP
EGFR: 88 ML/MIN/1.73M2 — SIGNIFICANT CHANGE UP
EGFR: 88 ML/MIN/1.73M2 — SIGNIFICANT CHANGE UP
GLUCOSE SERPL-MCNC: 92 MG/DL — SIGNIFICANT CHANGE UP (ref 70–99)
HCT VFR BLD CALC: 34.7 % — LOW (ref 39–50)
HGB BLD-MCNC: 12.2 G/DL — LOW (ref 13–17)
MAGNESIUM SERPL-MCNC: 1.9 MG/DL — SIGNIFICANT CHANGE UP (ref 1.6–2.6)
MCHC RBC-ENTMCNC: 30.8 PG — SIGNIFICANT CHANGE UP (ref 27–34)
MCHC RBC-ENTMCNC: 35.2 G/DL — SIGNIFICANT CHANGE UP (ref 32–36)
MCV RBC AUTO: 87.6 FL — SIGNIFICANT CHANGE UP (ref 80–100)
NRBC # BLD AUTO: 0 K/UL — SIGNIFICANT CHANGE UP (ref 0–0)
NRBC # FLD: 0 K/UL — SIGNIFICANT CHANGE UP (ref 0–0)
NRBC BLD AUTO-RTO: 0 /100 WBCS — SIGNIFICANT CHANGE UP (ref 0–0)
PHOSPHATE SERPL-MCNC: 2.9 MG/DL — SIGNIFICANT CHANGE UP (ref 2.4–4.7)
PLATELET # BLD AUTO: 222 K/UL — SIGNIFICANT CHANGE UP (ref 150–400)
PMV BLD: 8.3 FL — SIGNIFICANT CHANGE UP (ref 7–13)
POTASSIUM SERPL-MCNC: 4.2 MMOL/L — SIGNIFICANT CHANGE UP (ref 3.5–5.3)
POTASSIUM SERPL-SCNC: 4.2 MMOL/L — SIGNIFICANT CHANGE UP (ref 3.5–5.3)
PROT SERPL-MCNC: 6.7 G/DL — SIGNIFICANT CHANGE UP (ref 6.6–8.7)
RBC # BLD: 3.96 M/UL — LOW (ref 4.2–5.8)
RBC # FLD: 12.6 % — SIGNIFICANT CHANGE UP (ref 10.3–14.5)
SODIUM SERPL-SCNC: 134 MMOL/L — LOW (ref 135–145)
SPECIMEN SOURCE: SIGNIFICANT CHANGE UP
WBC # BLD: 3.9 K/UL — SIGNIFICANT CHANGE UP (ref 3.8–10.5)
WBC # FLD AUTO: 3.9 K/UL — SIGNIFICANT CHANGE UP (ref 3.8–10.5)

## 2025-04-11 PROCEDURE — 92610 EVALUATE SWALLOWING FUNCTION: CPT

## 2025-04-11 PROCEDURE — 70450 CT HEAD/BRAIN W/O DYE: CPT | Mod: MC

## 2025-04-11 PROCEDURE — 83935 ASSAY OF URINE OSMOLALITY: CPT

## 2025-04-11 PROCEDURE — 84443 ASSAY THYROID STIM HORMONE: CPT

## 2025-04-11 PROCEDURE — 84550 ASSAY OF BLOOD/URIC ACID: CPT

## 2025-04-11 PROCEDURE — 83930 ASSAY OF BLOOD OSMOLALITY: CPT

## 2025-04-11 PROCEDURE — 81003 URINALYSIS AUTO W/O SCOPE: CPT

## 2025-04-11 PROCEDURE — 84300 ASSAY OF URINE SODIUM: CPT

## 2025-04-11 PROCEDURE — 70551 MRI BRAIN STEM W/O DYE: CPT | Mod: MC

## 2025-04-11 PROCEDURE — 80048 BASIC METABOLIC PNL TOTAL CA: CPT

## 2025-04-11 PROCEDURE — 85018 HEMOGLOBIN: CPT

## 2025-04-11 PROCEDURE — 80053 COMPREHEN METABOLIC PANEL: CPT

## 2025-04-11 PROCEDURE — 84132 ASSAY OF SERUM POTASSIUM: CPT

## 2025-04-11 PROCEDURE — 83605 ASSAY OF LACTIC ACID: CPT

## 2025-04-11 PROCEDURE — 84100 ASSAY OF PHOSPHORUS: CPT

## 2025-04-11 PROCEDURE — 82962 GLUCOSE BLOOD TEST: CPT

## 2025-04-11 PROCEDURE — 93005 ELECTROCARDIOGRAM TRACING: CPT

## 2025-04-11 PROCEDURE — 85025 COMPLETE CBC W/AUTO DIFF WBC: CPT

## 2025-04-11 PROCEDURE — 70544 MR ANGIOGRAPHY HEAD W/O DYE: CPT | Mod: MC

## 2025-04-11 PROCEDURE — 85610 PROTHROMBIN TIME: CPT

## 2025-04-11 PROCEDURE — 83735 ASSAY OF MAGNESIUM: CPT

## 2025-04-11 PROCEDURE — 84560 ASSAY OF URINE/URIC ACID: CPT

## 2025-04-11 PROCEDURE — 85730 THROMBOPLASTIN TIME PARTIAL: CPT

## 2025-04-11 PROCEDURE — 71045 X-RAY EXAM CHEST 1 VIEW: CPT

## 2025-04-11 PROCEDURE — 85014 HEMATOCRIT: CPT

## 2025-04-11 PROCEDURE — 36415 COLL VENOUS BLD VENIPUNCTURE: CPT

## 2025-04-11 PROCEDURE — 82947 ASSAY GLUCOSE BLOOD QUANT: CPT

## 2025-04-11 PROCEDURE — 85027 COMPLETE CBC AUTOMATED: CPT

## 2025-04-11 PROCEDURE — 82533 TOTAL CORTISOL: CPT

## 2025-04-11 PROCEDURE — 84295 ASSAY OF SERUM SODIUM: CPT

## 2025-04-11 PROCEDURE — 99285 EMERGENCY DEPT VISIT HI MDM: CPT | Mod: 25

## 2025-04-11 PROCEDURE — 70547 MR ANGIOGRAPHY NECK W/O DYE: CPT | Mod: MC

## 2025-04-11 PROCEDURE — 84484 ASSAY OF TROPONIN QUANT: CPT

## 2025-04-11 PROCEDURE — 87040 BLOOD CULTURE FOR BACTERIA: CPT

## 2025-04-11 PROCEDURE — 82803 BLOOD GASES ANY COMBINATION: CPT

## 2025-04-11 PROCEDURE — 99239 HOSP IP/OBS DSCHRG MGMT >30: CPT

## 2025-04-11 PROCEDURE — 82330 ASSAY OF CALCIUM: CPT

## 2025-04-11 PROCEDURE — 0225U NFCT DS DNA&RNA 21 SARSCOV2: CPT

## 2025-04-11 PROCEDURE — 82435 ASSAY OF BLOOD CHLORIDE: CPT

## 2025-04-11 RX ADMIN — Medication 50 MICROGRAM(S): at 05:19

## 2025-04-11 RX ADMIN — CLOPIDOGREL BISULFATE 75 MILLIGRAM(S): 75 TABLET, FILM COATED ORAL at 11:19

## 2025-04-11 RX ADMIN — METOPROLOL SUCCINATE 25 MILLIGRAM(S): 50 TABLET, EXTENDED RELEASE ORAL at 05:19

## 2025-04-11 RX ADMIN — Medication 1 GRAM(S): at 05:19

## 2025-04-11 NOTE — DISCHARGE NOTE NURSING/CASE MANAGEMENT/SOCIAL WORK - FINANCIAL ASSISTANCE
[facility] provides services at a reduced cost to those who are determined to be eligible through [facility]’s financial assistance program. Information regarding [facility]’s financial assistance program can be found by going to [email or web address] or by calling [phone].

## 2025-04-11 NOTE — DISCHARGE NOTE NURSING/CASE MANAGEMENT/SOCIAL WORK - PATIENT PORTAL LINK FT
You can access the FollowMyHealth Patient Portal offered by [facility] by registering at the following website: [email or web address]. By joining [facility]’s FollowMyHealth portal, you will also be able to view your health information using other applications (apps) compatible with our system.

## 2025-04-11 NOTE — CHART NOTE - NSCHARTNOTEFT_GEN_A_CORE
Nutrition Note:     Pt to be discharged today to [facility].     Speech tx recommending soft and bite sized.  Pt is a total feed.     Continue to monitor po intake, tolerance.   RD to remain available.

## 2025-04-11 NOTE — DISCHARGE NOTE NURSING/CASE MANAGEMENT/SOCIAL WORK - NSDCFUADDAPPT_GEN_ALL_CORE_FT
APPTS ARE READY TO BE MADE: [X] YES    Best Family or Patient Contact (if needed):    Additional Information about above appointments (if needed):    1: PCP in 1 week  2: Neurology in 1 week   3: Cardiology in 1-2 week     Other comments or requests:         Patient is being transferred. Caregiver will arrange follow up.

## 2025-04-11 NOTE — DISCHARGE NOTE NURSING/CASE MANAGEMENT/SOCIAL WORK - NSDCCRNAME_GEN_ALL_CORE_FT
[facility]  [address] [place] [id number]  Phone: [phone]

## 2025-04-14 ENCOUNTER — NON-APPOINTMENT (OUTPATIENT)
Age: 80
End: 2025-04-14

## 2025-04-16 ENCOUNTER — APPOINTMENT (OUTPATIENT)
Dept: FAMILY MEDICINE | Facility: CLINIC | Age: 80
End: 2025-04-16

## 2025-04-18 NOTE — DISCHARGE NOTE PROVIDER - NSDCQMSTROKE_NEU_ALL_CORE
Dear Laura,     Confirmatory strep PCR was negative.        Thank you,     Cheri La, APRN, CNP  [facility]     No

## 2025-04-30 NOTE — ED CDU PROVIDER SUBSEQUENT DAY NOTE - CROS ED RESP ALL NEG
Social Work/Discharge Planning:  Hospice consult noted for information.  Met with patient best Shahid and provided him with hospice choice list.  He will review and notify this worker of hospice preference.  Plan remains to [facility] when medically stable and patient will need an insurance pre-cert.  Will continue to follow.  Electronically signed by SOFI Morales on 4/30/2025 at 10:28 AM    Addendum:  Received call from Allison with [facility] stating insurance approved pre-cert and good until 5/2.  Updated patient family and RN.  Will continue to follow.  Electronically signed by SOFI Morales on 4/30/2025 at 12:35 PM    Addendum:  [id number] completed. Electronically signed by SOFI Morales on 4/30/2025 at 1:53 PM     negative...

## 2025-05-09 ENCOUNTER — TRANSCRIPTION ENCOUNTER (OUTPATIENT)
Age: 80
End: 2025-05-09

## 2025-05-14 ENCOUNTER — NON-APPOINTMENT (OUTPATIENT)
Age: 80
End: 2025-05-14

## 2025-05-15 ENCOUNTER — APPOINTMENT (OUTPATIENT)
Dept: FAMILY MEDICINE | Facility: CLINIC | Age: 80
End: 2025-05-15

## 2025-05-15 VITALS
SYSTOLIC BLOOD PRESSURE: 124 MMHG | HEART RATE: 72 BPM | DIASTOLIC BLOOD PRESSURE: 60 MMHG | TEMPERATURE: 97.5 F | HEIGHT: 66 IN | RESPIRATION RATE: 15 BRPM

## 2025-05-15 DIAGNOSIS — Z87.09 PERSONAL HISTORY OF OTHER DISEASES OF THE RESPIRATORY SYSTEM: ICD-10-CM

## 2025-05-15 DIAGNOSIS — Z01.810 ENCOUNTER FOR PREPROCEDURAL CARDIOVASCULAR EXAMINATION: ICD-10-CM

## 2025-05-15 DIAGNOSIS — I42.8 OTHER CARDIOMYOPATHIES: ICD-10-CM

## 2025-05-15 DIAGNOSIS — Z91.09 OTHER ALLERGY STATUS, OTHER THAN TO DRUGS AND BIOLOGICAL SUBSTANCES: ICD-10-CM

## 2025-05-15 DIAGNOSIS — R19.4 CHANGE IN BOWEL HABIT: ICD-10-CM

## 2025-05-15 DIAGNOSIS — H10.45 OTHER CHRONIC ALLERGIC CONJUNCTIVITIS: ICD-10-CM

## 2025-05-15 DIAGNOSIS — Z87.2 PERSONAL HISTORY OF DISEASES OF THE SKIN AND SUBCUTANEOUS TISSUE: ICD-10-CM

## 2025-05-15 DIAGNOSIS — M19.041 PRIMARY OSTEOARTHRITIS, RIGHT HAND: ICD-10-CM

## 2025-05-15 DIAGNOSIS — G56.02 CARPAL TUNNEL SYNDROME, LEFT UPPER LIMB: ICD-10-CM

## 2025-05-15 DIAGNOSIS — Z86.39 PERSONAL HISTORY OF OTHER ENDOCRINE, NUTRITIONAL AND METABOLIC DISEASE: ICD-10-CM

## 2025-05-15 DIAGNOSIS — G56.22 LESION OF ULNAR NERVE, LEFT UPPER LIMB: ICD-10-CM

## 2025-05-15 DIAGNOSIS — M19.029 PRIMARY OSTEOARTHRITIS, UNSPECIFIED ELBOW: ICD-10-CM

## 2025-05-15 DIAGNOSIS — R22.32 LOCALIZED SWELLING, MASS AND LUMP, LEFT UPPER LIMB: ICD-10-CM

## 2025-05-15 DIAGNOSIS — Z87.438 PERSONAL HISTORY OF OTHER DISEASES OF MALE GENITAL ORGANS: ICD-10-CM

## 2025-05-15 DIAGNOSIS — M17.10 UNILATERAL PRIMARY OSTEOARTHRITIS, UNSPECIFIED KNEE: ICD-10-CM

## 2025-05-15 DIAGNOSIS — M25.522 PAIN IN LEFT ELBOW: ICD-10-CM

## 2025-05-15 DIAGNOSIS — I25.10 ATHEROSCLEROTIC HEART DISEASE OF NATIVE CORONARY ARTERY W/OUT ANGINA PECTORIS: ICD-10-CM

## 2025-05-15 DIAGNOSIS — R04.0 EPISTAXIS: ICD-10-CM

## 2025-05-15 DIAGNOSIS — Z86.69 PERSONAL HISTORY OF OTHER DISEASES OF THE NERVOUS SYSTEM AND SENSE ORGANS: ICD-10-CM

## 2025-05-15 DIAGNOSIS — Z86.79 PERSONAL HISTORY OF OTHER DISEASES OF THE CIRCULATORY SYSTEM: ICD-10-CM

## 2025-05-15 DIAGNOSIS — I49.3 VENTRICULAR PREMATURE DEPOLARIZATION: ICD-10-CM

## 2025-05-15 DIAGNOSIS — Z87.898 PERSONAL HISTORY OF OTHER SPECIFIED CONDITIONS: ICD-10-CM

## 2025-05-15 DIAGNOSIS — J06.9 ACUTE UPPER RESPIRATORY INFECTION, UNSPECIFIED: ICD-10-CM

## 2025-05-15 DIAGNOSIS — E78.00 PURE HYPERCHOLESTEROLEMIA, UNSPECIFIED: ICD-10-CM

## 2025-05-15 DIAGNOSIS — E03.9 HYPOTHYROIDISM, UNSPECIFIED: ICD-10-CM

## 2025-05-15 DIAGNOSIS — D64.9 ANEMIA, UNSPECIFIED: ICD-10-CM

## 2025-05-15 DIAGNOSIS — Z86.19 PERSONAL HISTORY OF OTHER INFECTIOUS AND PARASITIC DISEASES: ICD-10-CM

## 2025-05-15 DIAGNOSIS — Z12.5 ENCOUNTER FOR SCREENING FOR MALIGNANT NEOPLASM OF PROSTATE: ICD-10-CM

## 2025-05-15 DIAGNOSIS — R14.0 ABDOMINAL DISTENSION (GASEOUS): ICD-10-CM

## 2025-05-15 DIAGNOSIS — Z78.9 OTHER SPECIFIED HEALTH STATUS: ICD-10-CM

## 2025-05-15 DIAGNOSIS — R79.89 OTHER SPECIFIED ABNORMAL FINDINGS OF BLOOD CHEMISTRY: ICD-10-CM

## 2025-05-15 DIAGNOSIS — Z23 ENCOUNTER FOR IMMUNIZATION: ICD-10-CM

## 2025-05-15 DIAGNOSIS — G56.20 LESION OF ULNAR NERVE, UNSPECIFIED UPPER LIMB: ICD-10-CM

## 2025-05-15 DIAGNOSIS — J30.2 OTHER SEASONAL ALLERGIC RHINITIS: ICD-10-CM

## 2025-05-15 DIAGNOSIS — M19.042 PRIMARY OSTEOARTHRITIS, RIGHT HAND: ICD-10-CM

## 2025-05-15 PROCEDURE — G2211 COMPLEX E/M VISIT ADD ON: CPT

## 2025-05-15 PROCEDURE — 99214 OFFICE O/P EST MOD 30 MIN: CPT

## 2025-05-15 RX ORDER — ALCLOMETASONE DIPROPIONATE 0.5 MG/G
0.05 CREAM TOPICAL
Qty: 1 | Refills: 1 | Status: ACTIVE | COMMUNITY
Start: 2025-05-15 | End: 1900-01-01

## 2025-05-15 RX ORDER — METHYLPREDNISOLONE 4 MG/1
4 TABLET ORAL
Qty: 1 | Refills: 0 | Status: ACTIVE | COMMUNITY
Start: 2025-05-15 | End: 1900-01-01

## 2025-05-19 LAB
ALBUMIN SERPL ELPH-MCNC: 3.9 G/DL
ALP BLD-CCNC: 117 U/L
ALT SERPL-CCNC: 22 U/L
ANION GAP SERPL CALC-SCNC: 13 MMOL/L
AST SERPL-CCNC: 20 U/L
BASOPHILS # BLD AUTO: 0.01 K/UL
BASOPHILS NFR BLD AUTO: 0.2 %
BILIRUB SERPL-MCNC: 0.3 MG/DL
BUN SERPL-MCNC: 22 MG/DL
CALCIUM SERPL-MCNC: 9 MG/DL
CHLORIDE SERPL-SCNC: 107 MMOL/L
CO2 SERPL-SCNC: 19 MMOL/L
CREAT SERPL-MCNC: 0.9 MG/DL
EGFRCR SERPLBLD CKD-EPI 2021: 86 ML/MIN/1.73M2
EOSINOPHIL # BLD AUTO: 0.28 K/UL
EOSINOPHIL NFR BLD AUTO: 6.9 %
GLUCOSE SERPL-MCNC: 140 MG/DL
HCT VFR BLD CALC: 37.7 %
HGB BLD-MCNC: 12.1 G/DL
IMM GRANULOCYTES NFR BLD AUTO: 0.2 %
INFLUENZA A RESULT: NOT DETECTED
INFLUENZA B RESULT: NOT DETECTED
LYMPHOCYTES # BLD AUTO: 1.06 K/UL
LYMPHOCYTES NFR BLD AUTO: 26.2 %
MAN DIFF?: NORMAL
MCHC RBC-ENTMCNC: 31.3 PG
MCHC RBC-ENTMCNC: 32.1 G/DL
MCV RBC AUTO: 97.4 FL
MONOCYTES # BLD AUTO: 0.38 K/UL
MONOCYTES NFR BLD AUTO: 9.4 %
NEUTROPHILS # BLD AUTO: 2.3 K/UL
NEUTROPHILS NFR BLD AUTO: 57.1 %
NT-PROBNP SERPL-MCNC: 308 PG/ML
PLATELET # BLD AUTO: 186 K/UL
POTASSIUM SERPL-SCNC: 4 MMOL/L
PROT SERPL-MCNC: 6.9 G/DL
RBC # BLD: 3.87 M/UL
RBC # FLD: 15 %
RESP SYN VIRUS RESULT: NOT DETECTED
SARS-COV-2 RESULT: NOT DETECTED
SODIUM SERPL-SCNC: 140 MMOL/L
WBC # FLD AUTO: 4.04 K/UL

## 2025-05-22 ENCOUNTER — NON-APPOINTMENT (OUTPATIENT)
Age: 80
End: 2025-05-22

## 2025-05-22 LAB — BACTERIA EYE AEROBE CULT: ABNORMAL

## 2025-05-23 ENCOUNTER — TRANSCRIPTION ENCOUNTER (OUTPATIENT)
Age: 80
End: 2025-05-23

## 2025-05-28 ENCOUNTER — APPOINTMENT (OUTPATIENT)
Dept: CARDIOLOGY | Facility: CLINIC | Age: 80
End: 2025-05-28

## 2025-06-06 ENCOUNTER — NON-APPOINTMENT (OUTPATIENT)
Age: 80
End: 2025-06-06

## 2025-06-06 NOTE — ED ADULT NURSE NOTE - NS ED NOTE ABUSE SUSPICION NEGLECT YN
Attempted to reach pt to confirm she received the detailed message that was left regarding her medication hold instructions.. VMMLOM with CB# and OH.   No

## 2025-06-09 ENCOUNTER — APPOINTMENT (OUTPATIENT)
Dept: DERMATOLOGY | Facility: CLINIC | Age: 80
End: 2025-06-09

## 2025-06-12 ENCOUNTER — RX RENEWAL (OUTPATIENT)
Age: 80
End: 2025-06-12

## 2025-06-18 ENCOUNTER — APPOINTMENT (OUTPATIENT)
Dept: FAMILY MEDICINE | Facility: CLINIC | Age: 80
End: 2025-06-18
Payer: MEDICARE

## 2025-06-18 VITALS
DIASTOLIC BLOOD PRESSURE: 60 MMHG | HEART RATE: 70 BPM | BODY MASS INDEX: 21.05 KG/M2 | WEIGHT: 131 LBS | OXYGEN SATURATION: 98 % | SYSTOLIC BLOOD PRESSURE: 120 MMHG | HEIGHT: 66 IN

## 2025-06-18 PROBLEM — Z09 HOSPITAL DISCHARGE FOLLOW-UP: Status: ACTIVE | Noted: 2025-06-18

## 2025-06-18 PROCEDURE — 99495 TRANSJ CARE MGMT MOD F2F 14D: CPT

## 2025-06-18 PROCEDURE — 36415 COLL VENOUS BLD VENIPUNCTURE: CPT

## 2025-06-18 RX ORDER — TRAZODONE HYDROCHLORIDE 50 MG/1
50 TABLET ORAL
Qty: 30 | Refills: 1 | Status: ACTIVE | COMMUNITY
Start: 2025-06-18 | End: 1900-01-01

## 2025-06-19 LAB
ALBUMIN SERPL ELPH-MCNC: 3.9 G/DL
ALP BLD-CCNC: 82 U/L
ALT SERPL-CCNC: 42 U/L
ANION GAP SERPL CALC-SCNC: 13 MMOL/L
AST SERPL-CCNC: 25 U/L
BASOPHILS # BLD AUTO: 0.02 K/UL
BASOPHILS NFR BLD AUTO: 0.2 %
BILIRUB SERPL-MCNC: 0.3 MG/DL
BUN SERPL-MCNC: 24 MG/DL
CALCIUM SERPL-MCNC: 9.7 MG/DL
CHLORIDE SERPL-SCNC: 101 MMOL/L
CHOLEST SERPL-MCNC: 104 MG/DL
CO2 SERPL-SCNC: 20 MMOL/L
CREAT SERPL-MCNC: 0.91 MG/DL
EGFRCR SERPLBLD CKD-EPI 2021: 85 ML/MIN/1.73M2
EOSINOPHIL # BLD AUTO: 0 K/UL
EOSINOPHIL NFR BLD AUTO: 0 %
GLUCOSE SERPL-MCNC: 98 MG/DL
HCT VFR BLD CALC: 39.9 %
HDLC SERPL-MCNC: 54 MG/DL
HGB BLD-MCNC: 12.9 G/DL
IMM GRANULOCYTES NFR BLD AUTO: 0.4 %
LDLC SERPL-MCNC: 29 MG/DL
LYMPHOCYTES # BLD AUTO: 0.55 K/UL
LYMPHOCYTES NFR BLD AUTO: 5.3 %
MAN DIFF?: NORMAL
MCHC RBC-ENTMCNC: 30.3 PG
MCHC RBC-ENTMCNC: 32.3 G/DL
MCV RBC AUTO: 93.7 FL
MONOCYTES # BLD AUTO: 0.44 K/UL
MONOCYTES NFR BLD AUTO: 4.2 %
NEUTROPHILS # BLD AUTO: 9.31 K/UL
NEUTROPHILS NFR BLD AUTO: 89.9 %
NONHDLC SERPL-MCNC: 49 MG/DL
PLATELET # BLD AUTO: 193 K/UL
POTASSIUM SERPL-SCNC: 5.2 MMOL/L
PROT SERPL-MCNC: 7.2 G/DL
RBC # BLD: 4.26 M/UL
RBC # FLD: 15.1 %
SODIUM SERPL-SCNC: 135 MMOL/L
TRIGL SERPL-MCNC: 114 MG/DL
TSH SERPL-ACNC: 1 UIU/ML
WBC # FLD AUTO: 10.36 K/UL

## 2025-06-23 PROBLEM — R35.89 POLYURIA: Status: ACTIVE | Noted: 2025-06-23

## 2025-06-23 RX ORDER — TAMSULOSIN HYDROCHLORIDE 0.4 MG/1
0.4 CAPSULE ORAL
Qty: 90 | Refills: 0 | Status: ACTIVE | COMMUNITY
Start: 2025-06-23 | End: 1900-01-01

## 2025-06-30 ENCOUNTER — NON-APPOINTMENT (OUTPATIENT)
Age: 80
End: 2025-06-30

## 2025-07-07 ENCOUNTER — NON-APPOINTMENT (OUTPATIENT)
Age: 80
End: 2025-07-07

## 2025-07-16 ENCOUNTER — APPOINTMENT (OUTPATIENT)
Dept: FAMILY MEDICINE | Facility: CLINIC | Age: 80
End: 2025-07-16
Payer: MEDICARE

## 2025-07-16 VITALS
HEART RATE: 84 BPM | SYSTOLIC BLOOD PRESSURE: 120 MMHG | OXYGEN SATURATION: 98 % | DIASTOLIC BLOOD PRESSURE: 60 MMHG | WEIGHT: 131 LBS | HEIGHT: 66 IN | BODY MASS INDEX: 21.05 KG/M2

## 2025-07-16 PROCEDURE — G2211 COMPLEX E/M VISIT ADD ON: CPT

## 2025-07-16 PROCEDURE — 99214 OFFICE O/P EST MOD 30 MIN: CPT

## 2025-07-16 PROCEDURE — 36415 COLL VENOUS BLD VENIPUNCTURE: CPT

## 2025-07-17 LAB
ALBUMIN SERPL ELPH-MCNC: 4 G/DL
ALP BLD-CCNC: 106 U/L
ALT SERPL-CCNC: 16 U/L
ANION GAP SERPL CALC-SCNC: 12 MMOL/L
AST SERPL-CCNC: 24 U/L
BILIRUB SERPL-MCNC: 0.2 MG/DL
BUN SERPL-MCNC: 20 MG/DL
CALCIUM SERPL-MCNC: 9.9 MG/DL
CHLORIDE SERPL-SCNC: 102 MMOL/L
CO2 SERPL-SCNC: 24 MMOL/L
CREAT SERPL-MCNC: 0.94 MG/DL
EGFRCR SERPLBLD CKD-EPI 2021: 82 ML/MIN/1.73M2
GLUCOSE SERPL-MCNC: 94 MG/DL
POTASSIUM SERPL-SCNC: 4.6 MMOL/L
PROT SERPL-MCNC: 7 G/DL
SODIUM SERPL-SCNC: 138 MMOL/L

## 2025-07-18 LAB
BASOPHILS # BLD AUTO: 0.03 K/UL
BASOPHILS NFR BLD AUTO: 0.5 %
EOSINOPHIL # BLD AUTO: 0.19 K/UL
EOSINOPHIL NFR BLD AUTO: 3.1 %
HCT VFR BLD CALC: 40.6 %
HGB BLD-MCNC: 12.8 G/DL
IMM GRANULOCYTES NFR BLD AUTO: 0.3 %
LYMPHOCYTES # BLD AUTO: 1.37 K/UL
LYMPHOCYTES NFR BLD AUTO: 22.5 %
MAN DIFF?: NORMAL
MCHC RBC-ENTMCNC: 29.8 PG
MCHC RBC-ENTMCNC: 31.5 G/DL
MCV RBC AUTO: 94.4 FL
MONOCYTES # BLD AUTO: 0.56 K/UL
MONOCYTES NFR BLD AUTO: 9.2 %
NEUTROPHILS # BLD AUTO: 3.93 K/UL
NEUTROPHILS NFR BLD AUTO: 64.4 %
PLATELET # BLD AUTO: 197 K/UL
RBC # BLD: 4.3 M/UL
RBC # FLD: 15.4 %
WBC # FLD AUTO: 6.1 K/UL

## 2025-07-25 ENCOUNTER — APPOINTMENT (OUTPATIENT)
Dept: CARDIOLOGY | Facility: CLINIC | Age: 80
End: 2025-07-25
Payer: MEDICARE

## 2025-07-25 VITALS
HEIGHT: 66 IN | HEART RATE: 62 BPM | BODY MASS INDEX: 21.86 KG/M2 | WEIGHT: 136 LBS | DIASTOLIC BLOOD PRESSURE: 62 MMHG | SYSTOLIC BLOOD PRESSURE: 118 MMHG | OXYGEN SATURATION: 98 %

## 2025-07-25 DIAGNOSIS — I42.8 OTHER CARDIOMYOPATHIES: ICD-10-CM

## 2025-07-25 DIAGNOSIS — I25.10 ATHEROSCLEROTIC HEART DISEASE OF NATIVE CORONARY ARTERY W/OUT ANGINA PECTORIS: ICD-10-CM

## 2025-07-25 DIAGNOSIS — E78.00 PURE HYPERCHOLESTEROLEMIA, UNSPECIFIED: ICD-10-CM

## 2025-07-25 PROCEDURE — 93000 ELECTROCARDIOGRAM COMPLETE: CPT

## 2025-07-25 PROCEDURE — 99214 OFFICE O/P EST MOD 30 MIN: CPT

## 2025-07-25 RX ORDER — MULTIVIT-MIN/FA/LYCOPEN/LUTEIN .4-300-25
TABLET ORAL DAILY
Refills: 0 | Status: ACTIVE | COMMUNITY

## 2025-07-25 RX ORDER — EVOLOCUMAB 140 MG/ML
140 INJECTION, SOLUTION SUBCUTANEOUS
Qty: 6 | Refills: 1 | Status: ACTIVE | COMMUNITY
Start: 2025-07-25 | End: 1900-01-01

## 2025-07-25 RX ORDER — ZINC OXIDE 13 %
CREAM (GRAM) TOPICAL DAILY
Refills: 0 | Status: ACTIVE | COMMUNITY

## 2025-08-12 ENCOUNTER — RX RENEWAL (OUTPATIENT)
Age: 80
End: 2025-08-12

## 2025-09-01 ENCOUNTER — RX RENEWAL (OUTPATIENT)
Age: 80
End: 2025-09-01

## 2025-09-02 ENCOUNTER — RX RENEWAL (OUTPATIENT)
Age: 80
End: 2025-09-02

## 2025-09-08 ENCOUNTER — RX RENEWAL (OUTPATIENT)
Age: 80
End: 2025-09-08